# Patient Record
Sex: FEMALE | Race: WHITE | Employment: OTHER | URBAN - METROPOLITAN AREA
[De-identification: names, ages, dates, MRNs, and addresses within clinical notes are randomized per-mention and may not be internally consistent; named-entity substitution may affect disease eponyms.]

---

## 2018-08-07 ENCOUNTER — HOSPITAL ENCOUNTER (EMERGENCY)
Age: 53
Discharge: HOME | End: 2018-08-07
Payer: MEDICARE

## 2018-08-07 VITALS
TEMPERATURE: 98.4 F | DIASTOLIC BLOOD PRESSURE: 92 MMHG | SYSTOLIC BLOOD PRESSURE: 168 MMHG | HEART RATE: 83 BPM | WEIGHT: 120 LBS | RESPIRATION RATE: 15 BRPM | OXYGEN SATURATION: 95 % | HEIGHT: 62 IN | BODY MASS INDEX: 22.08 KG/M2

## 2018-08-07 DIAGNOSIS — R31.0 GROSS HEMATURIA: Primary | ICD-10-CM

## 2018-08-07 LAB
AMORPHOUS: ABNORMAL
BACTERIA: ABNORMAL /HPF
BILIRUBIN URINE: NEGATIVE
BLOOD, URINE: ABNORMAL
CLARITY: ABNORMAL
COLOR: ABNORMAL
EPITHELIAL CELLS, UA: ABNORMAL /HPF
GLUCOSE URINE: NEGATIVE MG/DL
KETONES, URINE: NEGATIVE MG/DL
LEUKOCYTE ESTERASE, URINE: NEGATIVE
NITRITE, URINE: NEGATIVE
PH UA: 7 (ref 5–9)
PROTEIN UA: NEGATIVE MG/DL
RBC UA: >20 /HPF (ref 0–2)
SPECIFIC GRAVITY UA: 1.01 (ref 1–1.03)
UROBILINOGEN, URINE: 0.2 E.U./DL
WBC UA: ABNORMAL /HPF (ref 0–5)

## 2018-08-07 PROCEDURE — 99283 EMERGENCY DEPT VISIT LOW MDM: CPT

## 2018-08-07 PROCEDURE — 87088 URINE BACTERIA CULTURE: CPT

## 2018-08-07 PROCEDURE — 81001 URINALYSIS AUTO W/SCOPE: CPT

## 2018-08-07 RX ORDER — CEFDINIR 300 MG/1
300 CAPSULE ORAL 2 TIMES DAILY
Qty: 20 CAPSULE | Refills: 0 | Status: SHIPPED | OUTPATIENT
Start: 2018-08-07 | End: 2018-08-17

## 2018-08-07 RX ORDER — GABAPENTIN 300 MG/1
300 CAPSULE ORAL 3 TIMES DAILY
Status: ON HOLD | COMMUNITY
End: 2020-01-01 | Stop reason: HOSPADM

## 2018-08-07 NOTE — ED PROVIDER NOTES
Re-examination:  8/7/18       Time: 1938    Patients symptoms show no change. All results discussed. Patient advised to follow up with urology for further evaluation and management of this condition. Consults:   None    Procedures:   none    Medical Decision Making:    Patient is well appearing, non toxic and appropriate for outpatient management. Plan is for symptom management and follow up with urology is necessary based on her UA. Patient and outpatient treatment and follow up discussed with Dr Jaime Cloud prior to the patient's discharge from the ER. No CVA ttp or flank pain on exam. No concerns for ureterolithiasis    Counseling: The emergency provider has spoken with the patient and discussed todays results, in addition to providing specific details for the plan of care and counseling regarding the diagnosis and prognosis. Questions are answered at this time and they are agreeable with the plan. Assessment      1. Gross hematuria      Plan   Disposition: Discharge to home  Patient condition is good    New Medications     New Prescriptions    CEFDINIR (OMNICEF) 300 MG CAPSULE    Take 1 capsule by mouth 2 times daily for 10 days     Electronically signed by RUDI Marti   DD: 8/7/18  **This report was transcribed using voice recognition software. Every effort was made to ensure accuracy; however, inadvertent computerized transcription errors may be present.   END OF ED PROVIDER NOTE           Dexter Early, [address]  08/07/18 1941  ATTENDING PROVIDER ATTESTATION:     Supervising Physician, on-site, available for consultation, non-participatory in the evaluation or care of this patient       Fouzia Yanez DO  08/07/18 5004

## 2018-08-07 NOTE — ED NOTES
Patient presents to er with c/o freq burning urination and blood in  Urine , patient has history of MSA, alert and oriented skin warm and dry respirations easy and non labored     Chino Hanks RN  08/07/18 7637

## 2018-08-10 LAB — URINE CULTURE, ROUTINE: NORMAL

## 2018-09-10 ENCOUNTER — HOSPITAL ENCOUNTER (OUTPATIENT)
Age: 53
Discharge: HOME | End: 2018-09-12
Payer: MEDICARE

## 2018-09-10 PROCEDURE — 87070 CULTURE OTHR SPECIMN AEROBIC: CPT

## 2018-09-10 PROCEDURE — 87075 CULTR BACTERIA EXCEPT BLOOD: CPT

## 2018-09-10 PROCEDURE — 87186 SC STD MICRODIL/AGAR DIL: CPT

## 2018-09-10 PROCEDURE — 87077 CULTURE AEROBIC IDENTIFY: CPT

## 2018-09-12 LAB — ANAEROBIC CULTURE: NORMAL

## 2018-09-13 LAB
ORGANISM: ABNORMAL
WOUND/ABSCESS: ABNORMAL
WOUND/ABSCESS: ABNORMAL

## 2018-10-22 ENCOUNTER — HOSPITAL ENCOUNTER (EMERGENCY)
Age: 53
Discharge: HOME | End: 2018-10-22
Attending: EMERGENCY MEDICINE
Payer: MEDICARE

## 2018-10-22 VITALS
HEIGHT: 62 IN | OXYGEN SATURATION: 99 % | WEIGHT: 125 LBS | HEART RATE: 76 BPM | SYSTOLIC BLOOD PRESSURE: 162 MMHG | BODY MASS INDEX: 23 KG/M2 | DIASTOLIC BLOOD PRESSURE: 78 MMHG | RESPIRATION RATE: 16 BRPM | TEMPERATURE: 98.2 F

## 2018-10-22 DIAGNOSIS — Z86.69 HX OF PARKINSON'S DISEASE: ICD-10-CM

## 2018-10-22 DIAGNOSIS — I10 UNCONTROLLED HYPERTENSION: Primary | ICD-10-CM

## 2018-10-22 LAB
ANION GAP SERPL CALCULATED.3IONS-SCNC: 10 MMOL/L (ref 7–16)
BUN BLDV-MCNC: 25 MG/DL (ref 6–20)
CALCIUM SERPL-MCNC: 10.1 MG/DL (ref 8.6–10.2)
CHLORIDE BLD-SCNC: 101 MMOL/L (ref 98–107)
CO2: 33 MMOL/L (ref 22–29)
CREAT SERPL-MCNC: 0.6 MG/DL (ref 0.5–1)
GFR AFRICAN AMERICAN: >60
GFR NON-AFRICAN AMERICAN: >60 ML/MIN/1.73
GLUCOSE BLD-MCNC: 100 MG/DL (ref 74–109)
POTASSIUM SERPL-SCNC: 3.7 MMOL/L (ref 3.5–5)
SODIUM BLD-SCNC: 144 MMOL/L (ref 132–146)

## 2018-10-22 PROCEDURE — 99283 EMERGENCY DEPT VISIT LOW MDM: CPT

## 2018-10-22 PROCEDURE — 36415 COLL VENOUS BLD VENIPUNCTURE: CPT

## 2018-10-22 PROCEDURE — 80048 BASIC METABOLIC PNL TOTAL CA: CPT

## 2018-10-22 PROCEDURE — 6360000002 HC RX W HCPCS: Performed by: EMERGENCY MEDICINE

## 2018-10-22 PROCEDURE — 6370000000 HC RX 637 (ALT 250 FOR IP): Performed by: EMERGENCY MEDICINE

## 2018-10-22 RX ORDER — FLUDROCORTISONE ACETATE 0.1 MG/1
0.1 TABLET ORAL 2 TIMES DAILY
Status: ON HOLD | COMMUNITY
End: 2020-01-01 | Stop reason: HOSPADM

## 2018-10-22 RX ORDER — ONDANSETRON 4 MG/1
8 TABLET, ORALLY DISINTEGRATING ORAL ONCE
Status: COMPLETED | OUTPATIENT
Start: 2018-10-22 | End: 2018-10-22

## 2018-10-22 RX ORDER — OXYCODONE HYDROCHLORIDE AND ACETAMINOPHEN 5; 325 MG/1; MG/1
1 TABLET ORAL ONCE
Status: DISCONTINUED | OUTPATIENT
Start: 2018-10-22 | End: 2018-10-22 | Stop reason: HOSPADM

## 2018-10-22 RX ORDER — CLONIDINE HYDROCHLORIDE 0.1 MG/1
0.1 TABLET ORAL ONCE
Status: DISCONTINUED | OUTPATIENT
Start: 2018-10-22 | End: 2018-10-22 | Stop reason: HOSPADM

## 2018-10-22 RX ORDER — CLONIDINE HYDROCHLORIDE 0.1 MG/1
0.2 TABLET ORAL ONCE
Status: COMPLETED | OUTPATIENT
Start: 2018-10-22 | End: 2018-10-22

## 2018-10-22 RX ORDER — MIDODRINE HYDROCHLORIDE 5 MG/1
5 TABLET ORAL 2 TIMES DAILY
Status: ON HOLD | COMMUNITY
End: 2020-01-01 | Stop reason: HOSPADM

## 2018-10-22 RX ADMIN — ONDANSETRON 4 MG: 4 TABLET, ORALLY DISINTEGRATING ORAL at 19:46

## 2018-10-22 RX ADMIN — CLONIDINE HYDROCHLORIDE 0.2 MG: 0.1 TABLET ORAL at 18:24

## 2018-10-22 NOTE — ED PROVIDER NOTES
HPI:  10/22/18, Time: 6:21 PM        Nacho Garrison is a 52 y.o. female presenting to the ED for elevated blood pressure readings at home, beginning 4 hours ago. The complaint has been persistent, moderate in severity, and worsened by nothing. Pt. States that her blood pressures have usually been \"low numbers\" and that for this reason, she has been prescribed medications to actually raise her blood pressure. Pt. States that she sees a specialist at the [facility] for this condition which she states is related to her Parkinson's disease. Pt. Has also chronic pain related to severe arthritis and states that this also raises her blood pressures. No other complaints, no aggravating nor relieving factors. Review of Systems:   Pertinent positives and negatives are stated within HPI, all other systems reviewed and are negative.      --------------------------------------------- PAST HISTORY ---------------------------------------------  Past Medical History:  has a past medical history of Arthritis; Depression; Falls frequently; Hypertension; Parkinson's disease ([facility] [address] and [facility] Past Surgical History:  has a past surgical history that includes Appendectomy and tumor removal.    Social History:  reports that she has never smoked. She has never used smokeless tobacco. She reports that she does not drink alcohol or use drugs. Family History: family history includes Arthritis in an other family member; Cancer in an other family member; Depression in an other family member; Heart Disease in an other family member; Hypertension in an other family member; Kidney Disease in an other family member; Stroke in an other family member. The patients home medications have been reviewed.     Allergies: Sulfa antibiotics    -------------------------------------------------- RESULTS -------------------------------------------------  All laboratory and radiology results have been personally reviewed by a few.    Counseling: The emergency provider has spoken with the patient and spouse/SO and discussed todays results, in addition to providing specific details for the plan of care and counseling regarding the diagnosis and prognosis. Questions are answered at this time and they are agreeable with the plan.    --------------------------------- IMPRESSION AND DISPOSITION ---------------------------------    IMPRESSION  1. Uncontrolled hypertension    2. Hx of Parkinson's disease        DISPOSITION  Disposition: Discharge to home  Patient condition is stable      NOTE: This report was transcribed using voice recognition software.  Every effort was made to ensure accuracy; however, inadvertent computerized transcription errors may be present       Yuriy Burk MD  10/23/18 0040

## 2018-11-09 ENCOUNTER — HOSPITAL ENCOUNTER (EMERGENCY)
Age: 53
Discharge: HOME | End: 2018-11-09
Attending: EMERGENCY MEDICINE
Payer: MEDICARE

## 2018-11-09 ENCOUNTER — APPOINTMENT (OUTPATIENT)
Dept: CT IMAGING | Age: 53
End: 2018-11-09
Payer: MEDICARE

## 2018-11-09 ENCOUNTER — APPOINTMENT (OUTPATIENT)
Dept: GENERAL RADIOLOGY | Age: 53
End: 2018-11-09
Payer: MEDICARE

## 2018-11-09 VITALS
WEIGHT: 120 LBS | TEMPERATURE: 97.6 F | SYSTOLIC BLOOD PRESSURE: 158 MMHG | BODY MASS INDEX: 19.29 KG/M2 | HEIGHT: 66 IN | HEART RATE: 96 BPM | RESPIRATION RATE: 16 BRPM | DIASTOLIC BLOOD PRESSURE: 90 MMHG | OXYGEN SATURATION: 99 %

## 2018-11-09 DIAGNOSIS — S29.019A THORACIC MYOFASCIAL STRAIN, INITIAL ENCOUNTER: ICD-10-CM

## 2018-11-09 DIAGNOSIS — S16.1XXA ACUTE STRAIN OF NECK MUSCLE, INITIAL ENCOUNTER: ICD-10-CM
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
## 2018-11-09 DIAGNOSIS — S09.90XA INJURY OF HEAD, INITIAL ENCOUNTER: Primary | ICD-10-CM

## 2018-11-09 PROCEDURE — 99284 EMERGENCY DEPT VISIT MOD MDM: CPT

## 2018-11-09 PROCEDURE — 70450 CT HEAD/BRAIN W/O DYE: CPT

## 2018-11-09 PROCEDURE — 72070 X-RAY EXAM THORAC SPINE 2VWS: CPT

## 2018-11-09 PROCEDURE — 72125 CT NECK SPINE W/O DYE: CPT

## 2018-11-09 NOTE — ED PROVIDER NOTES
family member; Cancer in an other family member; Depression in an other family member; Heart Disease in an other family member; Hypertension in an other family member; Kidney Disease in an other family member; Stroke in an other family member. The patients home medications have been reviewed. Allergies: Sulfa antibiotics        ---------------------------------------------------PHYSICAL EXAM--------------------------------------    Constitutional:  Well developed, well nourished, no acute distress, non-toxic appearance   Eyes: No scleral icterus, no conjunctival injection  HENT: Contusion, swelling to the left temporal region, no blood from ear canals, external ears normal, nose normal, oropharynx moist.  Neck: Limited range of motion and some paraspinous and spinous tenderness although patient states she has chronic neck pain  Respiratory:  No respiratory distress, no chest wall tenderness or bruising  Cardiac: Regular rate and rhythm without murmur, normal and equal bilateral radial pulses   GI:  No abdominal distention, no evidence of abdominal trauma  Musculoskeletal:  No edema, no deformities  Tender midline over the thoracic spine. Integument:  Well hydrated, no rash. Neurologic:  Alert, answers all questions appropriately ,no focal motor deficits noted. Has general weakness in her upper and lower extremities but this is chronic, has good range of motion of upper and lower extremities. Psychiatric:  Speech and behavior appropriate       -------------------------------------------------- RESULTS -------------------------------------------------  I have personally reviewed all laboratory and imaging results for this patient. Results are listed below. LABS:  No results found for this visit on 11/09/18. RADIOLOGY:  Interpreted by Radiologist.  XR THORACIC SPINE (2 VIEWS)   Final Result   No evidence of acute thoracic spine trauma. Osteopenia and mild degenerative changes are present.

## 2019-01-01 ENCOUNTER — ANESTHESIA EVENT (OUTPATIENT)
Dept: OPERATING ROOM | Age: 54
DRG: 853 | End: 2019-01-01
Payer: COMMERCIAL

## 2019-01-01 ENCOUNTER — APPOINTMENT (OUTPATIENT)
Dept: ULTRASOUND IMAGING | Age: 54
DRG: 853 | End: 2019-01-01
Payer: COMMERCIAL

## 2019-01-01 ENCOUNTER — APPOINTMENT (OUTPATIENT)
Dept: GENERAL RADIOLOGY | Age: 54
DRG: 853 | End: 2019-01-01
Payer: COMMERCIAL

## 2019-01-01 ENCOUNTER — HOSPITAL ENCOUNTER (OUTPATIENT)
Age: 54
Discharge: HOME | End: 2019-06-07
Payer: COMMERCIAL

## 2019-01-01 ENCOUNTER — ANESTHESIA (OUTPATIENT)
Dept: OPERATING ROOM | Age: 54
DRG: 853 | End: 2019-01-01
Payer: COMMERCIAL

## 2019-01-01 ENCOUNTER — APPOINTMENT (OUTPATIENT)
Dept: CT IMAGING | Age: 54
DRG: 853 | End: 2019-01-01
Payer: COMMERCIAL

## 2019-01-01 ENCOUNTER — HOSPITAL ENCOUNTER (INPATIENT)
Age: 54
LOS: 33 days | DRG: 853 | End: 2020-01-31
Attending: EMERGENCY MEDICINE | Admitting: INTERNAL MEDICINE
Payer: COMMERCIAL

## 2019-01-01 ENCOUNTER — HOSPITAL ENCOUNTER (OUTPATIENT)
Age: 54
Discharge: HOME | End: 2019-12-29
Payer: COMMERCIAL

## 2019-01-01 ENCOUNTER — HOSPITAL ENCOUNTER (OUTPATIENT)
Age: 54
Discharge: HOME | End: 2019-09-08

## 2019-01-01 VITALS — OXYGEN SATURATION: 100 % | RESPIRATION RATE: 16 BRPM

## 2019-01-01 LAB
AADO2: 164.2 MMHG
AADO2: 477.6 MMHG
AADO2: 484.2 MMHG
AADO2: 505.6 MMHG
ABO/RH: NORMAL
ALBUMIN SERPL-MCNC: 2.4 G/DL (ref 3.5–5.2)
ALBUMIN SERPL-MCNC: 2.6 G/DL (ref 3.5–5.2)
ALBUMIN SERPL-MCNC: 3.1 G/DL (ref 3.5–5.2)
ALBUMIN SERPL-MCNC: 3.2 G/DL (ref 3.5–5.2)
ALBUMIN SERPL-MCNC: 4.1 G/DL (ref 3.5–5.2)
ALP BLD-CCNC: 155 U/L (ref 35–104)
ALP BLD-CCNC: 209 U/L (ref 35–104)
ALP BLD-CCNC: 265 U/L (ref 35–104)
ALP BLD-CCNC: 338 U/L (ref 35–104)
ALP BLD-CCNC: 359 U/L (ref 35–104)
ALT SERPL-CCNC: 5 U/L (ref 0–32)
ALT SERPL-CCNC: 6 U/L (ref 0–32)
ALT SERPL-CCNC: 6 U/L (ref 0–32)
AMMONIA: 56 UMOL/L (ref 11–51)
AMMONIA: 99 UMOL/L (ref 11–51)
ANGLE (CLOT STRENGTH): 73.3 DEGREE (ref 59–74)
ANION GAP SERPL CALCULATED.3IONS-SCNC: 12 MMOL/L (ref 7–16)
ANION GAP SERPL CALCULATED.3IONS-SCNC: 14 MMOL/L (ref 7–16)
ANION GAP SERPL CALCULATED.3IONS-SCNC: 17 MMOL/L (ref 7–16)
ANISOCYTOSIS: ABNORMAL
ANTIBODY SCREEN: NORMAL
AST SERPL-CCNC: 31 U/L (ref 0–31)
AST SERPL-CCNC: 32 U/L (ref 0–31)
AST SERPL-CCNC: 39 U/L (ref 0–31)
AST SERPL-CCNC: 42 U/L (ref 0–31)
AST SERPL-CCNC: 63 U/L (ref 0–31)
B.E.: -10.7 MMOL/L (ref -3–3)
B.E.: -11.1 MMOL/L (ref -3–0)
B.E.: -4.1 MMOL/L (ref -3–3)
B.E.: -4.9 MMOL/L (ref -3–0)
B.E.: -9.3 MMOL/L (ref -3–3)
B.E.: -9.4 MMOL/L (ref -3–3)
BACTERIA: ABNORMAL /HPF
BASOPHILS ABSOLUTE: 0 E9/L (ref 0–0.2)
BASOPHILS ABSOLUTE: 0.03 E9/L (ref 0–0.2)
BASOPHILS RELATIVE PERCENT: 0 % (ref 0–2)
BASOPHILS RELATIVE PERCENT: 0 % (ref 0–2)
BASOPHILS RELATIVE PERCENT: 0.1 % (ref 0–2)
BASOPHILS RELATIVE PERCENT: 0.2 % (ref 0–2)
BASOPHILS RELATIVE PERCENT: 0.3 % (ref 0–2)
BILIRUB SERPL-MCNC: 0.2 MG/DL (ref 0–1.2)
BILIRUB SERPL-MCNC: 0.2 MG/DL (ref 0–1.2)
BILIRUB SERPL-MCNC: 0.3 MG/DL (ref 0–1.2)
BILIRUB SERPL-MCNC: 0.3 MG/DL (ref 0–1.2)
BILIRUB SERPL-MCNC: 0.4 MG/DL (ref 0–1.2)
BILIRUBIN URINE: NEGATIVE
BLOOD BANK DISPENSE STATUS: NORMAL
BLOOD BANK DISPENSE STATUS: NORMAL
BLOOD BANK PRODUCT CODE: NORMAL
BLOOD BANK PRODUCT CODE: NORMAL
BLOOD, URINE: ABNORMAL
BPU ID: NORMAL
BPU ID: NORMAL
BUN BLDV-MCNC: 49 MG/DL (ref 6–20)
BUN BLDV-MCNC: 56 MG/DL (ref 6–20)
BUN BLDV-MCNC: 59 MG/DL (ref 6–20)
BUN BLDV-MCNC: 59 MG/DL (ref 6–20)
BUN BLDV-MCNC: 60 MG/DL (ref 6–20)
BURR CELLS: ABNORMAL
CALCIUM IONIZED: 1.12 MMOL/L (ref 1.15–1.33)
CALCIUM SERPL-MCNC: 10.4 MG/DL (ref 8.6–10.2)
CALCIUM SERPL-MCNC: 7.4 MG/DL (ref 8.6–10.2)
CALCIUM SERPL-MCNC: 7.8 MG/DL (ref 8.6–10.2)
CALCIUM SERPL-MCNC: 8.5 MG/DL (ref 8.6–10.2)
CALCIUM SERPL-MCNC: 8.7 MG/DL (ref 8.6–10.2)
CARDIOPULMONARY BYPASS: NO
CARDIOPULMONARY BYPASS: NO
CHLORIDE BLD-SCNC: 105 MMOL/L (ref 98–107)
CHLORIDE BLD-SCNC: 108 MMOL/L (ref 98–107)
CHLORIDE BLD-SCNC: 108 MMOL/L (ref 98–107)
CHLORIDE BLD-SCNC: 111 MMOL/L (ref 98–107)
CHLORIDE BLD-SCNC: 99 MMOL/L (ref 98–107)
CLARITY: ABNORMAL
CO2: 16 MMOL/L (ref 22–29)
CO2: 18 MMOL/L (ref 22–29)
CO2: 20 MMOL/L (ref 22–29)
CO2: 21 MMOL/L (ref 22–29)
CO2: 27 MMOL/L (ref 22–29)
COHB: 3.1 % (ref 0–1.5)
COHB: 3.2 % (ref 0–1.5)
COHB: 3.9 % (ref 0–1.5)
COHB: 4 % (ref 0–1.5)
COLOR: YELLOW
CORTISOL TOTAL: 53.18 MCG/DL (ref 2.68–18.4)
CREAT SERPL-MCNC: 0.7 MG/DL (ref 0.5–1)
CREAT SERPL-MCNC: 0.8 MG/DL (ref 0.5–1)
CREAT SERPL-MCNC: 0.8 MG/DL (ref 0.5–1)
CREAT SERPL-MCNC: 1.1 MG/DL (ref 0.5–1)
CREAT SERPL-MCNC: 1.4 MG/DL (ref 0.5–1)
CRITICAL NOTIFICATION: YES
CRITICAL: ABNORMAL
DATE ANALYZED: ABNORMAL
DATE OF COLLECTION: ABNORMAL
DESCRIPTION BLOOD BANK: NORMAL
DESCRIPTION BLOOD BANK: NORMAL
DEVICE: ABNORMAL
DEVICE: ABNORMAL
EKG ATRIAL RATE: 78 BPM
EKG P AXIS: 9 DEGREES
EKG P-R INTERVAL: 134 MS
EKG Q-T INTERVAL: 442 MS
EKG QRS DURATION: 82 MS
EKG QTC CALCULATION (BAZETT): 503 MS
EKG R AXIS: 22 DEGREES
EKG T AXIS: 16 DEGREES
EKG VENTRICULAR RATE: 78 BPM
EOSINOPHILS ABSOLUTE: 0 E9/L (ref 0.05–0.5)
EOSINOPHILS ABSOLUTE: 0.08 E9/L (ref 0.05–0.5)
EOSINOPHILS ABSOLUTE: 0.13 E9/L (ref 0.05–0.5)
EOSINOPHILS RELATIVE PERCENT: 0 % (ref 0–6)
EOSINOPHILS RELATIVE PERCENT: 0.7 % (ref 0–6)
EOSINOPHILS RELATIVE PERCENT: 1.7 % (ref 0–6)
EPL-TEG: 0.9 % (ref 0–15)
FIO2: 45 %
FIO2: 90 %
G-TEG: 12.1 K D/SC (ref 4.5–11)
GAMMA GLUTAMYL TRANSFERASE: 24 U/L (ref 6–42)
GFR AFRICAN AMERICAN: 47
GFR AFRICAN AMERICAN: >60
GFR NON-AFRICAN AMERICAN: 39 ML/MIN/1.73
GFR NON-AFRICAN AMERICAN: 51 ML/MIN/1.73
GFR NON-AFRICAN AMERICAN: >60 ML/MIN/1.73
GLUCOSE BLD-MCNC: 119 MG/DL (ref 74–99)
GLUCOSE BLD-MCNC: 122 MG/DL (ref 74–99)
GLUCOSE BLD-MCNC: 122 MG/DL (ref 74–99)
GLUCOSE BLD-MCNC: 126 MG/DL (ref 74–99)
GLUCOSE BLD-MCNC: 136 MG/DL (ref 74–99)
GLUCOSE URINE: NEGATIVE MG/DL
GRAM STAIN ORDERABLE: NORMAL
HAV IGM SER IA-ACNC: NORMAL
HCO3 ARTERIAL: 17.6 MMOL/L (ref 22–26)
HCO3 ARTERIAL: 21.6 MMOL/L (ref 22–26)
HCO3: 15.6 MMOL/L (ref 22–26)
HCO3: 17.7 MMOL/L (ref 22–26)
HCO3: 17.9 MMOL/L (ref 22–26)
HCO3: 19.4 MMOL/L (ref 22–26)
HCT (EST): 20 % (ref 34–48)
HCT (EST): 24 % (ref 34–48)
HCT VFR BLD CALC: 22.9 % (ref 34–48)
HCT VFR BLD CALC: 24.5 % (ref 34–48)
HCT VFR BLD CALC: 24.8 % (ref 34–48)
HCT VFR BLD CALC: 26.2 % (ref 34–48)
HCT VFR BLD CALC: 27.7 % (ref 34–48)
HCT VFR BLD CALC: 28.1 % (ref 34–48)
HCT VFR BLD CALC: 36.7 % (ref 34–48)
HCT VFR BLD CALC: 36.7 % (ref 34–48)
HCT VFR BLD CALC: 37.1 % (ref 34–48)
HEMOGLOBIN: 10.6 G/DL (ref 11.5–15.5)
HEMOGLOBIN: 10.6 G/DL (ref 11.5–15.5)
HEMOGLOBIN: 11.1 G/DL (ref 11.5–15.5)
HEMOGLOBIN: 7.3 G/DL (ref 11.5–15.5)
HEMOGLOBIN: 7.4 G/DL (ref 11.5–15.5)
HEMOGLOBIN: 7.5 G/DL (ref 11.5–15.5)
HEMOGLOBIN: 8.1 G/DL (ref 11.5–15.5)
HEMOGLOBIN: 8.6 G/DL (ref 11.5–15.5)
HEMOGLOBIN: 8.8 G/DL (ref 11.5–15.5)
HEPATITIS B CORE IGM ANTIBODY: NORMAL
HEPATITIS B SURFACE ANTIGEN INTERPRETATION: NORMAL
HEPATITIS C ANTIBODY INTERPRETATION: NORMAL
HGB, (EST): 6.8 G/DL (ref 11.5–15.5)
HGB, (EST): 8.2 G/DL (ref 11.5–15.5)
HHB: 2.4 % (ref 0–5)
HHB: 2.8 % (ref 0–5)
HHB: 6.5 % (ref 0–5)
HHB: 7.8 % (ref 0–5)
HYPOCHROMIA: ABNORMAL
HYPOCHROMIA: ABNORMAL
IMMATURE GRANULOCYTES #: 0.02 E9/L
IMMATURE GRANULOCYTES #: 0.02 E9/L
IMMATURE GRANULOCYTES #: 0.06 E9/L
IMMATURE GRANULOCYTES %: 0.2 % (ref 0–5)
IMMATURE GRANULOCYTES %: 0.6 % (ref 0–5)
IMMATURE GRANULOCYTES %: 1.1 % (ref 0–5)
INFLUENZA A BY PCR: NOT DETECTED
INFLUENZA B BY PCR: NOT DETECTED
INR BLD: 1.1
K (CLOTTING TIME): 1.2 MIN (ref 1–3)
KETONES, URINE: NEGATIVE MG/DL
LAB: ABNORMAL
LACTIC ACID: 1.2 MMOL/L (ref 0.5–2.2)
LACTIC ACID: 2 MMOL/L (ref 0.5–2.2)
LACTIC ACID: 2 MMOL/L (ref 0.5–2.2)
LACTIC ACID: 3 MMOL/L (ref 0.5–2.2)
LACTIC ACID: 3.1 MMOL/L (ref 0.5–2.2)
LEUKOCYTE ESTERASE, URINE: ABNORMAL
LIPASE: 27 U/L (ref 13–60)
LY30 (FIBRINOLYSIS): 0.9 % (ref 0–8)
LYME, EIA: 0.18 LIV (ref 0–1.2)
LYMPHOCYTES ABSOLUTE: 0.22 E9/L (ref 1.5–4)
LYMPHOCYTES ABSOLUTE: 0.26 E9/L (ref 1.5–4)
LYMPHOCYTES ABSOLUTE: 0.29 E9/L (ref 1.5–4)
LYMPHOCYTES ABSOLUTE: 0.38 E9/L (ref 1.5–4)
LYMPHOCYTES ABSOLUTE: 0.6 E9/L (ref 1.5–4)
LYMPHOCYTES RELATIVE PERCENT: 4.1 % (ref 20–42)
LYMPHOCYTES RELATIVE PERCENT: 5.2 % (ref 20–42)
LYMPHOCYTES RELATIVE PERCENT: 5.4 % (ref 20–42)
LYMPHOCYTES RELATIVE PERCENT: 6.1 % (ref 20–42)
LYMPHOCYTES RELATIVE PERCENT: 7.8 % (ref 20–42)
Lab: ABNORMAL
MA (MAX AMPLITUDE): 70.8 MM (ref 50–70)
MAGNESIUM: 4.6 MG/DL (ref 1.6–2.6)
MAGNESIUM: 6.6 MG/DL (ref 1.6–2.6)
MAGNESIUM: 6.8 MG/DL (ref 1.6–2.6)
MAGNESIUM: 7 MG/DL (ref 1.6–2.6)
MCH RBC QN AUTO: 27.5 PG (ref 26–35)
MCH RBC QN AUTO: 28.5 PG (ref 26–35)
MCH RBC QN AUTO: 28.5 PG (ref 26–35)
MCH RBC QN AUTO: 28.6 PG (ref 26–35)
MCH RBC QN AUTO: 29 PG (ref 26–35)
MCH RBC QN AUTO: 29.4 PG (ref 26–35)
MCH RBC QN AUTO: 29.6 PG (ref 26–35)
MCHC RBC AUTO-ENTMCNC: 28.9 % (ref 32–34.5)
MCHC RBC AUTO-ENTMCNC: 28.9 % (ref 32–34.5)
MCHC RBC AUTO-ENTMCNC: 29.8 % (ref 32–34.5)
MCHC RBC AUTO-ENTMCNC: 29.9 % (ref 32–34.5)
MCHC RBC AUTO-ENTMCNC: 30.6 % (ref 32–34.5)
MCHC RBC AUTO-ENTMCNC: 31.3 % (ref 32–34.5)
MCHC RBC AUTO-ENTMCNC: 31.9 % (ref 32–34.5)
MCV RBC AUTO: 92.7 FL (ref 80–99.9)
MCV RBC AUTO: 93.2 FL (ref 80–99.9)
MCV RBC AUTO: 94 FL (ref 80–99.9)
MCV RBC AUTO: 95.3 FL (ref 80–99.9)
MCV RBC AUTO: 95.4 FL (ref 80–99.9)
MCV RBC AUTO: 96.9 FL (ref 80–99.9)
MCV RBC AUTO: 98.9 FL (ref 80–99.9)
METAMYELOCYTES RELATIVE PERCENT: 2.6 % (ref 0–1)
METAMYELOCYTES RELATIVE PERCENT: 3.5 % (ref 0–1)
METHB: 0 % (ref 0–1.5)
METHB: 0.1 % (ref 0–1.5)
MODE: AC
MONOCYTES ABSOLUTE: 0.32 E9/L (ref 0.1–0.95)
MONOCYTES ABSOLUTE: 0.39 E9/L (ref 0.1–0.95)
MONOCYTES ABSOLUTE: 0.44 E9/L (ref 0.1–0.95)
MONOCYTES ABSOLUTE: 0.56 E9/L (ref 0.1–0.95)
MONOCYTES ABSOLUTE: 0.82 E9/L (ref 0.1–0.95)
MONOCYTES RELATIVE PERCENT: 11.3 % (ref 2–12)
MONOCYTES RELATIVE PERCENT: 5.1 % (ref 2–12)
MONOCYTES RELATIVE PERCENT: 7.2 % (ref 2–12)
MONOCYTES RELATIVE PERCENT: 8.7 % (ref 2–12)
MONOCYTES RELATIVE PERCENT: 9.6 % (ref 2–12)
MYELOCYTE PERCENT: 0.9 % (ref 0–0)
NEUTROPHILS ABSOLUTE: 2.75 E9/L (ref 1.8–7.3)
NEUTROPHILS ABSOLUTE: 4.17 E9/L (ref 1.8–7.3)
NEUTROPHILS ABSOLUTE: 4.71 E9/L (ref 1.8–7.3)
NEUTROPHILS ABSOLUTE: 6.15 E9/L (ref 1.8–7.3)
NEUTROPHILS ABSOLUTE: 9.76 E9/L (ref 1.8–7.3)
NEUTROPHILS RELATIVE PERCENT: 79.1 % (ref 43–80)
NEUTROPHILS RELATIVE PERCENT: 80.9 % (ref 43–80)
NEUTROPHILS RELATIVE PERCENT: 82 % (ref 43–80)
NEUTROPHILS RELATIVE PERCENT: 87.6 % (ref 43–80)
NEUTROPHILS RELATIVE PERCENT: 88.3 % (ref 43–80)
NITRITE, URINE: POSITIVE
O2 CONTENT: 10.6 ML/DL
O2 CONTENT: 13.4 ML/DL
O2 CONTENT: 14.1 ML/DL
O2 CONTENT: 14.8 ML/DL
O2 SATURATION: 91.9 % (ref 92–98.5)
O2 SATURATION: 93.2 % (ref 92–98.5)
O2 SATURATION: 97.1 % (ref 92–98.5)
O2 SATURATION: 97.5 % (ref 92–98.5)
O2 SATURATION: 99 % (ref 92–98.5)
O2 SATURATION: 99.3 % (ref 92–98.5)
O2HB: 88.3 % (ref 94–97)
O2HB: 89.5 % (ref 94–97)
O2HB: 94 % (ref 94–97)
O2HB: 94.4 % (ref 94–97)
OPERATOR ID: 2067
OPERATOR ID: ABNORMAL
OVALOCYTES: ABNORMAL
PARATHYROID HORMONE INTACT: 16 PG/ML (ref 15–65)
PATIENT TEMP: 37 C
PCO2 ARTERIAL: 46.5 MMHG (ref 35–45)
PCO2 ARTERIAL: 53.7 MMHG (ref 35–45)
PCO2: 29.2 MMHG (ref 35–45)
PCO2: 30.5 MMHG (ref 35–45)
PCO2: 43.2 MMHG (ref 35–45)
PCO2: 51.8 MMHG (ref 35–45)
PDW BLD-RTO: 17.1 FL (ref 11.5–15)
PDW BLD-RTO: 17.3 FL (ref 11.5–15)
PDW BLD-RTO: 17.7 FL (ref 11.5–15)
PDW BLD-RTO: 17.8 FL (ref 11.5–15)
PDW BLD-RTO: 17.8 FL (ref 11.5–15)
PDW BLD-RTO: 18.1 FL (ref 11.5–15)
PDW BLD-RTO: 18.4 FL (ref 11.5–15)
PEEP/CPAP: 5 CMH2O
PEEP/CPAP: 7 CMH2O
PFO2: 0.77 MMHG/%
PFO2: 0.91 MMHG/%
PFO2: 1.23 MMHG/%
PFO2: 2.49 MMHG/%
PH BLOOD GAS: 7.12 (ref 7.35–7.45)
PH BLOOD GAS: 7.16 (ref 7.35–7.45)
PH BLOOD GAS: 7.23 (ref 7.35–7.45)
PH BLOOD GAS: 7.28 (ref 7.35–7.45)
PH BLOOD GAS: 7.33 (ref 7.35–7.45)
PH BLOOD GAS: 7.44 (ref 7.35–7.45)
PH UA: 6 (ref 5–9)
PHOSPHORUS: 4.6 MG/DL (ref 2.5–4.5)
PHOSPHORUS: 5.5 MG/DL (ref 2.5–4.5)
PHOSPHORUS: 6.8 MG/DL (ref 2.5–4.5)
PHOSPHORUS: 7.8 MG/DL (ref 2.5–4.5)
PLATELET # BLD: 120 E9/L (ref 130–450)
PLATELET # BLD: 121 E9/L (ref 130–450)
PLATELET # BLD: 126 E9/L (ref 130–450)
PLATELET # BLD: 143 E9/L (ref 130–450)
PLATELET # BLD: 224 E9/L (ref 130–450)
PLATELET # BLD: 227 E9/L (ref 130–450)
PLATELET # BLD: 247 E9/L (ref 130–450)
PMV BLD AUTO: 10.2 FL (ref 7–12)
PMV BLD AUTO: 10.3 FL (ref 7–12)
PMV BLD AUTO: 10.4 FL (ref 7–12)
PMV BLD AUTO: 11 FL (ref 7–12)
PMV BLD AUTO: 11.1 FL (ref 7–12)
PMV BLD AUTO: 11.4 FL (ref 7–12)
PMV BLD AUTO: 11.6 FL (ref 7–12)
PO2 ARTERIAL: 171.5 MMHG (ref 80–100)
PO2 ARTERIAL: 177 MMHG (ref 80–100)
PO2: 110.3 MMHG (ref 60–100)
PO2: 112.2 MMHG (ref 60–100)
PO2: 69.3 MMHG (ref 60–100)
PO2: 81.9 MMHG (ref 60–100)
POIKILOCYTES: ABNORMAL
POLYCHROMASIA: ABNORMAL
POTASSIUM REFLEX MAGNESIUM: 4.2 MMOL/L (ref 3.5–5)
POTASSIUM REFLEX MAGNESIUM: 4.5 MMOL/L (ref 3.5–5)
POTASSIUM REFLEX MAGNESIUM: 4.5 MMOL/L (ref 3.5–5)
POTASSIUM SERPL-SCNC: 3.6 MMOL/L (ref 3.5–5.5)
POTASSIUM SERPL-SCNC: 4.1 MMOL/L (ref 3.5–5.5)
POTASSIUM SERPL-SCNC: 4.3 MMOL/L (ref 3.5–5)
POTASSIUM SERPL-SCNC: 4.7 MMOL/L (ref 3.5–5)
PROCALCITONIN: 3.56 NG/ML (ref 0–0.08)
PROTEIN UA: NEGATIVE MG/DL
PROTHROMBIN TIME: 11.9 SEC (ref 9.3–12.4)
R (REACTION TIME): 6.8 MIN (ref 5–10)
RBC # BLD: 2.47 E12/L (ref 3.5–5.5)
RBC # BLD: 2.6 E12/L (ref 3.5–5.5)
RBC # BLD: 2.63 E12/L (ref 3.5–5.5)
RBC # BLD: 2.99 E12/L (ref 3.5–5.5)
RBC # BLD: 3.71 E12/L (ref 3.5–5.5)
RBC # BLD: 3.83 E12/L (ref 3.5–5.5)
RBC # BLD: 3.85 E12/L (ref 3.5–5.5)
RBC UA: ABNORMAL /HPF (ref 0–2)
RI(T): 1.46
RI(T): 4.33
RI(T): 5.91
RI(T): 7.3
RR MECHANICAL: 14 B/MIN
RR MECHANICAL: 16 B/MIN
RR MECHANICAL: 18 B/MIN
RR MECHANICAL: 18 B/MIN
SCHISTOCYTES: ABNORMAL
SCHISTOCYTES: ABNORMAL
SODIUM BLD-SCNC: 140 MMOL/L (ref 132–146)
SODIUM BLD-SCNC: 141 MMOL/L (ref 132–146)
SODIUM BLD-SCNC: 143 MMOL/L (ref 132–146)
SOURCE, BLOOD GAS: ABNORMAL
SPECIFIC GRAVITY UA: 1.01 (ref 1–1.03)
TARGET CELLS: ABNORMAL
THB: 10.7 G/DL (ref 11.5–16.5)
THB: 11.1 G/DL (ref 11.5–16.5)
THB: 11.1 G/DL (ref 11.5–16.5)
THB: 7.8 G/DL (ref 11.5–16.5)
TIME ANALYZED: 0
TIME ANALYZED: 2135
TIME ANALYZED: 519
TIME ANALYZED: 633
TOTAL CK: 137 U/L (ref 20–180)
TOTAL PROTEIN: 4.5 G/DL (ref 6.4–8.3)
TOTAL PROTEIN: 4.7 G/DL (ref 6.4–8.3)
TOTAL PROTEIN: 5.6 G/DL (ref 6.4–8.3)
TOTAL PROTEIN: 6 G/DL (ref 6.4–8.3)
TOTAL PROTEIN: 7 G/DL (ref 6.4–8.3)
TSH SERPL DL<=0.05 MIU/L-ACNC: 1.95 UIU/ML (ref 0.27–4.2)
UROBILINOGEN, URINE: 0.2 E.U./DL
VACUOLATED NEUTROPHILS: ABNORMAL
VT MECHANICAL: 450 ML
VT MECHANICAL: 450 ML
VT MECHANICAL: 480 ML
VT MECHANICAL: 480 ML
WBC # BLD: 11.1 E9/L (ref 4.5–11.5)
WBC # BLD: 3.4 E9/L (ref 4.5–11.5)
WBC # BLD: 4.9 E9/L (ref 4.5–11.5)
WBC # BLD: 5.4 E9/L (ref 4.5–11.5)
WBC # BLD: 5.8 E9/L (ref 4.5–11.5)
WBC # BLD: 7.5 E9/L (ref 4.5–11.5)
WBC # BLD: 8.5 E9/L (ref 4.5–11.5)
WBC UA: ABNORMAL /HPF (ref 0–5)

## 2019-01-01 PROCEDURE — 71045 X-RAY EXAM CHEST 1 VIEW: CPT

## 2019-01-01 PROCEDURE — 74018 RADEX ABDOMEN 1 VIEW: CPT

## 2019-01-01 PROCEDURE — 82140 ASSAY OF AMMONIA: CPT

## 2019-01-01 PROCEDURE — 93010 ELECTROCARDIOGRAM REPORT: CPT | Performed by: INTERNAL MEDICINE

## 2019-01-01 PROCEDURE — 6360000002 HC RX W HCPCS: Performed by: INTERNAL MEDICINE

## 2019-01-01 PROCEDURE — 2580000003 HC RX 258: Performed by: STUDENT IN AN ORGANIZED HEALTH CARE EDUCATION/TRAINING PROGRAM

## 2019-01-01 PROCEDURE — P9041 ALBUMIN (HUMAN),5%, 50ML: HCPCS | Performed by: NURSE ANESTHETIST, CERTIFIED REGISTERED

## 2019-01-01 PROCEDURE — 6370000000 HC RX 637 (ALT 250 FOR IP): Performed by: INTERNAL MEDICINE

## 2019-01-01 PROCEDURE — 6360000002 HC RX W HCPCS: Performed by: NURSE ANESTHETIST, CERTIFIED REGISTERED

## 2019-01-01 PROCEDURE — 86923 COMPATIBILITY TEST ELECTRIC: CPT

## 2019-01-01 PROCEDURE — 85610 PROTHROMBIN TIME: CPT

## 2019-01-01 PROCEDURE — 94002 VENT MGMT INPAT INIT DAY: CPT

## 2019-01-01 PROCEDURE — 85014 HEMATOCRIT: CPT

## 2019-01-01 PROCEDURE — 86901 BLOOD TYPING SEROLOGIC RH(D): CPT

## 2019-01-01 PROCEDURE — 2580000003 HC RX 258: Performed by: INTERNAL MEDICINE

## 2019-01-01 PROCEDURE — 2000000000 HC ICU R&B

## 2019-01-01 PROCEDURE — 2580000003 HC RX 258: Performed by: EMERGENCY MEDICINE

## 2019-01-01 PROCEDURE — 83735 ASSAY OF MAGNESIUM: CPT

## 2019-01-01 PROCEDURE — P9016 RBC LEUKOCYTES REDUCED: HCPCS

## 2019-01-01 PROCEDURE — 83605 ASSAY OF LACTIC ACID: CPT

## 2019-01-01 PROCEDURE — 87186 SC STD MICRODIL/AGAR DIL: CPT

## 2019-01-01 PROCEDURE — 6360000002 HC RX W HCPCS

## 2019-01-01 PROCEDURE — 96374 THER/PROPH/DIAG INJ IV PUSH: CPT

## 2019-01-01 PROCEDURE — 02HV33Z INSERTION OF INFUSION DEVICE INTO SUPERIOR VENA CAVA, PERCUTANEOUS APPROACH: ICD-10-PCS | Performed by: INTERNAL MEDICINE

## 2019-01-01 PROCEDURE — 80053 COMPREHEN METABOLIC PANEL: CPT

## 2019-01-01 PROCEDURE — 85025 COMPLETE CBC W/AUTO DIFF WBC: CPT

## 2019-01-01 PROCEDURE — 6360000002 HC RX W HCPCS: Performed by: STUDENT IN AN ORGANIZED HEALTH CARE EDUCATION/TRAINING PROGRAM

## 2019-01-01 PROCEDURE — 37799 UNLISTED PX VASCULAR SURGERY: CPT

## 2019-01-01 PROCEDURE — 85384 FIBRINOGEN ACTIVITY: CPT

## 2019-01-01 PROCEDURE — 85347 COAGULATION TIME ACTIVATED: CPT

## 2019-01-01 PROCEDURE — 99291 CRITICAL CARE FIRST HOUR: CPT | Performed by: INTERNAL MEDICINE

## 2019-01-01 PROCEDURE — 85018 HEMOGLOBIN: CPT

## 2019-01-01 PROCEDURE — 2500000003 HC RX 250 WO HCPCS: Performed by: NURSE ANESTHETIST, CERTIFIED REGISTERED

## 2019-01-01 PROCEDURE — 2500000003 HC RX 250 WO HCPCS: Performed by: INTERNAL MEDICINE

## 2019-01-01 PROCEDURE — 6370000000 HC RX 637 (ALT 250 FOR IP)

## 2019-01-01 PROCEDURE — 80074 ACUTE HEPATITIS PANEL: CPT

## 2019-01-01 PROCEDURE — 85027 COMPLETE CBC AUTOMATED: CPT

## 2019-01-01 PROCEDURE — 0DBG0ZZ EXCISION OF LEFT LARGE INTESTINE, OPEN APPROACH: ICD-10-PCS | Performed by: SURGERY

## 2019-01-01 PROCEDURE — 82533 TOTAL CORTISOL: CPT

## 2019-01-01 PROCEDURE — 87075 CULTR BACTERIA EXCEPT BLOOD: CPT

## 2019-01-01 PROCEDURE — 94003 VENT MGMT INPAT SUBQ DAY: CPT

## 2019-01-01 PROCEDURE — 96375 TX/PRO/DX INJ NEW DRUG ADDON: CPT

## 2019-01-01 PROCEDURE — 6370000000 HC RX 637 (ALT 250 FOR IP): Performed by: STUDENT IN AN ORGANIZED HEALTH CARE EDUCATION/TRAINING PROGRAM

## 2019-01-01 PROCEDURE — A0425 GROUND MILEAGE: HCPCS

## 2019-01-01 PROCEDURE — 36620 INSERTION CATHETER ARTERY: CPT

## 2019-01-01 PROCEDURE — 83970 ASSAY OF PARATHORMONE: CPT

## 2019-01-01 PROCEDURE — 84100 ASSAY OF PHOSPHORUS: CPT

## 2019-01-01 PROCEDURE — 6360000004 HC RX CONTRAST MEDICATION: Performed by: RADIOLOGY

## 2019-01-01 PROCEDURE — 3600000014 HC SURGERY LEVEL 4 ADDTL 15MIN: Performed by: SURGERY

## 2019-01-01 PROCEDURE — 87205 SMEAR GRAM STAIN: CPT

## 2019-01-01 PROCEDURE — 36415 COLL VENOUS BLD VENIPUNCTURE: CPT

## 2019-01-01 PROCEDURE — 86618 LYME DISEASE ANTIBODY: CPT

## 2019-01-01 PROCEDURE — 96361 HYDRATE IV INFUSION ADD-ON: CPT

## 2019-01-01 PROCEDURE — 99024 POSTOP FOLLOW-UP VISIT: CPT | Performed by: SURGERY

## 2019-01-01 PROCEDURE — 0D1B0Z4 BYPASS ILEUM TO CUTANEOUS, OPEN APPROACH: ICD-10-PCS | Performed by: SURGERY

## 2019-01-01 PROCEDURE — 36430 TRANSFUSION BLD/BLD COMPNT: CPT

## 2019-01-01 PROCEDURE — 99223 1ST HOSP IP/OBS HIGH 75: CPT | Performed by: SURGERY

## 2019-01-01 PROCEDURE — 0DBF0ZZ EXCISION OF RIGHT LARGE INTESTINE, OPEN APPROACH: ICD-10-PCS | Performed by: SURGERY

## 2019-01-01 PROCEDURE — 86850 RBC ANTIBODY SCREEN: CPT

## 2019-01-01 PROCEDURE — 3700000001 HC ADD 15 MINUTES (ANESTHESIA): Performed by: SURGERY

## 2019-01-01 PROCEDURE — 2500000003 HC RX 250 WO HCPCS: Performed by: EMERGENCY MEDICINE

## 2019-01-01 PROCEDURE — 2580000003 HC RX 258: Performed by: NURSE ANESTHETIST, CERTIFIED REGISTERED

## 2019-01-01 PROCEDURE — A0426 ALS 1: HCPCS

## 2019-01-01 PROCEDURE — 84145 PROCALCITONIN (PCT): CPT

## 2019-01-01 PROCEDURE — 71260 CT THORAX DX C+: CPT

## 2019-01-01 PROCEDURE — 6360000002 HC RX W HCPCS: Performed by: EMERGENCY MEDICINE

## 2019-01-01 PROCEDURE — 94799 UNLISTED PULMONARY SVC/PX: CPT

## 2019-01-01 PROCEDURE — 2500000003 HC RX 250 WO HCPCS

## 2019-01-01 PROCEDURE — 88307 TISSUE EXAM BY PATHOLOGIST: CPT

## 2019-01-01 PROCEDURE — 31500 INSERT EMERGENCY AIRWAY: CPT

## 2019-01-01 PROCEDURE — 3600000004 HC SURGERY LEVEL 4 BASE: Performed by: SURGERY

## 2019-01-01 PROCEDURE — 2709999900 HC NON-CHARGEABLE SUPPLY: Performed by: SURGERY

## 2019-01-01 PROCEDURE — 44150 REMOVAL OF COLON: CPT | Performed by: SURGERY

## 2019-01-01 PROCEDURE — 83690 ASSAY OF LIPASE: CPT

## 2019-01-01 PROCEDURE — C9113 INJ PANTOPRAZOLE SODIUM, VIA: HCPCS | Performed by: INTERNAL MEDICINE

## 2019-01-01 PROCEDURE — 93005 ELECTROCARDIOGRAM TRACING: CPT | Performed by: EMERGENCY MEDICINE

## 2019-01-01 PROCEDURE — 82805 BLOOD GASES W/O2 SATURATION: CPT

## 2019-01-01 PROCEDURE — 99285 EMERGENCY DEPT VISIT HI MDM: CPT

## 2019-01-01 PROCEDURE — 82550 ASSAY OF CK (CPK): CPT

## 2019-01-01 PROCEDURE — 3700000000 HC ANESTHESIA ATTENDED CARE: Performed by: SURGERY

## 2019-01-01 PROCEDURE — 88305 TISSUE EXAM BY PATHOLOGIST: CPT

## 2019-01-01 PROCEDURE — 2500000003 HC RX 250 WO HCPCS: Performed by: NURSE PRACTITIONER

## 2019-01-01 PROCEDURE — 87088 URINE BACTERIA CULTURE: CPT

## 2019-01-01 PROCEDURE — 86900 BLOOD TYPING SEROLOGIC ABO: CPT

## 2019-01-01 PROCEDURE — 2720000010 HC SURG SUPPLY STERILE: Performed by: SURGERY

## 2019-01-01 PROCEDURE — 81001 URINALYSIS AUTO W/SCOPE: CPT

## 2019-01-01 PROCEDURE — 87070 CULTURE OTHR SPECIMN AEROBIC: CPT

## 2019-01-01 PROCEDURE — 2700000000 HC OXYGEN THERAPY PER DAY

## 2019-01-01 PROCEDURE — 82330 ASSAY OF CALCIUM: CPT

## 2019-01-01 PROCEDURE — C9113 INJ PANTOPRAZOLE SODIUM, VIA: HCPCS | Performed by: STUDENT IN AN ORGANIZED HEALTH CARE EDUCATION/TRAINING PROGRAM

## 2019-01-01 PROCEDURE — 87040 BLOOD CULTURE FOR BACTERIA: CPT

## 2019-01-01 PROCEDURE — 82803 BLOOD GASES ANY COMBINATION: CPT

## 2019-01-01 PROCEDURE — 2500000003 HC RX 250 WO HCPCS: Performed by: STUDENT IN AN ORGANIZED HEALTH CARE EDUCATION/TRAINING PROGRAM

## 2019-01-01 PROCEDURE — 84443 ASSAY THYROID STIM HORMONE: CPT

## 2019-01-01 PROCEDURE — 85576 BLOOD PLATELET AGGREGATION: CPT

## 2019-01-01 PROCEDURE — 82977 ASSAY OF GGT: CPT

## 2019-01-01 PROCEDURE — 74177 CT ABD & PELVIS W/CONTRAST: CPT

## 2019-01-01 PROCEDURE — 87502 INFLUENZA DNA AMP PROBE: CPT

## 2019-01-01 RX ORDER — LIDOCAINE HYDROCHLORIDE 10 MG/ML
INJECTION, SOLUTION EPIDURAL; INFILTRATION; INTRACAUDAL; PERINEURAL
Status: COMPLETED
Start: 2019-01-01 | End: 2019-01-01

## 2019-01-01 RX ORDER — 0.9 % SODIUM CHLORIDE 0.9 %
250 INTRAVENOUS SOLUTION INTRAVENOUS ONCE
Status: COMPLETED | OUTPATIENT
Start: 2019-01-01 | End: 2019-01-01

## 2019-01-01 RX ORDER — SODIUM CHLORIDE 0.9 % (FLUSH) 0.9 %
10 SYRINGE (ML) INJECTION EVERY 12 HOURS SCHEDULED
Status: DISCONTINUED | OUTPATIENT
Start: 2019-01-01 | End: 2020-01-01 | Stop reason: HOSPADM

## 2019-01-01 RX ORDER — ONDANSETRON 2 MG/ML
4 INJECTION INTRAMUSCULAR; INTRAVENOUS EVERY 6 HOURS PRN
Status: DISCONTINUED | OUTPATIENT
Start: 2019-01-01 | End: 2019-01-01

## 2019-01-01 RX ORDER — 0.9 % SODIUM CHLORIDE 0.9 %
30 INTRAVENOUS SOLUTION INTRAVENOUS ONCE
Status: COMPLETED | OUTPATIENT
Start: 2019-01-01 | End: 2019-01-01

## 2019-01-01 RX ORDER — MAGNESIUM OXIDE 400 MG/1
400 TABLET ORAL DAILY
Status: ON HOLD | COMMUNITY
End: 2020-01-01 | Stop reason: HOSPADM

## 2019-01-01 RX ORDER — MIDAZOLAM HYDROCHLORIDE 1 MG/ML
INJECTION INTRAMUSCULAR; INTRAVENOUS PRN
Status: DISCONTINUED | OUTPATIENT
Start: 2019-01-01 | End: 2019-01-01 | Stop reason: SDUPTHER

## 2019-01-01 RX ORDER — MIDAZOLAM HYDROCHLORIDE 1 MG/ML
2 INJECTION INTRAMUSCULAR; INTRAVENOUS ONCE
Status: COMPLETED | OUTPATIENT
Start: 2019-01-01 | End: 2019-01-01

## 2019-01-01 RX ORDER — SODIUM CHLORIDE, SODIUM LACTATE, POTASSIUM CHLORIDE, CALCIUM CHLORIDE 600; 310; 30; 20 MG/100ML; MG/100ML; MG/100ML; MG/100ML
INJECTION, SOLUTION INTRAVENOUS CONTINUOUS
Status: DISCONTINUED | OUTPATIENT
Start: 2019-01-01 | End: 2019-01-01 | Stop reason: SDUPTHER

## 2019-01-01 RX ORDER — ROCURONIUM BROMIDE 10 MG/ML
INJECTION, SOLUTION INTRAVENOUS PRN
Status: DISCONTINUED | OUTPATIENT
Start: 2019-01-01 | End: 2019-01-01 | Stop reason: SDUPTHER

## 2019-01-01 RX ORDER — SODIUM CHLORIDE 9 MG/ML
10 INJECTION INTRAVENOUS DAILY
Status: DISCONTINUED | OUTPATIENT
Start: 2019-01-01 | End: 2019-01-01

## 2019-01-01 RX ORDER — SODIUM CHLORIDE, SODIUM LACTATE, POTASSIUM CHLORIDE, CALCIUM CHLORIDE 600; 310; 30; 20 MG/100ML; MG/100ML; MG/100ML; MG/100ML
INJECTION, SOLUTION INTRAVENOUS CONTINUOUS
Status: DISCONTINUED | OUTPATIENT
Start: 2019-01-01 | End: 2020-01-01

## 2019-01-01 RX ORDER — OXYMETAZOLINE HYDROCHLORIDE 0.05 G/100ML
2 SPRAY NASAL ONCE
Status: DISCONTINUED | OUTPATIENT
Start: 2019-01-01 | End: 2019-01-01

## 2019-01-01 RX ORDER — MIDAZOLAM HYDROCHLORIDE 1 MG/ML
INJECTION INTRAMUSCULAR; INTRAVENOUS
Status: COMPLETED
Start: 2019-01-01 | End: 2019-01-01

## 2019-01-01 RX ORDER — SODIUM PHOSPHATE, DIBASIC AND SODIUM PHOSPHATE, MONOBASIC 7; 19 G/133ML; G/133ML
1 ENEMA RECTAL
Status: DISCONTINUED | OUTPATIENT
Start: 2019-01-01 | End: 2019-01-01

## 2019-01-01 RX ORDER — SODIUM CHLORIDE 9 MG/ML
INJECTION, SOLUTION INTRAVENOUS CONTINUOUS PRN
Status: DISCONTINUED | OUTPATIENT
Start: 2019-01-01 | End: 2019-01-01 | Stop reason: SDUPTHER

## 2019-01-01 RX ORDER — SODIUM CHLORIDE 9 MG/ML
10 INJECTION INTRAVENOUS DAILY
Status: DISCONTINUED | OUTPATIENT
Start: 2019-01-01 | End: 2020-01-01 | Stop reason: HOSPADM

## 2019-01-01 RX ORDER — 0.9 % SODIUM CHLORIDE 0.9 %
1000 INTRAVENOUS SOLUTION INTRAVENOUS ONCE
Status: COMPLETED | OUTPATIENT
Start: 2019-01-01 | End: 2019-01-01

## 2019-01-01 RX ORDER — ACETAMINOPHEN 325 MG/1
650 TABLET ORAL EVERY 4 HOURS PRN
Status: DISCONTINUED | OUTPATIENT
Start: 2019-01-01 | End: 2019-01-01

## 2019-01-01 RX ORDER — 0.9 % SODIUM CHLORIDE 0.9 %
250 INTRAVENOUS SOLUTION INTRAVENOUS ONCE
Status: DISCONTINUED | OUTPATIENT
Start: 2019-01-01 | End: 2019-01-01 | Stop reason: SDUPTHER

## 2019-01-01 RX ORDER — FENTANYL CITRATE 50 UG/ML
INJECTION, SOLUTION INTRAMUSCULAR; INTRAVENOUS
Status: COMPLETED
Start: 2019-01-01 | End: 2019-01-01

## 2019-01-01 RX ORDER — LEVETIRACETAM 10 MG/ML
1000 INJECTION INTRAVASCULAR EVERY 12 HOURS
Status: DISCONTINUED | OUTPATIENT
Start: 2019-01-01 | End: 2020-01-01 | Stop reason: HOSPADM

## 2019-01-01 RX ORDER — SODIUM CHLORIDE, SODIUM LACTATE, POTASSIUM CHLORIDE, AND CALCIUM CHLORIDE .6; .31; .03; .02 G/100ML; G/100ML; G/100ML; G/100ML
1000 INJECTION, SOLUTION INTRAVENOUS ONCE
Status: COMPLETED | OUTPATIENT
Start: 2019-01-01 | End: 2019-01-01

## 2019-01-01 RX ORDER — FLUDROCORTISONE ACETATE 0.1 MG/1
0.1 TABLET ORAL 2 TIMES DAILY
Status: DISCONTINUED | OUTPATIENT
Start: 2019-01-01 | End: 2020-01-01

## 2019-01-01 RX ORDER — MIDODRINE HYDROCHLORIDE 5 MG/1
5 TABLET ORAL 2 TIMES DAILY
Status: DISCONTINUED | OUTPATIENT
Start: 2019-01-01 | End: 2020-01-01

## 2019-01-01 RX ORDER — PANTOPRAZOLE SODIUM 40 MG/1
40 TABLET, DELAYED RELEASE ORAL DAILY
Status: ON HOLD | COMMUNITY
End: 2020-01-01 | Stop reason: HOSPADM

## 2019-01-01 RX ORDER — SODIUM CHLORIDE 0.9 % (FLUSH) 0.9 %
10 SYRINGE (ML) INJECTION PRN
Status: DISCONTINUED | OUTPATIENT
Start: 2019-01-01 | End: 2020-01-01 | Stop reason: HOSPADM

## 2019-01-01 RX ORDER — AMOXICILLIN 250 MG
2 CAPSULE ORAL DAILY
Status: DISCONTINUED | OUTPATIENT
Start: 2019-01-01 | End: 2019-01-01

## 2019-01-01 RX ORDER — PANTOPRAZOLE SODIUM 40 MG/10ML
40 INJECTION, POWDER, LYOPHILIZED, FOR SOLUTION INTRAVENOUS DAILY
Status: DISCONTINUED | OUTPATIENT
Start: 2019-01-01 | End: 2020-01-01 | Stop reason: HOSPADM

## 2019-01-01 RX ORDER — DIMETHICONE, OXYBENZONE, AND PADIMATE O 2; 2.5; 6.6 G/100G; G/100G; G/100G
STICK TOPICAL
Status: COMPLETED
Start: 2019-01-01 | End: 2019-01-01

## 2019-01-01 RX ORDER — DEXAMETHASONE SODIUM PHOSPHATE 10 MG/ML
INJECTION INTRAMUSCULAR; INTRAVENOUS PRN
Status: DISCONTINUED | OUTPATIENT
Start: 2019-01-01 | End: 2019-01-01 | Stop reason: SDUPTHER

## 2019-01-01 RX ORDER — SODIUM CHLORIDE 9 MG/ML
INJECTION, SOLUTION INTRAVENOUS CONTINUOUS
Status: DISCONTINUED | OUTPATIENT
Start: 2019-01-01 | End: 2019-01-01

## 2019-01-01 RX ORDER — FENTANYL CITRATE 50 UG/ML
100 INJECTION, SOLUTION INTRAMUSCULAR; INTRAVENOUS ONCE
Status: COMPLETED | OUTPATIENT
Start: 2019-01-01 | End: 2019-01-01

## 2019-01-01 RX ORDER — LIDOCAINE HYDROCHLORIDE 20 MG/ML
JELLY TOPICAL ONCE
Status: DISCONTINUED | OUTPATIENT
Start: 2019-01-01 | End: 2020-01-01 | Stop reason: HOSPADM

## 2019-01-01 RX ORDER — 0.9 % SODIUM CHLORIDE 0.9 %
500 INTRAVENOUS SOLUTION INTRAVENOUS ONCE
Status: COMPLETED | OUTPATIENT
Start: 2019-01-01 | End: 2019-01-01

## 2019-01-01 RX ORDER — ACETAMINOPHEN 160 MG/5ML
650 SOLUTION ORAL EVERY 6 HOURS PRN
Status: DISCONTINUED | OUTPATIENT
Start: 2019-01-01 | End: 2020-01-01 | Stop reason: HOSPADM

## 2019-01-01 RX ORDER — PANTOPRAZOLE SODIUM 40 MG/10ML
40 INJECTION, POWDER, LYOPHILIZED, FOR SOLUTION INTRAVENOUS DAILY
Status: DISCONTINUED | OUTPATIENT
Start: 2019-01-01 | End: 2019-01-01

## 2019-01-01 RX ORDER — LACTULOSE 10 G/15ML
30 SOLUTION ORAL 3 TIMES DAILY
Status: DISCONTINUED | OUTPATIENT
Start: 2019-01-01 | End: 2019-01-01

## 2019-01-01 RX ORDER — SODIUM PHOSPHATE, DIBASIC AND SODIUM PHOSPHATE, MONOBASIC 7; 19 G/133ML; G/133ML
1 ENEMA RECTAL ONCE
Status: COMPLETED | OUTPATIENT
Start: 2019-01-01 | End: 2019-01-01

## 2019-01-01 RX ORDER — GLYCOPYRROLATE 1 MG/5 ML
SYRINGE (ML) INTRAVENOUS PRN
Status: DISCONTINUED | OUTPATIENT
Start: 2019-01-01 | End: 2019-01-01 | Stop reason: SDUPTHER

## 2019-01-01 RX ORDER — CHLORHEXIDINE GLUCONATE 0.12 MG/ML
15 RINSE ORAL 2 TIMES DAILY
Status: DISCONTINUED | OUTPATIENT
Start: 2019-01-01 | End: 2020-01-01 | Stop reason: ALTCHOICE

## 2019-01-01 RX ORDER — PROPOFOL 10 MG/ML
INJECTION, EMULSION INTRAVENOUS PRN
Status: DISCONTINUED | OUTPATIENT
Start: 2019-01-01 | End: 2019-01-01 | Stop reason: SDUPTHER

## 2019-01-01 RX ORDER — PROPOFOL 10 MG/ML
10 INJECTION, EMULSION INTRAVENOUS
Status: DISCONTINUED | OUTPATIENT
Start: 2019-01-01 | End: 2019-01-01 | Stop reason: ALTCHOICE

## 2019-01-01 RX ORDER — ONDANSETRON 2 MG/ML
INJECTION INTRAMUSCULAR; INTRAVENOUS PRN
Status: DISCONTINUED | OUTPATIENT
Start: 2019-01-01 | End: 2019-01-01 | Stop reason: SDUPTHER

## 2019-01-01 RX ORDER — POTASSIUM ACETATE 3.93 G/20ML
INJECTION, SOLUTION, CONCENTRATE INTRAVENOUS DAILY
Status: ON HOLD | COMMUNITY
End: 2020-01-01 | Stop reason: HOSPADM

## 2019-01-01 RX ORDER — FENTANYL CITRATE 50 UG/ML
INJECTION, SOLUTION INTRAMUSCULAR; INTRAVENOUS PRN
Status: DISCONTINUED | OUTPATIENT
Start: 2019-01-01 | End: 2019-01-01 | Stop reason: SDUPTHER

## 2019-01-01 RX ORDER — NEOSTIGMINE METHYLSULFATE 1 MG/ML
INJECTION, SOLUTION INTRAVENOUS PRN
Status: DISCONTINUED | OUTPATIENT
Start: 2019-01-01 | End: 2019-01-01 | Stop reason: SDUPTHER

## 2019-01-01 RX ORDER — ALBUMIN, HUMAN INJ 5% 5 %
SOLUTION INTRAVENOUS PRN
Status: DISCONTINUED | OUTPATIENT
Start: 2019-01-01 | End: 2019-01-01 | Stop reason: SDUPTHER

## 2019-01-01 RX ADMIN — SODIUM CHLORIDE, POTASSIUM CHLORIDE, SODIUM LACTATE AND CALCIUM CHLORIDE: 600; 310; 30; 20 INJECTION, SOLUTION INTRAVENOUS at 06:16

## 2019-01-01 RX ADMIN — SODIUM CHLORIDE, POTASSIUM CHLORIDE, SODIUM LACTATE AND CALCIUM CHLORIDE: 600; 310; 30; 20 INJECTION, SOLUTION INTRAVENOUS at 22:12

## 2019-01-01 RX ADMIN — SODIUM BICARBONATE 50 MEQ: 84 INJECTION, SOLUTION INTRAVENOUS at 11:13

## 2019-01-01 RX ADMIN — Medication 10 ML: at 08:54

## 2019-01-01 RX ADMIN — ALBUMIN (HUMAN) 25 G: 12.5 INJECTION, SOLUTION INTRAVENOUS at 10:04

## 2019-01-01 RX ADMIN — IOHEXOL 50 ML: 240 INJECTION, SOLUTION INTRATHECAL; INTRAVASCULAR; INTRAVENOUS; ORAL at 15:29

## 2019-01-01 RX ADMIN — Medication 10 ML: at 20:56

## 2019-01-01 RX ADMIN — CEFTRIAXONE 2 G: 2 INJECTION, POWDER, FOR SOLUTION INTRAMUSCULAR; INTRAVENOUS at 10:18

## 2019-01-01 RX ADMIN — HYDROCORTISONE SODIUM SUCCINATE 100 MG: 100 INJECTION, POWDER, FOR SOLUTION INTRAMUSCULAR; INTRAVENOUS at 04:31

## 2019-01-01 RX ADMIN — SODIUM CHLORIDE 1000 ML: 9 INJECTION, SOLUTION INTRAVENOUS at 20:36

## 2019-01-01 RX ADMIN — Medication 3 MG: at 12:05

## 2019-01-01 RX ADMIN — PROPOFOL 20 MCG/KG/MIN: 10 INJECTION, EMULSION INTRAVENOUS at 21:39

## 2019-01-01 RX ADMIN — NOREPINEPHRINE BITARTRATE 4 MCG/MIN: 1 INJECTION INTRAVENOUS at 10:42

## 2019-01-01 RX ADMIN — Medication: at 14:28

## 2019-01-01 RX ADMIN — SODIUM CHLORIDE: 9 INJECTION, SOLUTION INTRAVENOUS at 09:45

## 2019-01-01 RX ADMIN — FENTANYL CITRATE 50 MCG: 50 INJECTION, SOLUTION INTRAMUSCULAR; INTRAVENOUS at 09:47

## 2019-01-01 RX ADMIN — FENTANYL CITRATE 100 MCG: 50 INJECTION, SOLUTION INTRAMUSCULAR; INTRAVENOUS at 06:48

## 2019-01-01 RX ADMIN — Medication 10 ML: at 21:03

## 2019-01-01 RX ADMIN — PIPERACILLIN AND TAZOBACTAM 3.38 G: 3; .375 INJECTION, POWDER, LYOPHILIZED, FOR SOLUTION INTRAVENOUS at 20:26

## 2019-01-01 RX ADMIN — LACTULOSE 30 G: 20 SOLUTION ORAL at 01:03

## 2019-01-01 RX ADMIN — SODIUM CHLORIDE, PRESERVATIVE FREE 10 ML: 5 INJECTION INTRAVENOUS at 12:11

## 2019-01-01 RX ADMIN — HYDROCORTISONE SODIUM SUCCINATE 100 MG: 100 INJECTION, POWDER, FOR SOLUTION INTRAMUSCULAR; INTRAVENOUS at 05:29

## 2019-01-01 RX ADMIN — PIPERACILLIN AND TAZOBACTAM 3.38 G: 3; .375 INJECTION, POWDER, LYOPHILIZED, FOR SOLUTION INTRAVENOUS at 20:56

## 2019-01-01 RX ADMIN — LEVETIRACETAM 1000 MG: 10 INJECTION, SOLUTION INTRAVENOUS at 19:00

## 2019-01-01 RX ADMIN — SODIUM CHLORIDE, POTASSIUM CHLORIDE, SODIUM LACTATE AND CALCIUM CHLORIDE 1000 ML: 600; 310; 30; 20 INJECTION, SOLUTION INTRAVENOUS at 20:48

## 2019-01-01 RX ADMIN — PIPERACILLIN AND TAZOBACTAM 3.38 G: 3; .375 INJECTION, POWDER, LYOPHILIZED, FOR SOLUTION INTRAVENOUS at 12:08

## 2019-01-01 RX ADMIN — PROPOFOL 10 MCG/KG/MIN: 10 INJECTION, EMULSION INTRAVENOUS at 20:16

## 2019-01-01 RX ADMIN — FENTANYL CITRATE 50 MCG: 50 INJECTION, SOLUTION INTRAMUSCULAR; INTRAVENOUS at 10:25

## 2019-01-01 RX ADMIN — MIDAZOLAM 2 MG: 1 INJECTION INTRAMUSCULAR; INTRAVENOUS at 12:09

## 2019-01-01 RX ADMIN — HYDROCORTISONE SODIUM SUCCINATE 100 MG: 100 INJECTION, POWDER, FOR SOLUTION INTRAMUSCULAR; INTRAVENOUS at 20:26

## 2019-01-01 RX ADMIN — Medication 0.6 MG: at 12:05

## 2019-01-01 RX ADMIN — ROCURONIUM BROMIDE 10 MG: 10 INJECTION, SOLUTION INTRAVENOUS at 10:00

## 2019-01-01 RX ADMIN — SODIUM CHLORIDE: 9 INJECTION, SOLUTION INTRAVENOUS at 11:41

## 2019-01-01 RX ADMIN — DEXAMETHASONE SODIUM PHOSPHATE 10 MG: 10 INJECTION INTRAMUSCULAR; INTRAVENOUS at 09:50

## 2019-01-01 RX ADMIN — CARBIDOPA AND LEVODOPA 2 TABLET: 25; 100 TABLET ORAL at 11:23

## 2019-01-01 RX ADMIN — MIDAZOLAM 2 MG: 1 INJECTION INTRAMUSCULAR; INTRAVENOUS at 19:46

## 2019-01-01 RX ADMIN — FENTANYL CITRATE 50 MCG: 50 INJECTION, SOLUTION INTRAMUSCULAR; INTRAVENOUS at 10:50

## 2019-01-01 RX ADMIN — CARBIDOPA AND LEVODOPA 2 TABLET: 25; 100 TABLET ORAL at 19:00

## 2019-01-01 RX ADMIN — HYDROCORTISONE SODIUM SUCCINATE 100 MG: 100 INJECTION, POWDER, FOR SOLUTION INTRAMUSCULAR; INTRAVENOUS at 17:59

## 2019-01-01 RX ADMIN — CHLORHEXIDINE GLUCONATE 0.12% ORAL RINSE 15 ML: 1.2 LIQUID ORAL at 20:29

## 2019-01-01 RX ADMIN — LEVETIRACETAM 1000 MG: 10 INJECTION, SOLUTION INTRAVENOUS at 18:32

## 2019-01-01 RX ADMIN — Medication 50 MCG/HR: at 20:42

## 2019-01-01 RX ADMIN — SODIUM BICARBONATE 50 MEQ: 84 INJECTION, SOLUTION INTRAVENOUS at 11:17

## 2019-01-01 RX ADMIN — CHLORHEXIDINE GLUCONATE 0.12% ORAL RINSE 15 ML: 1.2 LIQUID ORAL at 20:55

## 2019-01-01 RX ADMIN — SODIUM CHLORIDE 500 ML: 9 INJECTION, SOLUTION INTRAVENOUS at 06:32

## 2019-01-01 RX ADMIN — ENOXAPARIN SODIUM 40 MG: 40 INJECTION SUBCUTANEOUS at 16:48

## 2019-01-01 RX ADMIN — CHLORHEXIDINE GLUCONATE 0.12% ORAL RINSE 15 ML: 1.2 LIQUID ORAL at 08:53

## 2019-01-01 RX ADMIN — PANTOPRAZOLE SODIUM 40 MG: 40 INJECTION, POWDER, FOR SOLUTION INTRAVENOUS at 14:10

## 2019-01-01 RX ADMIN — ACETAMINOPHEN ORAL SOLUTION 650 MG: 650 SOLUTION ORAL at 22:26

## 2019-01-01 RX ADMIN — SODIUM CHLORIDE, POTASSIUM CHLORIDE, SODIUM LACTATE AND CALCIUM CHLORIDE: 600; 310; 30; 20 INJECTION, SOLUTION INTRAVENOUS at 20:12

## 2019-01-01 RX ADMIN — CALCIUM GLUCONATE 1 G: 98 INJECTION, SOLUTION INTRAVENOUS at 09:43

## 2019-01-01 RX ADMIN — LIDOCAINE HYDROCHLORIDE 5 ML: 10 INJECTION, SOLUTION EPIDURAL; INFILTRATION; INTRACAUDAL; PERINEURAL at 20:49

## 2019-01-01 RX ADMIN — ONDANSETRON HYDROCHLORIDE 4 MG: 2 INJECTION, SOLUTION INTRAMUSCULAR; INTRAVENOUS at 12:04

## 2019-01-01 RX ADMIN — ACETAMINOPHEN ORAL SOLUTION 650 MG: 650 SOLUTION ORAL at 00:45

## 2019-01-01 RX ADMIN — FLUDROCORTISONE ACETATE 0.1 MG: 0.1 TABLET ORAL at 08:53

## 2019-01-01 RX ADMIN — FLUDROCORTISONE ACETATE 0.1 MG: 0.1 TABLET ORAL at 22:26

## 2019-01-01 RX ADMIN — CARBIDOPA AND LEVODOPA 2 TABLET: 25; 100 TABLET ORAL at 22:26

## 2019-01-01 RX ADMIN — FENTANYL CITRATE 50 MCG: 50 INJECTION, SOLUTION INTRAMUSCULAR; INTRAVENOUS at 10:31

## 2019-01-01 RX ADMIN — HYDROCORTISONE SODIUM SUCCINATE 100 MG: 100 INJECTION, POWDER, FOR SOLUTION INTRAMUSCULAR; INTRAVENOUS at 14:02

## 2019-01-01 RX ADMIN — SODIUM CHLORIDE, POTASSIUM CHLORIDE, SODIUM LACTATE AND CALCIUM CHLORIDE: 600; 310; 30; 20 INJECTION, SOLUTION INTRAVENOUS at 14:01

## 2019-01-01 RX ADMIN — SODIUM CHLORIDE 250 ML: 9 INJECTION, SOLUTION INTRAVENOUS at 06:53

## 2019-01-01 RX ADMIN — LEVETIRACETAM 1000 MG: 10 INJECTION, SOLUTION INTRAVENOUS at 06:09

## 2019-01-01 RX ADMIN — Medication 50 MCG/HR: at 06:48

## 2019-01-01 RX ADMIN — SODIUM CHLORIDE 1000 ML: 9 INJECTION, SOLUTION INTRAVENOUS at 09:45

## 2019-01-01 RX ADMIN — PIPERACILLIN AND TAZOBACTAM 3.38 G: 3; .375 INJECTION, POWDER, LYOPHILIZED, FOR SOLUTION INTRAVENOUS at 05:29

## 2019-01-01 RX ADMIN — SODIUM PHOSPHATE, DIBASIC AND SODIUM PHOSPHATE, MONOBASIC 1 ENEMA: 7; 19 ENEMA RECTAL at 01:04

## 2019-01-01 RX ADMIN — LEVETIRACETAM 1000 MG: 10 INJECTION, SOLUTION INTRAVENOUS at 20:49

## 2019-01-01 RX ADMIN — SODIUM CHLORIDE, POTASSIUM CHLORIDE, SODIUM LACTATE AND CALCIUM CHLORIDE: 600; 310; 30; 20 INJECTION, SOLUTION INTRAVENOUS at 16:36

## 2019-01-01 RX ADMIN — Medication 11 MCG/MIN: at 06:24

## 2019-01-01 RX ADMIN — PROPOFOL 30 MCG/KG/MIN: 10 INJECTION, EMULSION INTRAVENOUS at 06:16

## 2019-01-01 RX ADMIN — MIDAZOLAM 2 MG: 1 INJECTION INTRAMUSCULAR; INTRAVENOUS at 19:49

## 2019-01-01 RX ADMIN — SODIUM CHLORIDE, PRESERVATIVE FREE 10 ML: 5 INJECTION INTRAVENOUS at 17:59

## 2019-01-01 RX ADMIN — FENTANYL CITRATE 50 MCG: 50 INJECTION, SOLUTION INTRAMUSCULAR; INTRAVENOUS at 11:19

## 2019-01-01 RX ADMIN — CARBIDOPA AND LEVODOPA 2 TABLET: 25; 100 TABLET ORAL at 16:22

## 2019-01-01 RX ADMIN — PANTOPRAZOLE SODIUM 40 MG: 40 INJECTION, POWDER, FOR SOLUTION INTRAVENOUS at 16:49

## 2019-01-01 RX ADMIN — PIPERACILLIN AND TAZOBACTAM 3.38 G: 3; .375 INJECTION, POWDER, LYOPHILIZED, FOR SOLUTION INTRAVENOUS at 21:06

## 2019-01-01 RX ADMIN — MIDAZOLAM HYDROCHLORIDE 2 MG: 1 INJECTION INTRAMUSCULAR; INTRAVENOUS at 19:46

## 2019-01-01 RX ADMIN — HYDROCORTISONE SODIUM SUCCINATE 100 MG: 100 INJECTION, POWDER, FOR SOLUTION INTRAMUSCULAR; INTRAVENOUS at 21:00

## 2019-01-01 RX ADMIN — PIPERACILLIN AND TAZOBACTAM 3.38 G: 3; .375 INJECTION, POWDER, LYOPHILIZED, FOR SOLUTION INTRAVENOUS at 14:10

## 2019-01-01 RX ADMIN — SODIUM CHLORIDE 10 ML: 9 INJECTION INTRAMUSCULAR; INTRAVENOUS; SUBCUTANEOUS at 16:49

## 2019-01-01 RX ADMIN — ROCURONIUM BROMIDE 40 MG: 10 INJECTION, SOLUTION INTRAVENOUS at 09:47

## 2019-01-01 RX ADMIN — LEVETIRACETAM 1000 MG: 10 INJECTION, SOLUTION INTRAVENOUS at 06:55

## 2019-01-01 RX ADMIN — PROPOFOL 50 MG: 10 INJECTION, EMULSION INTRAVENOUS at 11:55

## 2019-01-01 RX ADMIN — IOPAMIDOL 110 ML: 755 INJECTION, SOLUTION INTRAVENOUS at 23:15

## 2019-01-01 RX ADMIN — PIPERACILLIN AND TAZOBACTAM 3.38 G: 3; .375 INJECTION, POWDER, LYOPHILIZED, FOR SOLUTION INTRAVENOUS at 04:31

## 2019-01-01 RX ADMIN — ENOXAPARIN SODIUM 40 MG: 40 INJECTION SUBCUTANEOUS at 08:53

## 2019-01-01 RX ADMIN — Medication 10 ML: at 20:29

## 2019-01-01 RX ADMIN — PANTOPRAZOLE SODIUM 40 MG: 40 INJECTION, POWDER, FOR SOLUTION INTRAVENOUS at 08:53

## 2019-01-01 ASSESSMENT — PULMONARY FUNCTION TESTS
PIF_VALUE: 23
PIF_VALUE: 23
PIF_VALUE: 18
PIF_VALUE: 30
PIF_VALUE: 19
PIF_VALUE: 18
PIF_VALUE: 20
PIF_VALUE: 19
PIF_VALUE: 25
PIF_VALUE: 18
PIF_VALUE: 25
PIF_VALUE: 18
PIF_VALUE: 18
PIF_VALUE: 20
PIF_VALUE: 24
PIF_VALUE: 23
PIF_VALUE: 19
PIF_VALUE: 19
PIF_VALUE: 18
PIF_VALUE: 28
PIF_VALUE: 18
PIF_VALUE: 18
PIF_VALUE: 20
PIF_VALUE: 19
PIF_VALUE: 27
PIF_VALUE: 21
PIF_VALUE: 18
PIF_VALUE: 26
PIF_VALUE: 27
PIF_VALUE: 25
PIF_VALUE: 18
PIF_VALUE: 32
PIF_VALUE: 20
PIF_VALUE: 18
PIF_VALUE: 19
PIF_VALUE: 18
PIF_VALUE: 25
PIF_VALUE: 22
PIF_VALUE: 18
PIF_VALUE: 19
PIF_VALUE: 18
PIF_VALUE: 23
PIF_VALUE: 18
PIF_VALUE: 18
PIF_VALUE: 22
PIF_VALUE: 19
PIF_VALUE: 18
PIF_VALUE: 18
PIF_VALUE: 20
PIF_VALUE: 24
PIF_VALUE: 18
PIF_VALUE: 20
PIF_VALUE: 18
PIF_VALUE: 23
PIF_VALUE: 19
PIF_VALUE: 25
PIF_VALUE: 18
PIF_VALUE: 16
PIF_VALUE: 18
PIF_VALUE: 19
PIF_VALUE: 22
PIF_VALUE: 21
PIF_VALUE: 19
PIF_VALUE: 35
PIF_VALUE: 18
PIF_VALUE: 26
PIF_VALUE: 18
PIF_VALUE: 27
PIF_VALUE: 18
PIF_VALUE: 21
PIF_VALUE: 19
PIF_VALUE: 18
PIF_VALUE: 20
PIF_VALUE: 19
PIF_VALUE: 18
PIF_VALUE: 19
PIF_VALUE: 25
PIF_VALUE: 18
PIF_VALUE: 18
PIF_VALUE: 19
PIF_VALUE: 18
PIF_VALUE: 21
PIF_VALUE: 18
PIF_VALUE: 18
PIF_VALUE: 19
PIF_VALUE: 22
PIF_VALUE: 27
PIF_VALUE: 20
PIF_VALUE: 19
PIF_VALUE: 18
PIF_VALUE: 26
PIF_VALUE: 18
PIF_VALUE: 23
PIF_VALUE: 21
PIF_VALUE: 18
PIF_VALUE: 32
PIF_VALUE: 19
PIF_VALUE: 32
PIF_VALUE: 26
PIF_VALUE: 28
PIF_VALUE: 18
PIF_VALUE: 3
PIF_VALUE: 42
PIF_VALUE: 25
PIF_VALUE: 19
PIF_VALUE: 18
PIF_VALUE: 19
PIF_VALUE: 18
PIF_VALUE: 22
PIF_VALUE: 20
PIF_VALUE: 4
PIF_VALUE: 20
PIF_VALUE: 18
PIF_VALUE: 28
PIF_VALUE: 19
PIF_VALUE: 23
PIF_VALUE: 18
PIF_VALUE: 18
PIF_VALUE: 29
PIF_VALUE: 18
PIF_VALUE: 18
PIF_VALUE: 19
PIF_VALUE: 18
PIF_VALUE: 21
PIF_VALUE: 26
PIF_VALUE: 26
PIF_VALUE: 19
PIF_VALUE: 18
PIF_VALUE: 12
PIF_VALUE: 18
PIF_VALUE: 22
PIF_VALUE: 40
PIF_VALUE: 19
PIF_VALUE: 18
PIF_VALUE: 29
PIF_VALUE: 35
PIF_VALUE: 18
PIF_VALUE: 26
PIF_VALUE: 18
PIF_VALUE: 19
PIF_VALUE: 18
PIF_VALUE: 4
PIF_VALUE: 21
PIF_VALUE: 20
PIF_VALUE: 22
PIF_VALUE: 18
PIF_VALUE: 26
PIF_VALUE: 19
PIF_VALUE: 19
PIF_VALUE: 21

## 2019-01-01 ASSESSMENT — PAIN SCALES - GENERAL
PAINLEVEL_OUTOF10: 0
PAINLEVEL_OUTOF10: 4
PAINLEVEL_OUTOF10: 1
PAINLEVEL_OUTOF10: 0

## 2019-12-29 PROBLEM — I95.89 HYPOTENSION DUE TO HYPOVOLEMIA: Status: ACTIVE | Noted: 2019-01-01

## 2019-12-29 PROBLEM — E86.1 HYPOTENSION DUE TO HYPOVOLEMIA: Status: ACTIVE | Noted: 2019-01-01

## 2019-12-29 PROBLEM — I95.9 HYPOTENSION: Status: ACTIVE | Noted: 2019-01-01

## 2019-12-29 PROBLEM — G35 MS (MULTIPLE SCLEROSIS) (HCC): Chronic | Status: ACTIVE | Noted: 2019-01-01

## 2019-12-29 PROBLEM — G90.3 MULTIPLE SYSTEM ATROPHY (HCC): Chronic | Status: ACTIVE | Noted: 2019-01-01

## 2019-12-29 PROBLEM — G20 PARKINSON DISEASE (HCC): Chronic | Status: ACTIVE | Noted: 2019-01-01

## 2019-12-29 NOTE — ED NOTES
Pt resting in bed. Warm blankets provided. Patient's  and dad at bedside with patient.  Patient on cardiac monitor, nurse will continue to monitor pt     Jose Robins RN  12/29/19 0929

## 2019-12-29 NOTE — PROGRESS NOTES
During central venous cath insertion, while patient was flat, began vomiting contrast. Sat upright immediately. Zofran given. CAT scan cancelled as patient does not tolerate oral contrast or being flat.

## 2019-12-29 NOTE — ED NOTES
Verified with Dr Veto morgan to hang levophed in peripheral 20 IV in [address] [place] [address], RN  12/29/19 4856

## 2019-12-29 NOTE — ED PROVIDER NOTES
HPI:  12/29/19,   Time: 10:15 AM         Patricia Serrano is a 62 y.o. female presenting to the ED for generalized weakness, beginning several hours ago. The complaint has been constant, severe in severity, and worsened by standing. 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 She has been treated for UTI recently and just finished a course of antibiotics. She has also had significant constipation lately. There has been no fever or chills but she still complains of dysuria. There has been no vomiting cough or shortness of breath    ROS:   Pertinent positives and negatives are stated within HPI, all other systems reviewed and are negative.  --------------------------------------------- PAST HISTORY ---------------------------------------------  Past Medical History:  has a past medical history of Arthritis, Depression, Falls frequently, Hypertension, Parkinson's disease ([facility] [address] and [facility] Past Surgical History:  has a past surgical history that includes Appendectomy and tumor removal.    Social History:  reports that she has never smoked. She has never used smokeless tobacco. She reports that she does not drink alcohol or use drugs. Family History: family history includes Arthritis in an other family member; Cancer in an other family member; Depression in an other family member; Heart Disease in an other family member; Hypertension in an other family member; Kidney Disease in an other family member; Stroke in an other family member. The patients home medications have been reviewed.     Allergies: Sulfa antibiotics    -------------------------------------------------- RESULTS -------------------------------------------------  All laboratory and radiology results have been personally reviewed by myself   LABS:  Results for orders placed or American >60 >=60 mL/min/1.73    GFR African American >60     Calcium 10.4 (H) 8.6 - 10.2 mg/dL    Total Protein 7.0 6.4 - 8.3 g/dL    Alb 4.1 3.5 - 5.2 g/dL    Total Bilirubin 0.4 0.0 - 1.2 mg/dL    Alkaline Phosphatase 265 (H) 35 - 104 U/L    ALT 6 0 - 32 U/L    AST 63 (H) 0 - 31 U/L   Microscopic Urinalysis   Result Value Ref Range    WBC, UA 5-10 0 - 5 /HPF    RBC, UA 1-3 0 - 2 /HPF    Bacteria, UA MANY (A) /HPF   EKG 12 Lead   Result Value Ref Range    Ventricular Rate 78 BPM    Atrial Rate 78 BPM    P-R Interval 134 ms    QRS Duration 82 ms    Q-T Interval 442 ms    QTc Calculation (Bazett) 503 ms    P Axis 9 degrees    R Axis 22 degrees    T Axis 16 degrees       RADIOLOGY:  Interpreted by Radiologist.  XR CHEST PORTABLE   Final Result   Left basilar opacity. Differential includes atelectasis, infection,   and/or layering pleural effusion.                   ------------------------- NURSING NOTES AND VITALS REVIEWED ---------------------------   The nursing notes within the ED encounter and vital signs as below have been reviewed. BP 91/60   Pulse 82   Temp 98 °F (36.7 °C)   Resp 16   Ht 5' 6\" (1.676 m)   Wt 120 lb (54.4 kg)   SpO2 95%   BMI 19.37 kg/m²   Oxygen Saturation Interpretation: Normal      ---------------------------------------------------PHYSICAL EXAM--------------------------------------      Constitutional/General: Alert and oriented x3, the patient is noticed to be significantly hypotensive on arrival with a systolic blood pressure of 55  Head: NC/AT  Eyes: PERRL, EOMI  Mouth: Oral mucosa appears very dry  Neck: Supple, full ROM, no meningeal signs  Pulmonary: Lungs clear to auscultation bilaterally, no wheezes, rales, or rhonchi. Not in respiratory distress  Cardiovascular:  Regular rate and rhythm, no murmurs, gallops, or rubs. 2+ distal pulses  Abdomen: The abdomen is diffusely tender somewhat indurated and slightly distended  Extremities: Moves all extremities x 4.  Warm and well perfused  Skin: warm and dry without rash  Neurologic: GCS 15,  Psych: Normal Affect      ------------------------------ ED COURSE/MEDICAL DECISION MAKING----------------------  Medications   0.9 % sodium chloride bolus (1,000 mLs Intravenous New Bag 12/29/19 0945)   norepinephrine (LEVOPHED) 16 mg in dextrose 5 % 250 mL infusion (4 mcg/min Intravenous New Bag 12/29/19 1042)   cefTRIAXone (ROCEPHIN) 2 g in sterile water 20 mL IV syringe (2 g Intravenous Given 12/29/19 1018)         Medical Decision Making:   The patient's urinalysis was compatible with a UTI; IV fluid resuscitation and empiric antibiotics were started in the ED. Time: 10:35a  Re-evaluation. Patients symptoms show no change  Repeat physical examination is not changed; after 2000 cc of crystalloid infusion the patient continues to be hypotension with a systolic blood pressure of 64. IV Levophed was initiated and in order to consult the hospitalist for admission was placed. The patient remains alert and oriented and has decent urinary output     Time: 11:20a  Re-evaluation. Patients symptoms are improving  Repeat physical examination is significantly improved; the patient systolic blood pressure is now 90 and she is eupneic and a normal sinus rhythm and continues to have good urinary output  Spoke with Dr. Sloan Arnold (Critical care). Discussed case. They will admit this patient to ICU  Please note that the withdrawal or failure to initiate urgent interventions for this patient would likely result in a life threatening deterioration or permanent disability. After examination and evaluation of the patient the critical nature the patient's clinical condition became evident required my constant attention see emergency department record for details with total time was over 30 minutes  Accordingly this patient received 30 minutes of critical care time, excluding separately billable procedures. .      EKG:   This EKG is signed and interpreted by me.    Rate: 78  Rhythm: Sinus  Interpretation: no acute changes  Comparison: stable as compared to patient's most recent EKG      Counseling: The emergency provider has spoken with the patient and spouse/SO and discussed todays results, in addition to providing specific details for the plan of care and counseling regarding the diagnosis and prognosis. Questions are answered at this time and they are agreeable with the plan.      --------------------------------- IMPRESSION AND DISPOSITION ---------------------------------    IMPRESSION  1. Fatigue, unspecified type    2. Generalized weakness    3. Dehydration    4. Hypotension, unspecified hypotension type    5.  Urinary tract infection without hematuria, site unspecified        DISPOSITION  Disposition: Admit to telemetry  Patient condition is serious                  Charlie Zhang MD  12/29/19 1120

## 2019-12-29 NOTE — ED NOTES
incontinence care provided for patient at this time with RN x2.  Patient turned and repositioned      Kyler Mcbride RN  12/29/19 8149

## 2019-12-29 NOTE — ED NOTES
Per , patient presents for weakness since 0700 this morning. Patient is usually able to get up and walk to bathroom with assistance from , patient was unable to do so this morning. Patient has a chronic condition known as MSA- patient was diagnosed 3 years ago per .      Keon Ya, RN  12/29/19 2591

## 2019-12-29 NOTE — CONSULTS
Medical Intensive Care Unit     [facility]  Resident History and Physical    Date and time: 12/29/2019 1:00 PM  Patient's name:  Elijah Qiu Record Number: [id number]  Patient's account/billing number: [de-identified]  Patient's YOB: 1965  Age: 62 y.o. Date of Admission: 12/29/2019  9:30 AM  Length of stay during current admission: 0    Primary Care Physician: Ahsan Junior MD  ICU Attending Physician: Dr. Mandi Talamantes    Code Status: Prior    Reason for ICU admission: Septic shock    History of Present Illness:   53-year-old female with a past medical history of seizure disorder, multiple systemic sclerosis, arthritis, hypotension presented to the ED after her  noted her blood pressure to be lower than usual and generalized weakness worse in the lower extremities. Patient is a poor historian. Patient was diagnosed with urinary tract infection about 10 days ago and she was treated with a course of Rocephin. CURRENT VENTILATION STATUS:   [] Ventilator  [] BIPAP  [] Nasal Cannula [x] Room Air        SEDATION:  RAAS Score:  [] Propofol gtt  [] Versed gtt  [] Ativan gtt   [x] No Sedation    PARALYZED:  [x] No    [] Yes    VASOPRESSORS:  [] No    [x] Yes    If yes -   [x] Levophed       [] Dopamine     [] Vasopressin       [] Dobutamine  [] Phenylephrine         [] Epinephrine    CENTRAL LINES:     [] No   [x] Yes   (Date of Insertion:   )           If yes -     [] Right IJ     [] Left IJ [x] Right Femoral [] Left Femoral                   [] Right Subclavian [] Left Subclavian     BHARDWAJ'S CATHETER:   [] No   [x] Yes  (Date of Insertion:   )     URINE OUTPUT:            [x] Good   [] Low              [] Anuric    REVIEW OF SYSTEMS:    · Constitutional: No fever, no chills, no change in weight; good appetite  · HEENT: No blurred vision, no ear problems, no sore throat, no rhinorrhea.   · Respiratory: No cough, no sputum production, no pleuritic chest pain, no shortness of breath  · Cardiology: No angina, no dyspnea on exertion, no paroxysmal nocturnal dyspnea, no orthopnea, no palpitation, no leg swelling. · Gastroenterology: No dysphagia, no reflux; (+) abdominal pain, no nausea or vomiting; no constipation or diarrhea.  No hematochezia    · Genitourinary: No dysuria, no frequency, hesitancy; no hematuria  · Musculoskeletal: no joint pain, no myalgia, no change in range of movement  · Neurology: no focal weakness in extremities, no slurred speech, no double vision, no tingling or numbness sensation  · Endocrinology: no temperature intolerance, no polyphagia, polydipsia or polyuria  · Hematology: no increased bleeding, no bruising, no lymphadenopathy  · Skin: no skin changes noticed by patient  · Psychology: no depressed mood, no suicidal ideation    OBJECTIVE:     VITAL SIGNS:  BP 95/66   Pulse 86   Temp 98.9 °F (37.2 °C) (Oral)   Resp 16   Ht 5' 6\" (1.676 m)   Wt 120 lb (54.4 kg)   SpO2 96%   BMI 19.37 kg/m²   Tmax over 24 hours:  Temp (24hrs), Av.3 °F (36.8 °C), Min:98 °F (36.7 °C), Max:98.9 °F (37.2 °C)      Patient Vitals for the past 6 hrs:   BP Temp Temp src Pulse Resp SpO2 Height Weight   19 1242 -- -- -- 86 -- -- -- --   19 1139 95/66 98.9 °F (37.2 °C) Oral 74 16 96 % -- --   19 1126 96/63 -- -- 82 16 96 % -- --   19 1122 91/60 -- -- 82 16 95 % -- --   19 1113 (!) 90/57 -- -- -- -- -- -- --   19 1105 (!) 85/54 -- -- -- -- -- -- --   19 1102 (!) 81/55 -- -- 77 16 98 % -- --   19 1051 (!) 87/58 -- -- -- -- -- -- --   19 1044 (!) 73/48 -- -- 71 16 98 % -- --   19 1022 (!) 68/43 -- -- 69 18 95 % -- --   19 1010 (!) 63/43 -- -- 69 16 100 % -- --   19 0936 (!) 53/41 98 °F (36.7 °C) -- 79 16 92 % -- --   19 0934 -- -- -- -- -- -- 5' 6\" (1.676 m) 120 lb (54.4 kg)   19 -- 98 °F (36.7 °C) Oral -- 14 -- -- --       No intake or output data in the 24 hours admission:     Blood cultures:                 [x] Drawn      [] Negative             []  Positive (Details:  )  Urine Culture:                   [x] Drawn      [] Negative             []  Positive (Details:  )  Sputum Culture:               [x] None drawn       [] Negative             []  Positive (Details:  )   Endotracheal aspirate:     [x] None drawn       [] Negative             []  Positive (Details:  )     Radiology/Imaging:   Chest Xray (12/29/2019): Left basilar opacity    ASSESSMENT  And PLAN:    78-year-old female with a past medical history of seizure disorder, multiple systemic sclerosis, arthritis, hypo tension presented to the ED after her  noted her blood pressure to be lower than usual and generalized weakness worse in the lower extremities. Neurologic    Awake, alert, interactive     Seizure disorder  - On Keppra 1000 twice daily at home  - Resume home med     Parkinson's Disease  - On Sinemet - currently holding for hypotension     Pulmonary    Acute hypoxic respiratory failure  - Likely 2/2 Aspiration   - Slowly on nasal cannula, became hypoxic later on.  - Chest Xray (12/29/2019): Left basilar opacity  - Follow CT chest    Cardiovascular    Hypotension   - Adrenal insufficiency vs septic shock vs hypovolemia   - History of hypotension, patient takes Midodrin at home as needed per - resumed  - Follow urine culture blood culture, procalcitonin, cortisol, TSH  - currently on Levophed for blood pressure support     Gastrointestinal    Abdominal distention   - Patient stated that she has been constipated for a while. On physical exam abdominal distention was noted. - KUB 12/29- Right hemicolonic fecal distention with functional outflow obstruction of the distal small bowel.  The transverse colon is distended with gas, raising questions regarding possible \"institutional bowel\" or chronic constipation.  - Follow CT abdomen   - On Zosyn     Elevated Liver enzymes   - ALP - 265, ALT/AST- 6/63  - Follow Ammonia, daily LFTs    Infectious Disease    Septic shock  - likely 2/2 to Urinary tract infection vs intraabdominal source   - Hypotensive requiring Levophed, history of UTI  - Remains Afebrile, WBC - 11.1 on admission  - UA positive for leukocyte esterase nitrites, bacteriuria, WBC 5-10  - Received Rocephin x1 in ED. Started on Zosyn to cover intraabdominal source   - Follow NICOM  - Follow urine and blood cultures, procalcitonin    Genitourinary/Renal    Elevated BUN  - Bun of 49, creatinine of 0.7 on admission, possibly from dehydration  - Monitor kidney function     Hematology/Oncology    Anemia  - Hb of 11.1 on admission   - Follow CBC daily       Dermatologic/Musculoskeletal    Multiple system atrophy  - Chronic condition, parkinson variant     WEAN PER PROTOCOL:  [] No   [] Yes  [x] N/A    ICU PROPHYLAXIS:  Stress ulcer:  [x] PPI Agent  [] [id number] [] Sucralfate  [] Other:  VTE:   [x] Enoxaparin  [] Unfract. Heparin Subcut  [] EPC Cuffs    NUTRITION:  [x] NPO [] Tube Feeding (Specify: ) [] TPN  [] PO (Diet: No diet orders on file)    INSULIN DRIP:   [x] No   [] Yes    CONSULTATION NEEDED:   [] No   [x] Yes    FAMILY UPDATED:    [] No   [x] Yes    TRANSFER OUT OF ICU:   [x] No   [] Yes    Stefanie Sen MD PGY-1  Attending Physician: Dr. Yolis Fabian  12/29/2019, 1:00 PM   I personally saw, examined and provided care for the patient. Radiographs, labs and medication list were reviewed by me independently. I spoke with bedside nursing, therapists and consultants. Critical care services and times documented are independent of procedures and multidisciplinary rounds with Residents. Additionally comprehensive, multidisciplinary rounds were conducted with the MICU team. The case was discussed in detail and plans for care were established. Review of Residents documentation was conducted and revisions were made as appropriate.  I agree with the above documented exam, problem list and plan of

## 2019-12-29 NOTE — ED NOTES
Patient complaining of abdominal pain and back pain.  Dr Lorelei Garcia notified      Caitlin Killian RN  12/29/19 3952

## 2019-12-30 NOTE — CONSULTS
capsule Take 300 mg by mouth 3 times daily. .   Yes Historical Provider, MD   Cyanocobalamin (VITAMIN B-12 PO) Take by mouth   Yes Historical Provider, MD   TURMERIC PO Take by mouth daily   Yes Historical Provider, MD   Pyridoxine HCl (VITAMIN B-6) 100 MG tablet Take 100 mg by mouth daily   Yes Historical Provider, MD   oxaprozin (DAYPRO) 600 MG tablet Take 1,200 mg by mouth daily   Yes Historical Provider, MD   Vitamin D (CHOLECALCIFEROL) 1000 UNITS CAPS capsule Take 1,000 Units by mouth daily   Yes Historical Provider, MD   levETIRAcetam (KEPPRA) 1000 MG tablet Take 1,000 mg by mouth 2 times daily. Yes Historical Provider, MD   traZODone (DESYREL) 50 MG tablet Take 50 mg by mouth nightly    Yes Historical Provider, MD   fludrocortisone (FLORINEF) 0.1 MG tablet Take 0.1 mg by mouth 2 times daily    Historical Provider, MD   Alpha-Lipoic Acid 600 MG CAPS Take 600 mg by mouth 2 times daily    Historical Provider, MD       Allergies   Allergen Reactions    Sulfa Antibiotics Rash       Family History   Problem Relation Age of Onset    Arthritis Other     Cancer Other     Depression Other     Heart Disease Other     Hypertension Other     Kidney Disease Other     Stroke Other        Social History     Tobacco Use    Smoking status: Never Smoker    Smokeless tobacco: Never Used   Substance Use Topics    Alcohol use: No    Drug use: No         Review of Systems   Unable to be obtained due to septic chock      PHYSICAL EXAM:    Vitals:    12/30/19 0600   BP: 101/66   Pulse: 96   Resp: 18   Temp:    SpO2: 99%       PHYSICAL EXAM   PSYCH: intubated sedaed opens eyes to voice  CONSTITUTIONAL: No apparent distress, comfortable  EYES: Sclera white, pupils equal round and reactive to light  ENMT:  Hearing normal, trachea midline, ears externally intact  RESP: Breath sounds were clear and equal with no rales, wheezes, or rhonchi.               Respiratory effort was normal with no retractions or use of accessory muscles. CV: sinus tachycardicNo pedal edema  GI/ Abdomen: The abdomen was soft and  distended. There was diffusetenderness, guarding, rebound, or rigidity. Pt grimaces to palpation There was no                     masses, hepatosplenomegaly, or hernias. LABS:    CBC  Recent Labs     19  0450   WBC 4.9   HGB 10.6*   HCT 36.7        BMP  Recent Labs     19  0450      K 4.5   *   CO2 16*   BUN 59*   CREATININE 0.8   CALCIUM 8.5*     Liver Function  Recent Labs     19  1735  19  0450   LIPASE 27  --   --    BILITOT  --    < > 0.3   AST  --    < > 32*   ALT  --    < > 5   ALKPHOS  --    < > 338*   PROT  --    < > 5.6*   LABALBU  --    < > 3.1*    < > = values in this interval not displayed. No results for input(s): LACTATE in the last 72 hours. No results for input(s): INR, PTT in the last 72 hours. Invalid input(s): PT    RADIOLOGY    Xr Abdomen (kub) (single Ap View)    Result Date: 2019  Patient MRN:  [id number] : 1965 Age: 62 years Gender: Female Order Date:  2019 12:45 PM EXAM: XR ABDOMEN (KUB) (SINGLE AP VIEW) NUMBER OF IMAGES:  1 view-2 images  INDICATION: Abdominal distention COMPARISON: None FINDINGS: Right hemicolon is distended with fecal content, and there is fluid filled pelvic small bowel which may be functional outflow obstruction. The transverse colon is distended with gas. There is no evidence of obstructive dilation, perforation, or pathologic fluid levels. Flank and psoas margins are well-defined. No pathologic calcifications are noted, and there is no evidence of organomegaly. Included portions of the lower chest show no evidence of acute pathology. Skeletal structures are unremarkable for acute findings. Tubal sterilization clips are noted in the pelvis. Overlying EKG leads are present. Right hemicolonic fecal distention with functional outflow obstruction of the distal small bowel.  The transverse colon is distended with gas, raising questions regarding possible \"institutional bowel\" or chronic constipation. Limited radiographic appearance does not suggest small bowel obstruction. Ct Chest W Contrast    Result Date: 2019  Patient MRN:  [id number] : 1965 Age: 62 years Gender: Female Order Date:  2019 2:00 PM TECHNIQUE/NUMBER OF IMAGES/COMPARISON/CLINICAL HISTORY: CT chest After IV contrast administration axial images were obtained with sagittal and coronal MIP reconstructions of pulmonary arterial circulation thoracic aorta. Clinical history abdominal distention, abdominal pain, for evolution the pulmonary arterial circulation. 75-year-old female patient had previous appendectomy. The history of Parkinson's disease is seizures. FINDINGS: There are motion artifacts which cause misregistration of images makes difficult evaluation of the pulmonary arterial circulation towards the lower lung bases. There is no conspicuous acute central pulmonary embolus in the main PA, right and left main PAs in their lobar and segmental and more proximal subsegmental branches. There is no aneurysm formation dissection thoracic aorta. Heart has normal size. Inner diameter for the left ventricular cavity is 4.5 cm and for the right ventricular cavity is 4.4 cm. There is discrete to mild pericardial effusion. There is no mediastinal masses or adenopathy. Diameter for the ascending aorta is 3.5 cm and for the main pulmonary arteries up to 2.5 cm . There is no pericardial effusion. Presence of a consolidation with atelectasis of the right and left lower lobe with patent central airways. Air bronchograms are present. Findings can be relate with hypoventilation lower lung base. Please correlate clinically. There is no conspicuous or pleural effusions. Endotracheal tube is in good position. The patient has a NG tube with the tip in the stomach.  There are also compression atelectasis in the posterior aspect of the shows a dilated cecum of 10 cm and dilated colon withfree fluid  Her clinical picture and ct imaging is concerning for ischemic colon with perforation  Pt needs emergency surgery for source control--subtotal colectomy with end ileostomy.   Risk include death  If family is agreeable to surgery, we will proceed emergently      Ruma Woody MD, FACS  12/30/2019  7:39 AM

## 2019-12-30 NOTE — H&P
Value Date    WBC 3.4 12/30/2019    RBC 2.60 12/30/2019    HGB 7.4 12/30/2019    HCT 24.8 12/30/2019     12/30/2019    MCV 95.4 12/30/2019    MCH 28.5 12/30/2019    MCHC 29.8 12/30/2019    RDW 18.4 12/30/2019    METASPCT 3.5 12/30/2019    LYMPHOPCT 6.1 12/30/2019    MONOPCT 8.7 12/30/2019    MYELOPCT 0.9 12/30/2019    BASOPCT 0.2 12/30/2019    MONOSABS 0.44 12/30/2019    LYMPHSABS 0.29 12/30/2019    EOSABS 0.00 12/30/2019    BASOSABS 0.00 12/30/2019     CMP:    Lab Results   Component Value Date     12/30/2019    K 4.5 12/30/2019     12/30/2019    CO2 18 12/30/2019    BUN 59 12/30/2019    CREATININE 1.1 12/30/2019    GFRAA >60 12/30/2019    LABGLOM 51 12/30/2019    GLUCOSE 136 12/30/2019    PROT 4.7 12/30/2019    LABALBU 2.6 12/30/2019    CALCIUM 7.4 12/30/2019    BILITOT 0.2 12/30/2019    ALKPHOS 209 12/30/2019    AST 39 12/30/2019    ALT 6 12/30/2019     Magnesium:    Lab Results   Component Value Date    MG 6.8 12/30/2019     Phosphorus:    Lab Results   Component Value Date    PHOS 5.5 12/30/2019     PT/INR:    Lab Results   Component Value Date    PROTIME 11.9 12/30/2019    INR 1.1 12/30/2019     Last 3 Troponin:    Lab Results   Component Value Date    TROPONINI <0.01 12/08/2017    TROPONINI <0.01 09/27/2017    TROPONINI <0.01 01/07/2016     U/A:    Lab Results   Component Value Date    COLORU Yellow 12/29/2019    PROTEINU Negative 12/29/2019    PHUR 6.0 12/29/2019    WBCUA 5-10 12/29/2019    RBCUA 1-3 12/29/2019    BACTERIA MANY 12/29/2019    CLARITYU SL CLOUDY 12/29/2019    SPECGRAV 1.015 12/29/2019    LEUKOCYTESUR TRACE 12/29/2019    UROBILINOGEN 0.2 12/29/2019    BILIRUBINUR Negative 12/29/2019    BLOODU TRACE-LYSED 12/29/2019    GLUCOSEU Negative 12/29/2019    AMORPHOUS MODERATE 08/07/2018     ABG:    Lab Results   Component Value Date    PH 7.275 12/30/2019    PCO2 30.5 12/30/2019    PO2 110.3 12/30/2019    HCO3 15.6 12/30/2019    BE -4.9 12/30/2019    O2SAT 99.3 12/30/2019     HgBA1c: No results found for: LABA1C  FLP:  No results found for: TRIG, HDL, LDLCALC, LDLDIRECT, LABVLDL  TSH:    Lab Results   Component Value Date    TSH 1.950 12/29/2019       XR CHEST PORTABLE   Final Result   Partial clearing of bibasilar infiltrates and left effusion               XR CHEST PORTABLE   Final Result   Stable abnormal chest with  improving CHF. Superimposed pneumonia is   not excluded. CT CHEST W CONTRAST   Final Result   1. Atelectasis consolidation of both lower lobes with patent central   airways. Findings to be correlated clinically. Can be relate with the   hypoventilation of the lower lung bases. Cannot exclude a pneumonia. 2. Discrete patchy bronchocentric infiltrate in the left upper lobe   lingular segment. 3. No acute pulmonary emboli seen although there are some limitations   for evaluation of the lower lobes and motion artifacts. 4. No aneurysm formation dissection thoracic aorta. 5. Discrete to mild pericardial effusion. CT ABDOMEN PELVIS W IV CONTRAST Additional Contrast? None   Final Result   1. Findings for severe constipation with a large amount of fecal   contents/rotation/impaction from the sigmoid colon to the rectum,   causing a pattern of megacolon with marked fluid/air distentions or   more proximal segments of the colon. 2. Consider decompression of the rectum/sigmoid to permit relieve of   the more proximal segments of the colon. 3. Marked: Distention can explain hypoventilation lower lung bases   causing atelectasis or consolidations with overall patent central   airways of both lower lobes. Please correlate clinically. 4. There is no dilatation of the biliary tree, to be reevaluated   relieve of the megacolon pattern which cause significant mass effect   throughout the entire abdomen peritoneal cavity. .      5. Mild ascites in the abdomen. 6. No free intraperitoneal air observed.        XR CHEST PORTABLE   Final Result

## 2019-12-30 NOTE — PROGRESS NOTES
Patient just had a colectomy, and due to the positioning and pressures of the transducer, we (RN & Myself) decided it's best to wait and try tomorrow~thank you.

## 2019-12-30 NOTE — PROGRESS NOTES
I discussed the patient's course with her spouse, Mic Paiz, at [phone]. He reports he would want anything done to help the patient overcome her acute illness. OR for exploratory laparotomy, colectomy and end ileostomy. ONUR discussed.     John Zaldivar DO  12/30/19  8:38 AM

## 2019-12-30 NOTE — PROGRESS NOTES
[address] [facility]   Department of Internal Medicine   Internal Medicine Residency  MICU Progress Note    Patient:  Megan Almeida 62 y.o. female   MRN: [id number]       Date of Service: 2019    Allergy: Sulfa antibiotics    Subjective     Patient was seen and examined this morning at bedside in no acute distress. 24 hour change: Overnight, became increasingly hypoxic to 73% and unresponsive. Was subsequently intubated with improvement of SpO2 > 90%. MAP 50-60, arterial line placed. Patient taken for CT chest and abdomen revealing bilateral lower lobe infiltrates on CT chest. CT abdomen demonstrating distended large bowel with fecal impaction from rectum to proximal sigmoid colon with air/fluid levels proximal to sigmoid colon. Objective     TEMPERATURE:  Current - Temp: 97 °F (36.1 °C); Max - Temp  Av.5 °F (36.4 °C)  Min: 96.8 °F (36 °C)  Max: 98.9 °F (37.2 °C)  RESPIRATIONS RANGE: Resp  Av.5  Min: 14  Max: 23  PULSE RANGE: Pulse  Av.7  Min: 69  Max: 106  BLOOD PRESSURE RANGE:  Systolic (48UEF), VWW:99 , Min:53 , ARK:220   ; Diastolic (08EJX), IVK:77, Min:41, Max:78    PULSE OXIMETRY RANGE: SpO2  Av.3 %  Min: 83 %  Max: 100 %    I & O - 24hr:    Intake/Output Summary (Last 24 hours) at 2019 0027  Last data filed at 2019 0000  Gross per 24 hour   Intake 2486 ml   Output 570 ml   Net 1916 ml     I/O last 3 completed shifts: In: 2486 [I.V.:2486]  Out: 26 [Urine:520] I/O this shift:  In: -   Out: 50 [Urine:50]   Weight change:     Physical Exam:  General Appearance:    Sedated, intubated   HEENT:    NC/AT, mucous membranes are moist   Neck:   Supple, no jugular venous distention. Resp:     CTAB, No wheezes, No rhonchi, no use of accessory muscles   Heart:    RRR, S1 and S2 normal, no murmur, rub or gallop.     Abdomen:     Soft, non-tender, but distended with hypoactive bowel sounds   Extremities:   Atraumatic, no cyanosis or edema   Pulses:  Radial and pedal pulses are intact bilaterally   Neurologic:   DTRs intact, no focal deficits grossly       Medications     Continuous Infusions:   norepinephrine 4 mcg/min (12/29/19 1042)    propofol 20 mcg/kg/min (12/29/19 2139)     Scheduled Meds:   midodrine  5 mg Oral BID    sodium chloride flush  10 mL Intravenous 2 times per day    enoxaparin  40 mg Subcutaneous Daily    levetiracetam  1,000 mg Intravenous Q12H    hydrocortisone sodium succinate PF  100 mg Intravenous Q8H    piperacillin-tazobactam  3.375 g Intravenous Q8H    oxymetazoline  2 spray Each Nostril Once    lidocaine   Topical Once    fludrocortisone  0.1 mg Oral BID    lactulose  30 g Oral TID    fleet  1 enema Rectal Once    carbidopa-levodopa  2 tablet Oral 5x Daily     PRN Meds: sodium chloride flush, ondansetron, acetaminophen  Nutrition:   Hold TF d/t ileus     Labs and Imaging Studies     CBC:   Recent Labs     12/29/19  0954 12/29/19  2130   WBC 11.1 7.5   HGB 11.1* 10.6*   HCT 37.1 36.7   MCV 96.9 98.9    227       BMP:    Recent Labs     12/29/19  0954 12/29/19  2130    140   K 4.3 4.7   CL 99 105   CO2 27 21*   BUN 49* 56*   CREATININE 0.7 0.8   GLUCOSE 122* 122*       LIVER PROFILE:   Recent Labs     12/29/19  0954 12/29/19  1735 12/29/19  2130   AST 63*  --  42*   ALT 6  --  5   LIPASE  --  27  --    BILITOT 0.4  --  0.3   ALKPHOS 265*  --  359*       PT/INR:   No results for input(s): PROTIME, INR in the last 72 hours. APTT:   No results for input(s): APTT in the last 72 hours.     Fasting Lipid Panel:    No results found for: CHOL, TRIG, HDL    Cardiac Enzymes:    Lab Results   Component Value Date    CKTOTAL 137 12/29/2019    CKTOTAL 238 (H) 01/07/2016    CKMB 5.7 (H) 01/07/2016    TROPONINI <0.01 12/08/2017    TROPONINI <0.01 09/27/2017    TROPONINI <0.01 01/07/2016       Notable Cultures:      Blood cultures No results found for: BC  Respiratory cultures No results found for: RESPCULTURE No results found for: LABGRAM  Urine   Urine Culture, Routine   Date Value Ref Range Status   08/07/2018 <10,000 CFU/mL  Mixed gram positive organisms    Final     Legionella No results found for: LABLEGI  C Diff PCR No results found for: CDIFPCR  Wound culture/abscess: No results for input(s): WNDABS in the last 72 hours. Tip culture:No results for input(s): CXCATHTIP in the last 72 hours. Antibiotic  Days  Day started   Ceftriaxone x 1     Zosyn     1   12/29                Oxygen:     Vent Information  $Ventilation: $Initial Day  Equipment ID: [id number]  Vent Type: 980  Vent Mode: AC/VC  Vt Ordered: 480 mL  Rate Set: 18 bmp  Peak Flow: 60 L/min  Pressure Support: 0 cmH20  FiO2 : 90 %  Sensitivity: 3  PEEP/CPAP: 7  I Time/ I Time %: 0 s  Additional Respiratory  Assessments  Pulse: 99  Resp: 18  SpO2: 98 %  pCO2 (TCOM, mmHg): 35 mmHg  Position: Semi-Vyas's  Subglottic Suction Done?: Yes  Airway Type: ET  Airway Size: 8  Measured From: Lips(21 top lipline)     ABG   Vent Settings     PH  7.23 Mode   AC/VC   PCO2  43.2 TV   480   PO2  69.3 RR   18   HCO3  17.7 PS      Sat%   PEEP   7   FIO2  90 FIO2   90     P/F   77       Lines:  Site  Day  Date inserted     TLC   R fem   1   12/29     PICC              Arterial line   R radial    1         Peripheral line         12/29     HD cath            Urethral Catheter 16 fr-Output (mL): 50 mL    Imaging Studies:  CT CHEST W CONTRAST   Final Result   1. Atelectasis consolidation of both lower lobes with patent central   airways. Findings to be correlated clinically. Can be relate with the   hypoventilation of the lower lung bases. Cannot exclude a pneumonia. 2. Discrete patchy bronchocentric infiltrate in the left upper lobe   lingular segment. 3. No acute pulmonary emboli seen although there are some limitations   for evaluation of the lower lobes and motion artifacts. 4. No aneurysm formation dissection thoracic aorta. 5. Discrete to mild pericardial effusion.       CT ABDOMEN PELVIS W IV CONTRAST Additional Contrast? None   Final Result   1. Findings for severe constipation with a large amount of fecal   contents/rotation/impaction from the sigmoid colon to the rectum,   causing a pattern of megacolon with marked fluid/air distentions or   more proximal segments of the colon. 2. Consider decompression of the rectum/sigmoid to permit relieve of   the more proximal segments of the colon. 3. Marked: Distention can explain hypoventilation lower lung bases   causing atelectasis or consolidations with overall patent central   airways of both lower lobes. Please correlate clinically. 4. There is no dilatation of the biliary tree, to be reevaluated   relieve of the megacolon pattern which cause significant mass effect   throughout the entire abdomen peritoneal cavity. .      5. Mild ascites in the abdomen. 6. No free intraperitoneal air observed. XR CHEST PORTABLE   Final Result      Gaseous distention of a loop of bowel within the left upper abdomen   may be stomach or colon. Consider further workup with computed   tomography for better characterization. Bilateral pleural effusions with contiguous atelectasis. Endotracheal tube is present with the distal tip 5 cm above the   adriano. Nasogastric tube follows the expected contours of the esophagus into   stomach with distal sideholes overlying the left upper quadrant of the   abdomen. Esophageal manometry device appears present terminating at the level   of the esophagogastric junction. XR ABDOMEN FOR NG/OG/NE TUBE PLACEMENT   Final Result      Gaseous distention of a loop of bowel within the left upper abdomen   may be stomach or colon. Consider further workup with computed   tomography for better characterization. Bilateral pleural effusions with contiguous atelectasis. Endotracheal tube is present with the distal tip 5 cm above the   adriano.        Nasogastric tube follows WBC 5-10  · Continue Zosyn  · Bedside ultrasound with collapsible IVC, given additional 30cc/kg NS  · On Levophed, wean as able, MAP goal > 65mmHg  · Resume home dose midodrine 5mg BID  · Continue Solucortef 100mg q8h, wean as able   · F/u cultures     Gastrointestinal  Fecal Impaction  · CT abdomen with distended large intestine, large amounts of feces from rectum to proximal sigmoid colon  · No radiographic evidence of bowel perforation  · Gastric decompression, NGT to intermittent suction  · Fleet enema x 1, monitor for bowel movements  · Lactulose for hyperammonemia, titrate to 3 BMs qd  · If no progression, consider GS consult     Transaminitis   · CT abdomen demonstrating distended gallbladder, dilatation of intrahepatic biliary tree and CBD  · F/u Hepatitis viral panel  · Alk Phos 359, f/u GGT    Hyperammonemia  · Likely 2/2 fecal impaction/constipation  · Start lactulose 30g TID titrated to 3 BMs qd    Infectious Disease  Aspiration PNA  · procalcitonin 3.15  · CT chest and CXR w/ bilateral lower lobe infiltrates  · Continue Zosyn   · F/u respiratory culture    UTI  · UA w/ pyuria, bacteriuria and +LE  · Continue Zosyn  · F/u urine culture     Genitourinary/Renal  Hypermagnesemia  · Mg 7.0, on mag oxide at home  · DTRs remain intact  · Hold all magnesium containing medications/supplementation  · F/u repeat mag    Lactic Acidosis  · Trend LA q6h until normal  · Given an additional 30cc/kg IV NS    Hematology/Oncology  Normocytic Hypochromic Anemia  · Baseline Hb 11-12  · Monitor CBC qd for now  · Transfuse if Hb < 7      # Peptic ulcer prophylaxis: Protonix  # DVT Prophylaxis: Lovenox  # Disposition: Cont current care    Margaret Joy M.D., PGY-1    Attending Physician: Dr. Shankar Thomas  Addendum ICU Attending Statement     Ángela Radford was seen, examined and discussed with the multi-disciplinary ICU team during rounds. A addendum note may be separate to the residents note.  If not then, I have personally seen and examined the patient and the key elements of the encounter were performed by me (> 85 % time). The medications & laboratory data was discussed and adjusted where necessary. The radiographic images were reviewed either as a group or with radiologist.  Any changes are document or changed if felt dis-concordant with the exam or history. The above findings were corroborated, plans confirmed and changes made if needed. Family was updated at the bedside as available. Key issues of the case were discussed among consultants. Critical Care time is documented if appropriate.       Aleksandr Veliz DO, MPH  Professor of Medicine

## 2019-12-31 PROBLEM — R65.21 SEPTIC SHOCK (HCC): Status: ACTIVE | Noted: 2019-01-01

## 2019-12-31 PROBLEM — A41.9 SEPTIC SHOCK (HCC): Status: ACTIVE | Noted: 2019-01-01

## 2019-12-31 NOTE — PROGRESS NOTES
Patient seen and examined. Hypotensive now resumed on low dose levophed 2mcg. 1U PRBC administered for multifactorial anemia and the same. UOP adequate. Intubated and sedated    BP (!) 94/49   Pulse 96   Temp 99.5 °F (37.5 °C) (Esophageal)   Resp 13   Ht 5' 6\" (1.676 m)   Wt 126 lb 5.2 oz (57.3 kg)   SpO2 100%   BMI 20.39 kg/m²     Sedated  Vented  RR  Soft, non distended, sedated-no grimace w palpation, midline dressing with minimal betadine strikethrough, stoma pink with thin liquid OP  Ext warm without edema    63 yo female POD0 subtotal colectomy/end ileostomy for ischemic colitis resultant septic shock now with post-op/dilutional anemia    Discussed with ICU team - ongoing resuscitation for septic shock - concern also for hypovolemia s/p colectomy - bolus 1L crystalloid  Hgb 7.4 ->8.1 s/p 1U.  Check TEG, trend CBC q6 x4, hold lovenox until stable, transfuse prn  NPO/NGT LIS  Continue abx  Replete luiza Lopez Appl, DO  12/30/19  8:59 PM

## 2019-12-31 NOTE — OP NOTE
small omental bleeding vessel, which  was identified and controlled with Vicryl, tied the same. The left  colon, once freed was then removed from the field as specimen. Copious  irrigation was then carried out in all four quadrants. Packs and towels  were removed from the abdomen and irrigation completed until effluent  was clear. We then selected a site for ileostomy creation in the mid  rectus muscle on the patient's right just cephalad to the umbilicus. This area had been previously marked, grasped with Allis and a skin and  subcutaneous tissue disc was dissected down to fascia. A vertical  fascial incision was then carried out with electrocautery and the rectus  muscle was splayed. The posterior abdominal wall tissues were then  tented manually and our incision was completed. It was then dilated to  two fingerbreadths. Small bowel was then run and there was no evidence  for any additional pathology. Candida Curling was placed through the defect and  ileum was pulled up through the abdominal wall without clear torsion. Small bowel was then positioned within the peritoneal space without  torsion. The pelvis was again inspected and hemostatic and the field  was clean. We were now prepared for abdominal wall closure over  malleable, apposing 0 loop PDS sutures were then used to approximate the  fascia. Once tied, the midline was toweled off and we matured our  end-ileostomy. Staple line was grasped, removed with electrocautery  revealing brisk bleeding edge. There was no clear torsion of the ileal  segment and mesentery. It was brooked with 3-0 Vicryl in the standard  fashion. Gloves were changed. Irrigation carried out of the midline  wound, hemostasis confirmed, and a gapped staple closure was then  carried out, finished with intermittent Betadine-soaked Telfa dwight. A  stomal appliance was then placed over the ileostomy.   Abdomen was  cleaned and sterile Medipore dressing was placed over the

## 2019-12-31 NOTE — PROGRESS NOTES
P Quality Flow/Interdisciplinary Rounds Progress Note        Quality Flow Rounds held on December 31, 2019    Disciplines Attending:      Mariana Johnson was admitted on 12/29/2019  9:30 AM    Anticipated Discharge Date:  Expected Discharge Date: 12/04/21    Disposition:    Zay Score:  Zay Scale Score: 18    Readmission Risk              Risk of Unplanned Readmission:        17           Discussed patient goal for the day, patient clinical progression, and barriers to discharge. The following Goal(s) of the Day/Commitment(s) have been identified:  Wean vent for extubation.        Michael Yuen  December 31, 2019

## 2019-12-31 NOTE — PROGRESS NOTES
[address] [facility]   Department of Internal Medicine   Internal Medicine Residency  MICU Progress Note    Patient:  Pascale Jordan 62 y.o. female   MRN: [id number]       Date of Service: 2019    Allergy: Sulfa antibiotics    Subjective     Patient was seen and examined this morning at bedside in no acute distress. 24 hour change: Yesterday, patient evaluated by GS and taken for ex lap. No perforation identified, but noted ischemic sigmoid colon. S/p subtotal colectomy. Received 1 unit RBCs post operatively with Hb of 7.4. H/H overnight 8.1. Given 1L IV LR. Today, patient not complaining of any pain. Remains intubated, sedated. Off pressors overnight, HD stable. Objective     TEMPERATURE:  Current - Temp: 100.2 °F (37.9 °C); Max - Temp  Av.9 °F (36.6 °C)  Min: 95.5 °F (35.3 °C)  Max: 100.2 °F (37.9 °C)  RESPIRATIONS RANGE: Resp  Av.7  Min: 0  Max: 18  PULSE RANGE: Pulse  Av.5  Min: 85  Max: 100  BLOOD PRESSURE RANGE:  Systolic (80NVX), EYE:975 , Min:90 , RWA:001   ; Diastolic (28NAI), JUO:24, Min:49, Max:67    PULSE OXIMETRY RANGE: SpO2  Av %  Min: 88 %  Max: 100 %    I & O - 24hr:    Intake/Output Summary (Last 24 hours) at 2019 0426  Last data filed at 2019 0100  Gross per 24 hour   Intake 5214.5 ml   Output 2695 ml   Net 2519.5 ml     I/O last 3 completed shifts: In: 5214.5 [I.V.:5114.5; IV Piggyback:100]  Out: 9282 [Urine:2020; Stool:600; Blood:200] I/O this shift:  In: -   Out: 125 [Urine:125]   Weight change:     Physical Exam:  General Appearance:    Sedated, intubated   HEENT:    NC/AT, mucous membranes are moist   Neck:   Supple, no jugular venous distention. Resp:     CTAB, No wheezes, No rhonchi, no use of accessory muscles   Heart:    RRR, S1 and S2 normal, no murmur, rub or gallop.     Abdomen:     Soft, non-tender, surgical incision sites clean and dry with no drainage, colostomy bag with black fluid   Extremities:   Atraumatic, no cyanosis or intraperitoneal air observed. XR CHEST PORTABLE   Final Result      Gaseous distention of a loop of bowel within the left upper abdomen   may be stomach or colon. Consider further workup with computed   tomography for better characterization. Bilateral pleural effusions with contiguous atelectasis. Endotracheal tube is present with the distal tip 5 cm above the   adriano. Nasogastric tube follows the expected contours of the esophagus into   stomach with distal sideholes overlying the left upper quadrant of the   abdomen. Esophageal manometry device appears present terminating at the level   of the esophagogastric junction. XR ABDOMEN FOR NG/OG/NE TUBE PLACEMENT   Final Result      Gaseous distention of a loop of bowel within the left upper abdomen   may be stomach or colon. Consider further workup with computed   tomography for better characterization. Bilateral pleural effusions with contiguous atelectasis. Endotracheal tube is present with the distal tip 5 cm above the   adriano. Nasogastric tube follows the expected contours of the esophagus into   stomach with distal sideholes overlying the left upper quadrant of the   abdomen. Esophageal manometry device appears present terminating at the level   of the esophagogastric junction. XR ABDOMEN (KUB) (SINGLE AP VIEW)   Final Result   Right hemicolonic fecal distention with functional outflow obstruction   of the distal small bowel. The transverse colon is distended with gas,   raising questions regarding possible \"institutional bowel\" or chronic   constipation. Limited radiographic appearance does not suggest small bowel   obstruction. XR CHEST PORTABLE   Final Result   Left basilar opacity. Differential includes atelectasis, infection,   and/or layering pleural effusion.                US ORGAN ELASTOGRAPHY    (Results Pending)   XR CHEST PORTABLE    (Results Pending)   XR CHEST PORTABLE

## 2019-12-31 NOTE — PROGRESS NOTES
XR CHEST PORTABLE   Final Result   Stable abnormal chest with  improving CHF. Superimposed pneumonia is   not excluded. CT CHEST W CONTRAST   Final Result   1. Atelectasis consolidation of both lower lobes with patent central   airways. Findings to be correlated clinically. Can be relate with the   hypoventilation of the lower lung bases. Cannot exclude a pneumonia. 2. Discrete patchy bronchocentric infiltrate in the left upper lobe   lingular segment. 3. No acute pulmonary emboli seen although there are some limitations   for evaluation of the lower lobes and motion artifacts. 4. No aneurysm formation dissection thoracic aorta. 5. Discrete to mild pericardial effusion. CT ABDOMEN PELVIS W IV CONTRAST Additional Contrast? None   Final Result   1. Findings for severe constipation with a large amount of fecal   contents/rotation/impaction from the sigmoid colon to the rectum,   causing a pattern of megacolon with marked fluid/air distentions or   more proximal segments of the colon. 2. Consider decompression of the rectum/sigmoid to permit relieve of   the more proximal segments of the colon. 3. Marked: Distention can explain hypoventilation lower lung bases   causing atelectasis or consolidations with overall patent central   airways of both lower lobes. Please correlate clinically. 4. There is no dilatation of the biliary tree, to be reevaluated   relieve of the megacolon pattern which cause significant mass effect   throughout the entire abdomen peritoneal cavity. .      5. Mild ascites in the abdomen. 6. No free intraperitoneal air observed. XR CHEST PORTABLE   Final Result      Gaseous distention of a loop of bowel within the left upper abdomen   may be stomach or colon. Consider further workup with computed   tomography for better characterization. Bilateral pleural effusions with contiguous atelectasis.       Endotracheal tube is present

## 2020-01-01 ENCOUNTER — APPOINTMENT (OUTPATIENT)
Dept: GENERAL RADIOLOGY | Age: 55
DRG: 853 | End: 2020-01-01
Payer: COMMERCIAL

## 2020-01-01 ENCOUNTER — APPOINTMENT (OUTPATIENT)
Dept: CT IMAGING | Age: 55
DRG: 853 | End: 2020-01-01
Payer: COMMERCIAL

## 2020-01-01 ENCOUNTER — ANESTHESIA (OUTPATIENT)
Dept: ENDOSCOPY | Age: 55
DRG: 853 | End: 2020-01-01
Payer: COMMERCIAL

## 2020-01-01 ENCOUNTER — ANESTHESIA EVENT (OUTPATIENT)
Dept: ENDOSCOPY | Age: 55
DRG: 853 | End: 2020-01-01
Payer: COMMERCIAL

## 2020-01-01 ENCOUNTER — APPOINTMENT (OUTPATIENT)
Dept: ULTRASOUND IMAGING | Age: 55
DRG: 853 | End: 2020-01-01
Payer: COMMERCIAL

## 2020-01-01 VITALS — DIASTOLIC BLOOD PRESSURE: 77 MMHG | SYSTOLIC BLOOD PRESSURE: 136 MMHG | OXYGEN SATURATION: 93 %

## 2020-01-01 VITALS
TEMPERATURE: 97.3 F | HEIGHT: 66 IN | BODY MASS INDEX: 13.73 KG/M2 | OXYGEN SATURATION: 93 % | DIASTOLIC BLOOD PRESSURE: 62 MMHG | HEART RATE: 108 BPM | SYSTOLIC BLOOD PRESSURE: 136 MMHG | WEIGHT: 85.4 LBS | RESPIRATION RATE: 20 BRPM

## 2020-01-01 VITALS — DIASTOLIC BLOOD PRESSURE: 83 MMHG | OXYGEN SATURATION: 96 % | SYSTOLIC BLOOD PRESSURE: 151 MMHG

## 2020-01-01 LAB
(1,3)-BETA-D-GLUCAN (FUNGITELL) INTERPRETATION: NEGATIVE
(1,3)-BETA-D-GLUCAN (FUNGITELL): 47 PG/ML
AADO2: 114 MMHG
AADO2: 129.3 MMHG
AADO2: 157.2 MMHG
AADO2: 161.3 MMHG
AADO2: 203.5 MMHG
AADO2: 203.5 MMHG
AADO2: 203.9 MMHG
AADO2: 210 MMHG
AADO2: 224.6 MMHG
AADO2: 244 MMHG
AADO2: 249.4 MMHG
AADO2: 323.1 MMHG
AADO2: 349.3 MMHG
AADO2: 376.6 MMHG
AADO2: 391.3 MMHG
AADO2: 415.7 MMHG
AADO2: 422.5 MMHG
AADO2: 430.9 MMHG
AADO2: 518.3 MMHG
ACETAMINOPHEN LEVEL: <5 MCG/ML (ref 10–30)
ADENOVIRUS BY PCR: NOT DETECTED
ALBUMIN SERPL-MCNC: 2.6 G/DL (ref 3.5–5.2)
ALBUMIN SERPL-MCNC: 2.7 G/DL (ref 3.5–5.2)
ALBUMIN SERPL-MCNC: 2.8 G/DL (ref 3.5–5.2)
ALBUMIN SERPL-MCNC: 2.9 G/DL (ref 3.5–5.2)
ALBUMIN SERPL-MCNC: 3 G/DL (ref 3.5–5.2)
ALBUMIN SERPL-MCNC: 3.3 G/DL (ref 3.5–5.2)
ALBUMIN SERPL-MCNC: 3.4 G/DL (ref 3.5–5.2)
ALBUMIN SERPL-MCNC: 3.4 G/DL (ref 3.5–5.2)
ALBUMIN SERPL-MCNC: 3.5 G/DL (ref 3.5–5.2)
ALBUMIN SERPL-MCNC: 3.6 G/DL (ref 3.5–5.2)
ALBUMIN SERPL-MCNC: 3.7 G/DL (ref 3.5–5.2)
ALBUMIN SERPL-MCNC: 3.7 G/DL (ref 3.5–5.2)
ALBUMIN SERPL-MCNC: 3.9 G/DL (ref 3.5–5.2)
ALBUMIN SERPL-MCNC: 4 G/DL (ref 3.5–5.2)
ALBUMIN SERPL-MCNC: 4.1 G/DL (ref 3.5–5.2)
ALBUMIN SERPL-MCNC: 4.3 G/DL (ref 3.5–5.2)
ALP BLD-CCNC: 123 U/L (ref 35–104)
ALP BLD-CCNC: 129 U/L (ref 35–104)
ALP BLD-CCNC: 139 U/L (ref 35–104)
ALP BLD-CCNC: 168 U/L (ref 35–104)
ALP BLD-CCNC: 168 U/L (ref 35–104)
ALP BLD-CCNC: 169 U/L (ref 35–104)
ALP BLD-CCNC: 171 U/L (ref 35–104)
ALP BLD-CCNC: 174 U/L (ref 35–104)
ALP BLD-CCNC: 208 U/L (ref 35–104)
ALP BLD-CCNC: 212 U/L (ref 35–104)
ALP BLD-CCNC: 215 U/L (ref 35–104)
ALP BLD-CCNC: 220 U/L (ref 35–104)
ALP BLD-CCNC: 231 U/L (ref 35–104)
ALP BLD-CCNC: 249 U/L (ref 35–104)
ALP BLD-CCNC: 256 U/L (ref 35–104)
ALP BLD-CCNC: 256 U/L (ref 35–104)
ALP BLD-CCNC: 261 U/L (ref 35–104)
ALP BLD-CCNC: 290 U/L (ref 35–104)
ALP BLD-CCNC: 308 U/L (ref 35–104)
ALP BLD-CCNC: 315 U/L (ref 35–104)
ALP BLD-CCNC: 316 U/L (ref 35–104)
ALP BLD-CCNC: 339 U/L (ref 35–104)
ALP BLD-CCNC: 365 U/L (ref 35–104)
ALP BLD-CCNC: 369 U/L (ref 35–104)
ALT SERPL-CCNC: 10 U/L (ref 0–32)
ALT SERPL-CCNC: 10 U/L (ref 0–32)
ALT SERPL-CCNC: 12 U/L (ref 0–32)
ALT SERPL-CCNC: 12 U/L (ref 0–32)
ALT SERPL-CCNC: 14 U/L (ref 0–32)
ALT SERPL-CCNC: 16 U/L (ref 0–32)
ALT SERPL-CCNC: 17 U/L (ref 0–32)
ALT SERPL-CCNC: 5 U/L (ref 0–32)
ALT SERPL-CCNC: 6 U/L (ref 0–32)
ALT SERPL-CCNC: 6 U/L (ref 0–32)
ALT SERPL-CCNC: 7 U/L (ref 0–32)
ALT SERPL-CCNC: 7 U/L (ref 0–32)
ALT SERPL-CCNC: 8 U/L (ref 0–32)
ALT SERPL-CCNC: 9 U/L (ref 0–32)
ALT SERPL-CCNC: <5 U/L (ref 0–32)
AMMONIA: 37 UMOL/L (ref 11–51)
AMMONIA: 52 UMOL/L (ref 11–51)
AMMONIA: 53 UMOL/L (ref 11–51)
ANAEROBIC CULTURE: NORMAL
ANION GAP SERPL CALCULATED.3IONS-SCNC: 10 MMOL/L (ref 7–16)
ANION GAP SERPL CALCULATED.3IONS-SCNC: 11 MMOL/L (ref 7–16)
ANION GAP SERPL CALCULATED.3IONS-SCNC: 12 MMOL/L (ref 7–16)
ANION GAP SERPL CALCULATED.3IONS-SCNC: 12 MMOL/L (ref 7–16)
ANION GAP SERPL CALCULATED.3IONS-SCNC: 13 MMOL/L (ref 7–16)
ANION GAP SERPL CALCULATED.3IONS-SCNC: 14 MMOL/L (ref 7–16)
ANION GAP SERPL CALCULATED.3IONS-SCNC: 15 MMOL/L (ref 7–16)
ANION GAP SERPL CALCULATED.3IONS-SCNC: 16 MMOL/L (ref 7–16)
ANION GAP SERPL CALCULATED.3IONS-SCNC: 17 MMOL/L (ref 7–16)
ANION GAP SERPL CALCULATED.3IONS-SCNC: 18 MMOL/L (ref 7–16)
ANION GAP SERPL CALCULATED.3IONS-SCNC: 18 MMOL/L (ref 7–16)
ANION GAP SERPL CALCULATED.3IONS-SCNC: 19 MMOL/L (ref 7–16)
ANION GAP SERPL CALCULATED.3IONS-SCNC: 20 MMOL/L (ref 7–16)
ANION GAP SERPL CALCULATED.3IONS-SCNC: 21 MMOL/L (ref 7–16)
ANION GAP SERPL CALCULATED.3IONS-SCNC: 9 MMOL/L (ref 7–16)
ANISOCYTOSIS: ABNORMAL
APPEARANCE FLUID: NORMAL
AST SERPL-CCNC: 14 U/L (ref 0–31)
AST SERPL-CCNC: 15 U/L (ref 0–31)
AST SERPL-CCNC: 16 U/L (ref 0–31)
AST SERPL-CCNC: 19 U/L (ref 0–31)
AST SERPL-CCNC: 19 U/L (ref 0–31)
AST SERPL-CCNC: 21 U/L (ref 0–31)
AST SERPL-CCNC: 22 U/L (ref 0–31)
AST SERPL-CCNC: 22 U/L (ref 0–31)
AST SERPL-CCNC: 23 U/L (ref 0–31)
AST SERPL-CCNC: 23 U/L (ref 0–31)
AST SERPL-CCNC: 25 U/L (ref 0–31)
AST SERPL-CCNC: 26 U/L (ref 0–31)
AST SERPL-CCNC: 29 U/L (ref 0–31)
AST SERPL-CCNC: 35 U/L (ref 0–31)
AST SERPL-CCNC: 36 U/L (ref 0–31)
AST SERPL-CCNC: 38 U/L (ref 0–31)
AST SERPL-CCNC: 40 U/L (ref 0–31)
AST SERPL-CCNC: 40 U/L (ref 0–31)
AST SERPL-CCNC: 48 U/L (ref 0–31)
AST SERPL-CCNC: 67 U/L (ref 0–31)
AST SERPL-CCNC: 81 U/L (ref 0–31)
AST SERPL-CCNC: 98 U/L (ref 0–31)
B.E.: -3.4 MMOL/L (ref -3–3)
B.E.: 0.6 MMOL/L (ref -3–3)
B.E.: 0.7 MMOL/L (ref -3–3)
B.E.: 1.5 MMOL/L (ref -3–3)
B.E.: 10.1 MMOL/L (ref -3–3)
B.E.: 11.6 MMOL/L (ref -3–3)
B.E.: 2.2 MMOL/L (ref -3–3)
B.E.: 2.6 MMOL/L (ref -3–3)
B.E.: 3 MMOL/L (ref -3–3)
B.E.: 3.1 MMOL/L (ref -3–3)
B.E.: 3.3 MMOL/L (ref -3–3)
B.E.: 3.7 MMOL/L (ref -3–3)
B.E.: 4 MMOL/L (ref -3–3)
B.E.: 4.1 MMOL/L (ref -3–3)
B.E.: 4.4 MMOL/L (ref -3–3)
B.E.: 4.4 MMOL/L (ref -3–3)
B.E.: 5.7 MMOL/L (ref -3–3)
B.E.: 8 MMOL/L (ref -3–3)
B.E.: 9.4 MMOL/L (ref -3–3)
B.E.: 9.8 MMOL/L (ref -3–3)
BACTERIA: ABNORMAL /HPF
BACTERIA: ABNORMAL /HPF
BASO FLUID: 0 %
BASO FLUID: 0 %
BASOPHILIC STIPPLING: ABNORMAL
BASOPHILS ABSOLUTE: 0 E9/L (ref 0–0.2)
BASOPHILS ABSOLUTE: 0.01 E9/L (ref 0–0.2)
BASOPHILS ABSOLUTE: 0.01 E9/L (ref 0–0.2)
BASOPHILS ABSOLUTE: 0.02 E9/L (ref 0–0.2)
BASOPHILS ABSOLUTE: 0.02 E9/L (ref 0–0.2)
BASOPHILS ABSOLUTE: 0.03 E9/L (ref 0–0.2)
BASOPHILS ABSOLUTE: 0.04 E9/L (ref 0–0.2)
BASOPHILS ABSOLUTE: 0.05 E9/L (ref 0–0.2)
BASOPHILS ABSOLUTE: 0.06 E9/L (ref 0–0.2)
BASOPHILS ABSOLUTE: 0.06 E9/L (ref 0–0.2)
BASOPHILS ABSOLUTE: 0.08 E9/L (ref 0–0.2)
BASOPHILS RELATIVE PERCENT: 0.1 % (ref 0–2)
BASOPHILS RELATIVE PERCENT: 0.2 % (ref 0–2)
BASOPHILS RELATIVE PERCENT: 0.3 % (ref 0–2)
BASOPHILS RELATIVE PERCENT: 0.4 % (ref 0–2)
BASOPHILS RELATIVE PERCENT: 0.5 % (ref 0–2)
BASOPHILS RELATIVE PERCENT: 0.9 % (ref 0–2)
BETA-HYDROXYBUTYRATE: 0.52 MMOL/L (ref 0.02–0.27)
BETA-HYDROXYBUTYRATE: 1.05 MMOL/L (ref 0.02–0.27)
BILIRUB SERPL-MCNC: 0.2 MG/DL (ref 0–1.2)
BILIRUB SERPL-MCNC: 0.3 MG/DL (ref 0–1.2)
BILIRUB SERPL-MCNC: 0.4 MG/DL (ref 0–1.2)
BILIRUB SERPL-MCNC: 0.5 MG/DL (ref 0–1.2)
BILIRUB SERPL-MCNC: 0.6 MG/DL (ref 0–1.2)
BILIRUBIN DIRECT: <0.2 MG/DL (ref 0–0.3)
BILIRUBIN URINE: ABNORMAL
BILIRUBIN URINE: NEGATIVE
BILIRUBIN, INDIRECT: ABNORMAL MG/DL (ref 0–1)
BLOOD CULTURE, ROUTINE: NORMAL
BLOOD, URINE: ABNORMAL
BLOOD, URINE: ABNORMAL
BODY FLUID CULTURE, STERILE: NORMAL
BORDETELLA PARAPERTUSSIS BY PCR: NOT DETECTED
BORDETELLA PERTUSSIS BY PCR: NOT DETECTED
BUN BLDV-MCNC: 15 MG/DL (ref 6–20)
BUN BLDV-MCNC: 16 MG/DL (ref 6–20)
BUN BLDV-MCNC: 16 MG/DL (ref 6–20)
BUN BLDV-MCNC: 17 MG/DL (ref 6–20)
BUN BLDV-MCNC: 18 MG/DL (ref 6–20)
BUN BLDV-MCNC: 18 MG/DL (ref 6–20)
BUN BLDV-MCNC: 19 MG/DL (ref 6–20)
BUN BLDV-MCNC: 21 MG/DL (ref 6–20)
BUN BLDV-MCNC: 23 MG/DL (ref 6–20)
BUN BLDV-MCNC: 23 MG/DL (ref 6–20)
BUN BLDV-MCNC: 26 MG/DL (ref 6–20)
BUN BLDV-MCNC: 27 MG/DL (ref 6–20)
BUN BLDV-MCNC: 27 MG/DL (ref 6–20)
BUN BLDV-MCNC: 29 MG/DL (ref 6–20)
BUN BLDV-MCNC: 30 MG/DL (ref 6–20)
BUN BLDV-MCNC: 30 MG/DL (ref 6–20)
BUN BLDV-MCNC: 31 MG/DL (ref 6–20)
BUN BLDV-MCNC: 31 MG/DL (ref 6–20)
BUN BLDV-MCNC: 34 MG/DL (ref 6–20)
BUN BLDV-MCNC: 34 MG/DL (ref 6–20)
BUN BLDV-MCNC: 35 MG/DL (ref 6–20)
BUN BLDV-MCNC: 39 MG/DL (ref 6–20)
BUN BLDV-MCNC: 41 MG/DL (ref 6–20)
BUN BLDV-MCNC: 46 MG/DL (ref 6–20)
BUN BLDV-MCNC: 47 MG/DL (ref 6–20)
BUN BLDV-MCNC: 51 MG/DL (ref 6–20)
CALCIUM IONIZED: 1.39 MMOL/L (ref 1.15–1.33)
CALCIUM SERPL-MCNC: 10 MG/DL (ref 8.6–10.2)
CALCIUM SERPL-MCNC: 10.2 MG/DL (ref 8.6–10.2)
CALCIUM SERPL-MCNC: 10.2 MG/DL (ref 8.6–10.2)
CALCIUM SERPL-MCNC: 10.7 MG/DL (ref 8.6–10.2)
CALCIUM SERPL-MCNC: 10.7 MG/DL (ref 8.6–10.2)
CALCIUM SERPL-MCNC: 10.9 MG/DL (ref 8.6–10.2)
CALCIUM SERPL-MCNC: 11 MG/DL (ref 8.6–10.2)
CALCIUM SERPL-MCNC: 11 MG/DL (ref 8.6–10.2)
CALCIUM SERPL-MCNC: 11.1 MG/DL (ref 8.6–10.2)
CALCIUM SERPL-MCNC: 8.5 MG/DL (ref 8.6–10.2)
CALCIUM SERPL-MCNC: 8.5 MG/DL (ref 8.6–10.2)
CALCIUM SERPL-MCNC: 8.7 MG/DL (ref 8.6–10.2)
CALCIUM SERPL-MCNC: 8.8 MG/DL (ref 8.6–10.2)
CALCIUM SERPL-MCNC: 8.8 MG/DL (ref 8.6–10.2)
CALCIUM SERPL-MCNC: 8.9 MG/DL (ref 8.6–10.2)
CALCIUM SERPL-MCNC: 8.9 MG/DL (ref 8.6–10.2)
CALCIUM SERPL-MCNC: 9.1 MG/DL (ref 8.6–10.2)
CALCIUM SERPL-MCNC: 9.2 MG/DL (ref 8.6–10.2)
CALCIUM SERPL-MCNC: 9.3 MG/DL (ref 8.6–10.2)
CALCIUM SERPL-MCNC: 9.4 MG/DL (ref 8.6–10.2)
CALCIUM SERPL-MCNC: 9.5 MG/DL (ref 8.6–10.2)
CALCIUM SERPL-MCNC: 9.5 MG/DL (ref 8.6–10.2)
CALCIUM SERPL-MCNC: 9.6 MG/DL (ref 8.6–10.2)
CALCIUM SERPL-MCNC: 9.8 MG/DL (ref 8.6–10.2)
CALCIUM SERPL-MCNC: 9.9 MG/DL (ref 8.6–10.2)
CALCIUM SERPL-MCNC: 9.9 MG/DL (ref 8.6–10.2)
CASTS 2: ABNORMAL /LPF
CASTS: ABNORMAL /LPF
CASTS: ABNORMAL /LPF
CELL COUNT FLUID TYPE: NORMAL
CHLAMYDOPHILIA PNEUMONIAE BY PCR: NOT DETECTED
CHLORIDE BLD-SCNC: 101 MMOL/L (ref 98–107)
CHLORIDE BLD-SCNC: 104 MMOL/L (ref 98–107)
CHLORIDE BLD-SCNC: 104 MMOL/L (ref 98–107)
CHLORIDE BLD-SCNC: 108 MMOL/L (ref 98–107)
CHLORIDE BLD-SCNC: 108 MMOL/L (ref 98–107)
CHLORIDE BLD-SCNC: 109 MMOL/L (ref 98–107)
CHLORIDE BLD-SCNC: 109 MMOL/L (ref 98–107)
CHLORIDE BLD-SCNC: 110 MMOL/L (ref 98–107)
CHLORIDE BLD-SCNC: 112 MMOL/L (ref 98–107)
CHLORIDE BLD-SCNC: 93 MMOL/L (ref 98–107)
CHLORIDE BLD-SCNC: 94 MMOL/L (ref 98–107)
CHLORIDE BLD-SCNC: 95 MMOL/L (ref 98–107)
CHLORIDE BLD-SCNC: 96 MMOL/L (ref 98–107)
CHLORIDE BLD-SCNC: 97 MMOL/L (ref 98–107)
CHLORIDE BLD-SCNC: 98 MMOL/L (ref 98–107)
CHLORIDE BLD-SCNC: 99 MMOL/L (ref 98–107)
CHLORIDE URINE RANDOM: 36 MMOL/L
CHLORIDE URINE RANDOM: 70 MMOL/L
CHLORIDE URINE RANDOM: <20 MMOL/L
CK MB: 1.5 NG/ML (ref 0–4.3)
CLARITY: CLEAR
CLARITY: CLEAR
CO2: 21 MMOL/L (ref 22–29)
CO2: 21 MMOL/L (ref 22–29)
CO2: 22 MMOL/L (ref 22–29)
CO2: 23 MMOL/L (ref 22–29)
CO2: 24 MMOL/L (ref 22–29)
CO2: 24 MMOL/L (ref 22–29)
CO2: 25 MMOL/L (ref 22–29)
CO2: 26 MMOL/L (ref 22–29)
CO2: 26 MMOL/L (ref 22–29)
CO2: 27 MMOL/L (ref 22–29)
CO2: 28 MMOL/L (ref 22–29)
CO2: 29 MMOL/L (ref 22–29)
CO2: 30 MMOL/L (ref 22–29)
CO2: 32 MMOL/L (ref 22–29)
CO2: 32 MMOL/L (ref 22–29)
CO2: 37 MMOL/L (ref 22–29)
COHB: 0.8 % (ref 0–1.5)
COHB: 1 % (ref 0–1.5)
COHB: 1.2 % (ref 0–1.5)
COHB: 1.3 % (ref 0–1.5)
COHB: 1.4 % (ref 0–1.5)
COHB: 1.4 % (ref 0–1.5)
COHB: 1.5 % (ref 0–1.5)
COHB: 1.8 % (ref 0–1.5)
COHB: 1.8 % (ref 0–1.5)
COHB: 1.9 % (ref 0–1.5)
COHB: 2 % (ref 0–1.5)
COHB: 2 % (ref 0–1.5)
COHB: 2.1 % (ref 0–1.5)
COHB: 2.2 % (ref 0–1.5)
COHB: 2.3 % (ref 0–1.5)
COHB: 2.5 % (ref 0–1.5)
COHB: 2.5 % (ref 0–1.5)
COLOR FLUID: NORMAL
COLOR: YELLOW
COLOR: YELLOW
COMMENT: ABNORMAL
COMMENT: ABNORMAL
CORONAVIRUS 229E BY PCR: NOT DETECTED
CORONAVIRUS HKU1 BY PCR: NOT DETECTED
CORONAVIRUS NL63 BY PCR: NOT DETECTED
CORONAVIRUS OC43 BY PCR: NOT DETECTED
CREAT SERPL-MCNC: 0.3 MG/DL (ref 0.5–1)
CREAT SERPL-MCNC: 0.4 MG/DL (ref 0.5–1)
CREAT SERPL-MCNC: 0.5 MG/DL (ref 0.5–1)
CREAT SERPL-MCNC: 0.6 MG/DL (ref 0.5–1)
CREAT SERPL-MCNC: 0.7 MG/DL (ref 0.5–1)
CREAT SERPL-MCNC: 0.8 MG/DL (ref 0.5–1)
CREAT SERPL-MCNC: 0.9 MG/DL (ref 0.5–1)
CREATININE URINE: 32 MG/DL (ref 29–226)
CREATININE URINE: 88 MG/DL (ref 29–226)
CRITICAL: ABNORMAL
CULTURE, BLOOD 2: NORMAL
CULTURE, BLOOD 2: NORMAL
CULTURE, RESPIRATORY: NORMAL
CYTOMEGALOVIRUS IGM ANTIBODY: NORMAL
DATE ANALYZED: ABNORMAL
DATE OF COLLECTION: ABNORMAL
EKG ATRIAL RATE: 103 BPM
EKG ATRIAL RATE: 326 BPM
EKG P AXIS: 32 DEGREES
EKG P AXIS: 65 DEGREES
EKG P-R INTERVAL: 136 MS
EKG Q-T INTERVAL: 332 MS
EKG Q-T INTERVAL: 346 MS
EKG QRS DURATION: 80 MS
EKG QRS DURATION: 90 MS
EKG QTC CALCULATION (BAZETT): 436 MS
EKG QTC CALCULATION (BAZETT): 453 MS
EKG R AXIS: 28 DEGREES
EKG R AXIS: 41 DEGREES
EKG T AXIS: 42 DEGREES
EKG T AXIS: 80 DEGREES
EKG VENTRICULAR RATE: 103 BPM
EKG VENTRICULAR RATE: 104 BPM
EOSINOPHIL FLUID: 0 %
EOSINOPHIL FLUID: 0 %
EOSINOPHILS ABSOLUTE: 0 E9/L (ref 0.05–0.5)
EOSINOPHILS ABSOLUTE: 0.02 E9/L (ref 0.05–0.5)
EOSINOPHILS ABSOLUTE: 0.02 E9/L (ref 0.05–0.5)
EOSINOPHILS ABSOLUTE: 0.03 E9/L (ref 0.05–0.5)
EOSINOPHILS ABSOLUTE: 0.04 E9/L (ref 0.05–0.5)
EOSINOPHILS ABSOLUTE: 0.04 E9/L (ref 0.05–0.5)
EOSINOPHILS ABSOLUTE: 0.05 E9/L (ref 0.05–0.5)
EOSINOPHILS ABSOLUTE: 0.06 E9/L (ref 0.05–0.5)
EOSINOPHILS ABSOLUTE: 0.07 E9/L (ref 0.05–0.5)
EOSINOPHILS ABSOLUTE: 0.08 E9/L (ref 0.05–0.5)
EOSINOPHILS ABSOLUTE: 0.08 E9/L (ref 0.05–0.5)
EOSINOPHILS ABSOLUTE: 0.09 E9/L (ref 0.05–0.5)
EOSINOPHILS ABSOLUTE: 0.1 E9/L (ref 0.05–0.5)
EOSINOPHILS ABSOLUTE: 0.11 E9/L (ref 0.05–0.5)
EOSINOPHILS ABSOLUTE: 0.11 E9/L (ref 0.05–0.5)
EOSINOPHILS ABSOLUTE: 0.13 E9/L (ref 0.05–0.5)
EOSINOPHILS ABSOLUTE: 0.14 E9/L (ref 0.05–0.5)
EOSINOPHILS ABSOLUTE: 0.15 E9/L (ref 0.05–0.5)
EOSINOPHILS ABSOLUTE: 0.25 E9/L (ref 0.05–0.5)
EOSINOPHILS RELATIVE PERCENT: 0 % (ref 0–6)
EOSINOPHILS RELATIVE PERCENT: 0.1 % (ref 0–6)
EOSINOPHILS RELATIVE PERCENT: 0.1 % (ref 0–6)
EOSINOPHILS RELATIVE PERCENT: 0.2 % (ref 0–6)
EOSINOPHILS RELATIVE PERCENT: 0.3 % (ref 0–6)
EOSINOPHILS RELATIVE PERCENT: 0.4 % (ref 0–6)
EOSINOPHILS RELATIVE PERCENT: 0.5 % (ref 0–6)
EOSINOPHILS RELATIVE PERCENT: 0.6 % (ref 0–6)
EOSINOPHILS RELATIVE PERCENT: 0.7 % (ref 0–6)
EOSINOPHILS RELATIVE PERCENT: 0.7 % (ref 0–6)
EOSINOPHILS RELATIVE PERCENT: 0.8 % (ref 0–6)
EOSINOPHILS RELATIVE PERCENT: 0.9 % (ref 0–6)
EOSINOPHILS RELATIVE PERCENT: 1 % (ref 0–6)
EOSINOPHILS RELATIVE PERCENT: 1.5 % (ref 0–6)
EOSINOPHILS RELATIVE PERCENT: 1.5 % (ref 0–6)
EOSINOPHILS RELATIVE PERCENT: 2 % (ref 0–6)
FIO2: 100 %
FIO2: 40 %
FIO2: 50 %
FIO2: 60 %
FIO2: 70 %
FIO2: 70 %
FIO2: 80 %
FIO2: 90 %
FIO2: 90 %
FOLATE: 11 NG/ML (ref 4.8–24.2)
GAMMA GLUTAMYL TRANSFERASE: 405 U/L (ref 6–42)
GFR AFRICAN AMERICAN: >60
GFR NON-AFRICAN AMERICAN: >60 ML/MIN/1.73
GLUCOSE BLD-MCNC: 100 MG/DL (ref 74–99)
GLUCOSE BLD-MCNC: 104 MG/DL (ref 74–99)
GLUCOSE BLD-MCNC: 105 MG/DL (ref 74–99)
GLUCOSE BLD-MCNC: 108 MG/DL (ref 74–99)
GLUCOSE BLD-MCNC: 109 MG/DL (ref 74–99)
GLUCOSE BLD-MCNC: 112 MG/DL (ref 74–99)
GLUCOSE BLD-MCNC: 112 MG/DL (ref 74–99)
GLUCOSE BLD-MCNC: 114 MG/DL (ref 74–99)
GLUCOSE BLD-MCNC: 115 MG/DL (ref 74–99)
GLUCOSE BLD-MCNC: 115 MG/DL (ref 74–99)
GLUCOSE BLD-MCNC: 116 MG/DL (ref 74–99)
GLUCOSE BLD-MCNC: 119 MG/DL (ref 74–99)
GLUCOSE BLD-MCNC: 120 MG/DL (ref 74–99)
GLUCOSE BLD-MCNC: 121 MG/DL (ref 74–99)
GLUCOSE BLD-MCNC: 121 MG/DL (ref 74–99)
GLUCOSE BLD-MCNC: 123 MG/DL (ref 74–99)
GLUCOSE BLD-MCNC: 128 MG/DL (ref 74–99)
GLUCOSE BLD-MCNC: 129 MG/DL (ref 74–99)
GLUCOSE BLD-MCNC: 130 MG/DL (ref 74–99)
GLUCOSE BLD-MCNC: 131 MG/DL (ref 74–99)
GLUCOSE BLD-MCNC: 132 MG/DL (ref 74–99)
GLUCOSE BLD-MCNC: 133 MG/DL (ref 74–99)
GLUCOSE BLD-MCNC: 133 MG/DL (ref 74–99)
GLUCOSE BLD-MCNC: 134 MG/DL (ref 74–99)
GLUCOSE BLD-MCNC: 135 MG/DL (ref 74–99)
GLUCOSE BLD-MCNC: 142 MG/DL (ref 74–99)
GLUCOSE BLD-MCNC: 145 MG/DL (ref 74–99)
GLUCOSE BLD-MCNC: 147 MG/DL (ref 74–99)
GLUCOSE BLD-MCNC: 92 MG/DL (ref 74–99)
GLUCOSE BLD-MCNC: 93 MG/DL (ref 74–99)
GLUCOSE BLD-MCNC: 97 MG/DL (ref 74–99)
GLUCOSE URINE: NEGATIVE MG/DL
GLUCOSE URINE: NEGATIVE MG/DL
GRAM STAIN ORDERABLE: NORMAL
GRAM STAIN RESULT: NORMAL
HCO3: 23.1 MMOL/L (ref 22–26)
HCO3: 25 MMOL/L (ref 22–26)
HCO3: 25.3 MMOL/L (ref 22–26)
HCO3: 26 MMOL/L (ref 22–26)
HCO3: 26.7 MMOL/L (ref 22–26)
HCO3: 26.8 MMOL/L (ref 22–26)
HCO3: 27.7 MMOL/L (ref 22–26)
HCO3: 27.8 MMOL/L (ref 22–26)
HCO3: 28.4 MMOL/L (ref 22–26)
HCO3: 28.9 MMOL/L (ref 22–26)
HCO3: 29 MMOL/L (ref 22–26)
HCO3: 29.1 MMOL/L (ref 22–26)
HCO3: 30 MMOL/L (ref 22–26)
HCO3: 30.1 MMOL/L (ref 22–26)
HCO3: 31.9 MMOL/L (ref 22–26)
HCO3: 32.1 MMOL/L (ref 22–26)
HCO3: 33.5 MMOL/L (ref 22–26)
HCO3: 34.2 MMOL/L (ref 22–26)
HCO3: 35 MMOL/L (ref 22–26)
HCO3: 36.6 MMOL/L (ref 22–26)
HCT VFR BLD CALC: 24.8 % (ref 34–48)
HCT VFR BLD CALC: 24.8 % (ref 34–48)
HCT VFR BLD CALC: 25.1 % (ref 34–48)
HCT VFR BLD CALC: 26.4 % (ref 34–48)
HCT VFR BLD CALC: 26.6 % (ref 34–48)
HCT VFR BLD CALC: 27.2 % (ref 34–48)
HCT VFR BLD CALC: 27.3 % (ref 34–48)
HCT VFR BLD CALC: 27.3 % (ref 34–48)
HCT VFR BLD CALC: 27.5 % (ref 34–48)
HCT VFR BLD CALC: 27.5 % (ref 34–48)
HCT VFR BLD CALC: 28 % (ref 34–48)
HCT VFR BLD CALC: 28.1 % (ref 34–48)
HCT VFR BLD CALC: 28.4 % (ref 34–48)
HCT VFR BLD CALC: 28.6 % (ref 34–48)
HCT VFR BLD CALC: 28.8 % (ref 34–48)
HCT VFR BLD CALC: 29.1 % (ref 34–48)
HCT VFR BLD CALC: 29.1 % (ref 34–48)
HCT VFR BLD CALC: 29.2 % (ref 34–48)
HCT VFR BLD CALC: 29.3 % (ref 34–48)
HCT VFR BLD CALC: 29.5 % (ref 34–48)
HCT VFR BLD CALC: 30.2 % (ref 34–48)
HCT VFR BLD CALC: 30.9 % (ref 34–48)
HCT VFR BLD CALC: 31.1 % (ref 34–48)
HCT VFR BLD CALC: 31.4 % (ref 34–48)
HCT VFR BLD CALC: 31.5 % (ref 34–48)
HCT VFR BLD CALC: 31.7 % (ref 34–48)
HCT VFR BLD CALC: 32 % (ref 34–48)
HCT VFR BLD CALC: 32.8 % (ref 34–48)
HCT VFR BLD CALC: 34.1 % (ref 34–48)
HCT VFR BLD CALC: 34.4 % (ref 34–48)
HEMOGLOBIN: 10 G/DL (ref 11.5–15.5)
HEMOGLOBIN: 10.3 G/DL (ref 11.5–15.5)
HEMOGLOBIN: 7.2 G/DL (ref 11.5–15.5)
HEMOGLOBIN: 7.5 G/DL (ref 11.5–15.5)
HEMOGLOBIN: 7.6 G/DL (ref 11.5–15.5)
HEMOGLOBIN: 8 G/DL (ref 11.5–15.5)
HEMOGLOBIN: 8 G/DL (ref 11.5–15.5)
HEMOGLOBIN: 8.1 G/DL (ref 11.5–15.5)
HEMOGLOBIN: 8.2 G/DL (ref 11.5–15.5)
HEMOGLOBIN: 8.2 G/DL (ref 11.5–15.5)
HEMOGLOBIN: 8.3 G/DL (ref 11.5–15.5)
HEMOGLOBIN: 8.4 G/DL (ref 11.5–15.5)
HEMOGLOBIN: 8.4 G/DL (ref 11.5–15.5)
HEMOGLOBIN: 8.5 G/DL (ref 11.5–15.5)
HEMOGLOBIN: 8.6 G/DL (ref 11.5–15.5)
HEMOGLOBIN: 8.7 G/DL (ref 11.5–15.5)
HEMOGLOBIN: 8.7 G/DL (ref 11.5–15.5)
HEMOGLOBIN: 9 G/DL (ref 11.5–15.5)
HEMOGLOBIN: 9.1 G/DL (ref 11.5–15.5)
HEMOGLOBIN: 9.2 G/DL (ref 11.5–15.5)
HEMOGLOBIN: 9.3 G/DL (ref 11.5–15.5)
HEMOGLOBIN: 9.4 G/DL (ref 11.5–15.5)
HEMOGLOBIN: 9.5 G/DL (ref 11.5–15.5)
HEMOGLOBIN: 9.9 G/DL (ref 11.5–15.5)
HEPARIN PF4 ANTIBODY: 0.08 OD
HHB: 1 % (ref 0–5)
HHB: 1.3 % (ref 0–5)
HHB: 1.3 % (ref 0–5)
HHB: 1.4 % (ref 0–5)
HHB: 1.5 % (ref 0–5)
HHB: 1.8 % (ref 0–5)
HHB: 14.5 % (ref 0–5)
HHB: 2.2 % (ref 0–5)
HHB: 2.3 % (ref 0–5)
HHB: 2.4 % (ref 0–5)
HHB: 2.5 % (ref 0–5)
HHB: 3 % (ref 0–5)
HHB: 3.1 % (ref 0–5)
HHB: 3.4 % (ref 0–5)
HHB: 3.4 % (ref 0–5)
HHB: 3.6 % (ref 0–5)
HHB: 3.6 % (ref 0–5)
HHB: 4.5 % (ref 0–5)
HHB: 4.7 % (ref 0–5)
HHB: 5.6 % (ref 0–5)
HHB: 6.3 % (ref 0–5)
HHB: 6.8 % (ref 0–5)
HUMAN METAPNEUMOVIRUS BY PCR: NOT DETECTED
HUMAN RHINOVIRUS/ENTEROVIRUS BY PCR: NOT DETECTED
HYPOCHROMIA: ABNORMAL
IMMATURE GRANULOCYTES #: 0.06 E9/L
IMMATURE GRANULOCYTES #: 0.06 E9/L
IMMATURE GRANULOCYTES #: 0.07 E9/L
IMMATURE GRANULOCYTES #: 0.08 E9/L
IMMATURE GRANULOCYTES #: 0.09 E9/L
IMMATURE GRANULOCYTES #: 0.1 E9/L
IMMATURE GRANULOCYTES #: 0.1 E9/L
IMMATURE GRANULOCYTES #: 0.11 E9/L
IMMATURE GRANULOCYTES #: 0.11 E9/L
IMMATURE GRANULOCYTES #: 0.12 E9/L
IMMATURE GRANULOCYTES #: 0.13 E9/L
IMMATURE GRANULOCYTES #: 0.14 E9/L
IMMATURE GRANULOCYTES #: 0.16 E9/L
IMMATURE GRANULOCYTES #: 0.16 E9/L
IMMATURE GRANULOCYTES #: 0.17 E9/L
IMMATURE GRANULOCYTES #: 0.19 E9/L
IMMATURE GRANULOCYTES #: 0.2 E9/L
IMMATURE GRANULOCYTES #: 0.2 E9/L
IMMATURE GRANULOCYTES #: 0.21 E9/L
IMMATURE GRANULOCYTES #: 0.21 E9/L
IMMATURE GRANULOCYTES #: 0.24 E9/L
IMMATURE GRANULOCYTES #: 0.26 E9/L
IMMATURE GRANULOCYTES #: 0.32 E9/L
IMMATURE GRANULOCYTES #: 0.33 E9/L
IMMATURE GRANULOCYTES #: 0.37 E9/L
IMMATURE GRANULOCYTES %: 0.6 % (ref 0–5)
IMMATURE GRANULOCYTES %: 0.7 % (ref 0–5)
IMMATURE GRANULOCYTES %: 0.7 % (ref 0–5)
IMMATURE GRANULOCYTES %: 0.8 % (ref 0–5)
IMMATURE GRANULOCYTES %: 0.8 % (ref 0–5)
IMMATURE GRANULOCYTES %: 0.9 % (ref 0–5)
IMMATURE GRANULOCYTES %: 0.9 % (ref 0–5)
IMMATURE GRANULOCYTES %: 1 % (ref 0–5)
IMMATURE GRANULOCYTES %: 1.1 % (ref 0–5)
IMMATURE GRANULOCYTES %: 1.2 % (ref 0–5)
IMMATURE GRANULOCYTES %: 1.2 % (ref 0–5)
IMMATURE GRANULOCYTES %: 1.3 % (ref 0–5)
IMMATURE GRANULOCYTES %: 1.3 % (ref 0–5)
IMMATURE GRANULOCYTES %: 1.4 % (ref 0–5)
IMMATURE GRANULOCYTES %: 1.5 % (ref 0–5)
IMMATURE GRANULOCYTES %: 1.5 % (ref 0–5)
IMMATURE GRANULOCYTES %: 1.7 % (ref 0–5)
IMMATURE GRANULOCYTES %: 1.7 % (ref 0–5)
IMMATURE GRANULOCYTES %: 1.9 % (ref 0–5)
IMMATURE GRANULOCYTES %: 2 % (ref 0–5)
IMMATURE GRANULOCYTES %: 2.1 % (ref 0–5)
IMMATURE GRANULOCYTES %: 2.2 % (ref 0–5)
IMMATURE GRANULOCYTES %: 2.5 % (ref 0–5)
INFLUENZA A BY PCR: NOT DETECTED
INFLUENZA B BY PCR: NOT DETECTED
KETONES, URINE: 15 MG/DL
KETONES, URINE: ABNORMAL MG/DL
LAB: ABNORMAL
LACTIC ACID: 1 MMOL/L (ref 0.5–2.2)
LACTIC ACID: 1 MMOL/L (ref 0.5–2.2)
LEUKOCYTE ESTERASE, URINE: ABNORMAL
LEUKOCYTE ESTERASE, URINE: NEGATIVE
LV EF: 50 %
LVEF MODALITY: NORMAL
LYMPHOCYTES ABSOLUTE: 0.2 E9/L (ref 1.5–4)
LYMPHOCYTES ABSOLUTE: 0.5 E9/L (ref 1.5–4)
LYMPHOCYTES ABSOLUTE: 0.59 E9/L (ref 1.5–4)
LYMPHOCYTES ABSOLUTE: 0.67 E9/L (ref 1.5–4)
LYMPHOCYTES ABSOLUTE: 0.7 E9/L (ref 1.5–4)
LYMPHOCYTES ABSOLUTE: 0.76 E9/L (ref 1.5–4)
LYMPHOCYTES ABSOLUTE: 0.89 E9/L (ref 1.5–4)
LYMPHOCYTES ABSOLUTE: 0.91 E9/L (ref 1.5–4)
LYMPHOCYTES ABSOLUTE: 0.93 E9/L (ref 1.5–4)
LYMPHOCYTES ABSOLUTE: 1.03 E9/L (ref 1.5–4)
LYMPHOCYTES ABSOLUTE: 1.07 E9/L (ref 1.5–4)
LYMPHOCYTES ABSOLUTE: 1.08 E9/L (ref 1.5–4)
LYMPHOCYTES ABSOLUTE: 1.09 E9/L (ref 1.5–4)
LYMPHOCYTES ABSOLUTE: 1.12 E9/L (ref 1.5–4)
LYMPHOCYTES ABSOLUTE: 1.12 E9/L (ref 1.5–4)
LYMPHOCYTES ABSOLUTE: 1.15 E9/L (ref 1.5–4)
LYMPHOCYTES ABSOLUTE: 1.16 E9/L (ref 1.5–4)
LYMPHOCYTES ABSOLUTE: 1.17 E9/L (ref 1.5–4)
LYMPHOCYTES ABSOLUTE: 1.18 E9/L (ref 1.5–4)
LYMPHOCYTES ABSOLUTE: 1.19 E9/L (ref 1.5–4)
LYMPHOCYTES ABSOLUTE: 1.25 E9/L (ref 1.5–4)
LYMPHOCYTES ABSOLUTE: 1.32 E9/L (ref 1.5–4)
LYMPHOCYTES ABSOLUTE: 1.41 E9/L (ref 1.5–4)
LYMPHOCYTES ABSOLUTE: 1.44 E9/L (ref 1.5–4)
LYMPHOCYTES ABSOLUTE: 1.5 E9/L (ref 1.5–4)
LYMPHOCYTES ABSOLUTE: 1.57 E9/L (ref 1.5–4)
LYMPHOCYTES ABSOLUTE: 1.57 E9/L (ref 1.5–4)
LYMPHOCYTES ABSOLUTE: 1.67 E9/L (ref 1.5–4)
LYMPHOCYTES ABSOLUTE: 1.95 E9/L (ref 1.5–4)
LYMPHOCYTES ABSOLUTE: 2 E9/L (ref 1.5–4)
LYMPHOCYTES RELATIVE PERCENT: 1.8 % (ref 20–42)
LYMPHOCYTES RELATIVE PERCENT: 10.4 % (ref 20–42)
LYMPHOCYTES RELATIVE PERCENT: 10.5 % (ref 20–42)
LYMPHOCYTES RELATIVE PERCENT: 10.8 % (ref 20–42)
LYMPHOCYTES RELATIVE PERCENT: 11.4 % (ref 20–42)
LYMPHOCYTES RELATIVE PERCENT: 11.6 % (ref 20–42)
LYMPHOCYTES RELATIVE PERCENT: 12.3 % (ref 20–42)
LYMPHOCYTES RELATIVE PERCENT: 12.5 % (ref 20–42)
LYMPHOCYTES RELATIVE PERCENT: 15 % (ref 20–42)
LYMPHOCYTES RELATIVE PERCENT: 15 % (ref 20–42)
LYMPHOCYTES RELATIVE PERCENT: 15.8 % (ref 20–42)
LYMPHOCYTES RELATIVE PERCENT: 18.8 % (ref 20–42)
LYMPHOCYTES RELATIVE PERCENT: 20.6 % (ref 20–42)
LYMPHOCYTES RELATIVE PERCENT: 23.8 % (ref 20–42)
LYMPHOCYTES RELATIVE PERCENT: 5.2 % (ref 20–42)
LYMPHOCYTES RELATIVE PERCENT: 5.3 % (ref 20–42)
LYMPHOCYTES RELATIVE PERCENT: 5.5 % (ref 20–42)
LYMPHOCYTES RELATIVE PERCENT: 6 % (ref 20–42)
LYMPHOCYTES RELATIVE PERCENT: 6.1 % (ref 20–42)
LYMPHOCYTES RELATIVE PERCENT: 6.1 % (ref 20–42)
LYMPHOCYTES RELATIVE PERCENT: 6.6 % (ref 20–42)
LYMPHOCYTES RELATIVE PERCENT: 7.2 % (ref 20–42)
LYMPHOCYTES RELATIVE PERCENT: 7.4 % (ref 20–42)
LYMPHOCYTES RELATIVE PERCENT: 7.9 % (ref 20–42)
LYMPHOCYTES RELATIVE PERCENT: 7.9 % (ref 20–42)
LYMPHOCYTES RELATIVE PERCENT: 8.2 % (ref 20–42)
LYMPHOCYTES RELATIVE PERCENT: 8.4 % (ref 20–42)
LYMPHOCYTES RELATIVE PERCENT: 8.8 % (ref 20–42)
LYMPHOCYTES RELATIVE PERCENT: 9.6 % (ref 20–42)
LYMPHOCYTES RELATIVE PERCENT: 9.8 % (ref 20–42)
LYMPHOCYTES, BODY FLUID: 1 %
LYMPHOCYTES, BODY FLUID: 1 %
LYMPHOCYTES, BODY FLUID: 11 %
Lab: ABNORMAL
Lab: NORMAL
MAGNESIUM: 1.8 MG/DL (ref 1.6–2.6)
MAGNESIUM: 1.9 MG/DL (ref 1.6–2.6)
MAGNESIUM: 2 MG/DL (ref 1.6–2.6)
MAGNESIUM: 2.1 MG/DL (ref 1.6–2.6)
MAGNESIUM: 2.5 MG/DL (ref 1.6–2.6)
MAGNESIUM: 3.6 MG/DL (ref 1.6–2.6)
MCH RBC QN AUTO: 28.7 PG (ref 26–35)
MCH RBC QN AUTO: 28.8 PG (ref 26–35)
MCH RBC QN AUTO: 28.9 PG (ref 26–35)
MCH RBC QN AUTO: 29.1 PG (ref 26–35)
MCH RBC QN AUTO: 29.2 PG (ref 26–35)
MCH RBC QN AUTO: 29.2 PG (ref 26–35)
MCH RBC QN AUTO: 29.3 PG (ref 26–35)
MCH RBC QN AUTO: 29.4 PG (ref 26–35)
MCH RBC QN AUTO: 29.7 PG (ref 26–35)
MCH RBC QN AUTO: 29.8 PG (ref 26–35)
MCHC RBC AUTO-ENTMCNC: 28.6 % (ref 32–34.5)
MCHC RBC AUTO-ENTMCNC: 29 % (ref 32–34.5)
MCHC RBC AUTO-ENTMCNC: 29.1 % (ref 32–34.5)
MCHC RBC AUTO-ENTMCNC: 29.3 % (ref 32–34.5)
MCHC RBC AUTO-ENTMCNC: 29.3 % (ref 32–34.5)
MCHC RBC AUTO-ENTMCNC: 29.4 % (ref 32–34.5)
MCHC RBC AUTO-ENTMCNC: 29.5 % (ref 32–34.5)
MCHC RBC AUTO-ENTMCNC: 29.6 % (ref 32–34.5)
MCHC RBC AUTO-ENTMCNC: 29.7 % (ref 32–34.5)
MCHC RBC AUTO-ENTMCNC: 29.8 % (ref 32–34.5)
MCHC RBC AUTO-ENTMCNC: 29.9 % (ref 32–34.5)
MCHC RBC AUTO-ENTMCNC: 30 % (ref 32–34.5)
MCHC RBC AUTO-ENTMCNC: 30 % (ref 32–34.5)
MCHC RBC AUTO-ENTMCNC: 30.1 % (ref 32–34.5)
MCHC RBC AUTO-ENTMCNC: 30.2 % (ref 32–34.5)
MCHC RBC AUTO-ENTMCNC: 30.3 % (ref 32–34.5)
MCHC RBC AUTO-ENTMCNC: 30.3 % (ref 32–34.5)
MCHC RBC AUTO-ENTMCNC: 30.4 % (ref 32–34.5)
MCHC RBC AUTO-ENTMCNC: 30.5 % (ref 32–34.5)
MCV RBC AUTO: 100.7 FL (ref 80–99.9)
MCV RBC AUTO: 102.6 FL (ref 80–99.9)
MCV RBC AUTO: 102.9 FL (ref 80–99.9)
MCV RBC AUTO: 94.6 FL (ref 80–99.9)
MCV RBC AUTO: 94.7 FL (ref 80–99.9)
MCV RBC AUTO: 95 FL (ref 80–99.9)
MCV RBC AUTO: 95.2 FL (ref 80–99.9)
MCV RBC AUTO: 95.4 FL (ref 80–99.9)
MCV RBC AUTO: 95.4 FL (ref 80–99.9)
MCV RBC AUTO: 95.5 FL (ref 80–99.9)
MCV RBC AUTO: 96.5 FL (ref 80–99.9)
MCV RBC AUTO: 97 FL (ref 80–99.9)
MCV RBC AUTO: 97.2 FL (ref 80–99.9)
MCV RBC AUTO: 97.3 FL (ref 80–99.9)
MCV RBC AUTO: 97.5 FL (ref 80–99.9)
MCV RBC AUTO: 98.1 FL (ref 80–99.9)
MCV RBC AUTO: 98.3 FL (ref 80–99.9)
MCV RBC AUTO: 98.3 FL (ref 80–99.9)
MCV RBC AUTO: 98.4 FL (ref 80–99.9)
MCV RBC AUTO: 98.6 FL (ref 80–99.9)
MCV RBC AUTO: 98.6 FL (ref 80–99.9)
MCV RBC AUTO: 99 FL (ref 80–99.9)
MCV RBC AUTO: 99 FL (ref 80–99.9)
MCV RBC AUTO: 99.3 FL (ref 80–99.9)
MCV RBC AUTO: 99.3 FL (ref 80–99.9)
MCV RBC AUTO: 99.4 FL (ref 80–99.9)
MCV RBC AUTO: 99.7 FL (ref 80–99.9)
MCV RBC AUTO: 99.7 FL (ref 80–99.9)
METAMYELOCYTES RELATIVE PERCENT: 0.9 % (ref 0–1)
METAMYELOCYTES RELATIVE PERCENT: 0.9 % (ref 0–1)
METER GLUCOSE: 104 MG/DL (ref 74–99)
METER GLUCOSE: 106 MG/DL (ref 74–99)
METER GLUCOSE: 107 MG/DL (ref 74–99)
METER GLUCOSE: 108 MG/DL (ref 74–99)
METER GLUCOSE: 109 MG/DL (ref 74–99)
METER GLUCOSE: 109 MG/DL (ref 74–99)
METER GLUCOSE: 110 MG/DL (ref 74–99)
METER GLUCOSE: 110 MG/DL (ref 74–99)
METER GLUCOSE: 111 MG/DL (ref 74–99)
METER GLUCOSE: 112 MG/DL (ref 74–99)
METER GLUCOSE: 113 MG/DL (ref 74–99)
METER GLUCOSE: 114 MG/DL (ref 74–99)
METER GLUCOSE: 116 MG/DL (ref 74–99)
METER GLUCOSE: 116 MG/DL (ref 74–99)
METER GLUCOSE: 118 MG/DL (ref 74–99)
METER GLUCOSE: 119 MG/DL (ref 74–99)
METER GLUCOSE: 121 MG/DL (ref 74–99)
METER GLUCOSE: 124 MG/DL (ref 74–99)
METER GLUCOSE: 124 MG/DL (ref 74–99)
METER GLUCOSE: 125 MG/DL (ref 74–99)
METER GLUCOSE: 126 MG/DL (ref 74–99)
METER GLUCOSE: 128 MG/DL (ref 74–99)
METER GLUCOSE: 131 MG/DL (ref 74–99)
METER GLUCOSE: 133 MG/DL (ref 74–99)
METER GLUCOSE: 154 MG/DL (ref 74–99)
METER GLUCOSE: 156 MG/DL (ref 74–99)
METER GLUCOSE: 82 MG/DL (ref 74–99)
METER GLUCOSE: 93 MG/DL (ref 74–99)
METHB: 0.2 % (ref 0–1.5)
METHB: 0.3 % (ref 0–1.5)
MODE: ABNORMAL
MODE: AC
MONOCYTE, FLUID: 0 %
MONOCYTE, FLUID: 1 %
MONOCYTE, FLUID: 6 %
MONOCYTES ABSOLUTE: 0.1 E9/L (ref 0.1–0.95)
MONOCYTES ABSOLUTE: 0.18 E9/L (ref 0.1–0.95)
MONOCYTES ABSOLUTE: 0.3 E9/L (ref 0.1–0.95)
MONOCYTES ABSOLUTE: 0.44 E9/L (ref 0.1–0.95)
MONOCYTES ABSOLUTE: 0.5 E9/L (ref 0.1–0.95)
MONOCYTES ABSOLUTE: 0.66 E9/L (ref 0.1–0.95)
MONOCYTES ABSOLUTE: 0.69 E9/L (ref 0.1–0.95)
MONOCYTES ABSOLUTE: 0.78 E9/L (ref 0.1–0.95)
MONOCYTES ABSOLUTE: 0.79 E9/L (ref 0.1–0.95)
MONOCYTES ABSOLUTE: 0.79 E9/L (ref 0.1–0.95)
MONOCYTES ABSOLUTE: 0.82 E9/L (ref 0.1–0.95)
MONOCYTES ABSOLUTE: 0.84 E9/L (ref 0.1–0.95)
MONOCYTES ABSOLUTE: 0.87 E9/L (ref 0.1–0.95)
MONOCYTES ABSOLUTE: 0.9 E9/L (ref 0.1–0.95)
MONOCYTES ABSOLUTE: 0.9 E9/L (ref 0.1–0.95)
MONOCYTES ABSOLUTE: 0.91 E9/L (ref 0.1–0.95)
MONOCYTES ABSOLUTE: 0.93 E9/L (ref 0.1–0.95)
MONOCYTES ABSOLUTE: 0.95 E9/L (ref 0.1–0.95)
MONOCYTES ABSOLUTE: 0.97 E9/L (ref 0.1–0.95)
MONOCYTES ABSOLUTE: 0.97 E9/L (ref 0.1–0.95)
MONOCYTES ABSOLUTE: 0.98 E9/L (ref 0.1–0.95)
MONOCYTES ABSOLUTE: 1.05 E9/L (ref 0.1–0.95)
MONOCYTES ABSOLUTE: 1.08 E9/L (ref 0.1–0.95)
MONOCYTES ABSOLUTE: 1.1 E9/L (ref 0.1–0.95)
MONOCYTES ABSOLUTE: 1.16 E9/L (ref 0.1–0.95)
MONOCYTES ABSOLUTE: 1.18 E9/L (ref 0.1–0.95)
MONOCYTES ABSOLUTE: 1.23 E9/L (ref 0.1–0.95)
MONOCYTES ABSOLUTE: 1.28 E9/L (ref 0.1–0.95)
MONOCYTES ABSOLUTE: 1.31 E9/L (ref 0.1–0.95)
MONOCYTES ABSOLUTE: 1.48 E9/L (ref 0.1–0.95)
MONOCYTES RELATIVE PERCENT: 0.9 % (ref 2–12)
MONOCYTES RELATIVE PERCENT: 1.7 % (ref 2–12)
MONOCYTES RELATIVE PERCENT: 10.3 % (ref 2–12)
MONOCYTES RELATIVE PERCENT: 10.4 % (ref 2–12)
MONOCYTES RELATIVE PERCENT: 10.5 % (ref 2–12)
MONOCYTES RELATIVE PERCENT: 2.6 % (ref 2–12)
MONOCYTES RELATIVE PERCENT: 4.3 % (ref 2–12)
MONOCYTES RELATIVE PERCENT: 5.2 % (ref 2–12)
MONOCYTES RELATIVE PERCENT: 5.3 % (ref 2–12)
MONOCYTES RELATIVE PERCENT: 5.9 % (ref 2–12)
MONOCYTES RELATIVE PERCENT: 6.1 % (ref 2–12)
MONOCYTES RELATIVE PERCENT: 6.9 % (ref 2–12)
MONOCYTES RELATIVE PERCENT: 7 % (ref 2–12)
MONOCYTES RELATIVE PERCENT: 7.2 % (ref 2–12)
MONOCYTES RELATIVE PERCENT: 7.3 % (ref 2–12)
MONOCYTES RELATIVE PERCENT: 7.5 % (ref 2–12)
MONOCYTES RELATIVE PERCENT: 7.7 % (ref 2–12)
MONOCYTES RELATIVE PERCENT: 7.9 % (ref 2–12)
MONOCYTES RELATIVE PERCENT: 8 % (ref 2–12)
MONOCYTES RELATIVE PERCENT: 8.5 % (ref 2–12)
MONOCYTES RELATIVE PERCENT: 8.6 % (ref 2–12)
MONOCYTES RELATIVE PERCENT: 8.6 % (ref 2–12)
MONOCYTES RELATIVE PERCENT: 8.7 % (ref 2–12)
MONOCYTES RELATIVE PERCENT: 8.8 % (ref 2–12)
MONOCYTES RELATIVE PERCENT: 8.9 % (ref 2–12)
MONOCYTES RELATIVE PERCENT: 8.9 % (ref 2–12)
MONOCYTES RELATIVE PERCENT: 9 % (ref 2–12)
MONOCYTES RELATIVE PERCENT: 9.5 % (ref 2–12)
MRSA CULTURE ONLY: NORMAL
MYCOPLASMA PNEUMONIAE BY PCR: NOT DETECTED
MYELOCYTE PERCENT: 0.9 % (ref 0–0)
MYELOCYTE PERCENT: 0.9 % (ref 0–0)
MYELOCYTE PERCENT: 1.8 % (ref 0–0)
NEUTROPHIL, FLUID: 83 %
NEUTROPHIL, FLUID: 98 %
NEUTROPHIL, FLUID: 99 %
NEUTROPHILS ABSOLUTE: 10.55 E9/L (ref 1.8–7.3)
NEUTROPHILS ABSOLUTE: 10.55 E9/L (ref 1.8–7.3)
NEUTROPHILS ABSOLUTE: 10.62 E9/L (ref 1.8–7.3)
NEUTROPHILS ABSOLUTE: 11.32 E9/L (ref 1.8–7.3)
NEUTROPHILS ABSOLUTE: 11.47 E9/L (ref 1.8–7.3)
NEUTROPHILS ABSOLUTE: 11.65 E9/L (ref 1.8–7.3)
NEUTROPHILS ABSOLUTE: 12.2 E9/L (ref 1.8–7.3)
NEUTROPHILS ABSOLUTE: 12.2 E9/L (ref 1.8–7.3)
NEUTROPHILS ABSOLUTE: 13.02 E9/L (ref 1.8–7.3)
NEUTROPHILS ABSOLUTE: 13.47 E9/L (ref 1.8–7.3)
NEUTROPHILS ABSOLUTE: 14.28 E9/L (ref 1.8–7.3)
NEUTROPHILS ABSOLUTE: 14.3 E9/L (ref 1.8–7.3)
NEUTROPHILS ABSOLUTE: 5.38 E9/L (ref 1.8–7.3)
NEUTROPHILS ABSOLUTE: 6.21 E9/L (ref 1.8–7.3)
NEUTROPHILS ABSOLUTE: 6.56 E9/L (ref 1.8–7.3)
NEUTROPHILS ABSOLUTE: 6.63 E9/L (ref 1.8–7.3)
NEUTROPHILS ABSOLUTE: 7.03 E9/L (ref 1.8–7.3)
NEUTROPHILS ABSOLUTE: 7.23 E9/L (ref 1.8–7.3)
NEUTROPHILS ABSOLUTE: 7.35 E9/L (ref 1.8–7.3)
NEUTROPHILS ABSOLUTE: 7.84 E9/L (ref 1.8–7.3)
NEUTROPHILS ABSOLUTE: 7.92 E9/L (ref 1.8–7.3)
NEUTROPHILS ABSOLUTE: 7.97 E9/L (ref 1.8–7.3)
NEUTROPHILS ABSOLUTE: 8.07 E9/L (ref 1.8–7.3)
NEUTROPHILS ABSOLUTE: 8.21 E9/L (ref 1.8–7.3)
NEUTROPHILS ABSOLUTE: 8.66 E9/L (ref 1.8–7.3)
NEUTROPHILS ABSOLUTE: 8.7 E9/L (ref 1.8–7.3)
NEUTROPHILS ABSOLUTE: 8.81 E9/L (ref 1.8–7.3)
NEUTROPHILS ABSOLUTE: 9.11 E9/L (ref 1.8–7.3)
NEUTROPHILS ABSOLUTE: 9.57 E9/L (ref 1.8–7.3)
NEUTROPHILS ABSOLUTE: 9.89 E9/L (ref 1.8–7.3)
NEUTROPHILS RELATIVE PERCENT: 64 % (ref 43–80)
NEUTROPHILS RELATIVE PERCENT: 69.2 % (ref 43–80)
NEUTROPHILS RELATIVE PERCENT: 70.1 % (ref 43–80)
NEUTROPHILS RELATIVE PERCENT: 72.7 % (ref 43–80)
NEUTROPHILS RELATIVE PERCENT: 73.8 % (ref 43–80)
NEUTROPHILS RELATIVE PERCENT: 75.9 % (ref 43–80)
NEUTROPHILS RELATIVE PERCENT: 76.3 % (ref 43–80)
NEUTROPHILS RELATIVE PERCENT: 76.5 % (ref 43–80)
NEUTROPHILS RELATIVE PERCENT: 76.8 % (ref 43–80)
NEUTROPHILS RELATIVE PERCENT: 76.9 % (ref 43–80)
NEUTROPHILS RELATIVE PERCENT: 77.2 % (ref 43–80)
NEUTROPHILS RELATIVE PERCENT: 79.1 % (ref 43–80)
NEUTROPHILS RELATIVE PERCENT: 79.4 % (ref 43–80)
NEUTROPHILS RELATIVE PERCENT: 79.5 % (ref 43–80)
NEUTROPHILS RELATIVE PERCENT: 80.1 % (ref 43–80)
NEUTROPHILS RELATIVE PERCENT: 80.8 % (ref 43–80)
NEUTROPHILS RELATIVE PERCENT: 81.3 % (ref 43–80)
NEUTROPHILS RELATIVE PERCENT: 82.1 % (ref 43–80)
NEUTROPHILS RELATIVE PERCENT: 82.3 % (ref 43–80)
NEUTROPHILS RELATIVE PERCENT: 82.6 % (ref 43–80)
NEUTROPHILS RELATIVE PERCENT: 83.4 % (ref 43–80)
NEUTROPHILS RELATIVE PERCENT: 83.8 % (ref 43–80)
NEUTROPHILS RELATIVE PERCENT: 84.8 % (ref 43–80)
NEUTROPHILS RELATIVE PERCENT: 84.9 % (ref 43–80)
NEUTROPHILS RELATIVE PERCENT: 85.2 % (ref 43–80)
NEUTROPHILS RELATIVE PERCENT: 85.4 % (ref 43–80)
NEUTROPHILS RELATIVE PERCENT: 85.8 % (ref 43–80)
NEUTROPHILS RELATIVE PERCENT: 88.7 % (ref 43–80)
NEUTROPHILS RELATIVE PERCENT: 92.2 % (ref 43–80)
NEUTROPHILS RELATIVE PERCENT: 96.5 % (ref 43–80)
NITRITE, URINE: NEGATIVE
NITRITE, URINE: NEGATIVE
NUCLEATED CELLS FLUID: 2140 /UL
NUCLEATED CELLS FLUID: 3050 /UL
NUCLEATED CELLS FLUID: 3750 /UL
NUCLEATED RED BLOOD CELLS: 0.9 /100 WBC
NUCLEATED RED BLOOD CELLS: 0.9 /100 WBC
O2 CONTENT: 11 ML/DL
O2 CONTENT: 11.3 ML/DL
O2 CONTENT: 11.4 ML/DL
O2 CONTENT: 11.5 ML/DL
O2 CONTENT: 11.6 ML/DL
O2 CONTENT: 12 ML/DL
O2 CONTENT: 12.1 ML/DL
O2 CONTENT: 12.1 ML/DL
O2 CONTENT: 12.2 ML/DL
O2 CONTENT: 12.4 ML/DL
O2 CONTENT: 12.4 ML/DL
O2 CONTENT: 12.7 ML/DL
O2 CONTENT: 12.7 ML/DL
O2 CONTENT: 12.8 ML/DL
O2 CONTENT: 12.9 ML/DL
O2 CONTENT: 13.6 ML/DL
O2 CONTENT: 13.8 ML/DL
O2 CONTENT: 14.2 ML/DL
O2 CONTENT: 14.2 ML/DL
O2 CONTENT: 14.3 ML/DL
O2 CONTENT: 14.6 ML/DL
O2 CONTENT: 14.7 ML/DL
O2 SATURATION: 85.1 % (ref 92–98.5)
O2 SATURATION: 93.1 % (ref 92–98.5)
O2 SATURATION: 93.5 % (ref 92–98.5)
O2 SATURATION: 94.3 % (ref 92–98.5)
O2 SATURATION: 95.2 % (ref 92–98.5)
O2 SATURATION: 95.4 % (ref 92–98.5)
O2 SATURATION: 96.3 % (ref 92–98.5)
O2 SATURATION: 96.4 % (ref 92–98.5)
O2 SATURATION: 96.5 % (ref 92–98.5)
O2 SATURATION: 96.5 % (ref 92–98.5)
O2 SATURATION: 96.8 % (ref 92–98.5)
O2 SATURATION: 96.9 % (ref 92–98.5)
O2 SATURATION: 97.4 % (ref 92–98.5)
O2 SATURATION: 97.6 % (ref 92–98.5)
O2 SATURATION: 97.7 % (ref 92–98.5)
O2 SATURATION: 97.8 % (ref 92–98.5)
O2 SATURATION: 98.2 % (ref 92–98.5)
O2 SATURATION: 98.5 % (ref 92–98.5)
O2 SATURATION: 98.6 % (ref 92–98.5)
O2 SATURATION: 98.7 % (ref 92–98.5)
O2 SATURATION: 98.7 % (ref 92–98.5)
O2 SATURATION: 99 % (ref 92–98.5)
O2HB: 82.8 % (ref 94–97)
O2HB: 91 % (ref 94–97)
O2HB: 91.4 % (ref 94–97)
O2HB: 91.9 % (ref 94–97)
O2HB: 92.9 % (ref 94–97)
O2HB: 93.7 % (ref 94–97)
O2HB: 94.2 % (ref 94–97)
O2HB: 94.7 % (ref 94–97)
O2HB: 94.8 % (ref 94–97)
O2HB: 94.8 % (ref 94–97)
O2HB: 94.9 % (ref 94–97)
O2HB: 95.2 % (ref 94–97)
O2HB: 95.3 % (ref 94–97)
O2HB: 95.6 % (ref 94–97)
O2HB: 95.8 % (ref 94–97)
O2HB: 95.8 % (ref 94–97)
O2HB: 96 % (ref 94–97)
O2HB: 96.5 % (ref 94–97)
O2HB: 96.9 % (ref 94–97)
O2HB: 97.1 % (ref 94–97)
O2HB: 97.2 % (ref 94–97)
O2HB: 97.7 % (ref 94–97)
OPERATOR ID: 1394
OPERATOR ID: 187
OPERATOR ID: 1874
OPERATOR ID: 2464
OPERATOR ID: 2593
OPERATOR ID: 2593
OPERATOR ID: 2860
OPERATOR ID: 2863
OPERATOR ID: 359
OPERATOR ID: ABNORMAL
ORGANISM: ABNORMAL
OVALOCYTES: ABNORMAL
OVALOCYTES: ABNORMAL
PARAINFLUENZA VIRUS 1 BY PCR: NOT DETECTED
PARAINFLUENZA VIRUS 2 BY PCR: NOT DETECTED
PARAINFLUENZA VIRUS 3 BY PCR: NOT DETECTED
PARAINFLUENZA VIRUS 4 BY PCR: NOT DETECTED
PATHOLOGIST REVIEW: NORMAL
PATIENT TEMP: 37 C
PCO2: 32.4 MMHG (ref 35–45)
PCO2: 38.2 MMHG (ref 35–45)
PCO2: 39.5 MMHG (ref 35–45)
PCO2: 40.2 MMHG (ref 35–45)
PCO2: 42.1 MMHG (ref 35–45)
PCO2: 42.4 MMHG (ref 35–45)
PCO2: 43 MMHG (ref 35–45)
PCO2: 43.2 MMHG (ref 35–45)
PCO2: 43.4 MMHG (ref 35–45)
PCO2: 43.7 MMHG (ref 35–45)
PCO2: 44.4 MMHG (ref 35–45)
PCO2: 45 MMHG (ref 35–45)
PCO2: 45.1 MMHG (ref 35–45)
PCO2: 45.1 MMHG (ref 35–45)
PCO2: 45.3 MMHG (ref 35–45)
PCO2: 48.8 MMHG (ref 35–45)
PCO2: 51 MMHG (ref 35–45)
PCO2: 51.2 MMHG (ref 35–45)
PCO2: 51.7 MMHG (ref 35–45)
PCO2: 51.9 MMHG (ref 35–45)
PCO2: 54.3 MMHG (ref 35–45)
PCO2: 68.4 MMHG (ref 35–45)
PDW BLD-RTO: 15.7 FL (ref 11.5–15)
PDW BLD-RTO: 16.1 FL (ref 11.5–15)
PDW BLD-RTO: 16.2 FL (ref 11.5–15)
PDW BLD-RTO: 16.4 FL (ref 11.5–15)
PDW BLD-RTO: 16.5 FL (ref 11.5–15)
PDW BLD-RTO: 16.7 FL (ref 11.5–15)
PDW BLD-RTO: 17.4 FL (ref 11.5–15)
PDW BLD-RTO: 17.5 FL (ref 11.5–15)
PDW BLD-RTO: 17.7 FL (ref 11.5–15)
PDW BLD-RTO: 17.8 FL (ref 11.5–15)
PDW BLD-RTO: 17.9 FL (ref 11.5–15)
PDW BLD-RTO: 17.9 FL (ref 11.5–15)
PDW BLD-RTO: 18 FL (ref 11.5–15)
PDW BLD-RTO: 18 FL (ref 11.5–15)
PDW BLD-RTO: 18.1 FL (ref 11.5–15)
PDW BLD-RTO: 18.3 FL (ref 11.5–15)
PDW BLD-RTO: 18.4 FL (ref 11.5–15)
PDW BLD-RTO: 18.4 FL (ref 11.5–15)
PDW BLD-RTO: 18.6 FL (ref 11.5–15)
PDW BLD-RTO: 18.9 FL (ref 11.5–15)
PDW BLD-RTO: 19.1 FL (ref 11.5–15)
PDW BLD-RTO: 19.2 FL (ref 11.5–15)
PDW BLD-RTO: 19.3 FL (ref 11.5–15)
PDW BLD-RTO: 19.3 FL (ref 11.5–15)
PDW BLD-RTO: 19.7 FL (ref 11.5–15)
PEEP/CPAP: 10 CMH2O
PEEP/CPAP: 5 CMH2O
PEEP/CPAP: 6 CMH2O
PEEP/CPAP: 7 CMH2O
PEEP/CPAP: 7 CMH2O
PEEP/CPAP: 8 CMH2O
PEEP/CPAP: 8 CMH2O
PFO2: 0.72 MMHG/%
PFO2: 1.09 MMHG/%
PFO2: 1.15 MMHG/%
PFO2: 1.16 MMHG/%
PFO2: 1.6 MMHG/%
PFO2: 1.61 MMHG/%
PFO2: 1.68 MMHG/%
PFO2: 1.7 MMHG/%
PFO2: 1.74 MMHG/%
PFO2: 1.77 MMHG/%
PFO2: 1.8 MMHG/%
PFO2: 1.8 MMHG/%
PFO2: 1.84 MMHG/%
PFO2: 1.86 MMHG/%
PFO2: 1.91 MMHG/%
PFO2: 2.31 MMHG/%
PFO2: 2.44 MMHG/%
PFO2: 2.66 MMHG/%
PFO2: 2.79 MMHG/%
PH BLOOD GAS: 7.29 (ref 7.35–7.45)
PH BLOOD GAS: 7.29 (ref 7.35–7.45)
PH BLOOD GAS: 7.34 (ref 7.35–7.45)
PH BLOOD GAS: 7.37 (ref 7.35–7.45)
PH BLOOD GAS: 7.38 (ref 7.35–7.45)
PH BLOOD GAS: 7.39 (ref 7.35–7.45)
PH BLOOD GAS: 7.39 (ref 7.35–7.45)
PH BLOOD GAS: 7.42 (ref 7.35–7.45)
PH BLOOD GAS: 7.43 (ref 7.35–7.45)
PH BLOOD GAS: 7.44 (ref 7.35–7.45)
PH BLOOD GAS: 7.45 (ref 7.35–7.45)
PH BLOOD GAS: 7.45 (ref 7.35–7.45)
PH BLOOD GAS: 7.46 (ref 7.35–7.45)
PH BLOOD GAS: 7.46 (ref 7.35–7.45)
PH BLOOD GAS: 7.47 (ref 7.35–7.45)
PH BLOOD GAS: 7.47 (ref 7.35–7.45)
PH BLOOD GAS: 7.49 (ref 7.35–7.45)
PH BLOOD GAS: 7.5 (ref 7.35–7.45)
PH UA: 6 (ref 5–9)
PH UA: 6 (ref 5–9)
PHOSPHORUS: 1.2 MG/DL (ref 2.5–4.5)
PHOSPHORUS: 1.8 MG/DL (ref 2.5–4.5)
PHOSPHORUS: 2.1 MG/DL (ref 2.5–4.5)
PHOSPHORUS: 2.3 MG/DL (ref 2.5–4.5)
PHOSPHORUS: 2.4 MG/DL (ref 2.5–4.5)
PHOSPHORUS: 2.6 MG/DL (ref 2.5–4.5)
PHOSPHORUS: 2.6 MG/DL (ref 2.5–4.5)
PHOSPHORUS: 2.7 MG/DL (ref 2.5–4.5)
PHOSPHORUS: 2.8 MG/DL (ref 2.5–4.5)
PHOSPHORUS: 3 MG/DL (ref 2.5–4.5)
PHOSPHORUS: 3.1 MG/DL (ref 2.5–4.5)
PHOSPHORUS: 3.2 MG/DL (ref 2.5–4.5)
PHOSPHORUS: 3.3 MG/DL (ref 2.5–4.5)
PHOSPHORUS: 3.4 MG/DL (ref 2.5–4.5)
PHOSPHORUS: 3.5 MG/DL (ref 2.5–4.5)
PHOSPHORUS: 3.6 MG/DL (ref 2.5–4.5)
PHOSPHORUS: 3.8 MG/DL (ref 2.5–4.5)
PHOSPHORUS: 4 MG/DL (ref 2.5–4.5)
PHOSPHORUS: 4.1 MG/DL (ref 2.5–4.5)
PIP: 12 CMH2O
PIP: 18 CMH2O
PLATELET # BLD: 103 E9/L (ref 130–450)
PLATELET # BLD: 112 E9/L (ref 130–450)
PLATELET # BLD: 113 E9/L (ref 130–450)
PLATELET # BLD: 127 E9/L (ref 130–450)
PLATELET # BLD: 163 E9/L (ref 130–450)
PLATELET # BLD: 194 E9/L (ref 130–450)
PLATELET # BLD: 215 E9/L (ref 130–450)
PLATELET # BLD: 215 E9/L (ref 130–450)
PLATELET # BLD: 227 E9/L (ref 130–450)
PLATELET # BLD: 231 E9/L (ref 130–450)
PLATELET # BLD: 237 E9/L (ref 130–450)
PLATELET # BLD: 243 E9/L (ref 130–450)
PLATELET # BLD: 244 E9/L (ref 130–450)
PLATELET # BLD: 249 E9/L (ref 130–450)
PLATELET # BLD: 272 E9/L (ref 130–450)
PLATELET # BLD: 287 E9/L (ref 130–450)
PLATELET # BLD: 315 E9/L (ref 130–450)
PLATELET # BLD: 323 E9/L (ref 130–450)
PLATELET # BLD: 362 E9/L (ref 130–450)
PLATELET # BLD: 457 E9/L (ref 130–450)
PLATELET # BLD: 477 E9/L (ref 130–450)
PLATELET # BLD: 482 E9/L (ref 130–450)
PLATELET # BLD: 512 E9/L (ref 130–450)
PLATELET # BLD: 553 E9/L (ref 130–450)
PLATELET # BLD: 572 E9/L (ref 130–450)
PLATELET # BLD: 610 E9/L (ref 130–450)
PLATELET # BLD: 625 E9/L (ref 130–450)
PLATELET # BLD: 642 E9/L (ref 130–450)
PLATELET # BLD: 658 E9/L (ref 130–450)
PLATELET # BLD: 663 E9/L (ref 130–450)
PMV BLD AUTO: 10.1 FL (ref 7–12)
PMV BLD AUTO: 10.1 FL (ref 7–12)
PMV BLD AUTO: 10.2 FL (ref 7–12)
PMV BLD AUTO: 10.2 FL (ref 7–12)
PMV BLD AUTO: 10.4 FL (ref 7–12)
PMV BLD AUTO: 10.4 FL (ref 7–12)
PMV BLD AUTO: 10.5 FL (ref 7–12)
PMV BLD AUTO: 10.6 FL (ref 7–12)
PMV BLD AUTO: 10.7 FL (ref 7–12)
PMV BLD AUTO: 10.8 FL (ref 7–12)
PMV BLD AUTO: 10.8 FL (ref 7–12)
PMV BLD AUTO: 10.9 FL (ref 7–12)
PMV BLD AUTO: 10.9 FL (ref 7–12)
PMV BLD AUTO: 11.1 FL (ref 7–12)
PMV BLD AUTO: 11.3 FL (ref 7–12)
PMV BLD AUTO: 11.4 FL (ref 7–12)
PMV BLD AUTO: 11.7 FL (ref 7–12)
PMV BLD AUTO: 11.7 FL (ref 7–12)
PMV BLD AUTO: 11.9 FL (ref 7–12)
PMV BLD AUTO: 11.9 FL (ref 7–12)
PMV BLD AUTO: 12 FL (ref 7–12)
PMV BLD AUTO: 12 FL (ref 7–12)
PMV BLD AUTO: 12.1 FL (ref 7–12)
PMV BLD AUTO: 12.2 FL (ref 7–12)
PMV BLD AUTO: 12.5 FL (ref 7–12)
PNEUMOCYSTIS DFA: NORMAL
PNEUMOCYSTIS DFA: NORMAL
PO2: 106.5 MMHG (ref 60–100)
PO2: 111.5 MMHG (ref 60–100)
PO2: 111.7 MMHG (ref 60–100)
PO2: 112.9 MMHG (ref 60–100)
PO2: 115.4 MMHG (ref 60–100)
PO2: 138.4 MMHG (ref 60–100)
PO2: 144.2 MMHG (ref 60–100)
PO2: 147.3 MMHG (ref 60–100)
PO2: 156.2 MMHG (ref 60–100)
PO2: 159.4 MMHG (ref 60–100)
PO2: 50.2 MMHG (ref 60–100)
PO2: 67.3 MMHG (ref 60–100)
PO2: 68.1 MMHG (ref 60–100)
PO2: 76.6 MMHG (ref 60–100)
PO2: 81.1 MMHG (ref 60–100)
PO2: 84.1 MMHG (ref 60–100)
PO2: 85.8 MMHG (ref 75–100)
PO2: 87.3 MMHG (ref 60–100)
PO2: 89.8 MMHG (ref 60–100)
PO2: 89.8 MMHG (ref 60–100)
PO2: 92.5 MMHG (ref 60–100)
PO2: 97.5 MMHG (ref 60–100)
POIKILOCYTES: ABNORMAL
POLYCHROMASIA: ABNORMAL
POTASSIUM REFLEX MAGNESIUM: 2.8 MMOL/L (ref 3.5–5)
POTASSIUM REFLEX MAGNESIUM: 3.2 MMOL/L (ref 3.5–5)
POTASSIUM REFLEX MAGNESIUM: 3.3 MMOL/L (ref 3.5–5)
POTASSIUM REFLEX MAGNESIUM: 3.4 MMOL/L (ref 3.5–5)
POTASSIUM REFLEX MAGNESIUM: 3.5 MMOL/L (ref 3.5–5)
POTASSIUM REFLEX MAGNESIUM: 3.6 MMOL/L (ref 3.5–5)
POTASSIUM REFLEX MAGNESIUM: 3.7 MMOL/L (ref 3.5–5)
POTASSIUM REFLEX MAGNESIUM: 3.9 MMOL/L (ref 3.5–5)
POTASSIUM REFLEX MAGNESIUM: 4 MMOL/L (ref 3.5–5)
POTASSIUM REFLEX MAGNESIUM: 4 MMOL/L (ref 3.5–5)
POTASSIUM REFLEX MAGNESIUM: 4.1 MMOL/L (ref 3.5–5)
POTASSIUM REFLEX MAGNESIUM: 4.4 MMOL/L (ref 3.5–5)
POTASSIUM REFLEX MAGNESIUM: 4.6 MMOL/L (ref 3.5–5)
POTASSIUM SERPL-SCNC: 3.1 MMOL/L (ref 3.5–5)
POTASSIUM SERPL-SCNC: 3.5 MMOL/L (ref 3.5–5)
POTASSIUM SERPL-SCNC: 3.7 MMOL/L (ref 3.5–5)
POTASSIUM SERPL-SCNC: 3.8 MMOL/L (ref 3.5–5)
POTASSIUM SERPL-SCNC: 3.9 MMOL/L (ref 3.5–5)
POTASSIUM SERPL-SCNC: 4 MMOL/L (ref 3.5–5)
POTASSIUM SERPL-SCNC: 4 MMOL/L (ref 3.5–5)
POTASSIUM SERPL-SCNC: 4.1 MMOL/L (ref 3.5–5)
POTASSIUM SERPL-SCNC: 4.55 MMOL/L (ref 3.3–5.1)
POTASSIUM SERPL-SCNC: 4.58 MMOL/L (ref 3.3–5.1)
POTASSIUM SERPL-SCNC: 4.7 MMOL/L (ref 3.5–5)
POTASSIUM, UR: 26 MMOL/L
POTASSIUM, UR: >100 MMOL/L
POTASSIUM, UR: >100 MMOL/L
PRO-BNP: ABNORMAL PG/ML (ref 0–125)
PROCALCITONIN: 0.27 NG/ML (ref 0–0.08)
PROCALCITONIN: 0.31 NG/ML (ref 0–0.08)
PROCALCITONIN: 0.45 NG/ML (ref 0–0.08)
PROCALCITONIN: 1.89 NG/ML (ref 0–0.08)
PROMYELOCYTES PERCENT: 0.9 % (ref 0–0)
PROTEIN UA: 100 MG/DL
PROTEIN UA: 30 MG/DL
RBC # BLD: 2.6 E12/L (ref 3.5–5.5)
RBC # BLD: 2.63 E12/L (ref 3.5–5.5)
RBC # BLD: 2.77 E12/L (ref 3.5–5.5)
RBC # BLD: 2.78 E12/L (ref 3.5–5.5)
RBC # BLD: 2.8 E12/L (ref 3.5–5.5)
RBC # BLD: 2.81 E12/L (ref 3.5–5.5)
RBC # BLD: 2.82 E12/L (ref 3.5–5.5)
RBC # BLD: 2.83 E12/L (ref 3.5–5.5)
RBC # BLD: 2.87 E12/L (ref 3.5–5.5)
RBC # BLD: 2.88 E12/L (ref 3.5–5.5)
RBC # BLD: 2.89 E12/L (ref 3.5–5.5)
RBC # BLD: 2.89 E12/L (ref 3.5–5.5)
RBC # BLD: 2.9 E12/L (ref 3.5–5.5)
RBC # BLD: 2.92 E12/L (ref 3.5–5.5)
RBC # BLD: 2.94 E12/L (ref 3.5–5.5)
RBC # BLD: 2.94 E12/L (ref 3.5–5.5)
RBC # BLD: 2.96 E12/L (ref 3.5–5.5)
RBC # BLD: 2.96 E12/L (ref 3.5–5.5)
RBC # BLD: 2.97 E12/L (ref 3.5–5.5)
RBC # BLD: 2.98 E12/L (ref 3.5–5.5)
RBC # BLD: 3.06 E12/L (ref 3.5–5.5)
RBC # BLD: 3.08 E12/L (ref 3.5–5.5)
RBC # BLD: 3.11 E12/L (ref 3.5–5.5)
RBC # BLD: 3.12 E12/L (ref 3.5–5.5)
RBC # BLD: 3.16 E12/L (ref 3.5–5.5)
RBC # BLD: 3.2 E12/L (ref 3.5–5.5)
RBC # BLD: 3.23 E12/L (ref 3.5–5.5)
RBC # BLD: 3.38 E12/L (ref 3.5–5.5)
RBC # BLD: 3.46 E12/L (ref 3.5–5.5)
RBC # BLD: 3.54 E12/L (ref 3.5–5.5)
RBC FLUID: <2000 /UL
RBC UA: ABNORMAL /HPF (ref 0–2)
RBC UA: ABNORMAL /HPF (ref 0–2)
REPORT: NORMAL
RESPIRATORY SYNCYTIAL VIRUS BY PCR: NOT DETECTED
RI(T): 1.02
RI(T): 1.28
RI(T): 1.33
RI(T): 1.62
RI(T): 2.11
RI(T): 2.18
RI(T): 2.27
RI(T): 2.27
RI(T): 2.31
RI(T): 2.34
RI(T): 2.37
RI(T): 2.5
RI(T): 2.7
RI(T): 2.86
RI(T): 2.99
RI(T): 4.48
RI(T): 4.49
RI(T): 4.49
RI(T): 7.5
RR MECHANICAL: 12 B/MIN
RR MECHANICAL: 14 B/MIN
RR MECHANICAL: 16 B/MIN
RR MECHANICAL: 16 B/MIN
RR MECHANICAL: 18 B/MIN
SCHISTOCYTES: ABNORMAL
SMEAR, RESPIRATORY: NORMAL
SODIUM BLD-SCNC: 134 MMOL/L (ref 132–146)
SODIUM BLD-SCNC: 135 MMOL/L (ref 132–146)
SODIUM BLD-SCNC: 135 MMOL/L (ref 132–146)
SODIUM BLD-SCNC: 136 MMOL/L (ref 132–146)
SODIUM BLD-SCNC: 136 MMOL/L (ref 132–146)
SODIUM BLD-SCNC: 137 MMOL/L (ref 132–146)
SODIUM BLD-SCNC: 138 MMOL/L (ref 132–146)
SODIUM BLD-SCNC: 139 MMOL/L (ref 132–146)
SODIUM BLD-SCNC: 139 MMOL/L (ref 132–146)
SODIUM BLD-SCNC: 140 MMOL/L (ref 132–146)
SODIUM BLD-SCNC: 141 MMOL/L (ref 132–146)
SODIUM BLD-SCNC: 142 MMOL/L (ref 132–146)
SODIUM BLD-SCNC: 143 MMOL/L (ref 132–146)
SODIUM BLD-SCNC: 145 MMOL/L (ref 132–146)
SODIUM BLD-SCNC: 147 MMOL/L (ref 132–146)
SODIUM BLD-SCNC: 148 MMOL/L (ref 132–146)
SODIUM BLD-SCNC: 148 MMOL/L (ref 132–146)
SODIUM BLD-SCNC: 149 MMOL/L (ref 132–146)
SODIUM BLD-SCNC: 150 MMOL/L (ref 132–146)
SODIUM URINE: 42 MMOL/L
SODIUM URINE: 43 MMOL/L
SODIUM URINE: <20 MMOL/L
SOURCE, BLOOD GAS: ABNORMAL
SPECIFIC GRAVITY UA: 1.02 (ref 1–1.03)
SPECIFIC GRAVITY UA: 1.02 (ref 1–1.03)
STOMATOCYTES: ABNORMAL
THB: 10.6 G/DL (ref 11.5–16.5)
THB: 11.1 G/DL (ref 11.5–16.5)
THB: 11.3 G/DL (ref 11.5–16.5)
THB: 8.2 G/DL (ref 11.5–16.5)
THB: 8.3 G/DL (ref 11.5–16.5)
THB: 8.5 G/DL (ref 11.5–16.5)
THB: 8.7 G/DL (ref 11.5–16.5)
THB: 8.9 G/DL (ref 11.5–16.5)
THB: 9 G/DL (ref 11.5–16.5)
THB: 9.1 G/DL (ref 11.5–16.5)
THB: 9.2 G/DL (ref 11.5–16.5)
THB: 9.2 G/DL (ref 11.5–16.5)
THB: 9.3 G/DL (ref 11.5–16.5)
THB: 9.3 G/DL (ref 11.5–16.5)
THB: 9.4 G/DL (ref 11.5–16.5)
THB: 9.4 G/DL (ref 11.5–16.5)
THB: 9.7 G/DL (ref 11.5–16.5)
THB: 9.9 G/DL (ref 11.5–16.5)
THIS TEST SENT TO: NORMAL
TIME ANALYZED: 118
TIME ANALYZED: 1253
TIME ANALYZED: 1434
TIME ANALYZED: 1813
TIME ANALYZED: 1848
TIME ANALYZED: 1945
TIME ANALYZED: 2226
TIME ANALYZED: 342
TIME ANALYZED: 416
TIME ANALYZED: 446
TIME ANALYZED: 500
TIME ANALYZED: 508
TIME ANALYZED: 520
TIME ANALYZED: 526
TIME ANALYZED: 526
TIME ANALYZED: 535
TIME ANALYZED: 617
TIME ANALYZED: 621
TIME ANALYZED: 632
TIME ANALYZED: 632
TIME ANALYZED: 633
TIME ANALYZED: 956
TOTAL CK: 46 U/L (ref 20–180)
TOTAL PROTEIN: 5.3 G/DL (ref 6.4–8.3)
TOTAL PROTEIN: 5.6 G/DL (ref 6.4–8.3)
TOTAL PROTEIN: 5.6 G/DL (ref 6.4–8.3)
TOTAL PROTEIN: 5.7 G/DL (ref 6.4–8.3)
TOTAL PROTEIN: 5.9 G/DL (ref 6.4–8.3)
TOTAL PROTEIN: 6 G/DL (ref 6.4–8.3)
TOTAL PROTEIN: 6 G/DL (ref 6.4–8.3)
TOTAL PROTEIN: 6.3 G/DL (ref 6.4–8.3)
TOTAL PROTEIN: 6.3 G/DL (ref 6.4–8.3)
TOTAL PROTEIN: 6.7 G/DL (ref 6.4–8.3)
TOTAL PROTEIN: 6.7 G/DL (ref 6.4–8.3)
TOTAL PROTEIN: 6.8 G/DL (ref 6.4–8.3)
TOTAL PROTEIN: 6.9 G/DL (ref 6.4–8.3)
TOTAL PROTEIN: 6.9 G/DL (ref 6.4–8.3)
TOTAL PROTEIN: 7 G/DL (ref 6.4–8.3)
TOTAL PROTEIN: 7.1 G/DL (ref 6.4–8.3)
TOTAL PROTEIN: 7.3 G/DL (ref 6.4–8.3)
TOTAL PROTEIN: 7.5 G/DL (ref 6.4–8.3)
TOTAL PROTEIN: 7.6 G/DL (ref 6.4–8.3)
TOTAL PROTEIN: 7.9 G/DL (ref 6.4–8.3)
TOTAL PROTEIN: 8.1 G/DL (ref 6.4–8.3)
TOTAL PROTEIN: 8.4 G/DL (ref 6.4–8.3)
TRIGL SERPL-MCNC: 142 MG/DL (ref 0–149)
TROPONIN: <0.01 NG/ML (ref 0–0.03)
URINE CULTURE, ROUTINE: ABNORMAL
URINE CULTURE, ROUTINE: NORMAL
UROBILINOGEN, URINE: 0.2 E.U./DL
UROBILINOGEN, URINE: 0.2 E.U./DL
VITAMIN B-12: >2000 PG/ML (ref 211–946)
VITAMIN D 25-HYDROXY: 40 NG/ML (ref 30–100)
VT MECHANICAL: 350 ML
VT MECHANICAL: 400 ML
VT MECHANICAL: 500 ML
VT MECHANICAL: 500 ML
WBC # BLD: 10 E9/L (ref 4.5–11.5)
WBC # BLD: 10.1 E9/L (ref 4.5–11.5)
WBC # BLD: 10.2 E9/L (ref 4.5–11.5)
WBC # BLD: 10.2 E9/L (ref 4.5–11.5)
WBC # BLD: 10.5 E9/L (ref 4.5–11.5)
WBC # BLD: 10.5 E9/L (ref 4.5–11.5)
WBC # BLD: 10.7 E9/L (ref 4.5–11.5)
WBC # BLD: 10.9 E9/L (ref 4.5–11.5)
WBC # BLD: 12.1 E9/L (ref 4.5–11.5)
WBC # BLD: 12.5 E9/L (ref 4.5–11.5)
WBC # BLD: 12.9 E9/L (ref 4.5–11.5)
WBC # BLD: 13.2 E9/L (ref 4.5–11.5)
WBC # BLD: 13.3 E9/L (ref 4.5–11.5)
WBC # BLD: 14.2 E9/L (ref 4.5–11.5)
WBC # BLD: 14.6 E9/L (ref 4.5–11.5)
WBC # BLD: 14.8 E9/L (ref 4.5–11.5)
WBC # BLD: 15 E9/L (ref 4.5–11.5)
WBC # BLD: 15.6 E9/L (ref 4.5–11.5)
WBC # BLD: 15.9 E9/L (ref 4.5–11.5)
WBC # BLD: 17.1 E9/L (ref 4.5–11.5)
WBC # BLD: 17.3 E9/L (ref 4.5–11.5)
WBC # BLD: 8.4 E9/L (ref 4.5–11.5)
WBC # BLD: 8.9 E9/L (ref 4.5–11.5)
WBC # BLD: 8.9 E9/L (ref 4.5–11.5)
WBC # BLD: 9.1 E9/L (ref 4.5–11.5)
WBC # BLD: 9.1 E9/L (ref 4.5–11.5)
WBC # BLD: 9.5 E9/L (ref 4.5–11.5)
WBC # BLD: 9.5 E9/L (ref 4.5–11.5)
WBC # BLD: 9.9 E9/L (ref 4.5–11.5)
WBC # BLD: 9.9 E9/L (ref 4.5–11.5)
WBC UA: ABNORMAL /HPF (ref 0–5)
WBC UA: ABNORMAL /HPF (ref 0–5)

## 2020-01-01 PROCEDURE — 87081 CULTURE SCREEN ONLY: CPT

## 2020-01-01 PROCEDURE — 83735 ASSAY OF MAGNESIUM: CPT

## 2020-01-01 PROCEDURE — 6370000000 HC RX 637 (ALT 250 FOR IP): Performed by: INTERNAL MEDICINE

## 2020-01-01 PROCEDURE — 87088 URINE BACTERIA CULTURE: CPT

## 2020-01-01 PROCEDURE — 71045 X-RAY EXAM CHEST 1 VIEW: CPT

## 2020-01-01 PROCEDURE — 84100 ASSAY OF PHOSPHORUS: CPT

## 2020-01-01 PROCEDURE — 94640 AIRWAY INHALATION TREATMENT: CPT

## 2020-01-01 PROCEDURE — 80053 COMPREHEN METABOLIC PANEL: CPT

## 2020-01-01 PROCEDURE — 2500000003 HC RX 250 WO HCPCS: Performed by: INTERNAL MEDICINE

## 2020-01-01 PROCEDURE — 85025 COMPLETE CBC W/AUTO DIFF WBC: CPT

## 2020-01-01 PROCEDURE — 2700000000 HC OXYGEN THERAPY PER DAY

## 2020-01-01 PROCEDURE — 2580000003 HC RX 258: Performed by: STUDENT IN AN ORGANIZED HEALTH CARE EDUCATION/TRAINING PROGRAM

## 2020-01-01 PROCEDURE — 2580000003 HC RX 258: Performed by: INTERNAL MEDICINE

## 2020-01-01 PROCEDURE — 84133 ASSAY OF URINE POTASSIUM: CPT

## 2020-01-01 PROCEDURE — 94660 CPAP INITIATION&MGMT: CPT

## 2020-01-01 PROCEDURE — 82436 ASSAY OF URINE CHLORIDE: CPT

## 2020-01-01 PROCEDURE — 85018 HEMOGLOBIN: CPT

## 2020-01-01 PROCEDURE — 6360000002 HC RX W HCPCS: Performed by: INTERNAL MEDICINE

## 2020-01-01 PROCEDURE — 87281 PNEUMOCYSTIS CARINII AG IF: CPT

## 2020-01-01 PROCEDURE — 99232 SBSQ HOSP IP/OBS MODERATE 35: CPT | Performed by: NURSE PRACTITIONER

## 2020-01-01 PROCEDURE — 94669 MECHANICAL CHEST WALL OSCILL: CPT

## 2020-01-01 PROCEDURE — 2060000000 HC ICU INTERMEDIATE R&B

## 2020-01-01 PROCEDURE — 6360000002 HC RX W HCPCS: Performed by: STUDENT IN AN ORGANIZED HEALTH CARE EDUCATION/TRAINING PROGRAM

## 2020-01-01 PROCEDURE — 3609010800 HC BRONCHOSCOPY ALVEOLAR LAVAGE: Performed by: INTERNAL MEDICINE

## 2020-01-01 PROCEDURE — 36415 COLL VENOUS BLD VENIPUNCTURE: CPT

## 2020-01-01 PROCEDURE — 84484 ASSAY OF TROPONIN QUANT: CPT

## 2020-01-01 PROCEDURE — 80048 BASIC METABOLIC PNL TOTAL CA: CPT

## 2020-01-01 PROCEDURE — 94002 VENT MGMT INPAT INIT DAY: CPT

## 2020-01-01 PROCEDURE — C9113 INJ PANTOPRAZOLE SODIUM, VIA: HCPCS | Performed by: STUDENT IN AN ORGANIZED HEALTH CARE EDUCATION/TRAINING PROGRAM

## 2020-01-01 PROCEDURE — 94668 MNPJ CHEST WALL SBSQ: CPT

## 2020-01-01 PROCEDURE — 97530 THERAPEUTIC ACTIVITIES: CPT

## 2020-01-01 PROCEDURE — 87040 BLOOD CULTURE FOR BACTERIA: CPT

## 2020-01-01 PROCEDURE — 82306 VITAMIN D 25 HYDROXY: CPT

## 2020-01-01 PROCEDURE — 93005 ELECTROCARDIOGRAM TRACING: CPT | Performed by: INTERNAL MEDICINE

## 2020-01-01 PROCEDURE — 97110 THERAPEUTIC EXERCISES: CPT

## 2020-01-01 PROCEDURE — 87116 MYCOBACTERIA CULTURE: CPT

## 2020-01-01 PROCEDURE — 82962 GLUCOSE BLOOD TEST: CPT

## 2020-01-01 PROCEDURE — C9113 INJ PANTOPRAZOLE SODIUM, VIA: HCPCS | Performed by: INTERNAL MEDICINE

## 2020-01-01 PROCEDURE — 6370000000 HC RX 637 (ALT 250 FOR IP)

## 2020-01-01 PROCEDURE — 36592 COLLECT BLOOD FROM PICC: CPT

## 2020-01-01 PROCEDURE — 86645 CMV ANTIBODY IGM: CPT

## 2020-01-01 PROCEDURE — 94003 VENT MGMT INPAT SUBQ DAY: CPT

## 2020-01-01 PROCEDURE — 1200000000 HC SEMI PRIVATE

## 2020-01-01 PROCEDURE — 89051 BODY FLUID CELL COUNT: CPT

## 2020-01-01 PROCEDURE — 82805 BLOOD GASES W/O2 SATURATION: CPT

## 2020-01-01 PROCEDURE — 99024 POSTOP FOLLOW-UP VISIT: CPT | Performed by: SURGERY

## 2020-01-01 PROCEDURE — 0B9J8ZZ DRAINAGE OF LEFT LOWER LUNG LOBE, VIA NATURAL OR ARTIFICIAL OPENING ENDOSCOPIC: ICD-10-PCS | Performed by: INTERNAL MEDICINE

## 2020-01-01 PROCEDURE — 97163 PT EVAL HIGH COMPLEX 45 MIN: CPT

## 2020-01-01 PROCEDURE — 87106 FUNGI IDENTIFICATION YEAST: CPT

## 2020-01-01 PROCEDURE — 87205 SMEAR GRAM STAIN: CPT

## 2020-01-01 PROCEDURE — 84145 PROCALCITONIN (PCT): CPT

## 2020-01-01 PROCEDURE — 99291 CRITICAL CARE FIRST HOUR: CPT | Performed by: INTERNAL MEDICINE

## 2020-01-01 PROCEDURE — 0BJ08ZZ INSPECTION OF TRACHEOBRONCHIAL TREE, VIA NATURAL OR ARTIFICIAL OPENING ENDOSCOPIC: ICD-10-PCS | Performed by: INTERNAL MEDICINE

## 2020-01-01 PROCEDURE — 81001 URINALYSIS AUTO W/SCOPE: CPT

## 2020-01-01 PROCEDURE — 2000000000 HC ICU R&B

## 2020-01-01 PROCEDURE — 87102 FUNGUS ISOLATION CULTURE: CPT

## 2020-01-01 PROCEDURE — 2709999900 HC NON-CHARGEABLE SUPPLY: Performed by: INTERNAL MEDICINE

## 2020-01-01 PROCEDURE — 3700000001 HC ADD 15 MINUTES (ANESTHESIA): Performed by: INTERNAL MEDICINE

## 2020-01-01 PROCEDURE — 97140 MANUAL THERAPY 1/> REGIONS: CPT

## 2020-01-01 PROCEDURE — 97167 OT EVAL HIGH COMPLEX 60 MIN: CPT

## 2020-01-01 PROCEDURE — 70450 CT HEAD/BRAIN W/O DYE: CPT

## 2020-01-01 PROCEDURE — 93010 ELECTROCARDIOGRAM REPORT: CPT | Performed by: INTERNAL MEDICINE

## 2020-01-01 PROCEDURE — 84132 ASSAY OF SERUM POTASSIUM: CPT

## 2020-01-01 PROCEDURE — 87070 CULTURE OTHR SPECIMN AEROBIC: CPT

## 2020-01-01 PROCEDURE — 0B9D8ZX DRAINAGE OF RIGHT MIDDLE LUNG LOBE, VIA NATURAL OR ARTIFICIAL OPENING ENDOSCOPIC, DIAGNOSTIC: ICD-10-PCS | Performed by: INTERNAL MEDICINE

## 2020-01-01 PROCEDURE — 36569 INSJ PICC 5 YR+ W/O IMAGING: CPT

## 2020-01-01 PROCEDURE — 94664 DEMO&/EVAL PT USE INHALER: CPT

## 2020-01-01 PROCEDURE — 6360000002 HC RX W HCPCS

## 2020-01-01 PROCEDURE — 97535 SELF CARE MNGMENT TRAINING: CPT

## 2020-01-01 PROCEDURE — 85014 HEMATOCRIT: CPT

## 2020-01-01 PROCEDURE — 6360000002 HC RX W HCPCS: Performed by: ANESTHESIOLOGIST ASSISTANT

## 2020-01-01 PROCEDURE — 93306 TTE W/DOPPLER COMPLETE: CPT

## 2020-01-01 PROCEDURE — 74018 RADEX ABDOMEN 1 VIEW: CPT

## 2020-01-01 PROCEDURE — 9900000073 HC MANUAL THERAPY PER 15 MIN (SELF-PAY)

## 2020-01-01 PROCEDURE — 99233 SBSQ HOSP IP/OBS HIGH 50: CPT | Performed by: INTERNAL MEDICINE

## 2020-01-01 PROCEDURE — 76705 ECHO EXAM OF ABDOMEN: CPT

## 2020-01-01 PROCEDURE — 94667 MNPJ CHEST WALL 1ST: CPT

## 2020-01-01 PROCEDURE — 2580000003 HC RX 258: Performed by: RADIOLOGY

## 2020-01-01 PROCEDURE — 82553 CREATINE MB FRACTION: CPT

## 2020-01-01 PROCEDURE — 97164 PT RE-EVAL EST PLAN CARE: CPT

## 2020-01-01 PROCEDURE — 87015 SPECIMEN INFECT AGNT CONCNTJ: CPT

## 2020-01-01 PROCEDURE — 88312 SPECIAL STAINS GROUP 1: CPT

## 2020-01-01 PROCEDURE — 5A1955Z RESPIRATORY VENTILATION, GREATER THAN 96 CONSECUTIVE HOURS: ICD-10-PCS | Performed by: INTERNAL MEDICINE

## 2020-01-01 PROCEDURE — 82550 ASSAY OF CK (CPK): CPT

## 2020-01-01 PROCEDURE — 0B9F8ZZ DRAINAGE OF RIGHT LOWER LUNG LOBE, VIA NATURAL OR ARTIFICIAL OPENING ENDOSCOPIC: ICD-10-PCS | Performed by: INTERNAL MEDICINE

## 2020-01-01 PROCEDURE — 82570 ASSAY OF URINE CREATININE: CPT

## 2020-01-01 PROCEDURE — 82010 KETONE BODYS QUAN: CPT

## 2020-01-01 PROCEDURE — 0B918ZZ DRAINAGE OF TRACHEA, VIA NATURAL OR ARTIFICIAL OPENING ENDOSCOPIC: ICD-10-PCS | Performed by: INTERNAL MEDICINE

## 2020-01-01 PROCEDURE — 92610 EVALUATE SWALLOWING FUNCTION: CPT | Performed by: SPEECH-LANGUAGE PATHOLOGIST

## 2020-01-01 PROCEDURE — 3700000000 HC ANESTHESIA ATTENDED CARE: Performed by: INTERNAL MEDICINE

## 2020-01-01 PROCEDURE — 83605 ASSAY OF LACTIC ACID: CPT

## 2020-01-01 PROCEDURE — 82140 ASSAY OF AMMONIA: CPT

## 2020-01-01 PROCEDURE — 87449 NOS EACH ORGANISM AG IA: CPT

## 2020-01-01 PROCEDURE — C1751 CATH, INF, PER/CENT/MIDLINE: HCPCS

## 2020-01-01 PROCEDURE — 0B9D8ZZ DRAINAGE OF RIGHT MIDDLE LUNG LOBE, VIA NATURAL OR ARTIFICIAL OPENING ENDOSCOPIC: ICD-10-PCS | Performed by: INTERNAL MEDICINE

## 2020-01-01 PROCEDURE — 80307 DRUG TEST PRSMV CHEM ANLYZR: CPT

## 2020-01-01 PROCEDURE — 71260 CT THORAX DX C+: CPT

## 2020-01-01 PROCEDURE — 99222 1ST HOSP IP/OBS MODERATE 55: CPT | Performed by: NURSE PRACTITIONER

## 2020-01-01 PROCEDURE — 83880 ASSAY OF NATRIURETIC PEPTIDE: CPT

## 2020-01-01 PROCEDURE — 31720 CLEARANCE OF AIRWAYS: CPT

## 2020-01-01 PROCEDURE — 99221 1ST HOSP IP/OBS SF/LOW 40: CPT | Performed by: PSYCHIATRY & NEUROLOGY

## 2020-01-01 PROCEDURE — 2580000003 HC RX 258

## 2020-01-01 PROCEDURE — 92526 ORAL FUNCTION THERAPY: CPT | Performed by: SPEECH-LANGUAGE PATHOLOGIST

## 2020-01-01 PROCEDURE — 83918 ORGANIC ACIDS TOTAL QUANT: CPT

## 2020-01-01 PROCEDURE — 2720000010 HC SURG SUPPLY STERILE

## 2020-01-01 PROCEDURE — 31624 DX BRONCHOSCOPE/LAVAGE: CPT | Performed by: INTERNAL MEDICINE

## 2020-01-01 PROCEDURE — 0100U HC RESPIRPTHGN MULT REV TRANS & AMP PRB TECH 21 TRGT: CPT

## 2020-01-01 PROCEDURE — 87206 SMEAR FLUORESCENT/ACID STAI: CPT

## 2020-01-01 PROCEDURE — 7100000000 HC PACU RECOVERY - FIRST 15 MIN: Performed by: INTERNAL MEDICINE

## 2020-01-01 PROCEDURE — 84478 ASSAY OF TRIGLYCERIDES: CPT

## 2020-01-01 PROCEDURE — 82977 ASSAY OF GGT: CPT

## 2020-01-01 PROCEDURE — 82746 ASSAY OF FOLIC ACID SERUM: CPT

## 2020-01-01 PROCEDURE — 0B9F8ZX DRAINAGE OF RIGHT LOWER LUNG LOBE, VIA NATURAL OR ARTIFICIAL OPENING ENDOSCOPIC, DIAGNOSTIC: ICD-10-PCS | Performed by: INTERNAL MEDICINE

## 2020-01-01 PROCEDURE — 82330 ASSAY OF CALCIUM: CPT

## 2020-01-01 PROCEDURE — 99231 SBSQ HOSP IP/OBS SF/LOW 25: CPT | Performed by: NURSE PRACTITIONER

## 2020-01-01 PROCEDURE — 2500000003 HC RX 250 WO HCPCS

## 2020-01-01 PROCEDURE — 0B9M8ZX DRAINAGE OF BILATERAL LUNGS, VIA NATURAL OR ARTIFICIAL OPENING ENDOSCOPIC, DIAGNOSTIC: ICD-10-PCS | Performed by: INTERNAL MEDICINE

## 2020-01-01 PROCEDURE — P9047 ALBUMIN (HUMAN), 25%, 50ML: HCPCS | Performed by: INTERNAL MEDICINE

## 2020-01-01 PROCEDURE — 3609027000 HC BRONCHOSCOPY: Performed by: INTERNAL MEDICINE

## 2020-01-01 PROCEDURE — 2580000003 HC RX 258: Performed by: ANESTHESIOLOGIST ASSISTANT

## 2020-01-01 PROCEDURE — 76937 US GUIDE VASCULAR ACCESS: CPT

## 2020-01-01 PROCEDURE — 74177 CT ABD & PELVIS W/CONTRAST: CPT

## 2020-01-01 PROCEDURE — 88305 TISSUE EXAM BY PATHOLOGIST: CPT

## 2020-01-01 PROCEDURE — 97112 NEUROMUSCULAR REEDUCATION: CPT

## 2020-01-01 PROCEDURE — 02HV33Z INSERTION OF INFUSION DEVICE INTO SUPERIOR VENA CAVA, PERCUTANEOUS APPROACH: ICD-10-PCS | Performed by: INTERNAL MEDICINE

## 2020-01-01 PROCEDURE — 0BB18ZX EXCISION OF TRACHEA, VIA NATURAL OR ARTIFICIAL OPENING ENDOSCOPIC, DIAGNOSTIC: ICD-10-PCS | Performed by: INTERNAL MEDICINE

## 2020-01-01 PROCEDURE — 82607 VITAMIN B-12: CPT

## 2020-01-01 PROCEDURE — 84300 ASSAY OF URINE SODIUM: CPT

## 2020-01-01 PROCEDURE — 80076 HEPATIC FUNCTION PANEL: CPT

## 2020-01-01 PROCEDURE — 86022 PLATELET ANTIBODIES: CPT

## 2020-01-01 PROCEDURE — 92523 SPEECH SOUND LANG COMPREHEN: CPT

## 2020-01-01 PROCEDURE — 74022 RADEX COMPL AQT ABD SERIES: CPT

## 2020-01-01 PROCEDURE — 97168 OT RE-EVAL EST PLAN CARE: CPT

## 2020-01-01 PROCEDURE — 99153 MOD SED SAME PHYS/QHP EA: CPT | Performed by: INTERNAL MEDICINE

## 2020-01-01 PROCEDURE — 31500 INSERT EMERGENCY AIRWAY: CPT | Performed by: INTERNAL MEDICINE

## 2020-01-01 PROCEDURE — 6360000004 HC RX CONTRAST MEDICATION: Performed by: RADIOLOGY

## 2020-01-01 PROCEDURE — 0B9J8ZX DRAINAGE OF LEFT LOWER LUNG LOBE, VIA NATURAL OR ARTIFICIAL OPENING ENDOSCOPIC, DIAGNOSTIC: ICD-10-PCS | Performed by: INTERNAL MEDICINE

## 2020-01-01 PROCEDURE — 0BH18EZ INSERTION OF ENDOTRACHEAL AIRWAY INTO TRACHEA, VIA NATURAL OR ARTIFICIAL OPENING ENDOSCOPIC: ICD-10-PCS | Performed by: INTERNAL MEDICINE

## 2020-01-01 PROCEDURE — 99152 MOD SED SAME PHYS/QHP 5/>YRS: CPT | Performed by: INTERNAL MEDICINE

## 2020-01-01 PROCEDURE — 6370000000 HC RX 637 (ALT 250 FOR IP): Performed by: STUDENT IN AN ORGANIZED HEALTH CARE EDUCATION/TRAINING PROGRAM

## 2020-01-01 PROCEDURE — 7100000001 HC PACU RECOVERY - ADDTL 15 MIN: Performed by: INTERNAL MEDICINE

## 2020-01-01 RX ORDER — TRAZODONE HYDROCHLORIDE 50 MG/1
50 TABLET ORAL NIGHTLY
Status: DISCONTINUED | OUTPATIENT
Start: 2020-01-01 | End: 2020-01-01

## 2020-01-01 RX ORDER — ONDANSETRON 4 MG/1
4 TABLET, ORALLY DISINTEGRATING ORAL 3 TIMES DAILY PRN
Qty: 21 TABLET | Refills: 0 | DISCHARGE
Start: 2020-01-01

## 2020-01-01 RX ORDER — MIDAZOLAM HYDROCHLORIDE 1 MG/ML
1 INJECTION INTRAMUSCULAR; INTRAVENOUS ONCE
Status: COMPLETED | OUTPATIENT
Start: 2020-01-01 | End: 2020-01-01

## 2020-01-01 RX ORDER — POTASSIUM CHLORIDE 29.8 MG/ML
20 INJECTION INTRAVENOUS
Status: COMPLETED | OUTPATIENT
Start: 2020-01-01 | End: 2020-01-01

## 2020-01-01 RX ORDER — POTASSIUM CHLORIDE 29.8 MG/ML
20 INJECTION INTRAVENOUS ONCE
Status: COMPLETED | OUTPATIENT
Start: 2020-01-01 | End: 2020-01-01

## 2020-01-01 RX ORDER — FUROSEMIDE 10 MG/ML
40 INJECTION INTRAMUSCULAR; INTRAVENOUS ONCE
Status: COMPLETED | OUTPATIENT
Start: 2020-01-01 | End: 2020-01-01

## 2020-01-01 RX ORDER — BUMETANIDE 0.25 MG/ML
1 INJECTION, SOLUTION INTRAMUSCULAR; INTRAVENOUS ONCE
Status: COMPLETED | OUTPATIENT
Start: 2020-01-01 | End: 2020-01-01

## 2020-01-01 RX ORDER — CHLORHEXIDINE GLUCONATE 0.12 MG/ML
15 RINSE ORAL 2 TIMES DAILY
Status: DISCONTINUED | OUTPATIENT
Start: 2020-01-01 | End: 2020-01-01 | Stop reason: ALTCHOICE

## 2020-01-01 RX ORDER — METHYLPREDNISOLONE SODIUM SUCCINATE 125 MG/2ML
125 INJECTION, POWDER, LYOPHILIZED, FOR SOLUTION INTRAMUSCULAR; INTRAVENOUS ONCE
Status: DISCONTINUED | OUTPATIENT
Start: 2020-01-01 | End: 2020-01-01

## 2020-01-01 RX ORDER — LABETALOL HYDROCHLORIDE 5 MG/ML
10 INJECTION, SOLUTION INTRAVENOUS EVERY 6 HOURS PRN
Status: DISCONTINUED | OUTPATIENT
Start: 2020-01-01 | End: 2020-01-01 | Stop reason: HOSPADM

## 2020-01-01 RX ORDER — MAGNESIUM SULFATE 1 G/100ML
1 INJECTION INTRAVENOUS ONCE
Status: COMPLETED | OUTPATIENT
Start: 2020-01-01 | End: 2020-01-01

## 2020-01-01 RX ORDER — LIDOCAINE HYDROCHLORIDE 10 MG/ML
INJECTION, SOLUTION INFILTRATION; PERINEURAL
Status: COMPLETED
Start: 2020-01-01 | End: 2020-01-01

## 2020-01-01 RX ORDER — SODIUM CHLORIDE 9 MG/ML
INJECTION, SOLUTION INTRAVENOUS CONTINUOUS
Status: ACTIVE | OUTPATIENT
Start: 2020-01-01 | End: 2020-01-01

## 2020-01-01 RX ORDER — PETROLATUM 42 G/100G
OINTMENT TOPICAL 4 TIMES DAILY PRN
Status: DISCONTINUED | OUTPATIENT
Start: 2020-01-01 | End: 2020-01-01 | Stop reason: HOSPADM

## 2020-01-01 RX ORDER — DEXTROSE AND SODIUM CHLORIDE 5; .9 G/100ML; G/100ML
INJECTION, SOLUTION INTRAVENOUS CONTINUOUS
Status: DISCONTINUED | OUTPATIENT
Start: 2020-01-01 | End: 2020-01-01

## 2020-01-01 RX ORDER — POTASSIUM CHLORIDE 29.8 MG/ML
40 INJECTION INTRAVENOUS ONCE
Status: COMPLETED | OUTPATIENT
Start: 2020-01-01 | End: 2020-01-01

## 2020-01-01 RX ORDER — LANOLIN ALCOHOL/MO/W.PET/CERES
6 CREAM (GRAM) TOPICAL NIGHTLY PRN
Status: DISCONTINUED | OUTPATIENT
Start: 2020-01-01 | End: 2020-01-01 | Stop reason: HOSPADM

## 2020-01-01 RX ORDER — DEXTROSE AND SODIUM CHLORIDE 5; .9 G/100ML; G/100ML
INJECTION, SOLUTION INTRAVENOUS CONTINUOUS
Status: ACTIVE | OUTPATIENT
Start: 2020-01-01 | End: 2020-01-01

## 2020-01-01 RX ORDER — FENTANYL CITRATE 50 UG/ML
INJECTION, SOLUTION INTRAMUSCULAR; INTRAVENOUS
Status: DISCONTINUED
Start: 2020-01-01 | End: 2020-01-01 | Stop reason: WASHOUT

## 2020-01-01 RX ORDER — MAGNESIUM SULFATE IN WATER 40 MG/ML
2 INJECTION, SOLUTION INTRAVENOUS ONCE
Status: COMPLETED | OUTPATIENT
Start: 2020-01-01 | End: 2020-01-01

## 2020-01-01 RX ORDER — ALBUTEROL SULFATE 2.5 MG/3ML
2.5 SOLUTION RESPIRATORY (INHALATION) 4 TIMES DAILY
Status: DISCONTINUED | OUTPATIENT
Start: 2020-01-01 | End: 2020-01-01

## 2020-01-01 RX ORDER — FLUCONAZOLE 2 MG/ML
400 INJECTION, SOLUTION INTRAVENOUS EVERY 24 HOURS
Status: DISCONTINUED | OUTPATIENT
Start: 2020-01-01 | End: 2020-01-01 | Stop reason: SDUPTHER

## 2020-01-01 RX ORDER — CETIRIZINE HYDROCHLORIDE 5 MG/1
5 TABLET ORAL DAILY
DISCHARGE
Start: 2020-01-01

## 2020-01-01 RX ORDER — SODIUM CHLORIDE FOR INHALATION 3 %
2 VIAL, NEBULIZER (ML) INHALATION EVERY 4 HOURS
Status: DISCONTINUED | OUTPATIENT
Start: 2020-01-01 | End: 2020-01-01 | Stop reason: HOSPADM

## 2020-01-01 RX ORDER — SODIUM CHLORIDE 9 MG/ML
INJECTION, SOLUTION INTRAVENOUS CONTINUOUS PRN
Status: DISCONTINUED | OUTPATIENT
Start: 2020-01-01 | End: 2020-01-01 | Stop reason: SDUPTHER

## 2020-01-01 RX ORDER — GUAIFENESIN 400 MG/1
400 TABLET ORAL 3 TIMES DAILY
Status: DISCONTINUED | OUTPATIENT
Start: 2020-01-01 | End: 2020-01-01 | Stop reason: HOSPADM

## 2020-01-01 RX ORDER — GUAIFENESIN 400 MG/1
400 TABLET ORAL 3 TIMES DAILY
Qty: 56 TABLET | Refills: 0 | DISCHARGE
Start: 2020-01-01

## 2020-01-01 RX ORDER — CETIRIZINE HYDROCHLORIDE 10 MG/1
5 TABLET ORAL DAILY
Status: DISCONTINUED | OUTPATIENT
Start: 2020-01-01 | End: 2020-01-01 | Stop reason: HOSPADM

## 2020-01-01 RX ORDER — IPRATROPIUM BROMIDE AND ALBUTEROL SULFATE 2.5; .5 MG/3ML; MG/3ML
1 SOLUTION RESPIRATORY (INHALATION)
Status: DISCONTINUED | OUTPATIENT
Start: 2020-01-01 | End: 2020-01-01 | Stop reason: HOSPADM

## 2020-01-01 RX ORDER — SODIUM CHLORIDE 0.9 % (FLUSH) 0.9 %
10 SYRINGE (ML) INJECTION
Status: COMPLETED | OUTPATIENT
Start: 2020-01-01 | End: 2020-01-01

## 2020-01-01 RX ORDER — DIMETHICONE, OXYBENZONE, AND PADIMATE O 2; 2.5; 6.6 G/100G; G/100G; G/100G
STICK TOPICAL
Status: COMPLETED
Start: 2020-01-01 | End: 2020-01-01

## 2020-01-01 RX ORDER — LIDOCAINE HYDROCHLORIDE 10 MG/ML
INJECTION, SOLUTION EPIDURAL; INFILTRATION; INTRACAUDAL; PERINEURAL PRN
Status: DISCONTINUED | OUTPATIENT
Start: 2020-01-01 | End: 2020-01-01 | Stop reason: ALTCHOICE

## 2020-01-01 RX ORDER — PROPOFOL 10 MG/ML
INJECTION, EMULSION INTRAVENOUS PRN
Status: DISCONTINUED | OUTPATIENT
Start: 2020-01-01 | End: 2020-01-01 | Stop reason: SDUPTHER

## 2020-01-01 RX ORDER — METOCLOPRAMIDE 10 MG/1
10 TABLET ORAL
Status: DISCONTINUED | OUTPATIENT
Start: 2020-01-01 | End: 2020-01-01 | Stop reason: ALTCHOICE

## 2020-01-01 RX ORDER — LABETALOL HYDROCHLORIDE 5 MG/ML
10 INJECTION, SOLUTION INTRAVENOUS ONCE
Status: COMPLETED | OUTPATIENT
Start: 2020-01-01 | End: 2020-01-01

## 2020-01-01 RX ORDER — IPRATROPIUM BROMIDE AND ALBUTEROL SULFATE 2.5; .5 MG/3ML; MG/3ML
1 SOLUTION RESPIRATORY (INHALATION)
Status: DISCONTINUED | OUTPATIENT
Start: 2020-01-01 | End: 2020-01-01

## 2020-01-01 RX ORDER — METOCLOPRAMIDE HYDROCHLORIDE 5 MG/5ML
5 SOLUTION ORAL
Status: COMPLETED | OUTPATIENT
Start: 2020-01-01 | End: 2020-01-01

## 2020-01-01 RX ORDER — MIDAZOLAM HYDROCHLORIDE 1 MG/ML
INJECTION INTRAMUSCULAR; INTRAVENOUS
Status: COMPLETED
Start: 2020-01-01 | End: 2020-01-01

## 2020-01-01 RX ORDER — DEXTROSE, SODIUM CHLORIDE, AND POTASSIUM CHLORIDE 5; .45; .15 G/100ML; G/100ML; G/100ML
INJECTION INTRAVENOUS CONTINUOUS
Status: DISCONTINUED | OUTPATIENT
Start: 2020-01-01 | End: 2020-01-01 | Stop reason: HOSPADM

## 2020-01-01 RX ORDER — NICOTINE POLACRILEX 4 MG
15 LOZENGE BUCCAL PRN
Status: DISCONTINUED | OUTPATIENT
Start: 2020-01-01 | End: 2020-01-01 | Stop reason: HOSPADM

## 2020-01-01 RX ORDER — LIDOCAINE HCL 4% 4 G/100G
1 CREAM TOPICAL EVERY 6 HOURS PRN
DISCHARGE
Start: 2020-01-01

## 2020-01-01 RX ORDER — GUAIFENESIN 600 MG/1
600 TABLET, EXTENDED RELEASE ORAL 2 TIMES DAILY
Status: DISCONTINUED | OUTPATIENT
Start: 2020-01-01 | End: 2020-01-01 | Stop reason: CLARIF

## 2020-01-01 RX ORDER — SODIUM CHLORIDE FOR INHALATION 3 %
2 VIAL, NEBULIZER (ML) INHALATION EVERY 4 HOURS
DISCHARGE
Start: 2020-01-01

## 2020-01-01 RX ORDER — SODIUM CHLORIDE 0.9 % (FLUSH) 0.9 %
10 SYRINGE (ML) INJECTION PRN
Status: DISCONTINUED | OUTPATIENT
Start: 2020-01-01 | End: 2020-01-01 | Stop reason: HOSPADM

## 2020-01-01 RX ORDER — HEPARIN SODIUM (PORCINE) LOCK FLUSH IV SOLN 100 UNIT/ML 100 UNIT/ML
SOLUTION INTRAVENOUS
Status: COMPLETED
Start: 2020-01-01 | End: 2020-01-01

## 2020-01-01 RX ORDER — GABAPENTIN 300 MG/1
300 CAPSULE ORAL 3 TIMES DAILY
Status: DISCONTINUED | OUTPATIENT
Start: 2020-01-01 | End: 2020-01-01 | Stop reason: HOSPADM

## 2020-01-01 RX ORDER — TRAMADOL HYDROCHLORIDE 50 MG/1
50 TABLET ORAL EVERY 6 HOURS PRN
Status: DISCONTINUED | OUTPATIENT
Start: 2020-01-01 | End: 2020-01-01 | Stop reason: HOSPADM

## 2020-01-01 RX ORDER — MIDAZOLAM HYDROCHLORIDE 1 MG/ML
INJECTION INTRAMUSCULAR; INTRAVENOUS PRN
Status: DISCONTINUED | OUTPATIENT
Start: 2020-01-01 | End: 2020-01-01 | Stop reason: SDUPTHER

## 2020-01-01 RX ORDER — LANOLIN ALCOHOL/MO/W.PET/CERES
6 CREAM (GRAM) TOPICAL NIGHTLY PRN
Refills: 3 | DISCHARGE
Start: 2020-01-01

## 2020-01-01 RX ORDER — PROPOFOL 10 MG/ML
10 INJECTION, EMULSION INTRAVENOUS
Status: DISCONTINUED | OUTPATIENT
Start: 2020-01-01 | End: 2020-01-01 | Stop reason: ALTCHOICE

## 2020-01-01 RX ORDER — PETROLATUM 42 G/100G
OINTMENT TOPICAL
Refills: 0 | DISCHARGE
Start: 2020-01-01

## 2020-01-01 RX ORDER — LIDOCAINE HCL 4% 4 G/100G
1 CREAM TOPICAL EVERY 6 HOURS PRN
Status: DISCONTINUED | OUTPATIENT
Start: 2020-01-01 | End: 2020-01-01 | Stop reason: HOSPADM

## 2020-01-01 RX ORDER — LIDOCAINE HYDROCHLORIDE 10 MG/ML
5 INJECTION, SOLUTION EPIDURAL; INFILTRATION; INTRACAUDAL; PERINEURAL ONCE
Status: COMPLETED | OUTPATIENT
Start: 2020-01-01 | End: 2020-01-01

## 2020-01-01 RX ORDER — GLYCOPYRROLATE 1 MG/1
1 TABLET ORAL 3 TIMES DAILY
Status: DISCONTINUED | OUTPATIENT
Start: 2020-01-01 | End: 2020-01-01

## 2020-01-01 RX ORDER — GABAPENTIN 100 MG/1
100 CAPSULE ORAL 3 TIMES DAILY
Status: DISCONTINUED | OUTPATIENT
Start: 2020-01-01 | End: 2020-01-01

## 2020-01-01 RX ORDER — DIMETHICONE, OXYBENZONE, AND PADIMATE O 2; 2.5; 6.6 G/100G; G/100G; G/100G
STICK TOPICAL PRN
Refills: 0 | DISCHARGE
Start: 2020-01-01

## 2020-01-01 RX ORDER — METHYLPREDNISOLONE SODIUM SUCCINATE 125 MG/2ML
60 INJECTION, POWDER, LYOPHILIZED, FOR SOLUTION INTRAMUSCULAR; INTRAVENOUS ONCE
Status: COMPLETED | OUTPATIENT
Start: 2020-01-01 | End: 2020-01-01

## 2020-01-01 RX ORDER — DEXTROSE MONOHYDRATE 50 MG/ML
INJECTION, SOLUTION INTRAVENOUS CONTINUOUS
Status: ACTIVE | OUTPATIENT
Start: 2020-01-01 | End: 2020-01-01

## 2020-01-01 RX ORDER — ANALGESIC BALM 1.74; 4.06 G/29G; G/29G
OINTMENT TOPICAL PRN
Status: DISCONTINUED | OUTPATIENT
Start: 2020-01-01 | End: 2020-01-01 | Stop reason: HOSPADM

## 2020-01-01 RX ORDER — ACETAMINOPHEN 325 MG/1
650 TABLET ORAL EVERY 6 HOURS PRN
Status: DISCONTINUED | OUTPATIENT
Start: 2020-01-01 | End: 2020-01-01 | Stop reason: HOSPADM

## 2020-01-01 RX ORDER — LIDOCAINE 4 G/G
1 PATCH TOPICAL DAILY
Status: DISCONTINUED | OUTPATIENT
Start: 2020-01-01 | End: 2020-01-01 | Stop reason: HOSPADM

## 2020-01-01 RX ORDER — DIMETHICONE, OXYBENZONE, AND PADIMATE O 2; 2.5; 6.6 G/100G; G/100G; G/100G
STICK TOPICAL PRN
Status: DISCONTINUED | OUTPATIENT
Start: 2020-01-01 | End: 2020-01-01 | Stop reason: HOSPADM

## 2020-01-01 RX ORDER — DEXTROSE MONOHYDRATE 25 G/50ML
12.5 INJECTION, SOLUTION INTRAVENOUS PRN
Status: DISCONTINUED | OUTPATIENT
Start: 2020-01-01 | End: 2020-01-01 | Stop reason: HOSPADM

## 2020-01-01 RX ORDER — ALBUMIN (HUMAN) 12.5 G/50ML
25 SOLUTION INTRAVENOUS ONCE
Status: COMPLETED | OUTPATIENT
Start: 2020-01-01 | End: 2020-01-01

## 2020-01-01 RX ORDER — SODIUM CHLORIDE, SODIUM LACTATE, POTASSIUM CHLORIDE, CALCIUM CHLORIDE 600; 310; 30; 20 MG/100ML; MG/100ML; MG/100ML; MG/100ML
INJECTION, SOLUTION INTRAVENOUS CONTINUOUS
Status: DISCONTINUED | OUTPATIENT
Start: 2020-01-01 | End: 2020-01-01

## 2020-01-01 RX ORDER — LIDOCAINE HYDROCHLORIDE 10 MG/ML
5 INJECTION, SOLUTION EPIDURAL; INFILTRATION; INTRACAUDAL; PERINEURAL EVERY 4 HOURS PRN
Status: DISCONTINUED | OUTPATIENT
Start: 2020-01-01 | End: 2020-01-01 | Stop reason: HOSPADM

## 2020-01-01 RX ORDER — LIDOCAINE HYDROCHLORIDE 20 MG/ML
INJECTION, SOLUTION INTRAVENOUS PRN
Status: DISCONTINUED | OUTPATIENT
Start: 2020-01-01 | End: 2020-01-01 | Stop reason: SDUPTHER

## 2020-01-01 RX ORDER — PETROLATUM 42 G/100G
OINTMENT TOPICAL 4 TIMES DAILY
Status: DISCONTINUED | OUTPATIENT
Start: 2020-01-01 | End: 2020-01-01 | Stop reason: HOSPADM

## 2020-01-01 RX ORDER — FLUCONAZOLE 2 MG/ML
400 INJECTION, SOLUTION INTRAVENOUS EVERY 24 HOURS
Status: DISCONTINUED | OUTPATIENT
Start: 2020-01-01 | End: 2020-01-01

## 2020-01-01 RX ORDER — FAMOTIDINE 20 MG/1
20 TABLET, FILM COATED ORAL 2 TIMES DAILY
Qty: 60 TABLET | Refills: 3 | DISCHARGE
Start: 2020-01-01

## 2020-01-01 RX ORDER — MIDAZOLAM HYDROCHLORIDE 1 MG/ML
2 INJECTION INTRAMUSCULAR; INTRAVENOUS ONCE
Status: COMPLETED | OUTPATIENT
Start: 2020-01-01 | End: 2020-01-01

## 2020-01-01 RX ORDER — IPRATROPIUM BROMIDE AND ALBUTEROL SULFATE 2.5; .5 MG/3ML; MG/3ML
3 SOLUTION RESPIRATORY (INHALATION)
Qty: 360 ML | DISCHARGE
Start: 2020-01-01

## 2020-01-01 RX ORDER — SODIUM CHLORIDE FOR INHALATION 3 %
4 VIAL, NEBULIZER (ML) INHALATION PRN
DISCHARGE
Start: 2020-01-01

## 2020-01-01 RX ORDER — LIDOCAINE 4 G/G
1 PATCH TOPICAL DAILY
DISCHARGE
Start: 2020-01-01

## 2020-01-01 RX ORDER — ACETAMINOPHEN 650 MG/1
650 SUPPOSITORY RECTAL EVERY 6 HOURS PRN
Status: DISCONTINUED | OUTPATIENT
Start: 2020-01-01 | End: 2020-01-01

## 2020-01-01 RX ORDER — DEXTROSE MONOHYDRATE 50 MG/ML
100 INJECTION, SOLUTION INTRAVENOUS PRN
Status: DISCONTINUED | OUTPATIENT
Start: 2020-01-01 | End: 2020-01-01 | Stop reason: HOSPADM

## 2020-01-01 RX ORDER — MORPHINE SULFATE 2 MG/ML
1 INJECTION, SOLUTION INTRAMUSCULAR; INTRAVENOUS EVERY 4 HOURS PRN
Status: DISCONTINUED | OUTPATIENT
Start: 2020-01-01 | End: 2020-01-01

## 2020-01-01 RX ORDER — SODIUM CHLORIDE FOR INHALATION 3 %
4 VIAL, NEBULIZER (ML) INHALATION PRN
Status: DISCONTINUED | OUTPATIENT
Start: 2020-01-01 | End: 2020-01-01 | Stop reason: HOSPADM

## 2020-01-01 RX ORDER — ONDANSETRON 2 MG/ML
4 INJECTION INTRAMUSCULAR; INTRAVENOUS EVERY 6 HOURS PRN
Status: DISCONTINUED | OUTPATIENT
Start: 2020-01-01 | End: 2020-01-01 | Stop reason: HOSPADM

## 2020-01-01 RX ORDER — FENTANYL CITRATE 50 UG/ML
INJECTION, SOLUTION INTRAMUSCULAR; INTRAVENOUS PRN
Status: DISCONTINUED | OUTPATIENT
Start: 2020-01-01 | End: 2020-01-01 | Stop reason: SDUPTHER

## 2020-01-01 RX ADMIN — GUAIFENESIN 400 MG: 400 TABLET, FILM COATED ORAL at 08:45

## 2020-01-01 RX ADMIN — LEVETIRACETAM 1000 MG: 10 INJECTION, SOLUTION INTRAVENOUS at 08:14

## 2020-01-01 RX ADMIN — CARBIDOPA AND LEVODOPA 2.5 TABLET: 25; 100 TABLET ORAL at 09:07

## 2020-01-01 RX ADMIN — IPRATROPIUM BROMIDE AND ALBUTEROL SULFATE 1 AMPULE: 2.5; .5 SOLUTION RESPIRATORY (INHALATION) at 21:07

## 2020-01-01 RX ADMIN — CARBIDOPA AND LEVODOPA 2.5 TABLET: 25; 100 TABLET ORAL at 18:01

## 2020-01-01 RX ADMIN — SODIUM CHLORIDE SOLN NEBU 3% 4 ML: 3 NEBU SOLN at 03:09

## 2020-01-01 RX ADMIN — CARBIDOPA AND LEVODOPA 2.5 TABLET: 25; 100 TABLET ORAL at 17:02

## 2020-01-01 RX ADMIN — ACETAMINOPHEN ORAL SOLUTION 650 MG: 650 SOLUTION ORAL at 01:15

## 2020-01-01 RX ADMIN — ALBUTEROL SULFATE 2.5 MG: 2.5 SOLUTION RESPIRATORY (INHALATION) at 19:53

## 2020-01-01 RX ADMIN — CARBIDOPA AND LEVODOPA 2 TABLET: 25; 100 TABLET ORAL at 01:04

## 2020-01-01 RX ADMIN — MIDAZOLAM 1 MG: 1 INJECTION INTRAMUSCULAR; INTRAVENOUS at 04:48

## 2020-01-01 RX ADMIN — PANTOPRAZOLE SODIUM 40 MG: 40 INJECTION, POWDER, FOR SOLUTION INTRAVENOUS at 07:47

## 2020-01-01 RX ADMIN — CARBIDOPA AND LEVODOPA 2.5 TABLET: 25; 100 TABLET ORAL at 16:37

## 2020-01-01 RX ADMIN — SODIUM CHLORIDE SOLN NEBU 3% 2 ML: 3 NEBU SOLN at 20:33

## 2020-01-01 RX ADMIN — SODIUM CHLORIDE SOLN NEBU 3% 2 ML: 3 NEBU SOLN at 03:38

## 2020-01-01 RX ADMIN — PROPOFOL 10 MCG/KG/MIN: 10 INJECTION, EMULSION INTRAVENOUS at 13:07

## 2020-01-01 RX ADMIN — SODIUM CHLORIDE SOLN NEBU 3% 2 ML: 3 NEBU SOLN at 08:36

## 2020-01-01 RX ADMIN — LABETALOL HYDROCHLORIDE 10 MG: 5 INJECTION INTRAVENOUS at 20:30

## 2020-01-01 RX ADMIN — CARBIDOPA AND LEVODOPA 2.5 TABLET: 25; 100 TABLET ORAL at 17:31

## 2020-01-01 RX ADMIN — PANTOPRAZOLE SODIUM 40 MG: 40 INJECTION, POWDER, FOR SOLUTION INTRAVENOUS at 08:47

## 2020-01-01 RX ADMIN — DORNASE ALFA 2.5 MG: 1 SOLUTION RESPIRATORY (INHALATION) at 08:27

## 2020-01-01 RX ADMIN — LEVETIRACETAM 1000 MG: 10 INJECTION, SOLUTION INTRAVENOUS at 19:13

## 2020-01-01 RX ADMIN — PROPOFOL 20.06 MCG/KG/MIN: 10 INJECTION, EMULSION INTRAVENOUS at 08:19

## 2020-01-01 RX ADMIN — CHLORHEXIDINE GLUCONATE 0.12% ORAL RINSE 15 ML: 1.2 LIQUID ORAL at 21:06

## 2020-01-01 RX ADMIN — GUAIFENESIN 400 MG: 400 TABLET, FILM COATED ORAL at 19:47

## 2020-01-01 RX ADMIN — ACETAMINOPHEN ORAL SOLUTION 650 MG: 650 SOLUTION ORAL at 20:45

## 2020-01-01 RX ADMIN — CETIRIZINE HYDROCHLORIDE 5 MG: 10 TABLET, FILM COATED ORAL at 08:44

## 2020-01-01 RX ADMIN — CARBIDOPA AND LEVODOPA 2.5 TABLET: 25; 100 TABLET ORAL at 19:47

## 2020-01-01 RX ADMIN — PETROLATUM: 42 OINTMENT TOPICAL at 16:42

## 2020-01-01 RX ADMIN — SODIUM CHLORIDE, PRESERVATIVE FREE 10 ML: 5 INJECTION INTRAVENOUS at 05:46

## 2020-01-01 RX ADMIN — ENOXAPARIN SODIUM 40 MG: 40 INJECTION SUBCUTANEOUS at 08:44

## 2020-01-01 RX ADMIN — CARBIDOPA AND LEVODOPA 2.5 TABLET: 25; 100 TABLET ORAL at 14:36

## 2020-01-01 RX ADMIN — SODIUM CHLORIDE SOLN NEBU 3% 2 ML: 3 NEBU SOLN at 21:41

## 2020-01-01 RX ADMIN — CARBIDOPA AND LEVODOPA 2.5 TABLET: 25; 100 TABLET ORAL at 20:35

## 2020-01-01 RX ADMIN — GUAIFENESIN 400 MG: 400 TABLET, FILM COATED ORAL at 20:35

## 2020-01-01 RX ADMIN — CARBIDOPA AND LEVODOPA 2.5 TABLET: 25; 100 TABLET ORAL at 14:28

## 2020-01-01 RX ADMIN — ALBUTEROL SULFATE 2.5 MG: 2.5 SOLUTION RESPIRATORY (INHALATION) at 12:01

## 2020-01-01 RX ADMIN — GUAIFENESIN 400 MG: 400 TABLET, FILM COATED ORAL at 08:08

## 2020-01-01 RX ADMIN — LEVETIRACETAM 1000 MG: 10 INJECTION, SOLUTION INTRAVENOUS at 19:46

## 2020-01-01 RX ADMIN — PETROLATUM: 42 OINTMENT TOPICAL at 20:31

## 2020-01-01 RX ADMIN — SODIUM CHLORIDE SOLN NEBU 3% 2 ML: 3 NEBU SOLN at 08:30

## 2020-01-01 RX ADMIN — PIPERACILLIN AND TAZOBACTAM 3.38 G: 3; .375 INJECTION, POWDER, LYOPHILIZED, FOR SOLUTION INTRAVENOUS at 17:41

## 2020-01-01 RX ADMIN — IPRATROPIUM BROMIDE AND ALBUTEROL SULFATE 1 AMPULE: 2.5; .5 SOLUTION RESPIRATORY (INHALATION) at 20:30

## 2020-01-01 RX ADMIN — PANTOPRAZOLE SODIUM 40 MG: 40 INJECTION, POWDER, FOR SOLUTION INTRAVENOUS at 08:00

## 2020-01-01 RX ADMIN — LEVETIRACETAM 1000 MG: 10 INJECTION, SOLUTION INTRAVENOUS at 20:14

## 2020-01-01 RX ADMIN — DEXTROSE AND SODIUM CHLORIDE: 5; 900 INJECTION, SOLUTION INTRAVENOUS at 12:04

## 2020-01-01 RX ADMIN — PETROLATUM: 42 OINTMENT TOPICAL at 21:16

## 2020-01-01 RX ADMIN — SODIUM CHLORIDE SOLN NEBU 3% 2 ML: 3 NEBU SOLN at 11:59

## 2020-01-01 RX ADMIN — IPRATROPIUM BROMIDE AND ALBUTEROL SULFATE 1 AMPULE: 2.5; .5 SOLUTION RESPIRATORY (INHALATION) at 12:46

## 2020-01-01 RX ADMIN — ENOXAPARIN SODIUM 40 MG: 40 INJECTION SUBCUTANEOUS at 08:50

## 2020-01-01 RX ADMIN — DEXTROSE AND SODIUM CHLORIDE: 5; 900 INJECTION, SOLUTION INTRAVENOUS at 08:56

## 2020-01-01 RX ADMIN — POTASSIUM BICARBONATE 40 MEQ: 782 TABLET, EFFERVESCENT ORAL at 08:50

## 2020-01-01 RX ADMIN — MAGNESIUM SULFATE 1 G: 1 INJECTION INTRAVENOUS at 08:35

## 2020-01-01 RX ADMIN — CARBIDOPA AND LEVODOPA 2 TABLET: 25; 100 TABLET ORAL at 23:32

## 2020-01-01 RX ADMIN — PIPERACILLIN AND TAZOBACTAM 3.38 G: 3; .375 INJECTION, POWDER, LYOPHILIZED, FOR SOLUTION INTRAVENOUS at 08:16

## 2020-01-01 RX ADMIN — FLUDROCORTISONE ACETATE 0.1 MG: 0.1 TABLET ORAL at 22:09

## 2020-01-01 RX ADMIN — CARBIDOPA AND LEVODOPA 2.5 TABLET: 25; 100 TABLET ORAL at 08:07

## 2020-01-01 RX ADMIN — CETIRIZINE HYDROCHLORIDE 5 MG: 10 TABLET, FILM COATED ORAL at 08:45

## 2020-01-01 RX ADMIN — CARBIDOPA AND LEVODOPA 2 TABLET: 25; 100 TABLET ORAL at 22:49

## 2020-01-01 RX ADMIN — SODIUM CHLORIDE SOLN NEBU 3% 2 ML: 3 NEBU SOLN at 05:00

## 2020-01-01 RX ADMIN — CARBIDOPA AND LEVODOPA 2.5 TABLET: 25; 100 TABLET ORAL at 18:18

## 2020-01-01 RX ADMIN — GUAIFENESIN 400 MG: 400 TABLET, FILM COATED ORAL at 20:52

## 2020-01-01 RX ADMIN — NYSTATIN 500000 UNITS: 100000 SUSPENSION ORAL at 21:07

## 2020-01-01 RX ADMIN — PANTOPRAZOLE SODIUM 40 MG: 40 INJECTION, POWDER, FOR SOLUTION INTRAVENOUS at 08:21

## 2020-01-01 RX ADMIN — SODIUM CHLORIDE, POTASSIUM CHLORIDE, SODIUM LACTATE AND CALCIUM CHLORIDE: 600; 310; 30; 20 INJECTION, SOLUTION INTRAVENOUS at 04:32

## 2020-01-01 RX ADMIN — MIDAZOLAM HYDROCHLORIDE 2 MG: 1 INJECTION INTRAMUSCULAR; INTRAVENOUS at 15:42

## 2020-01-01 RX ADMIN — GUAIFENESIN 400 MG: 400 TABLET, FILM COATED ORAL at 15:45

## 2020-01-01 RX ADMIN — NYSTATIN 500000 UNITS: 100000 SUSPENSION ORAL at 09:00

## 2020-01-01 RX ADMIN — FUROSEMIDE 40 MG: 10 INJECTION, SOLUTION INTRAMUSCULAR; INTRAVENOUS at 11:13

## 2020-01-01 RX ADMIN — PETROLATUM: 42 OINTMENT TOPICAL at 21:50

## 2020-01-01 RX ADMIN — DORNASE ALFA 2.5 MG: 1 SOLUTION RESPIRATORY (INHALATION) at 09:04

## 2020-01-01 RX ADMIN — CETIRIZINE HYDROCHLORIDE 5 MG: 10 TABLET, FILM COATED ORAL at 08:33

## 2020-01-01 RX ADMIN — DEXTROSE, SODIUM CHLORIDE, AND POTASSIUM CHLORIDE: 5; .45; .15 INJECTION INTRAVENOUS at 21:06

## 2020-01-01 RX ADMIN — IPRATROPIUM BROMIDE AND ALBUTEROL SULFATE 1 AMPULE: 2.5; .5 SOLUTION RESPIRATORY (INHALATION) at 16:59

## 2020-01-01 RX ADMIN — LEVETIRACETAM 1000 MG: 10 INJECTION, SOLUTION INTRAVENOUS at 08:07

## 2020-01-01 RX ADMIN — Medication 10 ML: at 08:32

## 2020-01-01 RX ADMIN — CARBIDOPA AND LEVODOPA 2.5 TABLET: 25; 100 TABLET ORAL at 18:35

## 2020-01-01 RX ADMIN — GUAIFENESIN 400 MG: 400 TABLET, FILM COATED ORAL at 14:10

## 2020-01-01 RX ADMIN — Medication 10 ML: at 20:03

## 2020-01-01 RX ADMIN — LABETALOL HYDROCHLORIDE 10 MG: 5 INJECTION INTRAVENOUS at 01:20

## 2020-01-01 RX ADMIN — CARBIDOPA AND LEVODOPA 2.5 TABLET: 25; 100 TABLET ORAL at 16:20

## 2020-01-01 RX ADMIN — LEVETIRACETAM 1000 MG: 10 INJECTION, SOLUTION INTRAVENOUS at 09:06

## 2020-01-01 RX ADMIN — SODIUM CHLORIDE SOLN NEBU 3% 2 ML: 3 NEBU SOLN at 21:37

## 2020-01-01 RX ADMIN — LEVETIRACETAM 1000 MG: 10 INJECTION, SOLUTION INTRAVENOUS at 18:36

## 2020-01-01 RX ADMIN — SODIUM CHLORIDE SOLN NEBU 3% 2 ML: 3 NEBU SOLN at 23:36

## 2020-01-01 RX ADMIN — PIPERACILLIN AND TAZOBACTAM 3.38 G: 3; .375 INJECTION, POWDER, LYOPHILIZED, FOR SOLUTION INTRAVENOUS at 11:11

## 2020-01-01 RX ADMIN — GUAIFENESIN 400 MG: 400 TABLET, FILM COATED ORAL at 21:06

## 2020-01-01 RX ADMIN — CARBIDOPA AND LEVODOPA 2.5 TABLET: 25; 100 TABLET ORAL at 08:00

## 2020-01-01 RX ADMIN — IPRATROPIUM BROMIDE AND ALBUTEROL SULFATE 1 AMPULE: 2.5; .5 SOLUTION RESPIRATORY (INHALATION) at 17:36

## 2020-01-01 RX ADMIN — IPRATROPIUM BROMIDE AND ALBUTEROL SULFATE 1 AMPULE: 2.5; .5 SOLUTION RESPIRATORY (INHALATION) at 10:01

## 2020-01-01 RX ADMIN — SODIUM CHLORIDE: 9 INJECTION, SOLUTION INTRAVENOUS at 12:16

## 2020-01-01 RX ADMIN — SODIUM CHLORIDE SOLN NEBU 3% 2 ML: 3 NEBU SOLN at 18:20

## 2020-01-01 RX ADMIN — NYSTATIN 500000 UNITS: 100000 SUSPENSION ORAL at 16:12

## 2020-01-01 RX ADMIN — ALBUTEROL SULFATE 2.5 MG: 2.5 SOLUTION RESPIRATORY (INHALATION) at 08:01

## 2020-01-01 RX ADMIN — LEVETIRACETAM 1000 MG: 10 INJECTION, SOLUTION INTRAVENOUS at 21:46

## 2020-01-01 RX ADMIN — PANTOPRAZOLE SODIUM 40 MG: 40 INJECTION, POWDER, FOR SOLUTION INTRAVENOUS at 07:58

## 2020-01-01 RX ADMIN — IPRATROPIUM BROMIDE AND ALBUTEROL SULFATE 1 AMPULE: 2.5; .5 SOLUTION RESPIRATORY (INHALATION) at 12:50

## 2020-01-01 RX ADMIN — GABAPENTIN 100 MG: 100 CAPSULE ORAL at 08:42

## 2020-01-01 RX ADMIN — NYSTATIN 500000 UNITS: 100000 SUSPENSION ORAL at 21:05

## 2020-01-01 RX ADMIN — SODIUM CHLORIDE SOLN NEBU 3% 2 ML: 3 NEBU SOLN at 04:29

## 2020-01-01 RX ADMIN — PIPERACILLIN AND TAZOBACTAM 3.38 G: 3; .375 INJECTION, POWDER, LYOPHILIZED, FOR SOLUTION INTRAVENOUS at 11:00

## 2020-01-01 RX ADMIN — NYSTATIN 500000 UNITS: 100000 SUSPENSION ORAL at 07:48

## 2020-01-01 RX ADMIN — ACETAMINOPHEN ORAL SOLUTION 650 MG: 650 SOLUTION ORAL at 13:44

## 2020-01-01 RX ADMIN — CARBIDOPA AND LEVODOPA 2 TABLET: 25; 100 TABLET ORAL at 06:24

## 2020-01-01 RX ADMIN — CARBIDOPA AND LEVODOPA 2.5 TABLET: 25; 100 TABLET ORAL at 18:15

## 2020-01-01 RX ADMIN — Medication 10 ML: at 20:20

## 2020-01-01 RX ADMIN — CARBIDOPA AND LEVODOPA 2 TABLET: 25; 100 TABLET ORAL at 00:58

## 2020-01-01 RX ADMIN — PETROLATUM: 42 OINTMENT TOPICAL at 08:39

## 2020-01-01 RX ADMIN — CARBIDOPA AND LEVODOPA 2.5 TABLET: 25; 100 TABLET ORAL at 21:47

## 2020-01-01 RX ADMIN — SODIUM CHLORIDE SOLN NEBU 3% 2 ML: 3 NEBU SOLN at 04:46

## 2020-01-01 RX ADMIN — SODIUM CHLORIDE SOLN NEBU 3% 2 ML: 3 NEBU SOLN at 20:01

## 2020-01-01 RX ADMIN — LEVETIRACETAM 1000 MG: 10 INJECTION, SOLUTION INTRAVENOUS at 08:40

## 2020-01-01 RX ADMIN — GUAIFENESIN 400 MG: 400 TABLET, FILM COATED ORAL at 13:55

## 2020-01-01 RX ADMIN — NYSTATIN 500000 UNITS: 100000 SUSPENSION ORAL at 13:55

## 2020-01-01 RX ADMIN — CARBIDOPA AND LEVODOPA 2 TABLET: 25; 100 TABLET ORAL at 23:09

## 2020-01-01 RX ADMIN — CARBIDOPA AND LEVODOPA 2.5 TABLET: 25; 100 TABLET ORAL at 21:17

## 2020-01-01 RX ADMIN — PETROLATUM: 42 OINTMENT TOPICAL at 19:53

## 2020-01-01 RX ADMIN — SODIUM CHLORIDE SOLN NEBU 3% 2 ML: 3 NEBU SOLN at 21:18

## 2020-01-01 RX ADMIN — PETROLATUM: 42 OINTMENT TOPICAL at 18:12

## 2020-01-01 RX ADMIN — Medication 10 ML: at 09:35

## 2020-01-01 RX ADMIN — IPRATROPIUM BROMIDE AND ALBUTEROL SULFATE 1 AMPULE: 2.5; .5 SOLUTION RESPIRATORY (INHALATION) at 09:04

## 2020-01-01 RX ADMIN — GABAPENTIN 300 MG: 300 CAPSULE ORAL at 19:47

## 2020-01-01 RX ADMIN — CARBIDOPA AND LEVODOPA 2.5 TABLET: 25; 100 TABLET ORAL at 07:57

## 2020-01-01 RX ADMIN — LEVETIRACETAM 1000 MG: 10 INJECTION, SOLUTION INTRAVENOUS at 08:46

## 2020-01-01 RX ADMIN — Medication 10 ML: at 08:43

## 2020-01-01 RX ADMIN — GUAIFENESIN 400 MG: 400 TABLET, FILM COATED ORAL at 14:06

## 2020-01-01 RX ADMIN — IPRATROPIUM BROMIDE AND ALBUTEROL SULFATE 1 AMPULE: 2.5; .5 SOLUTION RESPIRATORY (INHALATION) at 17:03

## 2020-01-01 RX ADMIN — SODIUM CHLORIDE, PRESERVATIVE FREE 10 ML: 5 INJECTION INTRAVENOUS at 18:37

## 2020-01-01 RX ADMIN — ALBUTEROL SULFATE 2.5 MG: 2.5 SOLUTION RESPIRATORY (INHALATION) at 16:01

## 2020-01-01 RX ADMIN — PETROLATUM: 42 OINTMENT TOPICAL at 12:34

## 2020-01-01 RX ADMIN — CARBIDOPA AND LEVODOPA 2.5 TABLET: 25; 100 TABLET ORAL at 16:11

## 2020-01-01 RX ADMIN — CETIRIZINE HYDROCHLORIDE 5 MG: 10 TABLET, FILM COATED ORAL at 14:55

## 2020-01-01 RX ADMIN — SODIUM CHLORIDE SOLN NEBU 3% 2 ML: 3 NEBU SOLN at 08:46

## 2020-01-01 RX ADMIN — CARBIDOPA AND LEVODOPA 2.5 TABLET: 25; 100 TABLET ORAL at 11:18

## 2020-01-01 RX ADMIN — Medication 10 ML: at 07:59

## 2020-01-01 RX ADMIN — Medication 10 ML: at 20:26

## 2020-01-01 RX ADMIN — IPRATROPIUM BROMIDE AND ALBUTEROL SULFATE 1 AMPULE: 2.5; .5 SOLUTION RESPIRATORY (INHALATION) at 08:22

## 2020-01-01 RX ADMIN — DIMETHICONE, OXYBENZONE, AND PADIMATE O: 2; 2.5; 6.6 STICK TOPICAL at 10:27

## 2020-01-01 RX ADMIN — LEVETIRACETAM 1000 MG: 10 INJECTION, SOLUTION INTRAVENOUS at 20:55

## 2020-01-01 RX ADMIN — CARBIDOPA AND LEVODOPA 2.5 TABLET: 25; 100 TABLET ORAL at 16:10

## 2020-01-01 RX ADMIN — POTASSIUM PHOSPHATE, MONOBASIC AND POTASSIUM PHOSPHATE, DIBASIC 10 MMOL: 224; 236 INJECTION, SOLUTION, CONCENTRATE INTRAVENOUS at 15:14

## 2020-01-01 RX ADMIN — SODIUM CHLORIDE SOLN NEBU 3% 2 ML: 3 NEBU SOLN at 16:00

## 2020-01-01 RX ADMIN — IPRATROPIUM BROMIDE AND ALBUTEROL SULFATE 1 AMPULE: 2.5; .5 SOLUTION RESPIRATORY (INHALATION) at 13:55

## 2020-01-01 RX ADMIN — NYSTATIN 500000 UNITS: 100000 SUSPENSION ORAL at 17:31

## 2020-01-01 RX ADMIN — GUAIFENESIN 400 MG: 400 TABLET, FILM COATED ORAL at 13:56

## 2020-01-01 RX ADMIN — Medication: at 10:27

## 2020-01-01 RX ADMIN — CARBIDOPA AND LEVODOPA 2.5 TABLET: 25; 100 TABLET ORAL at 12:20

## 2020-01-01 RX ADMIN — ACETAMINOPHEN ORAL SOLUTION 650 MG: 650 SOLUTION ORAL at 05:07

## 2020-01-01 RX ADMIN — ALBUTEROL SULFATE 2.5 MG: 2.5 SOLUTION RESPIRATORY (INHALATION) at 16:02

## 2020-01-01 RX ADMIN — SODIUM CHLORIDE SOLN NEBU 3% 2 ML: 3 NEBU SOLN at 20:07

## 2020-01-01 RX ADMIN — CETIRIZINE HYDROCHLORIDE 5 MG: 10 TABLET, FILM COATED ORAL at 08:55

## 2020-01-01 RX ADMIN — LABETALOL HYDROCHLORIDE 10 MG: 5 INJECTION INTRAVENOUS at 08:58

## 2020-01-01 RX ADMIN — IPRATROPIUM BROMIDE AND ALBUTEROL SULFATE 1 AMPULE: 2.5; .5 SOLUTION RESPIRATORY (INHALATION) at 20:39

## 2020-01-01 RX ADMIN — SODIUM CHLORIDE SOLN NEBU 3% 2 ML: 3 NEBU SOLN at 05:12

## 2020-01-01 RX ADMIN — PIPERACILLIN AND TAZOBACTAM 3.38 G: 3; .375 INJECTION, POWDER, LYOPHILIZED, FOR SOLUTION INTRAVENOUS at 08:03

## 2020-01-01 RX ADMIN — CARBIDOPA AND LEVODOPA 2.5 TABLET: 25; 100 TABLET ORAL at 08:28

## 2020-01-01 RX ADMIN — IPRATROPIUM BROMIDE AND ALBUTEROL SULFATE 1 AMPULE: 2.5; .5 SOLUTION RESPIRATORY (INHALATION) at 09:21

## 2020-01-01 RX ADMIN — LEVETIRACETAM 1000 MG: 10 INJECTION, SOLUTION INTRAVENOUS at 08:35

## 2020-01-01 RX ADMIN — LEVETIRACETAM 1000 MG: 10 INJECTION, SOLUTION INTRAVENOUS at 07:04

## 2020-01-01 RX ADMIN — Medication 10 ML: at 08:42

## 2020-01-01 RX ADMIN — IPRATROPIUM BROMIDE 0.5 MG: 0.5 SOLUTION RESPIRATORY (INHALATION) at 13:35

## 2020-01-01 RX ADMIN — METOCLOPRAMIDE HYDROCHLORIDE 5 MG: 5 SOLUTION ORAL at 15:00

## 2020-01-01 RX ADMIN — IPRATROPIUM BROMIDE AND ALBUTEROL SULFATE 1 AMPULE: 2.5; .5 SOLUTION RESPIRATORY (INHALATION) at 08:42

## 2020-01-01 RX ADMIN — Medication 10 ML: at 09:31

## 2020-01-01 RX ADMIN — SODIUM CHLORIDE SOLN NEBU 3% 2 ML: 3 NEBU SOLN at 16:53

## 2020-01-01 RX ADMIN — IPRATROPIUM BROMIDE AND ALBUTEROL SULFATE 1 AMPULE: 2.5; .5 SOLUTION RESPIRATORY (INHALATION) at 21:43

## 2020-01-01 RX ADMIN — SODIUM CHLORIDE SOLN NEBU 3% 2 ML: 3 NEBU SOLN at 04:30

## 2020-01-01 RX ADMIN — PIPERACILLIN AND TAZOBACTAM 3.38 G: 3; .375 INJECTION, POWDER, LYOPHILIZED, FOR SOLUTION INTRAVENOUS at 07:51

## 2020-01-01 RX ADMIN — POTASSIUM BICARBONATE 40 MEQ: 782 TABLET, EFFERVESCENT ORAL at 08:08

## 2020-01-01 RX ADMIN — IPRATROPIUM BROMIDE AND ALBUTEROL SULFATE 1 AMPULE: 2.5; .5 SOLUTION RESPIRATORY (INHALATION) at 18:07

## 2020-01-01 RX ADMIN — SODIUM CHLORIDE SOLN NEBU 3% 2 ML: 3 NEBU SOLN at 16:16

## 2020-01-01 RX ADMIN — Medication 10 ML: at 08:02

## 2020-01-01 RX ADMIN — CARBIDOPA AND LEVODOPA 2.5 TABLET: 25; 100 TABLET ORAL at 15:13

## 2020-01-01 RX ADMIN — LEVETIRACETAM 1000 MG: 10 INJECTION, SOLUTION INTRAVENOUS at 08:43

## 2020-01-01 RX ADMIN — LEVETIRACETAM 1000 MG: 10 INJECTION, SOLUTION INTRAVENOUS at 19:52

## 2020-01-01 RX ADMIN — POTASSIUM BICARBONATE 40 MEQ: 782 TABLET, EFFERVESCENT ORAL at 08:31

## 2020-01-01 RX ADMIN — CARBIDOPA AND LEVODOPA 2.5 TABLET: 25; 100 TABLET ORAL at 14:55

## 2020-01-01 RX ADMIN — Medication 10 ML: at 20:18

## 2020-01-01 RX ADMIN — LIDOCAINE HYDROCHLORIDE: 10 INJECTION, SOLUTION INFILTRATION; PERINEURAL at 15:46

## 2020-01-01 RX ADMIN — PIPERACILLIN AND TAZOBACTAM 3.38 G: 3; .375 INJECTION, POWDER, LYOPHILIZED, FOR SOLUTION INTRAVENOUS at 21:47

## 2020-01-01 RX ADMIN — SODIUM CHLORIDE SOLN NEBU 3% 2 ML: 3 NEBU SOLN at 00:14

## 2020-01-01 RX ADMIN — ONDANSETRON 4 MG: 2 INJECTION INTRAMUSCULAR; INTRAVENOUS at 01:32

## 2020-01-01 RX ADMIN — PETROLATUM: 42 OINTMENT TOPICAL at 17:18

## 2020-01-01 RX ADMIN — DORNASE ALFA 2.5 MG: 1 SOLUTION RESPIRATORY (INHALATION) at 08:37

## 2020-01-01 RX ADMIN — SODIUM CHLORIDE SOLN NEBU 3% 2 ML: 3 NEBU SOLN at 16:57

## 2020-01-01 RX ADMIN — CETIRIZINE HYDROCHLORIDE 5 MG: 10 TABLET, FILM COATED ORAL at 08:46

## 2020-01-01 RX ADMIN — SODIUM CHLORIDE: 9 INJECTION, SOLUTION INTRAVENOUS at 14:08

## 2020-01-01 RX ADMIN — CARBIDOPA AND LEVODOPA 2.5 TABLET: 25; 100 TABLET ORAL at 13:54

## 2020-01-01 RX ADMIN — GUAIFENESIN 400 MG: 400 TABLET, FILM COATED ORAL at 21:30

## 2020-01-01 RX ADMIN — LEVETIRACETAM 1000 MG: 10 INJECTION, SOLUTION INTRAVENOUS at 20:20

## 2020-01-01 RX ADMIN — GUAIFENESIN 400 MG: 400 TABLET, FILM COATED ORAL at 15:00

## 2020-01-01 RX ADMIN — ALBUTEROL SULFATE 2.5 MG: 2.5 SOLUTION RESPIRATORY (INHALATION) at 13:11

## 2020-01-01 RX ADMIN — DORNASE ALFA 2.5 MG: 1 SOLUTION RESPIRATORY (INHALATION) at 08:50

## 2020-01-01 RX ADMIN — CARBIDOPA AND LEVODOPA 2.5 TABLET: 25; 100 TABLET ORAL at 17:14

## 2020-01-01 RX ADMIN — ENOXAPARIN SODIUM 40 MG: 40 INJECTION SUBCUTANEOUS at 09:30

## 2020-01-01 RX ADMIN — CARBIDOPA AND LEVODOPA 2 TABLET: 25; 100 TABLET ORAL at 23:30

## 2020-01-01 RX ADMIN — ENOXAPARIN SODIUM 40 MG: 40 INJECTION SUBCUTANEOUS at 08:34

## 2020-01-01 RX ADMIN — Medication 10 ML: at 10:26

## 2020-01-01 RX ADMIN — SODIUM CHLORIDE SOLN NEBU 3% 2 ML: 3 NEBU SOLN at 20:43

## 2020-01-01 RX ADMIN — IPRATROPIUM BROMIDE 0.5 MG: 0.5 SOLUTION RESPIRATORY (INHALATION) at 12:28

## 2020-01-01 RX ADMIN — GUAIFENESIN 400 MG: 400 TABLET, FILM COATED ORAL at 08:46

## 2020-01-01 RX ADMIN — Medication 10 ML: at 09:29

## 2020-01-01 RX ADMIN — IPRATROPIUM BROMIDE AND ALBUTEROL SULFATE 1 AMPULE: 2.5; .5 SOLUTION RESPIRATORY (INHALATION) at 17:26

## 2020-01-01 RX ADMIN — GUAIFENESIN 400 MG: 400 TABLET, FILM COATED ORAL at 21:07

## 2020-01-01 RX ADMIN — Medication 10 ML: at 08:45

## 2020-01-01 RX ADMIN — IPRATROPIUM BROMIDE AND ALBUTEROL SULFATE 1 AMPULE: 2.5; .5 SOLUTION RESPIRATORY (INHALATION) at 20:45

## 2020-01-01 RX ADMIN — GABAPENTIN 100 MG: 100 CAPSULE ORAL at 14:06

## 2020-01-01 RX ADMIN — LEVETIRACETAM 1000 MG: 10 INJECTION, SOLUTION INTRAVENOUS at 08:49

## 2020-01-01 RX ADMIN — PETROLATUM: 42 OINTMENT TOPICAL at 20:53

## 2020-01-01 RX ADMIN — POTASSIUM BICARBONATE 40 MEQ: 782 TABLET, EFFERVESCENT ORAL at 09:30

## 2020-01-01 RX ADMIN — GUAIFENESIN 400 MG: 400 TABLET, FILM COATED ORAL at 08:07

## 2020-01-01 RX ADMIN — Medication 10 ML: at 08:31

## 2020-01-01 RX ADMIN — SODIUM CHLORIDE SOLN NEBU 3% 2 ML: 3 NEBU SOLN at 13:55

## 2020-01-01 RX ADMIN — GUAIFENESIN 400 MG: 400 TABLET, FILM COATED ORAL at 20:23

## 2020-01-01 RX ADMIN — IPRATROPIUM BROMIDE AND ALBUTEROL SULFATE 1 AMPULE: 2.5; .5 SOLUTION RESPIRATORY (INHALATION) at 16:08

## 2020-01-01 RX ADMIN — NYSTATIN 500000 UNITS: 100000 SUSPENSION ORAL at 20:55

## 2020-01-01 RX ADMIN — SODIUM CHLORIDE SOLN NEBU 3% 2 ML: 3 NEBU SOLN at 08:50

## 2020-01-01 RX ADMIN — CHLORHEXIDINE GLUCONATE 0.12% ORAL RINSE 15 ML: 1.2 LIQUID ORAL at 08:09

## 2020-01-01 RX ADMIN — SODIUM CHLORIDE SOLN NEBU 3% 2 ML: 3 NEBU SOLN at 17:30

## 2020-01-01 RX ADMIN — CARBIDOPA AND LEVODOPA 2 TABLET: 25; 100 TABLET ORAL at 00:52

## 2020-01-01 RX ADMIN — Medication 10 ML: at 21:47

## 2020-01-01 RX ADMIN — CARBIDOPA AND LEVODOPA 2 TABLET: 25; 100 TABLET ORAL at 23:48

## 2020-01-01 RX ADMIN — SODIUM CHLORIDE SOLN NEBU 3% 2 ML: 3 NEBU SOLN at 08:25

## 2020-01-01 RX ADMIN — SODIUM CHLORIDE SOLN NEBU 3% 2 ML: 3 NEBU SOLN at 20:45

## 2020-01-01 RX ADMIN — CARBIDOPA AND LEVODOPA 2.5 TABLET: 25; 100 TABLET ORAL at 20:58

## 2020-01-01 RX ADMIN — GUAIFENESIN 400 MG: 400 TABLET, FILM COATED ORAL at 16:11

## 2020-01-01 RX ADMIN — GUAIFENESIN 400 MG: 400 TABLET, FILM COATED ORAL at 15:23

## 2020-01-01 RX ADMIN — LIDOCAINE 1 EACH: 40 CREAM TOPICAL at 17:43

## 2020-01-01 RX ADMIN — ALBUTEROL SULFATE 2.5 MG: 2.5 SOLUTION RESPIRATORY (INHALATION) at 08:25

## 2020-01-01 RX ADMIN — SODIUM CHLORIDE SOLN NEBU 3% 2 ML: 3 NEBU SOLN at 00:06

## 2020-01-01 RX ADMIN — ACETAMINOPHEN 650 MG: 650 SUPPOSITORY RECTAL at 02:25

## 2020-01-01 RX ADMIN — GUAIFENESIN 400 MG: 400 TABLET, FILM COATED ORAL at 21:03

## 2020-01-01 RX ADMIN — Medication 10 ML: at 20:07

## 2020-01-01 RX ADMIN — Medication 10 ML: at 08:59

## 2020-01-01 RX ADMIN — CARBIDOPA AND LEVODOPA 2.5 TABLET: 25; 100 TABLET ORAL at 17:29

## 2020-01-01 RX ADMIN — IPRATROPIUM BROMIDE AND ALBUTEROL SULFATE 1 AMPULE: 2.5; .5 SOLUTION RESPIRATORY (INHALATION) at 12:29

## 2020-01-01 RX ADMIN — ACETAMINOPHEN 650 MG: 650 SUPPOSITORY RECTAL at 13:42

## 2020-01-01 RX ADMIN — IPRATROPIUM BROMIDE AND ALBUTEROL SULFATE 1 AMPULE: 2.5; .5 SOLUTION RESPIRATORY (INHALATION) at 15:43

## 2020-01-01 RX ADMIN — CARBIDOPA AND LEVODOPA 2.5 TABLET: 25; 100 TABLET ORAL at 20:22

## 2020-01-01 RX ADMIN — SODIUM CHLORIDE: 9 INJECTION, SOLUTION INTRAVENOUS at 13:20

## 2020-01-01 RX ADMIN — SODIUM CHLORIDE SOLN NEBU 3% 2 ML: 3 NEBU SOLN at 00:51

## 2020-01-01 RX ADMIN — PIPERACILLIN AND TAZOBACTAM 3.38 G: 3; .375 INJECTION, POWDER, LYOPHILIZED, FOR SOLUTION INTRAVENOUS at 21:22

## 2020-01-01 RX ADMIN — POTASSIUM CHLORIDE 20 MEQ: 29.8 INJECTION, SOLUTION INTRAVENOUS at 07:00

## 2020-01-01 RX ADMIN — Medication 10 ML: at 07:58

## 2020-01-01 RX ADMIN — CARBIDOPA AND LEVODOPA 2.5 TABLET: 25; 100 TABLET ORAL at 14:03

## 2020-01-01 RX ADMIN — DORNASE ALFA 2.5 MG: 1 SOLUTION RESPIRATORY (INHALATION) at 08:31

## 2020-01-01 RX ADMIN — SODIUM CHLORIDE SOLN NEBU 3% 2 ML: 3 NEBU SOLN at 10:01

## 2020-01-01 RX ADMIN — PANTOPRAZOLE SODIUM 40 MG: 40 INJECTION, POWDER, FOR SOLUTION INTRAVENOUS at 08:07

## 2020-01-01 RX ADMIN — IPRATROPIUM BROMIDE 0.5 MG: 0.5 SOLUTION RESPIRATORY (INHALATION) at 16:58

## 2020-01-01 RX ADMIN — PETROLATUM: 42 OINTMENT TOPICAL at 08:43

## 2020-01-01 RX ADMIN — Medication 10 ML: at 20:34

## 2020-01-01 RX ADMIN — CARBIDOPA AND LEVODOPA 2.5 TABLET: 25; 100 TABLET ORAL at 08:47

## 2020-01-01 RX ADMIN — SODIUM CHLORIDE SOLN NEBU 3% 2 ML: 3 NEBU SOLN at 20:29

## 2020-01-01 RX ADMIN — POTASSIUM BICARBONATE 40 MEQ: 782 TABLET, EFFERVESCENT ORAL at 08:07

## 2020-01-01 RX ADMIN — CARBIDOPA AND LEVODOPA 2.5 TABLET: 25; 100 TABLET ORAL at 11:47

## 2020-01-01 RX ADMIN — POTASSIUM CHLORIDE 40 MEQ: 29.8 INJECTION, SOLUTION INTRAVENOUS at 20:59

## 2020-01-01 RX ADMIN — SODIUM CHLORIDE SOLN NEBU 3% 2 ML: 3 NEBU SOLN at 12:47

## 2020-01-01 RX ADMIN — PROPOFOL 20 MCG/KG/MIN: 10 INJECTION, EMULSION INTRAVENOUS at 00:49

## 2020-01-01 RX ADMIN — CARBIDOPA AND LEVODOPA 2.5 TABLET: 25; 100 TABLET ORAL at 07:47

## 2020-01-01 RX ADMIN — CARBIDOPA AND LEVODOPA 2.5 TABLET: 25; 100 TABLET ORAL at 08:27

## 2020-01-01 RX ADMIN — CARBIDOPA AND LEVODOPA 2 TABLET: 25; 100 TABLET ORAL at 23:38

## 2020-01-01 RX ADMIN — CARBIDOPA AND LEVODOPA 2.5 TABLET: 25; 100 TABLET ORAL at 20:27

## 2020-01-01 RX ADMIN — SODIUM CHLORIDE, PRESERVATIVE FREE 10 ML: 5 INJECTION INTRAVENOUS at 14:39

## 2020-01-01 RX ADMIN — SODIUM CHLORIDE SOLN NEBU 3% 2 ML: 3 NEBU SOLN at 04:50

## 2020-01-01 RX ADMIN — LEVETIRACETAM 1000 MG: 10 INJECTION, SOLUTION INTRAVENOUS at 20:08

## 2020-01-01 RX ADMIN — Medication 10 ML: at 08:09

## 2020-01-01 RX ADMIN — LEVETIRACETAM 1000 MG: 10 INJECTION, SOLUTION INTRAVENOUS at 08:29

## 2020-01-01 RX ADMIN — PIPERACILLIN AND TAZOBACTAM 3.38 G: 3; .375 INJECTION, POWDER, LYOPHILIZED, FOR SOLUTION INTRAVENOUS at 16:10

## 2020-01-01 RX ADMIN — Medication 10 ML: at 20:31

## 2020-01-01 RX ADMIN — PIPERACILLIN AND TAZOBACTAM 3.38 G: 3; .375 INJECTION, POWDER, LYOPHILIZED, FOR SOLUTION INTRAVENOUS at 17:18

## 2020-01-01 RX ADMIN — CARBIDOPA AND LEVODOPA 2.5 TABLET: 25; 100 TABLET ORAL at 20:23

## 2020-01-01 RX ADMIN — SODIUM CHLORIDE SOLN NEBU 3% 2 ML: 3 NEBU SOLN at 16:33

## 2020-01-01 RX ADMIN — Medication 10 ML: at 07:48

## 2020-01-01 RX ADMIN — LEVETIRACETAM 1000 MG: 10 INJECTION, SOLUTION INTRAVENOUS at 06:43

## 2020-01-01 RX ADMIN — CARBIDOPA AND LEVODOPA 2.5 TABLET: 25; 100 TABLET ORAL at 11:50

## 2020-01-01 RX ADMIN — GUAIFENESIN 400 MG: 400 TABLET, FILM COATED ORAL at 08:38

## 2020-01-01 RX ADMIN — LIDOCAINE HYDROCHLORIDE 60 MG: 20 INJECTION, SOLUTION INTRAVENOUS at 13:23

## 2020-01-01 RX ADMIN — CARBIDOPA AND LEVODOPA 2 TABLET: 25; 100 TABLET ORAL at 22:35

## 2020-01-01 RX ADMIN — SODIUM CHLORIDE SOLN NEBU 3% 2 ML: 3 NEBU SOLN at 01:22

## 2020-01-01 RX ADMIN — NYSTATIN 500000 UNITS: 100000 SUSPENSION ORAL at 20:23

## 2020-01-01 RX ADMIN — LEVETIRACETAM 1000 MG: 10 INJECTION, SOLUTION INTRAVENOUS at 06:23

## 2020-01-01 RX ADMIN — SODIUM CHLORIDE SOLN NEBU 3% 2 ML: 3 NEBU SOLN at 17:01

## 2020-01-01 RX ADMIN — SODIUM CHLORIDE SOLN NEBU 3% 2 ML: 3 NEBU SOLN at 12:36

## 2020-01-01 RX ADMIN — Medication 10 ML: at 19:53

## 2020-01-01 RX ADMIN — ACETAMINOPHEN ORAL SOLUTION 650 MG: 650 SOLUTION ORAL at 20:18

## 2020-01-01 RX ADMIN — LIDOCAINE HYDROCHLORIDE 5 ML: 10 INJECTION, SOLUTION EPIDURAL; INFILTRATION; INTRACAUDAL; PERINEURAL at 10:51

## 2020-01-01 RX ADMIN — ALBUTEROL SULFATE 2.5 MG: 2.5 SOLUTION RESPIRATORY (INHALATION) at 20:43

## 2020-01-01 RX ADMIN — CARBIDOPA AND LEVODOPA 2.5 TABLET: 25; 100 TABLET ORAL at 11:17

## 2020-01-01 RX ADMIN — NYSTATIN 500000 UNITS: 100000 SUSPENSION ORAL at 08:07

## 2020-01-01 RX ADMIN — GUAIFENESIN 400 MG: 400 TABLET, FILM COATED ORAL at 08:34

## 2020-01-01 RX ADMIN — PANTOPRAZOLE SODIUM 40 MG: 40 INJECTION, POWDER, FOR SOLUTION INTRAVENOUS at 09:34

## 2020-01-01 RX ADMIN — IPRATROPIUM BROMIDE AND ALBUTEROL SULFATE 1 AMPULE: 2.5; .5 SOLUTION RESPIRATORY (INHALATION) at 16:33

## 2020-01-01 RX ADMIN — SODIUM CHLORIDE SOLN NEBU 3% 2 ML: 3 NEBU SOLN at 00:26

## 2020-01-01 RX ADMIN — PETROLATUM: 42 OINTMENT TOPICAL at 16:20

## 2020-01-01 RX ADMIN — GUAIFENESIN 400 MG: 400 TABLET, FILM COATED ORAL at 08:47

## 2020-01-01 RX ADMIN — SODIUM CHLORIDE SOLN NEBU 3% 2 ML: 3 NEBU SOLN at 01:12

## 2020-01-01 RX ADMIN — CARBIDOPA AND LEVODOPA 2 TABLET: 25; 100 TABLET ORAL at 22:56

## 2020-01-01 RX ADMIN — PANTOPRAZOLE SODIUM 40 MG: 40 INJECTION, POWDER, FOR SOLUTION INTRAVENOUS at 08:46

## 2020-01-01 RX ADMIN — SODIUM CHLORIDE SOLN NEBU 3% 2 ML: 3 NEBU SOLN at 17:56

## 2020-01-01 RX ADMIN — SODIUM CHLORIDE SOLN NEBU 3% 2 ML: 3 NEBU SOLN at 16:18

## 2020-01-01 RX ADMIN — LABETALOL HYDROCHLORIDE 10 MG: 5 INJECTION INTRAVENOUS at 06:21

## 2020-01-01 RX ADMIN — Medication 10 ML: at 08:23

## 2020-01-01 RX ADMIN — NYSTATIN 500000 UNITS: 100000 SUSPENSION ORAL at 08:46

## 2020-01-01 RX ADMIN — CARBIDOPA AND LEVODOPA 2.5 TABLET: 25; 100 TABLET ORAL at 16:34

## 2020-01-01 RX ADMIN — PETROLATUM: 42 OINTMENT TOPICAL at 09:54

## 2020-01-01 RX ADMIN — LEVETIRACETAM 1000 MG: 10 INJECTION, SOLUTION INTRAVENOUS at 08:27

## 2020-01-01 RX ADMIN — DORNASE ALFA 2.5 MG: 1 SOLUTION RESPIRATORY (INHALATION) at 08:40

## 2020-01-01 RX ADMIN — PETROLATUM: 42 OINTMENT TOPICAL at 20:35

## 2020-01-01 RX ADMIN — Medication 10 ML: at 20:59

## 2020-01-01 RX ADMIN — CARBIDOPA AND LEVODOPA 2.5 TABLET: 25; 100 TABLET ORAL at 13:45

## 2020-01-01 RX ADMIN — NYSTATIN 500000 UNITS: 100000 SUSPENSION ORAL at 09:30

## 2020-01-01 RX ADMIN — CARBIDOPA AND LEVODOPA 2.5 TABLET: 25; 100 TABLET ORAL at 11:12

## 2020-01-01 RX ADMIN — PROPOFOL 20 MCG/KG/MIN: 10 INJECTION, EMULSION INTRAVENOUS at 22:51

## 2020-01-01 RX ADMIN — MENTHOL AND METHYL SALICYLATE: 7.6; 29 OINTMENT TOPICAL at 04:51

## 2020-01-01 RX ADMIN — CARBIDOPA AND LEVODOPA 2.5 TABLET: 25; 100 TABLET ORAL at 10:52

## 2020-01-01 RX ADMIN — CARBIDOPA AND LEVODOPA 2.5 TABLET: 25; 100 TABLET ORAL at 15:43

## 2020-01-01 RX ADMIN — NYSTATIN 500000 UNITS: 100000 SUSPENSION ORAL at 16:55

## 2020-01-01 RX ADMIN — NYSTATIN 500000 UNITS: 100000 SUSPENSION ORAL at 17:02

## 2020-01-01 RX ADMIN — Medication 10 ML: at 08:25

## 2020-01-01 RX ADMIN — IPRATROPIUM BROMIDE AND ALBUTEROL SULFATE 1 AMPULE: 2.5; .5 SOLUTION RESPIRATORY (INHALATION) at 20:20

## 2020-01-01 RX ADMIN — CETIRIZINE HYDROCHLORIDE 5 MG: 10 TABLET, FILM COATED ORAL at 08:29

## 2020-01-01 RX ADMIN — LEVETIRACETAM 1000 MG: 10 INJECTION, SOLUTION INTRAVENOUS at 15:36

## 2020-01-01 RX ADMIN — CETIRIZINE HYDROCHLORIDE 5 MG: 10 TABLET, FILM COATED ORAL at 08:42

## 2020-01-01 RX ADMIN — SODIUM CHLORIDE SOLN NEBU 3% 2 ML: 3 NEBU SOLN at 16:59

## 2020-01-01 RX ADMIN — PANTOPRAZOLE SODIUM 40 MG: 40 INJECTION, POWDER, FOR SOLUTION INTRAVENOUS at 09:08

## 2020-01-01 RX ADMIN — CARBIDOPA AND LEVODOPA 2.5 TABLET: 25; 100 TABLET ORAL at 20:24

## 2020-01-01 RX ADMIN — CARBIDOPA AND LEVODOPA 2.5 TABLET: 25; 100 TABLET ORAL at 08:08

## 2020-01-01 RX ADMIN — CARBIDOPA AND LEVODOPA 2.5 TABLET: 25; 100 TABLET ORAL at 21:00

## 2020-01-01 RX ADMIN — CARBIDOPA AND LEVODOPA 2.5 TABLET: 25; 100 TABLET ORAL at 11:13

## 2020-01-01 RX ADMIN — CARBIDOPA AND LEVODOPA 2 TABLET: 25; 100 TABLET ORAL at 23:51

## 2020-01-01 RX ADMIN — SODIUM CHLORIDE SOLN NEBU 3% 2 ML: 3 NEBU SOLN at 03:29

## 2020-01-01 RX ADMIN — SODIUM CHLORIDE SOLN NEBU 3% 2 ML: 3 NEBU SOLN at 19:52

## 2020-01-01 RX ADMIN — GUAIFENESIN 400 MG: 400 TABLET, FILM COATED ORAL at 08:48

## 2020-01-01 RX ADMIN — SODIUM CHLORIDE SOLN NEBU 3% 2 ML: 3 NEBU SOLN at 09:20

## 2020-01-01 RX ADMIN — IPRATROPIUM BROMIDE 0.5 MG: 0.5 SOLUTION RESPIRATORY (INHALATION) at 20:07

## 2020-01-01 RX ADMIN — PIPERACILLIN AND TAZOBACTAM 3.38 G: 3; .375 INJECTION, POWDER, LYOPHILIZED, FOR SOLUTION INTRAVENOUS at 00:59

## 2020-01-01 RX ADMIN — LEVETIRACETAM 1000 MG: 10 INJECTION, SOLUTION INTRAVENOUS at 06:24

## 2020-01-01 RX ADMIN — SODIUM CHLORIDE SOLN NEBU 3% 2 ML: 3 NEBU SOLN at 03:43

## 2020-01-01 RX ADMIN — SODIUM CHLORIDE SOLN NEBU 3% 2 ML: 3 NEBU SOLN at 03:16

## 2020-01-01 RX ADMIN — IPRATROPIUM BROMIDE AND ALBUTEROL SULFATE 1 AMPULE: 2.5; .5 SOLUTION RESPIRATORY (INHALATION) at 21:27

## 2020-01-01 RX ADMIN — ALBUTEROL SULFATE 2.5 MG: 2.5 SOLUTION RESPIRATORY (INHALATION) at 16:33

## 2020-01-01 RX ADMIN — IPRATROPIUM BROMIDE AND ALBUTEROL SULFATE 1 AMPULE: 2.5; .5 SOLUTION RESPIRATORY (INHALATION) at 16:14

## 2020-01-01 RX ADMIN — PANTOPRAZOLE SODIUM 40 MG: 40 INJECTION, POWDER, FOR SOLUTION INTRAVENOUS at 09:48

## 2020-01-01 RX ADMIN — POTASSIUM BICARBONATE 40 MEQ: 782 TABLET, EFFERVESCENT ORAL at 08:38

## 2020-01-01 RX ADMIN — SODIUM CHLORIDE SOLN NEBU 3% 2 ML: 3 NEBU SOLN at 12:30

## 2020-01-01 RX ADMIN — FLUCONAZOLE 800 MG: 400 INJECTION, SOLUTION INTRAVENOUS at 10:50

## 2020-01-01 RX ADMIN — GUAIFENESIN 400 MG: 400 TABLET, FILM COATED ORAL at 20:02

## 2020-01-01 RX ADMIN — LEVETIRACETAM 1000 MG: 10 INJECTION, SOLUTION INTRAVENOUS at 19:12

## 2020-01-01 RX ADMIN — Medication 10 ML: at 21:17

## 2020-01-01 RX ADMIN — DORNASE ALFA 2.5 MG: 1 SOLUTION RESPIRATORY (INHALATION) at 08:52

## 2020-01-01 RX ADMIN — Medication 10 ML: at 08:50

## 2020-01-01 RX ADMIN — LABETALOL HYDROCHLORIDE 10 MG: 5 INJECTION INTRAVENOUS at 04:29

## 2020-01-01 RX ADMIN — IPRATROPIUM BROMIDE AND ALBUTEROL SULFATE 1 AMPULE: 2.5; .5 SOLUTION RESPIRATORY (INHALATION) at 16:20

## 2020-01-01 RX ADMIN — IPRATROPIUM BROMIDE AND ALBUTEROL SULFATE 1 AMPULE: 2.5; .5 SOLUTION RESPIRATORY (INHALATION) at 21:10

## 2020-01-01 RX ADMIN — SODIUM CHLORIDE SOLN NEBU 3% 2 ML: 3 NEBU SOLN at 12:29

## 2020-01-01 RX ADMIN — CARBIDOPA AND LEVODOPA 2.5 TABLET: 25; 100 TABLET ORAL at 15:00

## 2020-01-01 RX ADMIN — TRAZODONE HYDROCHLORIDE 50 MG: 50 TABLET ORAL at 20:23

## 2020-01-01 RX ADMIN — ACETAMINOPHEN ORAL SOLUTION 650 MG: 650 SOLUTION ORAL at 21:53

## 2020-01-01 RX ADMIN — Medication 10 ML: at 09:36

## 2020-01-01 RX ADMIN — SODIUM CHLORIDE SOLN NEBU 3% 2 ML: 3 NEBU SOLN at 16:49

## 2020-01-01 RX ADMIN — ENOXAPARIN SODIUM 40 MG: 40 INJECTION SUBCUTANEOUS at 08:55

## 2020-01-01 RX ADMIN — SODIUM CHLORIDE SOLN NEBU 3% 2 ML: 3 NEBU SOLN at 16:21

## 2020-01-01 RX ADMIN — CARBIDOPA AND LEVODOPA 2.5 TABLET: 25; 100 TABLET ORAL at 14:09

## 2020-01-01 RX ADMIN — LABETALOL HYDROCHLORIDE 10 MG: 5 INJECTION INTRAVENOUS at 08:33

## 2020-01-01 RX ADMIN — CARBIDOPA AND LEVODOPA 2.5 TABLET: 25; 100 TABLET ORAL at 13:56

## 2020-01-01 RX ADMIN — CARBIDOPA AND LEVODOPA 2.5 TABLET: 25; 100 TABLET ORAL at 16:30

## 2020-01-01 RX ADMIN — CARBIDOPA AND LEVODOPA 2.5 TABLET: 25; 100 TABLET ORAL at 21:22

## 2020-01-01 RX ADMIN — NYSTATIN 500000 UNITS: 100000 SUSPENSION ORAL at 12:21

## 2020-01-01 RX ADMIN — IPRATROPIUM BROMIDE AND ALBUTEROL SULFATE 1 AMPULE: 2.5; .5 SOLUTION RESPIRATORY (INHALATION) at 11:59

## 2020-01-01 RX ADMIN — GABAPENTIN 100 MG: 100 CAPSULE ORAL at 18:10

## 2020-01-01 RX ADMIN — SODIUM CHLORIDE SOLN NEBU 3% 2 ML: 3 NEBU SOLN at 00:38

## 2020-01-01 RX ADMIN — PANTOPRAZOLE SODIUM 40 MG: 40 INJECTION, POWDER, FOR SOLUTION INTRAVENOUS at 08:42

## 2020-01-01 RX ADMIN — Medication 10 ML: at 21:07

## 2020-01-01 RX ADMIN — SODIUM CHLORIDE SOLN NEBU 3% 2 ML: 3 NEBU SOLN at 20:20

## 2020-01-01 RX ADMIN — SODIUM CHLORIDE SOLN NEBU 3% 2 ML: 3 NEBU SOLN at 03:53

## 2020-01-01 RX ADMIN — SODIUM CHLORIDE, POTASSIUM CHLORIDE, SODIUM LACTATE AND CALCIUM CHLORIDE: 600; 310; 30; 20 INJECTION, SOLUTION INTRAVENOUS at 13:01

## 2020-01-01 RX ADMIN — NYSTATIN 500000 UNITS: 100000 SUSPENSION ORAL at 18:15

## 2020-01-01 RX ADMIN — CARBIDOPA AND LEVODOPA 2.5 TABLET: 25; 100 TABLET ORAL at 08:55

## 2020-01-01 RX ADMIN — SODIUM CHLORIDE SOLN NEBU 3% 2 ML: 3 NEBU SOLN at 21:11

## 2020-01-01 RX ADMIN — IPRATROPIUM BROMIDE AND ALBUTEROL SULFATE 1 AMPULE: 2.5; .5 SOLUTION RESPIRATORY (INHALATION) at 08:30

## 2020-01-01 RX ADMIN — CHLORHEXIDINE GLUCONATE 0.12% ORAL RINSE 15 ML: 1.2 LIQUID ORAL at 09:29

## 2020-01-01 RX ADMIN — IPRATROPIUM BROMIDE AND ALBUTEROL SULFATE 1 AMPULE: 2.5; .5 SOLUTION RESPIRATORY (INHALATION) at 14:36

## 2020-01-01 RX ADMIN — CARBIDOPA AND LEVODOPA 2.5 TABLET: 25; 100 TABLET ORAL at 21:51

## 2020-01-01 RX ADMIN — IPRATROPIUM BROMIDE AND ALBUTEROL SULFATE 1 AMPULE: 2.5; .5 SOLUTION RESPIRATORY (INHALATION) at 13:25

## 2020-01-01 RX ADMIN — GUAIFENESIN 400 MG: 400 TABLET, FILM COATED ORAL at 08:42

## 2020-01-01 RX ADMIN — GUAIFENESIN 400 MG: 400 TABLET, FILM COATED ORAL at 09:08

## 2020-01-01 RX ADMIN — ALBUTEROL SULFATE 2.5 MG: 2.5 SOLUTION RESPIRATORY (INHALATION) at 12:30

## 2020-01-01 RX ADMIN — CARBIDOPA AND LEVODOPA 2.5 TABLET: 25; 100 TABLET ORAL at 11:06

## 2020-01-01 RX ADMIN — IPRATROPIUM BROMIDE 0.5 MG: 0.5 SOLUTION RESPIRATORY (INHALATION) at 12:36

## 2020-01-01 RX ADMIN — NYSTATIN 500000 UNITS: 100000 SUSPENSION ORAL at 09:35

## 2020-01-01 RX ADMIN — IPRATROPIUM BROMIDE AND ALBUTEROL SULFATE 1 AMPULE: 2.5; .5 SOLUTION RESPIRATORY (INHALATION) at 08:36

## 2020-01-01 RX ADMIN — NYSTATIN 500000 UNITS: 100000 SUSPENSION ORAL at 20:32

## 2020-01-01 RX ADMIN — Medication 10 ML: at 08:10

## 2020-01-01 RX ADMIN — SODIUM CHLORIDE SOLN NEBU 3% 2 ML: 3 NEBU SOLN at 16:58

## 2020-01-01 RX ADMIN — NYSTATIN 500000 UNITS: 100000 SUSPENSION ORAL at 08:47

## 2020-01-01 RX ADMIN — POTASSIUM PHOSPHATE, MONOBASIC AND POTASSIUM PHOSPHATE, DIBASIC 20 MMOL: 224; 236 INJECTION, SOLUTION, CONCENTRATE INTRAVENOUS at 06:20

## 2020-01-01 RX ADMIN — CETIRIZINE HYDROCHLORIDE 5 MG: 10 TABLET, FILM COATED ORAL at 08:38

## 2020-01-01 RX ADMIN — NYSTATIN 500000 UNITS: 100000 SUSPENSION ORAL at 12:03

## 2020-01-01 RX ADMIN — Medication 10 ML: at 20:06

## 2020-01-01 RX ADMIN — NYSTATIN 500000 UNITS: 100000 SUSPENSION ORAL at 07:58

## 2020-01-01 RX ADMIN — SODIUM CHLORIDE SOLN NEBU 3%: 3 NEBU SOLN at 08:43

## 2020-01-01 RX ADMIN — SODIUM CHLORIDE SOLN NEBU 3% 2 ML: 3 NEBU SOLN at 14:05

## 2020-01-01 RX ADMIN — IPRATROPIUM BROMIDE AND ALBUTEROL SULFATE 1 AMPULE: 2.5; .5 SOLUTION RESPIRATORY (INHALATION) at 14:32

## 2020-01-01 RX ADMIN — MAGNESIUM SULFATE HEPTAHYDRATE 2 G: 40 INJECTION, SOLUTION INTRAVENOUS at 08:19

## 2020-01-01 RX ADMIN — NYSTATIN 500000 UNITS: 100000 SUSPENSION ORAL at 17:13

## 2020-01-01 RX ADMIN — SODIUM CHLORIDE SOLN NEBU 3% 2 ML: 3 NEBU SOLN at 20:39

## 2020-01-01 RX ADMIN — LEVETIRACETAM 1000 MG: 10 INJECTION, SOLUTION INTRAVENOUS at 20:23

## 2020-01-01 RX ADMIN — TRAZODONE HYDROCHLORIDE 50 MG: 50 TABLET ORAL at 22:09

## 2020-01-01 RX ADMIN — SODIUM CHLORIDE SOLN NEBU 3% 2 ML: 3 NEBU SOLN at 23:52

## 2020-01-01 RX ADMIN — Medication 10 ML: at 08:46

## 2020-01-01 RX ADMIN — IPRATROPIUM BROMIDE AND ALBUTEROL SULFATE 1 AMPULE: 2.5; .5 SOLUTION RESPIRATORY (INHALATION) at 16:53

## 2020-01-01 RX ADMIN — DEXTROSE AND SODIUM CHLORIDE: 5; 900 INJECTION, SOLUTION INTRAVENOUS at 22:36

## 2020-01-01 RX ADMIN — CARBIDOPA AND LEVODOPA 2.5 TABLET: 25; 100 TABLET ORAL at 20:01

## 2020-01-01 RX ADMIN — PIPERACILLIN AND TAZOBACTAM 3.38 G: 3; .375 INJECTION, POWDER, LYOPHILIZED, FOR SOLUTION INTRAVENOUS at 18:45

## 2020-01-01 RX ADMIN — GUAIFENESIN 400 MG: 400 TABLET, FILM COATED ORAL at 21:17

## 2020-01-01 RX ADMIN — SODIUM CHLORIDE SOLN NEBU 3% 2 ML: 3 NEBU SOLN at 23:46

## 2020-01-01 RX ADMIN — SODIUM CHLORIDE SOLN NEBU 3% 2 ML: 3 NEBU SOLN at 21:27

## 2020-01-01 RX ADMIN — SODIUM CHLORIDE SOLN NEBU 3% 2 ML: 3 NEBU SOLN at 08:45

## 2020-01-01 RX ADMIN — Medication 10 ML: at 08:48

## 2020-01-01 RX ADMIN — IPRATROPIUM BROMIDE AND ALBUTEROL SULFATE 1 AMPULE: 2.5; .5 SOLUTION RESPIRATORY (INHALATION) at 09:44

## 2020-01-01 RX ADMIN — POTASSIUM BICARBONATE 40 MEQ: 782 TABLET, EFFERVESCENT ORAL at 08:36

## 2020-01-01 RX ADMIN — PIPERACILLIN AND TAZOBACTAM 3.38 G: 3; .375 INJECTION, POWDER, LYOPHILIZED, FOR SOLUTION INTRAVENOUS at 00:58

## 2020-01-01 RX ADMIN — PROPOFOL 20 MCG/KG/MIN: 10 INJECTION, EMULSION INTRAVENOUS at 15:17

## 2020-01-01 RX ADMIN — ENOXAPARIN SODIUM 40 MG: 40 INJECTION SUBCUTANEOUS at 10:26

## 2020-01-01 RX ADMIN — GUAIFENESIN 400 MG: 400 TABLET, FILM COATED ORAL at 23:30

## 2020-01-01 RX ADMIN — CARBIDOPA AND LEVODOPA 2.5 TABLET: 25; 100 TABLET ORAL at 19:46

## 2020-01-01 RX ADMIN — PIPERACILLIN AND TAZOBACTAM 3.38 G: 3; .375 INJECTION, POWDER, LYOPHILIZED, FOR SOLUTION INTRAVENOUS at 00:49

## 2020-01-01 RX ADMIN — SODIUM CHLORIDE SOLN NEBU 3% 2 ML: 3 NEBU SOLN at 20:34

## 2020-01-01 RX ADMIN — SODIUM CHLORIDE SOLN NEBU 3% 2 ML: 3 NEBU SOLN at 14:08

## 2020-01-01 RX ADMIN — SODIUM CHLORIDE SOLN NEBU 3% 2 ML: 3 NEBU SOLN at 15:43

## 2020-01-01 RX ADMIN — GUAIFENESIN 400 MG: 400 TABLET, FILM COATED ORAL at 08:31

## 2020-01-01 RX ADMIN — GUAIFENESIN 400 MG: 400 TABLET, FILM COATED ORAL at 08:55

## 2020-01-01 RX ADMIN — IPRATROPIUM BROMIDE AND ALBUTEROL SULFATE 1 AMPULE: 2.5; .5 SOLUTION RESPIRATORY (INHALATION) at 16:13

## 2020-01-01 RX ADMIN — ENOXAPARIN SODIUM 40 MG: 40 INJECTION SUBCUTANEOUS at 08:30

## 2020-01-01 RX ADMIN — PIPERACILLIN AND TAZOBACTAM 3.38 G: 3; .375 INJECTION, POWDER, LYOPHILIZED, FOR SOLUTION INTRAVENOUS at 04:30

## 2020-01-01 RX ADMIN — CARBIDOPA AND LEVODOPA 2.5 TABLET: 25; 100 TABLET ORAL at 20:16

## 2020-01-01 RX ADMIN — CARBIDOPA AND LEVODOPA 2.5 TABLET: 25; 100 TABLET ORAL at 07:51

## 2020-01-01 RX ADMIN — GUAIFENESIN 400 MG: 400 TABLET, FILM COATED ORAL at 14:02

## 2020-01-01 RX ADMIN — SODIUM CHLORIDE SOLN NEBU 3% 2 ML: 3 NEBU SOLN at 17:11

## 2020-01-01 RX ADMIN — CARBIDOPA AND LEVODOPA 2.5 TABLET: 25; 100 TABLET ORAL at 19:52

## 2020-01-01 RX ADMIN — DORNASE ALFA 2.5 MG: 1 SOLUTION RESPIRATORY (INHALATION) at 08:01

## 2020-01-01 RX ADMIN — PIPERACILLIN AND TAZOBACTAM 3.38 G: 3; .375 INJECTION, POWDER, LYOPHILIZED, FOR SOLUTION INTRAVENOUS at 12:03

## 2020-01-01 RX ADMIN — NYSTATIN 500000 UNITS: 100000 SUSPENSION ORAL at 16:37

## 2020-01-01 RX ADMIN — ENOXAPARIN SODIUM 40 MG: 40 INJECTION SUBCUTANEOUS at 08:20

## 2020-01-01 RX ADMIN — ACETAMINOPHEN ORAL SOLUTION 650 MG: 650 SOLUTION ORAL at 13:54

## 2020-01-01 RX ADMIN — DORNASE ALFA 2.5 MG: 1 SOLUTION RESPIRATORY (INHALATION) at 08:30

## 2020-01-01 RX ADMIN — ENOXAPARIN SODIUM 40 MG: 40 INJECTION SUBCUTANEOUS at 08:47

## 2020-01-01 RX ADMIN — ENOXAPARIN SODIUM 40 MG: 40 INJECTION SUBCUTANEOUS at 07:58

## 2020-01-01 RX ADMIN — NYSTATIN 500000 UNITS: 100000 SUSPENSION ORAL at 17:42

## 2020-01-01 RX ADMIN — LEVETIRACETAM 1000 MG: 10 INJECTION, SOLUTION INTRAVENOUS at 20:16

## 2020-01-01 RX ADMIN — SODIUM CHLORIDE SOLN NEBU 3% 2 ML: 3 NEBU SOLN at 21:20

## 2020-01-01 RX ADMIN — CARBIDOPA AND LEVODOPA 2.5 TABLET: 25; 100 TABLET ORAL at 11:41

## 2020-01-01 RX ADMIN — CHLORHEXIDINE GLUCONATE 0.12% ORAL RINSE 15 ML: 1.2 LIQUID ORAL at 08:21

## 2020-01-01 RX ADMIN — LEVETIRACETAM 1000 MG: 10 INJECTION, SOLUTION INTRAVENOUS at 09:45

## 2020-01-01 RX ADMIN — ENOXAPARIN SODIUM 40 MG: 40 INJECTION SUBCUTANEOUS at 08:35

## 2020-01-01 RX ADMIN — Medication 10 ML: at 21:54

## 2020-01-01 RX ADMIN — SODIUM CHLORIDE SOLN NEBU 3% 2 ML: 3 NEBU SOLN at 14:37

## 2020-01-01 RX ADMIN — SODIUM CHLORIDE SOLN NEBU 3% 2 ML: 3 NEBU SOLN at 10:04

## 2020-01-01 RX ADMIN — ONDANSETRON 4 MG: 2 INJECTION INTRAMUSCULAR; INTRAVENOUS at 18:50

## 2020-01-01 RX ADMIN — CARBIDOPA AND LEVODOPA 2 TABLET: 25; 100 TABLET ORAL at 22:32

## 2020-01-01 RX ADMIN — DEXTROSE MONOHYDRATE 1250 MG: 50 INJECTION, SOLUTION INTRAVENOUS at 17:11

## 2020-01-01 RX ADMIN — LIDOCAINE 1 EACH: 40 CREAM TOPICAL at 02:22

## 2020-01-01 RX ADMIN — MAGNESIUM SULFATE HEPTAHYDRATE 2 G: 40 INJECTION, SOLUTION INTRAVENOUS at 12:48

## 2020-01-01 RX ADMIN — CARBIDOPA AND LEVODOPA 2.5 TABLET: 25; 100 TABLET ORAL at 14:16

## 2020-01-01 RX ADMIN — DORNASE ALFA 2.5 MG: 1 SOLUTION RESPIRATORY (INHALATION) at 09:23

## 2020-01-01 RX ADMIN — CARBIDOPA AND LEVODOPA 2.5 TABLET: 25; 100 TABLET ORAL at 21:06

## 2020-01-01 RX ADMIN — ENOXAPARIN SODIUM 40 MG: 40 INJECTION SUBCUTANEOUS at 08:46

## 2020-01-01 RX ADMIN — IPRATROPIUM BROMIDE AND ALBUTEROL SULFATE 1 AMPULE: 2.5; .5 SOLUTION RESPIRATORY (INHALATION) at 10:09

## 2020-01-01 RX ADMIN — ENOXAPARIN SODIUM 40 MG: 40 INJECTION SUBCUTANEOUS at 08:31

## 2020-01-01 RX ADMIN — LEVETIRACETAM 1000 MG: 10 INJECTION, SOLUTION INTRAVENOUS at 09:34

## 2020-01-01 RX ADMIN — PIPERACILLIN AND TAZOBACTAM 3.38 G: 3; .375 INJECTION, POWDER, LYOPHILIZED, FOR SOLUTION INTRAVENOUS at 23:48

## 2020-01-01 RX ADMIN — ENOXAPARIN SODIUM 40 MG: 40 INJECTION SUBCUTANEOUS at 08:42

## 2020-01-01 RX ADMIN — IPRATROPIUM BROMIDE AND ALBUTEROL SULFATE 1 AMPULE: 2.5; .5 SOLUTION RESPIRATORY (INHALATION) at 14:08

## 2020-01-01 RX ADMIN — IPRATROPIUM BROMIDE AND ALBUTEROL SULFATE 1 AMPULE: 2.5; .5 SOLUTION RESPIRATORY (INHALATION) at 18:20

## 2020-01-01 RX ADMIN — NYSTATIN 500000 UNITS: 100000 SUSPENSION ORAL at 08:49

## 2020-01-01 RX ADMIN — SODIUM CHLORIDE SOLN NEBU 3% 2 ML: 3 NEBU SOLN at 04:08

## 2020-01-01 RX ADMIN — ACETAMINOPHEN ORAL SOLUTION 650 MG: 650 SOLUTION ORAL at 17:01

## 2020-01-01 RX ADMIN — POTASSIUM BICARBONATE 40 MEQ: 782 TABLET, EFFERVESCENT ORAL at 08:47

## 2020-01-01 RX ADMIN — CARBIDOPA AND LEVODOPA 2.5 TABLET: 25; 100 TABLET ORAL at 09:35

## 2020-01-01 RX ADMIN — IPRATROPIUM BROMIDE AND ALBUTEROL SULFATE 1 AMPULE: 2.5; .5 SOLUTION RESPIRATORY (INHALATION) at 21:41

## 2020-01-01 RX ADMIN — SODIUM CHLORIDE SOLN NEBU 3% 4 ML: 3 NEBU SOLN at 14:33

## 2020-01-01 RX ADMIN — SODIUM CHLORIDE SOLN NEBU 3% 2 ML: 3 NEBU SOLN at 01:17

## 2020-01-01 RX ADMIN — Medication 300 UNITS: at 08:32

## 2020-01-01 RX ADMIN — CARBIDOPA AND LEVODOPA 2.5 TABLET: 25; 100 TABLET ORAL at 17:03

## 2020-01-01 RX ADMIN — ACETAMINOPHEN ORAL SOLUTION 650 MG: 650 SOLUTION ORAL at 10:39

## 2020-01-01 RX ADMIN — FUROSEMIDE 40 MG: 10 INJECTION, SOLUTION INTRAMUSCULAR; INTRAVENOUS at 11:47

## 2020-01-01 RX ADMIN — PETROLATUM: 42 OINTMENT TOPICAL at 13:03

## 2020-01-01 RX ADMIN — NYSTATIN 500000 UNITS: 100000 SUSPENSION ORAL at 20:45

## 2020-01-01 RX ADMIN — CARBIDOPA AND LEVODOPA 2.5 TABLET: 25; 100 TABLET ORAL at 08:25

## 2020-01-01 RX ADMIN — ACETAMINOPHEN ORAL SOLUTION 650 MG: 650 SOLUTION ORAL at 06:48

## 2020-01-01 RX ADMIN — PANTOPRAZOLE SODIUM 40 MG: 40 INJECTION, POWDER, FOR SOLUTION INTRAVENOUS at 08:28

## 2020-01-01 RX ADMIN — PETROLATUM: 42 OINTMENT TOPICAL at 14:16

## 2020-01-01 RX ADMIN — GUAIFENESIN 400 MG: 400 TABLET, FILM COATED ORAL at 12:29

## 2020-01-01 RX ADMIN — POTASSIUM BICARBONATE 40 MEQ: 782 TABLET, EFFERVESCENT ORAL at 12:23

## 2020-01-01 RX ADMIN — Medication 10 ML: at 08:28

## 2020-01-01 RX ADMIN — POTASSIUM CHLORIDE 20 MEQ: 29.8 INJECTION, SOLUTION INTRAVENOUS at 06:58

## 2020-01-01 RX ADMIN — CARBIDOPA AND LEVODOPA 2.5 TABLET: 25; 100 TABLET ORAL at 08:57

## 2020-01-01 RX ADMIN — CARBIDOPA AND LEVODOPA 2 TABLET: 25; 100 TABLET ORAL at 23:16

## 2020-01-01 RX ADMIN — SODIUM CHLORIDE SOLN NEBU 3% 2 ML: 3 NEBU SOLN at 08:23

## 2020-01-01 RX ADMIN — SODIUM CHLORIDE SOLN NEBU 3% 2 ML: 3 NEBU SOLN at 00:20

## 2020-01-01 RX ADMIN — PETROLATUM: 42 OINTMENT TOPICAL at 20:20

## 2020-01-01 RX ADMIN — CARBIDOPA AND LEVODOPA 2.5 TABLET: 25; 100 TABLET ORAL at 12:21

## 2020-01-01 RX ADMIN — PETROLATUM: 42 OINTMENT TOPICAL at 14:29

## 2020-01-01 RX ADMIN — PIPERACILLIN AND TAZOBACTAM 3.38 G: 3; .375 INJECTION, POWDER, LYOPHILIZED, FOR SOLUTION INTRAVENOUS at 08:01

## 2020-01-01 RX ADMIN — SODIUM CHLORIDE SOLN NEBU 3% 2 ML: 3 NEBU SOLN at 01:13

## 2020-01-01 RX ADMIN — ACETAMINOPHEN ORAL SOLUTION 650 MG: 650 SOLUTION ORAL at 23:02

## 2020-01-01 RX ADMIN — GUAIFENESIN 400 MG: 400 TABLET, FILM COATED ORAL at 21:49

## 2020-01-01 RX ADMIN — BENZOCAINE: 200 SPRAY DENTAL; ORAL; PERIODONTAL at 20:27

## 2020-01-01 RX ADMIN — ENOXAPARIN SODIUM 40 MG: 40 INJECTION SUBCUTANEOUS at 08:23

## 2020-01-01 RX ADMIN — IPRATROPIUM BROMIDE AND ALBUTEROL SULFATE 1 AMPULE: 2.5; .5 SOLUTION RESPIRATORY (INHALATION) at 09:07

## 2020-01-01 RX ADMIN — Medication 10 ML: at 10:24

## 2020-01-01 RX ADMIN — PANTOPRAZOLE SODIUM 40 MG: 40 INJECTION, POWDER, FOR SOLUTION INTRAVENOUS at 08:57

## 2020-01-01 RX ADMIN — ENOXAPARIN SODIUM 40 MG: 40 INJECTION SUBCUTANEOUS at 08:43

## 2020-01-01 RX ADMIN — PIPERACILLIN AND TAZOBACTAM 3.38 G: 3; .375 INJECTION, POWDER, LYOPHILIZED, FOR SOLUTION INTRAVENOUS at 18:18

## 2020-01-01 RX ADMIN — DORNASE ALFA 2.5 MG: 1 SOLUTION RESPIRATORY (INHALATION) at 14:42

## 2020-01-01 RX ADMIN — SODIUM CHLORIDE SOLN NEBU 3% 2 ML: 3 NEBU SOLN at 03:17

## 2020-01-01 RX ADMIN — SODIUM CHLORIDE SOLN NEBU 3% 2 ML: 3 NEBU SOLN at 00:45

## 2020-01-01 RX ADMIN — SODIUM CHLORIDE SOLN NEBU 3% 2 ML: 3 NEBU SOLN at 00:22

## 2020-01-01 RX ADMIN — ACETAMINOPHEN ORAL SOLUTION 650 MG: 650 SOLUTION ORAL at 16:11

## 2020-01-01 RX ADMIN — NYSTATIN 500000 UNITS: 100000 SUSPENSION ORAL at 20:49

## 2020-01-01 RX ADMIN — SODIUM CHLORIDE SOLN NEBU 3% 2 ML: 3 NEBU SOLN at 17:47

## 2020-01-01 RX ADMIN — CARBIDOPA AND LEVODOPA 2 TABLET: 25; 100 TABLET ORAL at 23:56

## 2020-01-01 RX ADMIN — GUAIFENESIN 400 MG: 400 TABLET, FILM COATED ORAL at 20:58

## 2020-01-01 RX ADMIN — IPRATROPIUM BROMIDE AND ALBUTEROL SULFATE 1 AMPULE: 2.5; .5 SOLUTION RESPIRATORY (INHALATION) at 12:41

## 2020-01-01 RX ADMIN — NYSTATIN 500000 UNITS: 100000 SUSPENSION ORAL at 07:51

## 2020-01-01 RX ADMIN — Medication 10 ML: at 08:35

## 2020-01-01 RX ADMIN — PIPERACILLIN AND TAZOBACTAM 3.38 G: 3; .375 INJECTION, POWDER, LYOPHILIZED, FOR SOLUTION INTRAVENOUS at 18:40

## 2020-01-01 RX ADMIN — SODIUM CHLORIDE SOLN NEBU 3% 2 ML: 3 NEBU SOLN at 04:04

## 2020-01-01 RX ADMIN — FLUDROCORTISONE ACETATE 0.1 MG: 0.1 TABLET ORAL at 08:57

## 2020-01-01 RX ADMIN — DORNASE ALFA 2.5 MG: 1 SOLUTION RESPIRATORY (INHALATION) at 08:35

## 2020-01-01 RX ADMIN — MIDAZOLAM 2 MG: 1 INJECTION INTRAMUSCULAR; INTRAVENOUS at 15:42

## 2020-01-01 RX ADMIN — IPRATROPIUM BROMIDE AND ALBUTEROL SULFATE 1 AMPULE: 2.5; .5 SOLUTION RESPIRATORY (INHALATION) at 10:04

## 2020-01-01 RX ADMIN — ENOXAPARIN SODIUM 40 MG: 40 INJECTION SUBCUTANEOUS at 08:57

## 2020-01-01 RX ADMIN — NYSTATIN 500000 UNITS: 100000 SUSPENSION ORAL at 12:29

## 2020-01-01 RX ADMIN — LEVETIRACETAM 1000 MG: 10 INJECTION, SOLUTION INTRAVENOUS at 20:18

## 2020-01-01 RX ADMIN — PIPERACILLIN AND TAZOBACTAM 3.38 G: 3; .375 INJECTION, POWDER, LYOPHILIZED, FOR SOLUTION INTRAVENOUS at 11:18

## 2020-01-01 RX ADMIN — SODIUM CHLORIDE SOLN NEBU 3% 2 ML: 3 NEBU SOLN at 12:17

## 2020-01-01 RX ADMIN — PETROLATUM: 42 OINTMENT TOPICAL at 18:16

## 2020-01-01 RX ADMIN — Medication 10 ML: at 20:22

## 2020-01-01 RX ADMIN — DORNASE ALFA 2.5 MG: 1 SOLUTION RESPIRATORY (INHALATION) at 08:46

## 2020-01-01 RX ADMIN — BUMETANIDE 1 MG: 0.25 INJECTION INTRAMUSCULAR; INTRAVENOUS at 08:28

## 2020-01-01 RX ADMIN — GUAIFENESIN 400 MG: 400 TABLET, FILM COATED ORAL at 07:50

## 2020-01-01 RX ADMIN — POTASSIUM BICARBONATE 40 MEQ: 782 TABLET, EFFERVESCENT ORAL at 08:40

## 2020-01-01 RX ADMIN — Medication 10 ML: at 09:48

## 2020-01-01 RX ADMIN — Medication 10 ML: at 14:01

## 2020-01-01 RX ADMIN — IPRATROPIUM BROMIDE AND ALBUTEROL SULFATE 1 AMPULE: 2.5; .5 SOLUTION RESPIRATORY (INHALATION) at 18:00

## 2020-01-01 RX ADMIN — ALBUTEROL SULFATE 2.5 MG: 2.5 SOLUTION RESPIRATORY (INHALATION) at 16:00

## 2020-01-01 RX ADMIN — DORNASE ALFA 2.5 MG: 1 SOLUTION RESPIRATORY (INHALATION) at 09:50

## 2020-01-01 RX ADMIN — ACETAMINOPHEN ORAL SOLUTION 650 MG: 650 SOLUTION ORAL at 01:32

## 2020-01-01 RX ADMIN — GUAIFENESIN 400 MG: 400 TABLET, FILM COATED ORAL at 21:47

## 2020-01-01 RX ADMIN — CARBIDOPA AND LEVODOPA 2 TABLET: 25; 100 TABLET ORAL at 23:46

## 2020-01-01 RX ADMIN — SODIUM CHLORIDE SOLN NEBU 3% 4 ML: 3 NEBU SOLN at 18:56

## 2020-01-01 RX ADMIN — Medication 10 ML: at 20:33

## 2020-01-01 RX ADMIN — CARBIDOPA AND LEVODOPA 2.5 TABLET: 25; 100 TABLET ORAL at 14:41

## 2020-01-01 RX ADMIN — CETIRIZINE HYDROCHLORIDE 5 MG: 10 TABLET, FILM COATED ORAL at 09:35

## 2020-01-01 RX ADMIN — Medication 10 ML: at 08:21

## 2020-01-01 RX ADMIN — GUAIFENESIN 400 MG: 400 TABLET, FILM COATED ORAL at 20:06

## 2020-01-01 RX ADMIN — NYSTATIN 500000 UNITS: 100000 SUSPENSION ORAL at 08:41

## 2020-01-01 RX ADMIN — IPRATROPIUM BROMIDE AND ALBUTEROL SULFATE 1 AMPULE: 2.5; .5 SOLUTION RESPIRATORY (INHALATION) at 12:17

## 2020-01-01 RX ADMIN — ACETAMINOPHEN ORAL SOLUTION 650 MG: 650 SOLUTION ORAL at 17:53

## 2020-01-01 RX ADMIN — CHLORHEXIDINE GLUCONATE 0.12% ORAL RINSE 15 ML: 1.2 LIQUID ORAL at 15:15

## 2020-01-01 RX ADMIN — MIDAZOLAM 1 MG: 1 INJECTION INTRAMUSCULAR; INTRAVENOUS at 12:21

## 2020-01-01 RX ADMIN — Medication 10 ML: at 20:53

## 2020-01-01 RX ADMIN — DORNASE ALFA 2.5 MG: 1 SOLUTION RESPIRATORY (INHALATION) at 10:22

## 2020-01-01 RX ADMIN — Medication 10 ML: at 09:45

## 2020-01-01 RX ADMIN — GUAIFENESIN 400 MG: 400 TABLET, FILM COATED ORAL at 13:45

## 2020-01-01 RX ADMIN — ACETAMINOPHEN ORAL SOLUTION 650 MG: 650 SOLUTION ORAL at 08:21

## 2020-01-01 RX ADMIN — ENOXAPARIN SODIUM 40 MG: 40 INJECTION SUBCUTANEOUS at 08:07

## 2020-01-01 RX ADMIN — NYSTATIN 500000 UNITS: 100000 SUSPENSION ORAL at 12:39

## 2020-01-01 RX ADMIN — SODIUM CHLORIDE SOLN NEBU 3% 2 ML: 3 NEBU SOLN at 11:51

## 2020-01-01 RX ADMIN — Medication 10 ML: at 20:54

## 2020-01-01 RX ADMIN — CARBIDOPA AND LEVODOPA 2.5 TABLET: 25; 100 TABLET ORAL at 10:49

## 2020-01-01 RX ADMIN — ALBUTEROL SULFATE 2.5 MG: 2.5 SOLUTION RESPIRATORY (INHALATION) at 20:51

## 2020-01-01 RX ADMIN — ALBUTEROL SULFATE 2.5 MG: 2.5 SOLUTION RESPIRATORY (INHALATION) at 09:50

## 2020-01-01 RX ADMIN — CHLORHEXIDINE GLUCONATE 0.12% ORAL RINSE 15 ML: 1.2 LIQUID ORAL at 22:20

## 2020-01-01 RX ADMIN — CARBIDOPA AND LEVODOPA 2.5 TABLET: 25; 100 TABLET ORAL at 17:24

## 2020-01-01 RX ADMIN — SODIUM CHLORIDE SOLN NEBU 3% 2 ML: 3 NEBU SOLN at 13:33

## 2020-01-01 RX ADMIN — DORNASE ALFA 2.5 MG: 1 SOLUTION RESPIRATORY (INHALATION) at 09:44

## 2020-01-01 RX ADMIN — SODIUM CHLORIDE SOLN NEBU 3% 2 ML: 3 NEBU SOLN at 08:02

## 2020-01-01 RX ADMIN — Medication 10 ML: at 08:47

## 2020-01-01 RX ADMIN — IPRATROPIUM BROMIDE AND ALBUTEROL SULFATE 1 AMPULE: 2.5; .5 SOLUTION RESPIRATORY (INHALATION) at 20:01

## 2020-01-01 RX ADMIN — DORNASE ALFA 2.5 MG: 1 SOLUTION RESPIRATORY (INHALATION) at 11:11

## 2020-01-01 RX ADMIN — CARBIDOPA AND LEVODOPA 2.5 TABLET: 25; 100 TABLET ORAL at 20:18

## 2020-01-01 RX ADMIN — SODIUM CHLORIDE SOLN NEBU 3% 2 ML: 3 NEBU SOLN at 21:43

## 2020-01-01 RX ADMIN — GUAIFENESIN 400 MG: 400 TABLET, FILM COATED ORAL at 20:31

## 2020-01-01 RX ADMIN — Medication 10 ML: at 08:58

## 2020-01-01 RX ADMIN — NYSTATIN 500000 UNITS: 100000 SUSPENSION ORAL at 20:34

## 2020-01-01 RX ADMIN — CHLORHEXIDINE GLUCONATE 0.12% ORAL RINSE 15 ML: 1.2 LIQUID ORAL at 08:59

## 2020-01-01 RX ADMIN — PANTOPRAZOLE SODIUM 40 MG: 40 INJECTION, POWDER, FOR SOLUTION INTRAVENOUS at 09:45

## 2020-01-01 RX ADMIN — CARBIDOPA AND LEVODOPA 2.5 TABLET: 25; 100 TABLET ORAL at 21:48

## 2020-01-01 RX ADMIN — LEVETIRACETAM 1000 MG: 10 INJECTION, SOLUTION INTRAVENOUS at 08:45

## 2020-01-01 RX ADMIN — IPRATROPIUM BROMIDE AND ALBUTEROL SULFATE 1 AMPULE: 2.5; .5 SOLUTION RESPIRATORY (INHALATION) at 13:33

## 2020-01-01 RX ADMIN — LEVETIRACETAM 1000 MG: 10 INJECTION, SOLUTION INTRAVENOUS at 08:54

## 2020-01-01 RX ADMIN — CARBIDOPA AND LEVODOPA 2.5 TABLET: 25; 100 TABLET ORAL at 20:07

## 2020-01-01 RX ADMIN — PIPERACILLIN AND TAZOBACTAM 3.38 G: 3; .375 INJECTION, POWDER, LYOPHILIZED, FOR SOLUTION INTRAVENOUS at 16:23

## 2020-01-01 RX ADMIN — ENOXAPARIN SODIUM 40 MG: 40 INJECTION SUBCUTANEOUS at 14:55

## 2020-01-01 RX ADMIN — PETROLATUM: 42 OINTMENT TOPICAL at 16:11

## 2020-01-01 RX ADMIN — GUAIFENESIN 400 MG: 400 TABLET, FILM COATED ORAL at 20:49

## 2020-01-01 RX ADMIN — POTASSIUM CHLORIDE 20 MEQ: 29.8 INJECTION, SOLUTION INTRAVENOUS at 11:33

## 2020-01-01 RX ADMIN — SODIUM CHLORIDE SOLN NEBU 3% 2 ML: 3 NEBU SOLN at 09:07

## 2020-01-01 RX ADMIN — NYSTATIN 500000 UNITS: 100000 SUSPENSION ORAL at 20:31

## 2020-01-01 RX ADMIN — IPRATROPIUM BROMIDE AND ALBUTEROL SULFATE 1 AMPULE: 2.5; .5 SOLUTION RESPIRATORY (INHALATION) at 20:02

## 2020-01-01 RX ADMIN — PETROLATUM: 42 OINTMENT TOPICAL at 12:22

## 2020-01-01 RX ADMIN — CARBIDOPA AND LEVODOPA 2 TABLET: 25; 100 TABLET ORAL at 22:45

## 2020-01-01 RX ADMIN — SODIUM CHLORIDE SOLN NEBU 3% 2 ML: 3 NEBU SOLN at 20:02

## 2020-01-01 RX ADMIN — SODIUM CHLORIDE SOLN NEBU 3% 2 ML: 3 NEBU SOLN at 14:42

## 2020-01-01 RX ADMIN — ACETAMINOPHEN ORAL SOLUTION 650 MG: 650 SOLUTION ORAL at 11:08

## 2020-01-01 RX ADMIN — IPRATROPIUM BROMIDE 0.5 MG: 0.5 SOLUTION RESPIRATORY (INHALATION) at 16:57

## 2020-01-01 RX ADMIN — IPRATROPIUM BROMIDE AND ALBUTEROL SULFATE 1 AMPULE: 2.5; .5 SOLUTION RESPIRATORY (INHALATION) at 21:42

## 2020-01-01 RX ADMIN — PANTOPRAZOLE SODIUM 40 MG: 40 INJECTION, POWDER, FOR SOLUTION INTRAVENOUS at 08:35

## 2020-01-01 RX ADMIN — CARBIDOPA AND LEVODOPA 2.5 TABLET: 25; 100 TABLET ORAL at 08:20

## 2020-01-01 RX ADMIN — CARBIDOPA AND LEVODOPA 2.5 TABLET: 25; 100 TABLET ORAL at 11:15

## 2020-01-01 RX ADMIN — NYSTATIN 500000 UNITS: 100000 SUSPENSION ORAL at 14:00

## 2020-01-01 RX ADMIN — PIPERACILLIN AND TAZOBACTAM 3.38 G: 3; .375 INJECTION, POWDER, LYOPHILIZED, FOR SOLUTION INTRAVENOUS at 03:35

## 2020-01-01 RX ADMIN — SODIUM CHLORIDE SOLN NEBU 3% 2 ML: 3 NEBU SOLN at 13:35

## 2020-01-01 RX ADMIN — CARBIDOPA AND LEVODOPA 2.5 TABLET: 25; 100 TABLET ORAL at 20:03

## 2020-01-01 RX ADMIN — GUAIFENESIN 400 MG: 400 TABLET, FILM COATED ORAL at 08:24

## 2020-01-01 RX ADMIN — ALBUMIN (HUMAN) 25 G: 0.25 INJECTION, SOLUTION INTRAVENOUS at 11:08

## 2020-01-01 RX ADMIN — SODIUM CHLORIDE SOLN NEBU 3% 2 ML: 3 NEBU SOLN at 09:04

## 2020-01-01 RX ADMIN — ENOXAPARIN SODIUM 40 MG: 40 INJECTION SUBCUTANEOUS at 08:00

## 2020-01-01 RX ADMIN — LEVETIRACETAM 1000 MG: 10 INJECTION, SOLUTION INTRAVENOUS at 21:16

## 2020-01-01 RX ADMIN — PETROLATUM: 42 OINTMENT TOPICAL at 16:55

## 2020-01-01 RX ADMIN — ENOXAPARIN SODIUM 40 MG: 40 INJECTION SUBCUTANEOUS at 07:47

## 2020-01-01 RX ADMIN — NYSTATIN 500000 UNITS: 100000 SUSPENSION ORAL at 18:00

## 2020-01-01 RX ADMIN — SODIUM CHLORIDE SOLN NEBU 3% 2 ML: 3 NEBU SOLN at 12:45

## 2020-01-01 RX ADMIN — Medication: at 17:01

## 2020-01-01 RX ADMIN — CARBIDOPA AND LEVODOPA 2.5 TABLET: 25; 100 TABLET ORAL at 10:58

## 2020-01-01 RX ADMIN — SODIUM CHLORIDE SOLN NEBU 3% 2 ML: 3 NEBU SOLN at 23:55

## 2020-01-01 RX ADMIN — GABAPENTIN 300 MG: 300 CAPSULE ORAL at 21:06

## 2020-01-01 RX ADMIN — GUAIFENESIN 400 MG: 400 TABLET, FILM COATED ORAL at 07:47

## 2020-01-01 RX ADMIN — Medication 10 ML: at 08:40

## 2020-01-01 RX ADMIN — CETIRIZINE HYDROCHLORIDE 5 MG: 10 TABLET, FILM COATED ORAL at 08:31

## 2020-01-01 RX ADMIN — IPRATROPIUM BROMIDE AND ALBUTEROL SULFATE 1 AMPULE: 2.5; .5 SOLUTION RESPIRATORY (INHALATION) at 20:28

## 2020-01-01 RX ADMIN — DORNASE ALFA 2.5 MG: 1 SOLUTION RESPIRATORY (INHALATION) at 08:45

## 2020-01-01 RX ADMIN — PETROLATUM: 42 OINTMENT TOPICAL at 09:48

## 2020-01-01 RX ADMIN — FLUCONAZOLE 400 MG: 400 INJECTION, SOLUTION INTRAVENOUS at 09:55

## 2020-01-01 RX ADMIN — CARBIDOPA AND LEVODOPA 2.5 TABLET: 25; 100 TABLET ORAL at 20:50

## 2020-01-01 RX ADMIN — IPRATROPIUM BROMIDE AND ALBUTEROL SULFATE 1 AMPULE: 2.5; .5 SOLUTION RESPIRATORY (INHALATION) at 21:20

## 2020-01-01 RX ADMIN — LEVETIRACETAM 1000 MG: 10 INJECTION, SOLUTION INTRAVENOUS at 21:42

## 2020-01-01 RX ADMIN — ACETAMINOPHEN ORAL SOLUTION 650 MG: 650 SOLUTION ORAL at 18:26

## 2020-01-01 RX ADMIN — IPRATROPIUM BROMIDE AND ALBUTEROL SULFATE 1 AMPULE: 2.5; .5 SOLUTION RESPIRATORY (INHALATION) at 17:11

## 2020-01-01 RX ADMIN — NYSTATIN 500000 UNITS: 100000 SUSPENSION ORAL at 13:00

## 2020-01-01 RX ADMIN — GUAIFENESIN 400 MG: 400 TABLET, FILM COATED ORAL at 14:36

## 2020-01-01 RX ADMIN — NYSTATIN 500000 UNITS: 100000 SUSPENSION ORAL at 18:20

## 2020-01-01 RX ADMIN — SODIUM CHLORIDE SOLN NEBU 3% 2 ML: 3 NEBU SOLN at 08:52

## 2020-01-01 RX ADMIN — CARBIDOPA AND LEVODOPA 2.5 TABLET: 25; 100 TABLET ORAL at 10:39

## 2020-01-01 RX ADMIN — CARBIDOPA AND LEVODOPA 2.5 TABLET: 25; 100 TABLET ORAL at 20:21

## 2020-01-01 RX ADMIN — CARBIDOPA AND LEVODOPA 2.5 TABLET: 25; 100 TABLET ORAL at 16:43

## 2020-01-01 RX ADMIN — IOPAMIDOL 110 ML: 755 INJECTION, SOLUTION INTRAVENOUS at 14:01

## 2020-01-01 RX ADMIN — SODIUM CHLORIDE SOLN NEBU 3% 2 ML: 3 NEBU SOLN at 17:26

## 2020-01-01 RX ADMIN — CETIRIZINE HYDROCHLORIDE 5 MG: 10 TABLET, FILM COATED ORAL at 08:56

## 2020-01-01 RX ADMIN — BUMETANIDE 1 MG: 0.25 INJECTION INTRAMUSCULAR; INTRAVENOUS at 20:23

## 2020-01-01 RX ADMIN — TRAMADOL HYDROCHLORIDE 50 MG: 50 TABLET, FILM COATED ORAL at 17:40

## 2020-01-01 RX ADMIN — DEXTROSE MONOHYDRATE: 50 INJECTION, SOLUTION INTRAVENOUS at 11:33

## 2020-01-01 RX ADMIN — CHLORHEXIDINE GLUCONATE 0.12% ORAL RINSE 15 ML: 1.2 LIQUID ORAL at 08:50

## 2020-01-01 RX ADMIN — NYSTATIN 500000 UNITS: 100000 SUSPENSION ORAL at 08:36

## 2020-01-01 RX ADMIN — PANTOPRAZOLE SODIUM 40 MG: 40 INJECTION, POWDER, FOR SOLUTION INTRAVENOUS at 08:08

## 2020-01-01 RX ADMIN — LEVETIRACETAM 1000 MG: 10 INJECTION, SOLUTION INTRAVENOUS at 19:42

## 2020-01-01 RX ADMIN — CETIRIZINE HYDROCHLORIDE 5 MG: 10 TABLET, FILM COATED ORAL at 11:17

## 2020-01-01 RX ADMIN — NYSTATIN 500000 UNITS: 100000 SUSPENSION ORAL at 21:49

## 2020-01-01 RX ADMIN — NYSTATIN 500000 UNITS: 100000 SUSPENSION ORAL at 19:47

## 2020-01-01 RX ADMIN — POTASSIUM BICARBONATE 40 MEQ: 782 TABLET, EFFERVESCENT ORAL at 08:42

## 2020-01-01 RX ADMIN — SODIUM CHLORIDE SOLN NEBU 3% 2 ML: 3 NEBU SOLN at 16:08

## 2020-01-01 RX ADMIN — SODIUM CHLORIDE SOLN NEBU 3% 2 ML: 3 NEBU SOLN at 18:50

## 2020-01-01 RX ADMIN — LEVETIRACETAM 1000 MG: 10 INJECTION, SOLUTION INTRAVENOUS at 19:06

## 2020-01-01 RX ADMIN — CARBIDOPA AND LEVODOPA 2.5 TABLET: 25; 100 TABLET ORAL at 08:44

## 2020-01-01 RX ADMIN — SODIUM CHLORIDE, PRESERVATIVE FREE 10 ML: 5 INJECTION INTRAVENOUS at 16:23

## 2020-01-01 RX ADMIN — NYSTATIN 500000 UNITS: 100000 SUSPENSION ORAL at 18:10

## 2020-01-01 RX ADMIN — IPRATROPIUM BROMIDE AND ALBUTEROL SULFATE 1 AMPULE: 2.5; .5 SOLUTION RESPIRATORY (INHALATION) at 11:51

## 2020-01-01 RX ADMIN — CARBIDOPA AND LEVODOPA 2.5 TABLET: 25; 100 TABLET ORAL at 10:22

## 2020-01-01 RX ADMIN — GUAIFENESIN 400 MG: 400 TABLET, FILM COATED ORAL at 09:35

## 2020-01-01 RX ADMIN — IPRATROPIUM BROMIDE AND ALBUTEROL SULFATE 1 AMPULE: 2.5; .5 SOLUTION RESPIRATORY (INHALATION) at 20:33

## 2020-01-01 RX ADMIN — POTASSIUM BICARBONATE 40 MEQ: 782 TABLET, EFFERVESCENT ORAL at 08:45

## 2020-01-01 RX ADMIN — IPRATROPIUM BROMIDE 0.5 MG: 0.5 SOLUTION RESPIRATORY (INHALATION) at 08:50

## 2020-01-01 RX ADMIN — IPRATROPIUM BROMIDE AND ALBUTEROL SULFATE 1 AMPULE: 2.5; .5 SOLUTION RESPIRATORY (INHALATION) at 21:36

## 2020-01-01 RX ADMIN — SODIUM CHLORIDE SOLN NEBU 3%: 3 NEBU SOLN at 10:23

## 2020-01-01 RX ADMIN — IPRATROPIUM BROMIDE AND ALBUTEROL SULFATE 1 AMPULE: 2.5; .5 SOLUTION RESPIRATORY (INHALATION) at 21:17

## 2020-01-01 RX ADMIN — FLUCONAZOLE 400 MG: 400 INJECTION, SOLUTION INTRAVENOUS at 09:30

## 2020-01-01 RX ADMIN — SODIUM CHLORIDE SOLN NEBU 3% 2 ML: 3 NEBU SOLN at 23:43

## 2020-01-01 RX ADMIN — CARBIDOPA AND LEVODOPA 2 TABLET: 25; 100 TABLET ORAL at 23:20

## 2020-01-01 RX ADMIN — GUAIFENESIN 400 MG: 400 TABLET, FILM COATED ORAL at 14:30

## 2020-01-01 RX ADMIN — PIPERACILLIN AND TAZOBACTAM 3.38 G: 3; .375 INJECTION, POWDER, LYOPHILIZED, FOR SOLUTION INTRAVENOUS at 03:36

## 2020-01-01 RX ADMIN — MELATONIN 3 MG ORAL TABLET 6 MG: 3 TABLET ORAL at 22:08

## 2020-01-01 RX ADMIN — CETIRIZINE HYDROCHLORIDE 5 MG: 10 TABLET, FILM COATED ORAL at 08:34

## 2020-01-01 RX ADMIN — PIPERACILLIN AND TAZOBACTAM 3.38 G: 3; .375 INJECTION, POWDER, LYOPHILIZED, FOR SOLUTION INTRAVENOUS at 08:28

## 2020-01-01 RX ADMIN — SODIUM CHLORIDE SOLN NEBU 3% 4 ML: 3 NEBU SOLN at 11:57

## 2020-01-01 RX ADMIN — SODIUM CHLORIDE SOLN NEBU 3% 4 ML: 3 NEBU SOLN at 11:10

## 2020-01-01 RX ADMIN — CARBIDOPA AND LEVODOPA 2 TABLET: 25; 100 TABLET ORAL at 22:00

## 2020-01-01 RX ADMIN — POTASSIUM CHLORIDE 20 MEQ: 29.8 INJECTION, SOLUTION INTRAVENOUS at 08:20

## 2020-01-01 RX ADMIN — IPRATROPIUM BROMIDE AND ALBUTEROL SULFATE 1 AMPULE: 2.5; .5 SOLUTION RESPIRATORY (INHALATION) at 14:42

## 2020-01-01 RX ADMIN — POTASSIUM PHOSPHATE, MONOBASIC AND POTASSIUM PHOSPHATE, DIBASIC 20 MMOL: 224; 236 INJECTION, SOLUTION, CONCENTRATE INTRAVENOUS at 08:24

## 2020-01-01 RX ADMIN — CARBIDOPA AND LEVODOPA 2.5 TABLET: 25; 100 TABLET ORAL at 15:03

## 2020-01-01 RX ADMIN — Medication 10 ML: at 08:51

## 2020-01-01 RX ADMIN — CARBIDOPA AND LEVODOPA 2.5 TABLET: 25; 100 TABLET ORAL at 20:32

## 2020-01-01 RX ADMIN — METHYLPREDNISOLONE SODIUM SUCCINATE 60 MG: 125 INJECTION, POWDER, FOR SOLUTION INTRAMUSCULAR; INTRAVENOUS at 01:51

## 2020-01-01 RX ADMIN — GUAIFENESIN 400 MG: 400 TABLET, FILM COATED ORAL at 13:01

## 2020-01-01 RX ADMIN — SODIUM CHLORIDE SOLN NEBU 3% 2 ML: 3 NEBU SOLN at 23:31

## 2020-01-01 RX ADMIN — IPRATROPIUM BROMIDE AND ALBUTEROL SULFATE 1 AMPULE: 2.5; .5 SOLUTION RESPIRATORY (INHALATION) at 18:56

## 2020-01-01 RX ADMIN — Medication 10 ML: at 20:25

## 2020-01-01 RX ADMIN — NYSTATIN 500000 UNITS: 100000 SUSPENSION ORAL at 17:18

## 2020-01-01 RX ADMIN — Medication 10 ML: at 08:55

## 2020-01-01 RX ADMIN — Medication 10 ML: at 07:51

## 2020-01-01 RX ADMIN — Medication 10 ML: at 20:09

## 2020-01-01 RX ADMIN — GUAIFENESIN 400 MG: 400 TABLET, FILM COATED ORAL at 08:29

## 2020-01-01 RX ADMIN — IPRATROPIUM BROMIDE AND ALBUTEROL SULFATE 1 AMPULE: 2.5; .5 SOLUTION RESPIRATORY (INHALATION) at 14:00

## 2020-01-01 RX ADMIN — CARBIDOPA AND LEVODOPA 2.5 TABLET: 25; 100 TABLET ORAL at 11:59

## 2020-01-01 RX ADMIN — GUAIFENESIN 400 MG: 400 TABLET, FILM COATED ORAL at 09:30

## 2020-01-01 RX ADMIN — IPRATROPIUM BROMIDE AND ALBUTEROL SULFATE 1 AMPULE: 2.5; .5 SOLUTION RESPIRATORY (INHALATION) at 17:01

## 2020-01-01 RX ADMIN — SODIUM CHLORIDE SOLN NEBU 3% 2 ML: 3 NEBU SOLN at 12:00

## 2020-01-01 RX ADMIN — IPRATROPIUM BROMIDE AND ALBUTEROL SULFATE 1 AMPULE: 2.5; .5 SOLUTION RESPIRATORY (INHALATION) at 14:05

## 2020-01-01 RX ADMIN — PETROLATUM: 42 OINTMENT TOPICAL at 20:32

## 2020-01-01 RX ADMIN — CHLORHEXIDINE GLUCONATE 0.12% ORAL RINSE 15 ML: 1.2 LIQUID ORAL at 08:20

## 2020-01-01 RX ADMIN — POTASSIUM CHLORIDE 40 MEQ: 29.8 INJECTION, SOLUTION INTRAVENOUS at 06:56

## 2020-01-01 RX ADMIN — ACETAMINOPHEN ORAL SOLUTION 650 MG: 650 SOLUTION ORAL at 08:25

## 2020-01-01 RX ADMIN — LEVETIRACETAM 1000 MG: 10 INJECTION, SOLUTION INTRAVENOUS at 21:17

## 2020-01-01 RX ADMIN — NYSTATIN 500000 UNITS: 100000 SUSPENSION ORAL at 13:45

## 2020-01-01 RX ADMIN — CARBIDOPA AND LEVODOPA 2.5 TABLET: 25; 100 TABLET ORAL at 15:24

## 2020-01-01 RX ADMIN — NYSTATIN 500000 UNITS: 100000 SUSPENSION ORAL at 20:58

## 2020-01-01 RX ADMIN — IPRATROPIUM BROMIDE AND ALBUTEROL SULFATE 1 AMPULE: 2.5; .5 SOLUTION RESPIRATORY (INHALATION) at 16:18

## 2020-01-01 RX ADMIN — Medication 10 ML: at 08:22

## 2020-01-01 RX ADMIN — SODIUM CHLORIDE SOLN NEBU 3% 2 ML: 3 NEBU SOLN at 10:09

## 2020-01-01 RX ADMIN — DORNASE ALFA 2.5 MG: 1 SOLUTION RESPIRATORY (INHALATION) at 08:44

## 2020-01-01 RX ADMIN — IPRATROPIUM BROMIDE 0.5 MG: 0.5 SOLUTION RESPIRATORY (INHALATION) at 20:33

## 2020-01-01 RX ADMIN — ALBUTEROL SULFATE 2.5 MG: 2.5 SOLUTION RESPIRATORY (INHALATION) at 11:42

## 2020-01-01 RX ADMIN — LEVETIRACETAM 1000 MG: 10 INJECTION, SOLUTION INTRAVENOUS at 21:48

## 2020-01-01 RX ADMIN — LEVETIRACETAM 1000 MG: 10 INJECTION, SOLUTION INTRAVENOUS at 20:30

## 2020-01-01 RX ADMIN — GUAIFENESIN 400 MG: 400 TABLET, FILM COATED ORAL at 20:32

## 2020-01-01 RX ADMIN — BENZOCAINE: 200 SPRAY DENTAL; ORAL; PERIODONTAL at 20:21

## 2020-01-01 RX ADMIN — CETIRIZINE HYDROCHLORIDE 5 MG: 10 TABLET, FILM COATED ORAL at 07:47

## 2020-01-01 RX ADMIN — PIPERACILLIN AND TAZOBACTAM 3.38 G: 3; .375 INJECTION, POWDER, LYOPHILIZED, FOR SOLUTION INTRAVENOUS at 16:18

## 2020-01-01 RX ADMIN — CARBIDOPA AND LEVODOPA 2.5 TABLET: 25; 100 TABLET ORAL at 13:35

## 2020-01-01 RX ADMIN — ENOXAPARIN SODIUM 40 MG: 40 INJECTION SUBCUTANEOUS at 09:34

## 2020-01-01 RX ADMIN — NYSTATIN 500000 UNITS: 100000 SUSPENSION ORAL at 20:18

## 2020-01-01 RX ADMIN — POTASSIUM BICARBONATE 40 MEQ: 782 TABLET, EFFERVESCENT ORAL at 08:55

## 2020-01-01 RX ADMIN — CETIRIZINE HYDROCHLORIDE 5 MG: 10 TABLET, FILM COATED ORAL at 08:40

## 2020-01-01 RX ADMIN — METOCLOPRAMIDE HYDROCHLORIDE 5 MG: 5 SOLUTION ORAL at 17:25

## 2020-01-01 RX ADMIN — POTASSIUM CHLORIDE 20 MEQ: 29.8 INJECTION, SOLUTION INTRAVENOUS at 12:00

## 2020-01-01 RX ADMIN — PANTOPRAZOLE SODIUM 40 MG: 40 INJECTION, POWDER, FOR SOLUTION INTRAVENOUS at 07:51

## 2020-01-01 RX ADMIN — CARBIDOPA AND LEVODOPA 2.5 TABLET: 25; 100 TABLET ORAL at 08:46

## 2020-01-01 RX ADMIN — ALBUTEROL SULFATE 2.5 MG: 2.5 SOLUTION RESPIRATORY (INHALATION) at 08:35

## 2020-01-01 RX ADMIN — Medication 10 ML: at 08:39

## 2020-01-01 RX ADMIN — IPRATROPIUM BROMIDE AND ALBUTEROL SULFATE 1 AMPULE: 2.5; .5 SOLUTION RESPIRATORY (INHALATION) at 08:01

## 2020-01-01 RX ADMIN — LEVETIRACETAM 1000 MG: 10 INJECTION, SOLUTION INTRAVENOUS at 08:34

## 2020-01-01 RX ADMIN — NYSTATIN 500000 UNITS: 100000 SUSPENSION ORAL at 08:21

## 2020-01-01 RX ADMIN — IPRATROPIUM BROMIDE AND ALBUTEROL SULFATE 1 AMPULE: 2.5; .5 SOLUTION RESPIRATORY (INHALATION) at 18:50

## 2020-01-01 RX ADMIN — POTASSIUM BICARBONATE 40 MEQ: 782 TABLET, EFFERVESCENT ORAL at 14:55

## 2020-01-01 RX ADMIN — Medication 10 ML: at 08:37

## 2020-01-01 RX ADMIN — CARBIDOPA AND LEVODOPA 2.5 TABLET: 25; 100 TABLET ORAL at 12:29

## 2020-01-01 RX ADMIN — GUAIFENESIN 400 MG: 400 TABLET, FILM COATED ORAL at 20:20

## 2020-01-01 RX ADMIN — DORNASE ALFA 2.5 MG: 1 SOLUTION RESPIRATORY (INHALATION) at 10:11

## 2020-01-01 RX ADMIN — IPRATROPIUM BROMIDE AND ALBUTEROL SULFATE 1 AMPULE: 2.5; .5 SOLUTION RESPIRATORY (INHALATION) at 10:24

## 2020-01-01 RX ADMIN — Medication 10 ML: at 08:36

## 2020-01-01 RX ADMIN — LEVETIRACETAM 1000 MG: 10 INJECTION, SOLUTION INTRAVENOUS at 18:18

## 2020-01-01 RX ADMIN — PANTOPRAZOLE SODIUM 40 MG: 40 INJECTION, POWDER, FOR SOLUTION INTRAVENOUS at 08:50

## 2020-01-01 RX ADMIN — NYSTATIN 500000 UNITS: 100000 SUSPENSION ORAL at 15:15

## 2020-01-01 RX ADMIN — PANTOPRAZOLE SODIUM 40 MG: 40 INJECTION, POWDER, FOR SOLUTION INTRAVENOUS at 09:29

## 2020-01-01 RX ADMIN — NYSTATIN 500000 UNITS: 100000 SUSPENSION ORAL at 16:05

## 2020-01-01 RX ADMIN — DORNASE ALFA 2.5 MG: 1 SOLUTION RESPIRATORY (INHALATION) at 12:47

## 2020-01-01 RX ADMIN — ACETAMINOPHEN ORAL SOLUTION 650 MG: 650 SOLUTION ORAL at 19:46

## 2020-01-01 RX ADMIN — LEVETIRACETAM 1000 MG: 10 INJECTION, SOLUTION INTRAVENOUS at 20:06

## 2020-01-01 RX ADMIN — LABETALOL HYDROCHLORIDE 10 MG: 5 INJECTION INTRAVENOUS at 08:48

## 2020-01-01 RX ADMIN — PROPOFOL 20 MCG/KG/MIN: 10 INJECTION, EMULSION INTRAVENOUS at 21:24

## 2020-01-01 RX ADMIN — LEVETIRACETAM 1000 MG: 10 INJECTION, SOLUTION INTRAVENOUS at 20:27

## 2020-01-01 RX ADMIN — IPRATROPIUM BROMIDE AND ALBUTEROL SULFATE 1 AMPULE: 2.5; .5 SOLUTION RESPIRATORY (INHALATION) at 13:45

## 2020-01-01 RX ADMIN — SODIUM CHLORIDE SOLN NEBU 3% 2 ML: 3 NEBU SOLN at 08:31

## 2020-01-01 RX ADMIN — GUAIFENESIN 400 MG: 400 TABLET, FILM COATED ORAL at 15:12

## 2020-01-01 RX ADMIN — PANTOPRAZOLE SODIUM 40 MG: 40 INJECTION, POWDER, FOR SOLUTION INTRAVENOUS at 08:36

## 2020-01-01 RX ADMIN — CARBIDOPA AND LEVODOPA 2.5 TABLET: 25; 100 TABLET ORAL at 20:51

## 2020-01-01 RX ADMIN — CARBIDOPA AND LEVODOPA 2.5 TABLET: 25; 100 TABLET ORAL at 18:11

## 2020-01-01 RX ADMIN — SODIUM CHLORIDE SOLN NEBU 3% 2 ML: 3 NEBU SOLN at 12:48

## 2020-01-01 RX ADMIN — DORNASE ALFA 2.5 MG: 1 SOLUTION RESPIRATORY (INHALATION) at 08:22

## 2020-01-01 RX ADMIN — ALBUTEROL SULFATE 2.5 MG: 2.5 SOLUTION RESPIRATORY (INHALATION) at 19:35

## 2020-01-01 RX ADMIN — SODIUM CHLORIDE SOLN NEBU 3% 2 ML: 3 NEBU SOLN at 23:45

## 2020-01-01 RX ADMIN — PIPERACILLIN AND TAZOBACTAM 3.38 G: 3; .375 INJECTION, POWDER, LYOPHILIZED, FOR SOLUTION INTRAVENOUS at 03:10

## 2020-01-01 RX ADMIN — CARBIDOPA AND LEVODOPA 2 TABLET: 25; 100 TABLET ORAL at 00:12

## 2020-01-01 RX ADMIN — Medication 10 ML: at 20:23

## 2020-01-01 RX ADMIN — ACETAMINOPHEN ORAL SOLUTION 650 MG: 650 SOLUTION ORAL at 15:41

## 2020-01-01 RX ADMIN — IPRATROPIUM BROMIDE AND ALBUTEROL SULFATE 1 AMPULE: 2.5; .5 SOLUTION RESPIRATORY (INHALATION) at 09:00

## 2020-01-01 RX ADMIN — GUAIFENESIN 400 MG: 400 TABLET, FILM COATED ORAL at 20:07

## 2020-01-01 RX ADMIN — NYSTATIN 500000 UNITS: 100000 SUSPENSION ORAL at 20:20

## 2020-01-01 RX ADMIN — SODIUM CHLORIDE SOLN NEBU 3% 2 ML: 3 NEBU SOLN at 03:40

## 2020-01-01 RX ADMIN — PIPERACILLIN AND TAZOBACTAM 3.38 G: 3; .375 INJECTION, POWDER, LYOPHILIZED, FOR SOLUTION INTRAVENOUS at 19:11

## 2020-01-01 RX ADMIN — NYSTATIN 500000 UNITS: 100000 SUSPENSION ORAL at 15:23

## 2020-01-01 RX ADMIN — CARBIDOPA AND LEVODOPA 2.5 TABLET: 25; 100 TABLET ORAL at 16:05

## 2020-01-01 RX ADMIN — GUAIFENESIN 400 MG: 400 TABLET, FILM COATED ORAL at 23:16

## 2020-01-01 RX ADMIN — ACETAMINOPHEN ORAL SOLUTION 650 MG: 650 SOLUTION ORAL at 08:37

## 2020-01-01 RX ADMIN — IPRATROPIUM BROMIDE AND ALBUTEROL SULFATE 1 AMPULE: 2.5; .5 SOLUTION RESPIRATORY (INHALATION) at 12:45

## 2020-01-01 RX ADMIN — CHLORHEXIDINE GLUCONATE 0.12% ORAL RINSE 15 ML: 1.2 LIQUID ORAL at 20:18

## 2020-01-01 RX ADMIN — NYSTATIN 500000 UNITS: 100000 SUSPENSION ORAL at 14:27

## 2020-01-01 RX ADMIN — POTASSIUM BICARBONATE 40 MEQ: 782 TABLET, EFFERVESCENT ORAL at 08:44

## 2020-01-01 RX ADMIN — TRAZODONE HYDROCHLORIDE 50 MG: 50 TABLET ORAL at 02:21

## 2020-01-01 RX ADMIN — PANTOPRAZOLE SODIUM 40 MG: 40 INJECTION, POWDER, FOR SOLUTION INTRAVENOUS at 10:26

## 2020-01-01 RX ADMIN — PANTOPRAZOLE SODIUM 40 MG: 40 INJECTION, POWDER, FOR SOLUTION INTRAVENOUS at 08:37

## 2020-01-01 RX ADMIN — SODIUM CHLORIDE SOLN NEBU 3% 2 ML: 3 NEBU SOLN at 09:44

## 2020-01-01 RX ADMIN — ENOXAPARIN SODIUM 40 MG: 40 INJECTION SUBCUTANEOUS at 08:36

## 2020-01-01 RX ADMIN — ENOXAPARIN SODIUM 40 MG: 40 INJECTION SUBCUTANEOUS at 09:08

## 2020-01-01 RX ADMIN — CARBIDOPA AND LEVODOPA 2.5 TABLET: 25; 100 TABLET ORAL at 14:30

## 2020-01-01 RX ADMIN — IPRATROPIUM BROMIDE AND ALBUTEROL SULFATE 1 AMPULE: 2.5; .5 SOLUTION RESPIRATORY (INHALATION) at 11:56

## 2020-01-01 RX ADMIN — IPRATROPIUM BROMIDE AND ALBUTEROL SULFATE 1 AMPULE: 2.5; .5 SOLUTION RESPIRATORY (INHALATION) at 21:33

## 2020-01-01 RX ADMIN — POTASSIUM BICARBONATE 40 MEQ: 782 TABLET, EFFERVESCENT ORAL at 08:28

## 2020-01-01 RX ADMIN — SODIUM CHLORIDE SOLN NEBU 3% 2 ML: 3 NEBU SOLN at 04:21

## 2020-01-01 RX ADMIN — NYSTATIN 500000 UNITS: 100000 SUSPENSION ORAL at 13:43

## 2020-01-01 RX ADMIN — IPRATROPIUM BROMIDE AND ALBUTEROL SULFATE 1 AMPULE: 2.5; .5 SOLUTION RESPIRATORY (INHALATION) at 08:50

## 2020-01-01 RX ADMIN — MORPHINE SULFATE 1 MG: 2 INJECTION, SOLUTION INTRAMUSCULAR; INTRAVENOUS at 15:38

## 2020-01-01 RX ADMIN — PROPOFOL 30 MG: 10 INJECTION, EMULSION INTRAVENOUS at 12:21

## 2020-01-01 RX ADMIN — CARBIDOPA AND LEVODOPA 2.5 TABLET: 25; 100 TABLET ORAL at 15:23

## 2020-01-01 RX ADMIN — ACETAMINOPHEN ORAL SOLUTION 650 MG: 650 SOLUTION ORAL at 17:13

## 2020-01-01 RX ADMIN — IPRATROPIUM BROMIDE AND ALBUTEROL SULFATE 1 AMPULE: 2.5; .5 SOLUTION RESPIRATORY (INHALATION) at 17:55

## 2020-01-01 RX ADMIN — CARBIDOPA AND LEVODOPA 2.5 TABLET: 25; 100 TABLET ORAL at 08:34

## 2020-01-01 RX ADMIN — LEVETIRACETAM 1000 MG: 10 INJECTION, SOLUTION INTRAVENOUS at 08:56

## 2020-01-01 RX ADMIN — DORNASE ALFA 2.5 MG: 1 SOLUTION RESPIRATORY (INHALATION) at 09:07

## 2020-01-01 RX ADMIN — CARBIDOPA AND LEVODOPA 2.5 TABLET: 25; 100 TABLET ORAL at 17:33

## 2020-01-01 RX ADMIN — GUAIFENESIN 400 MG: 400 TABLET, FILM COATED ORAL at 14:56

## 2020-01-01 RX ADMIN — PROPOFOL 60 MG: 10 INJECTION, EMULSION INTRAVENOUS at 13:23

## 2020-01-01 RX ADMIN — PIPERACILLIN AND TAZOBACTAM 3.38 G: 3; .375 INJECTION, POWDER, LYOPHILIZED, FOR SOLUTION INTRAVENOUS at 11:08

## 2020-01-01 RX ADMIN — CARBIDOPA AND LEVODOPA 2.5 TABLET: 25; 100 TABLET ORAL at 08:42

## 2020-01-01 RX ADMIN — GUAIFENESIN 400 MG: 400 TABLET, FILM COATED ORAL at 20:03

## 2020-01-01 RX ADMIN — IPRATROPIUM BROMIDE AND ALBUTEROL SULFATE 1 AMPULE: 2.5; .5 SOLUTION RESPIRATORY (INHALATION) at 08:53

## 2020-01-01 RX ADMIN — SODIUM CHLORIDE SOLN NEBU 3% 2 ML: 3 NEBU SOLN at 12:28

## 2020-01-01 RX ADMIN — SODIUM CHLORIDE SOLN NEBU 3% 2 ML: 3 NEBU SOLN at 04:01

## 2020-01-01 RX ADMIN — PIPERACILLIN AND TAZOBACTAM 3.38 G: 3; .375 INJECTION, POWDER, LYOPHILIZED, FOR SOLUTION INTRAVENOUS at 03:57

## 2020-01-01 RX ADMIN — SODIUM CHLORIDE SOLN NEBU 3% 2 ML: 3 NEBU SOLN at 21:07

## 2020-01-01 RX ADMIN — SODIUM CHLORIDE SOLN NEBU 3% 2 ML: 3 NEBU SOLN at 05:45

## 2020-01-01 RX ADMIN — LEVETIRACETAM 1000 MG: 10 INJECTION, SOLUTION INTRAVENOUS at 17:41

## 2020-01-01 RX ADMIN — Medication 10 ML: at 21:03

## 2020-01-01 RX ADMIN — CARBIDOPA AND LEVODOPA 2.5 TABLET: 25; 100 TABLET ORAL at 08:38

## 2020-01-01 RX ADMIN — NYSTATIN 500000 UNITS: 100000 SUSPENSION ORAL at 16:10

## 2020-01-01 RX ADMIN — PIPERACILLIN AND TAZOBACTAM 3.38 G: 3; .375 INJECTION, POWDER, LYOPHILIZED, FOR SOLUTION INTRAVENOUS at 08:21

## 2020-01-01 RX ADMIN — METOCLOPRAMIDE HYDROCHLORIDE 5 MG: 5 SOLUTION ORAL at 20:23

## 2020-01-01 RX ADMIN — PIPERACILLIN AND TAZOBACTAM 3.38 G: 3; .375 INJECTION, POWDER, LYOPHILIZED, FOR SOLUTION INTRAVENOUS at 00:02

## 2020-01-01 RX ADMIN — METOCLOPRAMIDE HYDROCHLORIDE 5 MG: 5 SOLUTION ORAL at 06:58

## 2020-01-01 RX ADMIN — Medication 10 ML: at 09:47

## 2020-01-01 RX ADMIN — LEVETIRACETAM 1000 MG: 10 INJECTION, SOLUTION INTRAVENOUS at 19:44

## 2020-01-01 RX ADMIN — CARBIDOPA AND LEVODOPA 2.5 TABLET: 25; 100 TABLET ORAL at 14:06

## 2020-01-01 RX ADMIN — CARBIDOPA AND LEVODOPA 2.5 TABLET: 25; 100 TABLET ORAL at 14:27

## 2020-01-01 RX ADMIN — FUROSEMIDE 40 MG: 10 INJECTION, SOLUTION INTRAMUSCULAR; INTRAVENOUS at 11:12

## 2020-01-01 RX ADMIN — IPRATROPIUM BROMIDE AND ALBUTEROL SULFATE 1 AMPULE: 2.5; .5 SOLUTION RESPIRATORY (INHALATION) at 08:46

## 2020-01-01 RX ADMIN — PANTOPRAZOLE SODIUM 40 MG: 40 INJECTION, POWDER, FOR SOLUTION INTRAVENOUS at 08:40

## 2020-01-01 RX ADMIN — CARBIDOPA AND LEVODOPA 2.5 TABLET: 25; 100 TABLET ORAL at 20:05

## 2020-01-01 RX ADMIN — POTASSIUM BICARBONATE 40 MEQ: 782 TABLET, EFFERVESCENT ORAL at 09:35

## 2020-01-01 RX ADMIN — Medication 10 ML: at 20:02

## 2020-01-01 RX ADMIN — CARBIDOPA AND LEVODOPA 2.5 TABLET: 25; 100 TABLET ORAL at 20:20

## 2020-01-01 RX ADMIN — Medication 10 ML: at 23:16

## 2020-01-01 RX ADMIN — CARBIDOPA AND LEVODOPA 2 TABLET: 25; 100 TABLET ORAL at 23:01

## 2020-01-01 RX ADMIN — POTASSIUM BICARBONATE 40 MEQ: 782 TABLET, EFFERVESCENT ORAL at 08:35

## 2020-01-01 RX ADMIN — LIDOCAINE 1 EACH: 40 CREAM TOPICAL at 00:30

## 2020-01-01 RX ADMIN — GUAIFENESIN 400 MG: 400 TABLET, FILM COATED ORAL at 14:28

## 2020-01-01 RX ADMIN — CARBIDOPA AND LEVODOPA 2.5 TABLET: 25; 100 TABLET ORAL at 11:08

## 2020-01-01 RX ADMIN — NYSTATIN 500000 UNITS: 100000 SUSPENSION ORAL at 08:20

## 2020-01-01 RX ADMIN — CARBIDOPA AND LEVODOPA 2 TABLET: 25; 100 TABLET ORAL at 22:09

## 2020-01-01 RX ADMIN — SODIUM CHLORIDE SOLN NEBU 3% 2 ML: 3 NEBU SOLN at 17:04

## 2020-01-01 RX ADMIN — SODIUM CHLORIDE, PRESERVATIVE FREE 10 ML: 5 INJECTION INTRAVENOUS at 04:30

## 2020-01-01 RX ADMIN — IPRATROPIUM BROMIDE AND ALBUTEROL SULFATE 1 AMPULE: 2.5; .5 SOLUTION RESPIRATORY (INHALATION) at 20:29

## 2020-01-01 RX ADMIN — CARBIDOPA AND LEVODOPA 2.5 TABLET: 25; 100 TABLET ORAL at 11:11

## 2020-01-01 RX ADMIN — GUAIFENESIN 400 MG: 400 TABLET, FILM COATED ORAL at 14:16

## 2020-01-01 RX ADMIN — GUAIFENESIN 400 MG: 400 TABLET, FILM COATED ORAL at 20:27

## 2020-01-01 RX ADMIN — PIPERACILLIN AND TAZOBACTAM 3.38 G: 3; .375 INJECTION, POWDER, LYOPHILIZED, FOR SOLUTION INTRAVENOUS at 03:11

## 2020-01-01 RX ADMIN — CARBIDOPA AND LEVODOPA 2.5 TABLET: 25; 100 TABLET ORAL at 13:01

## 2020-01-01 RX ADMIN — LEVETIRACETAM 1000 MG: 10 INJECTION, SOLUTION INTRAVENOUS at 08:30

## 2020-01-01 RX ADMIN — POTASSIUM CHLORIDE 20 MEQ: 29.8 INJECTION, SOLUTION INTRAVENOUS at 06:40

## 2020-01-01 RX ADMIN — SODIUM CHLORIDE SOLN NEBU 3% 2 ML: 3 NEBU SOLN at 14:12

## 2020-01-01 RX ADMIN — NYSTATIN 500000 UNITS: 100000 SUSPENSION ORAL at 20:06

## 2020-01-01 RX ADMIN — POTASSIUM BICARBONATE 40 MEQ: 782 TABLET, EFFERVESCENT ORAL at 09:07

## 2020-01-01 RX ADMIN — SODIUM CHLORIDE SOLN NEBU 3% 2 ML: 3 NEBU SOLN at 04:31

## 2020-01-01 RX ADMIN — Medication 10 ML: at 20:45

## 2020-01-01 RX ADMIN — DORNASE ALFA 2.5 MG: 1 SOLUTION RESPIRATORY (INHALATION) at 09:22

## 2020-01-01 RX ADMIN — IPRATROPIUM BROMIDE AND ALBUTEROL SULFATE 1 AMPULE: 2.5; .5 SOLUTION RESPIRATORY (INHALATION) at 16:49

## 2020-01-01 RX ADMIN — CARBIDOPA AND LEVODOPA 2.5 TABLET: 25; 100 TABLET ORAL at 17:15

## 2020-01-01 RX ADMIN — Medication 10 ML: at 21:49

## 2020-01-01 RX ADMIN — PANTOPRAZOLE SODIUM 40 MG: 40 INJECTION, POWDER, FOR SOLUTION INTRAVENOUS at 08:45

## 2020-01-01 RX ADMIN — CHLORHEXIDINE GLUCONATE 0.12% ORAL RINSE 15 ML: 1.2 LIQUID ORAL at 21:46

## 2020-01-01 RX ADMIN — SODIUM CHLORIDE SOLN NEBU 3% 2 ML: 3 NEBU SOLN at 09:12

## 2020-01-01 RX ADMIN — IPRATROPIUM BROMIDE AND ALBUTEROL SULFATE 1 AMPULE: 2.5; .5 SOLUTION RESPIRATORY (INHALATION) at 12:19

## 2020-01-01 RX ADMIN — NYSTATIN 500000 UNITS: 100000 SUSPENSION ORAL at 18:52

## 2020-01-01 RX ADMIN — ACETAMINOPHEN ORAL SOLUTION 650 MG: 650 SOLUTION ORAL at 02:12

## 2020-01-01 RX ADMIN — PANTOPRAZOLE SODIUM 40 MG: 40 INJECTION, POWDER, FOR SOLUTION INTRAVENOUS at 08:55

## 2020-01-01 RX ADMIN — Medication 10 ML: at 21:18

## 2020-01-01 RX ADMIN — CARBIDOPA AND LEVODOPA 2.5 TABLET: 25; 100 TABLET ORAL at 12:03

## 2020-01-01 RX ADMIN — DORNASE ALFA 2.5 MG: 1 SOLUTION RESPIRATORY (INHALATION) at 09:56

## 2020-01-01 RX ADMIN — CARBIDOPA AND LEVODOPA 2 TABLET: 25; 100 TABLET ORAL at 23:04

## 2020-01-01 RX ADMIN — ENOXAPARIN SODIUM 40 MG: 40 INJECTION SUBCUTANEOUS at 08:37

## 2020-01-01 RX ADMIN — IPRATROPIUM BROMIDE AND ALBUTEROL SULFATE 1 AMPULE: 2.5; .5 SOLUTION RESPIRATORY (INHALATION) at 03:10

## 2020-01-01 RX ADMIN — LEVETIRACETAM 1000 MG: 10 INJECTION, SOLUTION INTRAVENOUS at 21:22

## 2020-01-01 RX ADMIN — SODIUM CHLORIDE SOLN NEBU 3% 2 ML: 3 NEBU SOLN at 18:07

## 2020-01-01 RX ADMIN — IPRATROPIUM BROMIDE AND ALBUTEROL SULFATE 1 AMPULE: 2.5; .5 SOLUTION RESPIRATORY (INHALATION) at 09:11

## 2020-01-01 RX ADMIN — SODIUM CHLORIDE SOLN NEBU 3% 2 ML: 3 NEBU SOLN at 20:28

## 2020-01-01 RX ADMIN — CARBIDOPA AND LEVODOPA 2 TABLET: 25; 100 TABLET ORAL at 22:36

## 2020-01-01 RX ADMIN — TRAMADOL HYDROCHLORIDE 50 MG: 50 TABLET, FILM COATED ORAL at 00:34

## 2020-01-01 RX ADMIN — NYSTATIN 500000 UNITS: 100000 SUSPENSION ORAL at 08:42

## 2020-01-01 RX ADMIN — PANTOPRAZOLE SODIUM 40 MG: 40 INJECTION, POWDER, FOR SOLUTION INTRAVENOUS at 08:31

## 2020-01-01 RX ADMIN — GUAIFENESIN 400 MG: 400 TABLET, FILM COATED ORAL at 14:41

## 2020-01-01 RX ADMIN — CARBIDOPA AND LEVODOPA 2.5 TABLET: 25; 100 TABLET ORAL at 08:35

## 2020-01-01 RX ADMIN — CARBIDOPA AND LEVODOPA 2.5 TABLET: 25; 100 TABLET ORAL at 11:23

## 2020-01-01 RX ADMIN — CETIRIZINE HYDROCHLORIDE 5 MG: 10 TABLET, FILM COATED ORAL at 09:07

## 2020-01-01 RX ADMIN — FENTANYL CITRATE 50 MCG: 50 INJECTION, SOLUTION INTRAMUSCULAR; INTRAVENOUS at 12:21

## 2020-01-01 RX ADMIN — LEVETIRACETAM 1000 MG: 10 INJECTION, SOLUTION INTRAVENOUS at 07:47

## 2020-01-01 RX ADMIN — Medication 10 ML: at 21:06

## 2020-01-01 RX ADMIN — SODIUM CHLORIDE SOLN NEBU 3% 2 ML: 3 NEBU SOLN at 12:42

## 2020-01-01 RX ADMIN — SODIUM CHLORIDE SOLN NEBU 3% 2 ML: 3 NEBU SOLN at 16:15

## 2020-01-01 RX ADMIN — LEVETIRACETAM 1000 MG: 10 INJECTION, SOLUTION INTRAVENOUS at 20:02

## 2020-01-01 RX ADMIN — GUAIFENESIN 400 MG: 400 TABLET, FILM COATED ORAL at 08:44

## 2020-01-01 RX ADMIN — SODIUM CHLORIDE SOLN NEBU 3% 2 ML: 3 NEBU SOLN at 03:37

## 2020-01-01 RX ADMIN — Medication 10 ML: at 10:27

## 2020-01-01 RX ADMIN — GUAIFENESIN 400 MG: 400 TABLET, FILM COATED ORAL at 15:04

## 2020-01-01 RX ADMIN — CARBIDOPA AND LEVODOPA 2 TABLET: 25; 100 TABLET ORAL at 23:44

## 2020-01-01 RX ADMIN — SODIUM CHLORIDE SOLN NEBU 3% 2 ML: 3 NEBU SOLN at 20:30

## 2020-01-01 RX ADMIN — GUAIFENESIN 400 MG: 400 TABLET, FILM COATED ORAL at 08:39

## 2020-01-01 RX ADMIN — IPRATROPIUM BROMIDE 0.5 MG: 0.5 SOLUTION RESPIRATORY (INHALATION) at 08:31

## 2020-01-01 RX ADMIN — CARBIDOPA AND LEVODOPA 2.5 TABLET: 25; 100 TABLET ORAL at 18:10

## 2020-01-01 RX ADMIN — NYSTATIN 500000 UNITS: 100000 SUSPENSION ORAL at 21:54

## 2020-01-01 RX ADMIN — CARBIDOPA AND LEVODOPA 2 TABLET: 25; 100 TABLET ORAL at 22:40

## 2020-01-01 RX ADMIN — CARBIDOPA AND LEVODOPA 2.5 TABLET: 25; 100 TABLET ORAL at 16:28

## 2020-01-01 RX ADMIN — CARBIDOPA AND LEVODOPA 2.5 TABLET: 25; 100 TABLET ORAL at 12:00

## 2020-01-01 RX ADMIN — Medication 10 ML: at 09:06

## 2020-01-01 RX ADMIN — MAGNESIUM SULFATE 1 G: 1 INJECTION INTRAVENOUS at 07:50

## 2020-01-01 RX ADMIN — LEVETIRACETAM 1000 MG: 10 INJECTION, SOLUTION INTRAVENOUS at 08:37

## 2020-01-01 RX ADMIN — SODIUM CHLORIDE SOLN NEBU 3% 2 ML: 3 NEBU SOLN at 09:51

## 2020-01-01 RX ADMIN — Medication 10 ML: at 08:57

## 2020-01-01 RX ADMIN — CARBIDOPA AND LEVODOPA 2.5 TABLET: 25; 100 TABLET ORAL at 12:48

## 2020-01-01 RX ADMIN — CARBIDOPA AND LEVODOPA 2.5 TABLET: 25; 100 TABLET ORAL at 14:02

## 2020-01-01 RX ADMIN — ENOXAPARIN SODIUM 40 MG: 40 INJECTION SUBCUTANEOUS at 08:41

## 2020-01-01 RX ADMIN — POTASSIUM BICARBONATE 40 MEQ: 782 TABLET, EFFERVESCENT ORAL at 07:47

## 2020-01-01 RX ADMIN — SODIUM CHLORIDE SOLN NEBU 3% 2 ML: 3 NEBU SOLN at 00:30

## 2020-01-01 RX ADMIN — SODIUM CHLORIDE, PRESERVATIVE FREE 10 ML: 5 INJECTION INTRAVENOUS at 11:13

## 2020-01-01 RX ADMIN — POTASSIUM BICARBONATE 40 MEQ: 782 TABLET, EFFERVESCENT ORAL at 08:46

## 2020-01-01 RX ADMIN — CARBIDOPA AND LEVODOPA 2.5 TABLET: 25; 100 TABLET ORAL at 20:00

## 2020-01-01 RX ADMIN — IPRATROPIUM BROMIDE AND ALBUTEROL SULFATE 1 AMPULE: 2.5; .5 SOLUTION RESPIRATORY (INHALATION) at 12:48

## 2020-01-01 RX ADMIN — Medication 10 ML: at 20:35

## 2020-01-01 RX ADMIN — CARBIDOPA AND LEVODOPA 2.5 TABLET: 25; 100 TABLET ORAL at 16:42

## 2020-01-01 RX ADMIN — GUAIFENESIN 400 MG: 400 TABLET, FILM COATED ORAL at 13:44

## 2020-01-01 RX ADMIN — POTASSIUM BICARBONATE 40 MEQ: 782 TABLET, EFFERVESCENT ORAL at 08:54

## 2020-01-01 RX ADMIN — PETROLATUM: 42 OINTMENT TOPICAL at 08:34

## 2020-01-01 RX ADMIN — LEVETIRACETAM 1000 MG: 10 INJECTION, SOLUTION INTRAVENOUS at 06:40

## 2020-01-01 RX ADMIN — CARBIDOPA AND LEVODOPA 2.5 TABLET: 25; 100 TABLET ORAL at 14:17

## 2020-01-01 RX ADMIN — LEVETIRACETAM 1000 MG: 10 INJECTION, SOLUTION INTRAVENOUS at 20:35

## 2020-01-01 RX ADMIN — SODIUM CHLORIDE SOLN NEBU 3% 2 ML: 3 NEBU SOLN at 13:25

## 2020-01-01 RX ADMIN — PIPERACILLIN AND TAZOBACTAM 3.38 G: 3; .375 INJECTION, POWDER, LYOPHILIZED, FOR SOLUTION INTRAVENOUS at 01:20

## 2020-01-01 RX ADMIN — SODIUM CHLORIDE SOLN NEBU 3% 2 ML: 3 NEBU SOLN at 21:36

## 2020-01-01 RX ADMIN — CARBIDOPA AND LEVODOPA 2.5 TABLET: 25; 100 TABLET ORAL at 17:41

## 2020-01-01 RX ADMIN — NYSTATIN 500000 UNITS: 100000 SUSPENSION ORAL at 13:36

## 2020-01-01 RX ADMIN — CARBIDOPA AND LEVODOPA 2.5 TABLET: 25; 100 TABLET ORAL at 14:20

## 2020-01-01 RX ADMIN — SODIUM CHLORIDE SOLN NEBU 3% 2 ML: 3 NEBU SOLN at 13:11

## 2020-01-01 RX ADMIN — Medication 10 ML: at 21:22

## 2020-01-01 RX ADMIN — SODIUM CHLORIDE SOLN NEBU 3% 2 ML: 3 NEBU SOLN at 12:50

## 2020-01-01 RX ADMIN — IPRATROPIUM BROMIDE AND ALBUTEROL SULFATE 1 AMPULE: 2.5; .5 SOLUTION RESPIRATORY (INHALATION) at 11:11

## 2020-01-01 RX ADMIN — GABAPENTIN 100 MG: 100 CAPSULE ORAL at 20:35

## 2020-01-01 RX ADMIN — POTASSIUM PHOSPHATE, MONOBASIC AND POTASSIUM PHOSPHATE, DIBASIC 20 MMOL: 224; 236 INJECTION, SOLUTION, CONCENTRATE INTRAVENOUS at 08:10

## 2020-01-01 RX ADMIN — NYSTATIN 500000 UNITS: 100000 SUSPENSION ORAL at 16:20

## 2020-01-01 RX ADMIN — CETIRIZINE HYDROCHLORIDE 5 MG: 10 TABLET, FILM COATED ORAL at 08:08

## 2020-01-01 ASSESSMENT — PULMONARY FUNCTION TESTS
PIF_VALUE: 16
PIF_VALUE: 13
PIF_VALUE: 16
PIF_VALUE: 20
PIF_VALUE: 15
PIF_VALUE: 16
PIF_VALUE: 11
PIF_VALUE: 15
PIF_VALUE: 16
PIF_VALUE: 34
PIF_VALUE: 22
PIF_VALUE: 16
PIF_VALUE: 13
PIF_VALUE: 15
PIF_VALUE: 0
PIF_VALUE: 14
PIF_VALUE: 18
PIF_VALUE: 14
PIF_VALUE: 16
PIF_VALUE: 0
PIF_VALUE: 14
PIF_VALUE: 12
PIF_VALUE: 18
PIF_VALUE: 24
PIF_VALUE: 25
PIF_VALUE: 13
PIF_VALUE: 18
PIF_VALUE: 16
PIF_VALUE: 27
PIF_VALUE: 12
PIF_VALUE: 13
PIF_VALUE: 20
PIF_VALUE: 16
PIF_VALUE: 12
PIF_VALUE: 24
PIF_VALUE: 16
PIF_VALUE: 0
PIF_VALUE: 16
PIF_VALUE: 15
PIF_VALUE: 16
PIF_VALUE: 0
PIF_VALUE: 21
PIF_VALUE: 14
PIF_VALUE: 14
PIF_VALUE: 20
PIF_VALUE: 18
PIF_VALUE: 19
PIF_VALUE: 16
PIF_VALUE: 24
PIF_VALUE: 17
PIF_VALUE: 15
PIF_VALUE: 19
PIF_VALUE: 15
PIF_VALUE: 12
PIF_VALUE: 15
PIF_VALUE: 12
PIF_VALUE: 15
PIF_VALUE: 0
PIF_VALUE: 36
PIF_VALUE: 0
PIF_VALUE: 16
PIF_VALUE: 0
PIF_VALUE: 18
PIF_VALUE: 15
PIF_VALUE: 15
PIF_VALUE: 16
PIF_VALUE: 17
PIF_VALUE: 13
PIF_VALUE: 20
PIF_VALUE: 12
PIF_VALUE: 14
PIF_VALUE: 15
PIF_VALUE: 16
PIF_VALUE: 17
PIF_VALUE: 15
PIF_VALUE: 17
PIF_VALUE: 15
PIF_VALUE: 13
PIF_VALUE: 18
PIF_VALUE: 21
PIF_VALUE: 18
PIF_VALUE: 17
PIF_VALUE: 34
PIF_VALUE: 16
PIF_VALUE: 0
PIF_VALUE: 19
PIF_VALUE: 23
PIF_VALUE: 12
PIF_VALUE: 17
PIF_VALUE: 16
PIF_VALUE: 17
PIF_VALUE: 27
PIF_VALUE: 14
PIF_VALUE: 0
PIF_VALUE: 16
PIF_VALUE: 19
PIF_VALUE: 16
PIF_VALUE: 16
PIF_VALUE: 20
PIF_VALUE: 13
PIF_VALUE: 16
PIF_VALUE: 12
PIF_VALUE: 18
PIF_VALUE: 12
PIF_VALUE: 19
PIF_VALUE: 19
PIF_VALUE: 22
PIF_VALUE: 19
PIF_VALUE: 23
PIF_VALUE: 15
PIF_VALUE: 13
PIF_VALUE: 0
PIF_VALUE: 19
PIF_VALUE: 20
PIF_VALUE: 15
PIF_VALUE: 12
PIF_VALUE: 3
PIF_VALUE: 19
PIF_VALUE: 17
PIF_VALUE: 15
PIF_VALUE: 0
PIF_VALUE: 14
PIF_VALUE: 0
PIF_VALUE: 13
PIF_VALUE: 16
PIF_VALUE: 18
PIF_VALUE: 17
PIF_VALUE: 22
PIF_VALUE: 16
PIF_VALUE: 18
PIF_VALUE: 15
PIF_VALUE: 14
PIF_VALUE: 25
PIF_VALUE: 27
PIF_VALUE: 18
PIF_VALUE: 19
PIF_VALUE: 22
PIF_VALUE: 16
PIF_VALUE: 29
PIF_VALUE: 14
PIF_VALUE: 20
PIF_VALUE: 22
PIF_VALUE: 17
PIF_VALUE: 16
PIF_VALUE: 17
PIF_VALUE: 15
PIF_VALUE: 18
PIF_VALUE: 14
PIF_VALUE: 16
PIF_VALUE: 21
PIF_VALUE: 17
PIF_VALUE: 15
PIF_VALUE: 13
PIF_VALUE: 15
PIF_VALUE: 15
PIF_VALUE: 18
PIF_VALUE: 18
PIF_VALUE: 19
PIF_VALUE: 15
PIF_VALUE: 19
PIF_VALUE: 16
PIF_VALUE: 15
PIF_VALUE: 20
PIF_VALUE: 21
PIF_VALUE: 0
PIF_VALUE: 15
PIF_VALUE: 0
PIF_VALUE: 14
PIF_VALUE: 0
PIF_VALUE: 19
PIF_VALUE: 13
PIF_VALUE: 26
PIF_VALUE: 20
PIF_VALUE: 17
PIF_VALUE: 16
PIF_VALUE: 12
PIF_VALUE: 18
PIF_VALUE: 12
PIF_VALUE: 20
PIF_VALUE: 23
PIF_VALUE: 14
PIF_VALUE: 16
PIF_VALUE: 17
PIF_VALUE: 0
PIF_VALUE: 18
PIF_VALUE: 12
PIF_VALUE: 19
PIF_VALUE: 12
PIF_VALUE: 22
PIF_VALUE: 14
PIF_VALUE: 17

## 2020-01-01 ASSESSMENT — PAIN SCALES - PAIN ASSESSMENT IN ADVANCED DEMENTIA (PAINAD)
FACIALEXPRESSION: 0
BODYLANGUAGE: 0
BREATHING: 1
FACIALEXPRESSION: 0
CONSOLABILITY: 0
BODYLANGUAGE: 0
BREATHING: 1
NEGVOCALIZATION: 1
CONSOLABILITY: 1
TOTALSCORE: 3
CONSOLABILITY: 1
TOTALSCORE: 3
NEGVOCALIZATION: 1
BREATHING: 1
FACIALEXPRESSION: 0
CONSOLABILITY: 1
NEGVOCALIZATION: 1
TOTALSCORE: 2
NEGVOCALIZATION: 1
CONSOLABILITY: 1
NEGVOCALIZATION: 1
NEGVOCALIZATION: 1
BREATHING: 1
FACIALEXPRESSION: 0
BODYLANGUAGE: 0
BREATHING: 1
TOTALSCORE: 3
TOTALSCORE: 3
BODYLANGUAGE: 0
NEGVOCALIZATION: 1
BODYLANGUAGE: 1
BREATHING: 1
BODYLANGUAGE: 0
BREATHING: 1
NEGVOCALIZATION: 1
CONSOLABILITY: 1
BREATHING: 1
FACIALEXPRESSION: 0
FACIALEXPRESSION: 0
TOTALSCORE: 3
CONSOLABILITY: 1
TOTALSCORE: 5
TOTALSCORE: 3
BODYLANGUAGE: 0
BODYLANGUAGE: 0
TOTALSCORE: 4
BODYLANGUAGE: 0
TOTALSCORE: 3
BREATHING: 1
FACIALEXPRESSION: 0
FACIALEXPRESSION: 1
BREATHING: 1
CONSOLABILITY: 1
FACIALEXPRESSION: 0
FACIALEXPRESSION: 0
NEGVOCALIZATION: 1
TOTALSCORE: 2
BODYLANGUAGE: 1
NEGVOCALIZATION: 1
BREATHING: 1
CONSOLABILITY: 1
CONSOLABILITY: 1
TOTALSCORE: 3
CONSOLABILITY: 1
NEGVOCALIZATION: 1
BREATHING: 1
BODYLANGUAGE: 0
FACIALEXPRESSION: 0
CONSOLABILITY: 0
FACIALEXPRESSION: 0
BODYLANGUAGE: 0
CONSOLABILITY: 1
BREATHING: 1
BODYLANGUAGE: 0
NEGVOCALIZATION: 1
BODYLANGUAGE: 0
NEGVOCALIZATION: 1
FACIALEXPRESSION: 1
NEGVOCALIZATION: 1
BODYLANGUAGE: 0
FACIALEXPRESSION: 1
NEGVOCALIZATION: 1
CONSOLABILITY: 1
FACIALEXPRESSION: 0
TOTALSCORE: 3
BODYLANGUAGE: 0
BREATHING: 1
TOTALSCORE: 5
BREATHING: 1
TOTALSCORE: 3
NEGVOCALIZATION: 1
CONSOLABILITY: 1
CONSOLABILITY: 1
BREATHING: 1
FACIALEXPRESSION: 0
TOTALSCORE: 3

## 2020-01-01 ASSESSMENT — PAIN SCALES - GENERAL
PAINLEVEL_OUTOF10: 5
PAINLEVEL_OUTOF10: 6
PAINLEVEL_OUTOF10: 8
PAINLEVEL_OUTOF10: 0
PAINLEVEL_OUTOF10: 5
PAINLEVEL_OUTOF10: 0
PAINLEVEL_OUTOF10: 3
PAINLEVEL_OUTOF10: 0
PAINLEVEL_OUTOF10: 5
PAINLEVEL_OUTOF10: 2
PAINLEVEL_OUTOF10: 0
PAINLEVEL_OUTOF10: 0
PAINLEVEL_OUTOF10: 1
PAINLEVEL_OUTOF10: 5
PAINLEVEL_OUTOF10: 0
PAINLEVEL_OUTOF10: 5
PAINLEVEL_OUTOF10: 5
PAINLEVEL_OUTOF10: 0
PAINLEVEL_OUTOF10: 6
PAINLEVEL_OUTOF10: 6
PAINLEVEL_OUTOF10: 3
PAINLEVEL_OUTOF10: 3
PAINLEVEL_OUTOF10: 1
PAINLEVEL_OUTOF10: 0
PAINLEVEL_OUTOF10: 5
PAINLEVEL_OUTOF10: 0
PAINLEVEL_OUTOF10: 6
PAINLEVEL_OUTOF10: 0
PAINLEVEL_OUTOF10: 5
PAINLEVEL_OUTOF10: 0
PAINLEVEL_OUTOF10: 5
PAINLEVEL_OUTOF10: 0
PAINLEVEL_OUTOF10: 10
PAINLEVEL_OUTOF10: 0
PAINLEVEL_OUTOF10: 3
PAINLEVEL_OUTOF10: 0
PAINLEVEL_OUTOF10: 10
PAINLEVEL_OUTOF10: 0
PAINLEVEL_OUTOF10: 3
PAINLEVEL_OUTOF10: 0
PAINLEVEL_OUTOF10: 4
PAINLEVEL_OUTOF10: 0
PAINLEVEL_OUTOF10: 3
PAINLEVEL_OUTOF10: 4
PAINLEVEL_OUTOF10: 0
PAINLEVEL_OUTOF10: 3
PAINLEVEL_OUTOF10: 5
PAINLEVEL_OUTOF10: 0
PAINLEVEL_OUTOF10: 8
PAINLEVEL_OUTOF10: 3
PAINLEVEL_OUTOF10: 10
PAINLEVEL_OUTOF10: 0
PAINLEVEL_OUTOF10: 4
PAINLEVEL_OUTOF10: 0
PAINLEVEL_OUTOF10: 10
PAINLEVEL_OUTOF10: 0
PAINLEVEL_OUTOF10: 5
PAINLEVEL_OUTOF10: 5
PAINLEVEL_OUTOF10: 0
PAINLEVEL_OUTOF10: 3

## 2020-01-01 ASSESSMENT — PAIN DESCRIPTION - DESCRIPTORS
DESCRIPTORS: ACHING;SORE
DESCRIPTORS: ACHING
DESCRIPTORS: ACHING;SORE
DESCRIPTORS: ACHING
DESCRIPTORS: PATIENT UNABLE TO DESCRIBE
DESCRIPTORS: ACHING;SORE;DISCOMFORT
DESCRIPTORS: ACHING;DISCOMFORT;THROBBING
DESCRIPTORS: ACHING;DISCOMFORT;SORE

## 2020-01-01 ASSESSMENT — PAIN DESCRIPTION - FREQUENCY
FREQUENCY: CONTINUOUS

## 2020-01-01 ASSESSMENT — PAIN DESCRIPTION - ORIENTATION
ORIENTATION: MID

## 2020-01-01 ASSESSMENT — PAIN DESCRIPTION - PAIN TYPE
TYPE: ACUTE PAIN
TYPE: CHRONIC PAIN
TYPE: ACUTE PAIN
TYPE: CHRONIC PAIN
TYPE: ACUTE PAIN
TYPE: CHRONIC PAIN
TYPE: CHRONIC PAIN
TYPE: ACUTE PAIN
TYPE: CHRONIC PAIN
TYPE: CHRONIC PAIN

## 2020-01-01 ASSESSMENT — PAIN DESCRIPTION - LOCATION
LOCATION: BACK
LOCATION: GENERALIZED
LOCATION: GENERALIZED
LOCATION: BACK
LOCATION: FOOT
LOCATION: BUTTOCKS;FOOT
LOCATION: BACK
LOCATION: GENERALIZED
LOCATION: BACK
LOCATION: BACK

## 2020-01-01 ASSESSMENT — PAIN DESCRIPTION - ONSET
ONSET: ON-GOING
ONSET: AWAKENED FROM SLEEP
ONSET: GRADUAL
ONSET: ON-GOING
ONSET: ON-GOING
ONSET: GRADUAL
ONSET: AWAKENED FROM SLEEP

## 2020-01-01 ASSESSMENT — PAIN DESCRIPTION - PROGRESSION
CLINICAL_PROGRESSION: NOT CHANGED
CLINICAL_PROGRESSION: NOT CHANGED
CLINICAL_PROGRESSION: GRADUALLY WORSENING
CLINICAL_PROGRESSION: NOT CHANGED

## 2020-01-01 ASSESSMENT — PAIN - FUNCTIONAL ASSESSMENT
PAIN_FUNCTIONAL_ASSESSMENT: PREVENTS OR INTERFERES SOME ACTIVE ACTIVITIES AND ADLS
PAIN_FUNCTIONAL_ASSESSMENT: PREVENTS OR INTERFERES SOME ACTIVE ACTIVITIES AND ADLS

## 2020-01-01 ASSESSMENT — ENCOUNTER SYMPTOMS
SHORTNESS OF BREATH: 1
SHORTNESS OF BREATH: 1

## 2020-01-01 NOTE — PROGRESS NOTES
Dr. Curt Bautista notified blood pressure 178/83 (119) and heart rate 111. Will continue to monitor.

## 2020-01-01 NOTE — PROGRESS NOTES
[address] [facility]   Department of Internal Medicine   Internal Medicine Residency  MICU Progress Note    Patient:  Latasha Kumar 62 y.o. female   MRN: [id number]       Date of Service: 2020    Allergy: Sulfa antibiotics    Subjective     Patient was seen and examined this morning at bedside in no acute distress. 24 hour change: Extubated yesterday. Saturating well on nasal cannula. Initially, patient was hoarse after extubation, but this morning developed difficulty with speech. No other acute issues at this time. Objective     TEMPERATURE:  Current - Temp: 98.2 °F (36.8 °C); Max - Temp  Av.6 °F (37 °C)  Min: 96.8 °F (36 °C)  Max: 99.8 °F (37.7 °C)  RESPIRATIONS RANGE: Resp  Av.8  Min: 10  Max: 26  PULSE RANGE: Pulse  Av.9  Min: 90  Max: 111  BLOOD PRESSURE RANGE:  Systolic (67EKH), SK , Min:175 , DHL:924   ; Diastolic (99LXU), YEN:07, Min:86, Max:86    PULSE OXIMETRY RANGE: SpO2  Av.5 %  Min: 96 %  Max: 100 %    I & O - 24hr:    Intake/Output Summary (Last 24 hours) at 2020 0747  Last data filed at 2020 0606  Gross per 24 hour   Intake 3745 ml   Output 3215 ml   Net 530 ml     I/O last 3 completed shifts: In: 2251 [I.V.:2910; Blood:350; NG/GT:185; IV Piggyback:300]  Out: 7172 [Urine:1790; JYYHB:2788] No intake/output data recorded. Weight change: 8 lb 13.1 oz (4 kg)    Physical Exam:  General Appearance:    AAOx3, mild distress   HEENT:    NC/AT, mucous membranes are moist   Neck:   Supple, no jugular venous distention. Resp:     CTAB, No wheezes, No rhonchi, no use of accessory muscles   Heart:    RRR, S1 and S2 normal, no murmur, rub or gallop.     Abdomen:     Soft, non-tender, surgical incision sites clean and dry with no drainage, colostomy bag with black fluid   Extremities:   Atraumatic, no cyanosis or edema   Pulses:  Radial and pedal pulses are intact bilaterally   Neurologic:   DTRs intact, no focal deficits grossly         Medications Continuous Infusions:   lactated ringers 75 mL/hr at 01/01/20 0432     Scheduled Meds:   traZODone  50 mg Oral Nightly    pantoprazole  40 mg Intravenous Daily    And    sodium chloride (PF)  10 mL Intravenous Daily    chlorhexidine  15 mL Mouth/Throat BID    [Held by provider] midodrine  5 mg Oral BID    sodium chloride flush  10 mL Intravenous 2 times per day    enoxaparin  40 mg Subcutaneous Daily    levetiracetam  1,000 mg Intravenous Q12H    piperacillin-tazobactam  3.375 g Intravenous Q8H    lidocaine   Topical Once    fludrocortisone  0.1 mg Oral BID    carbidopa-levodopa  2 tablet Oral 5x Daily     PRN Meds: labetalol, acetaminophen, sodium chloride flush  Nutrition:   Hold TF, patient unable to swallow    Labs and Imaging Studies     CBC:   Recent Labs     12/31/19  0458 12/31/19  1210 12/31/19  1802 01/01/20  0020 01/01/20  0600   WBC 5.4 8.5  --   --  9.9   HGB 7.3* 8.8* 8.6* 9.0* 8.5*   HCT 22.9* 28.1* 27.7* 28.8* 28.0*   MCV 92.7 94.0  --   --  94.6   * 120*  --   --  113*       BMP:    Recent Labs     12/30/19  1505 12/31/19  0458 01/01/20  0600    140 142   K 4.5 4.2 3.7   * 108* 108*   CO2 18* 20* 21*   BUN 59* 60* 46*   CREATININE 1.1* 1.4* 0.9   GLUCOSE 136* 119* 93       LIVER PROFILE:   Recent Labs     12/29/19  1735  12/30/19  1505 12/31/19  0458 01/01/20  0600   AST  --    < > 39* 31 40*   ALT  --    < > 6 5 <5   LIPASE 27  --   --   --   --    BILITOT  --    < > 0.2 0.2 0.2   ALKPHOS  --    < > 209* 155* 139*    < > = values in this interval not displayed. PT/INR:   Recent Labs     12/30/19  0656   PROTIME 11.9   INR 1.1       APTT:   No results for input(s): APTT in the last 72 hours.     Fasting Lipid Panel:    No results found for: CHOL, TRIG, HDL    Cardiac Enzymes:    Lab Results   Component Value Date    CKTOTAL 137 12/29/2019    CKTOTAL 238 (H) 01/07/2016    CKMB 5.7 (H) 01/07/2016    TROPONINI <0.01 12/08/2017    TROPONINI <0.01 09/27/2017 megacolon pattern which cause significant mass effect   throughout the entire abdomen peritoneal cavity. .      5. Mild ascites in the abdomen. 6. No free intraperitoneal air observed. XR CHEST PORTABLE   Final Result      Gaseous distention of a loop of bowel within the left upper abdomen   may be stomach or colon. Consider further workup with computed   tomography for better characterization. Bilateral pleural effusions with contiguous atelectasis. Endotracheal tube is present with the distal tip 5 cm above the   adriano. Nasogastric tube follows the expected contours of the esophagus into   stomach with distal sideholes overlying the left upper quadrant of the   abdomen. Esophageal manometry device appears present terminating at the level   of the esophagogastric junction. XR ABDOMEN FOR NG/OG/NE TUBE PLACEMENT   Final Result      Gaseous distention of a loop of bowel within the left upper abdomen   may be stomach or colon. Consider further workup with computed   tomography for better characterization. Bilateral pleural effusions with contiguous atelectasis. Endotracheal tube is present with the distal tip 5 cm above the   adriano. Nasogastric tube follows the expected contours of the esophagus into   stomach with distal sideholes overlying the left upper quadrant of the   abdomen. Esophageal manometry device appears present terminating at the level   of the esophagogastric junction. XR ABDOMEN (KUB) (SINGLE AP VIEW)   Final Result   Right hemicolonic fecal distention with functional outflow obstruction   of the distal small bowel. The transverse colon is distended with gas,   raising questions regarding possible \"institutional bowel\" or chronic   constipation. Limited radiographic appearance does not suggest small bowel   obstruction. XR CHEST PORTABLE   Final Result   Left basilar opacity.  Differential includes atelectasis, infection, and/or layering pleural effusion. US ORGAN ELASTOGRAPHY    (Results Pending)   XR CHEST PORTABLE    (Results Pending)   XR CHEST PORTABLE    (Results Pending)   CT HEAD WO CONTRAST    (Results Pending)       Resident's Assessment and Plan     58-year-old female with a past medical history of seizure disorder, multiple systemic sclerosis, arthritis, hypotension presented to the ED after her  noted her blood pressure to be lower than usual and generalized weakness worse in the lower extremities.  Patient is a poor historian.      Patient was diagnosed with urinary tract infection about 10 days ago and she was treated with a course of Rocephin.      Neurologic  Sedation  · Propofol gtt  · No fentanyl gtt d/t ileus     Seizure disorder  · Resume home dose Keppra 1000mg BID     Parkinson's Disease  · Resume Sinemet     Pulmonary   Acute hypoxic respiratory failure  · 2/2 aspiration PNA  · Intubated 12/29, extubated 12/31  · CT chest w/ bilateral lower lobe infiltrates and effusion  · Continue Zosyn  · Hoarseness post-extubation, now with speech difficulty, f/u head CT r/o intracranial process     Cardiovascular  Septic Shock, resolved  · likely 2/2 to UTI vs aspiration PNA vs ischemic bowel  · UA positive for leukocyte esterase nitrites, bacteriuria, WBC 5-10  · Continue Zosyn  · Off Levophed, wean as able, MAP goal > 65mmHg  · Hold home dose midodrine d/t HTN  · Continue fludrocortisone 0.1mg BID  · F/u cultures     Gastrointestinal  Ischemic Colitis   · CT abdomen with distended large intestine, large amounts of feces from rectum to proximal sigmoid colon  · S/p ex lap with subtotal colectomy, ischemic sigmoid colon, but no perforations  · Monitor CBC qd for Hb and transfuse prn  · Continue to hold Lovenox   · OK for NGT meds and TF     Hyperammonemia  · Likely 2/2 fecal impaction/constipation  · Trending down     Infectious Disease  Aspiration PNA  · procalcitonin 3.15  · CT chest and CXR w/ bilateral lower lobe infiltrates  · Continue Zosyn   · F/u respiratory culture     UTI  · UA w/ pyuria, bacteriuria and +LE  · UCx with coag neg staph > 100k CFU, likely contamination  · Continue Zosyn  · F/u urine culture     Genitourinary/Renal  Hypermagnesemia  · Mg 7.0, on mag oxide at home  · DTRs remain intact  · Hold all magnesium containing medications/supplementation  · F/u repeat mag     Hematology/Oncology  Normocytic Hypochromic Anemia  · Likely multifactorial d/t dilution, blood loss  · Baseline Hb 11-12  · Monitor CBC q6h, then qd when Hb stable  · 1 unit RBCs ordered  · iCa replaced  · Transfuse if Hb < 7        # Peptic ulcer prophylaxis: Protonix  # DVT Prophylaxis: Lovenox held  # Disposition: Cont current care     Elisabeth Styles M.D., PGY-1     Attending Physician: Martinez Whitfield  Addendum ICU Attending Statement     Ryan Hagen was seen, examined and discussed with the multi-disciplinary ICU team during rounds. A addendum note may be separate to the residents note. If not then, I have personally seen and examined the patient and the key elements of the encounter were performed by me (> 85 % time). The medications & laboratory data was discussed and adjusted where necessary. The radiographic images were reviewed either as a group or with radiologist.  Any changes are document or changed if felt dis-concordant with the exam or history. The above findings were corroborated, plans confirmed and changes made if needed. Family was updated at the bedside as available. Key issues of the case were discussed among consultants. Critical Care time is documented if appropriate.       Jemima Pisano DO, MPH  Professor of Medicine

## 2020-01-01 NOTE — PROGRESS NOTES
Subjective:  Feeling better-still having difficulty talking per family but somewhat improved  No CP or SOB  No fever or chills   No uncontrolled pain  No vomiting or diarrhea   Family bedside all questions answered  Objective:    BP (!) 175/86   Pulse 103   Temp 98 °F (36.7 °C)   Resp 25   Ht 5' 6\" (1.676 m)   Wt 132 lb 15 oz (60.3 kg)   SpO2 99%   BMI 21.46 kg/m²     24HR INTAKE/OUTPUT:      Intake/Output Summary (Last 24 hours) at 1/1/2020 1533  Last data filed at 1/1/2020 1429  Gross per 24 hour   Intake 2692 ml   Output 2135 ml   Net 557 ml     NAD some words extubated on nasal cannula NG present  Heart:  RRR, no murmurs, gallops, or rubs. Lungs: Bilateral rhonchi rhonchi  Abd: bowel sounds present, nontender, nondistended, no masses  Extrem:  No clubbing, cyanosis, or edema  Weak x4 extremities  Most Recent Labs  Lab Results   Component Value Date    WBC 9.9 01/01/2020    HGB 8.5 (L) 01/01/2020    HCT 28.0 (L) 01/01/2020     (L) 01/01/2020     01/01/2020    K 3.7 01/01/2020     (H) 01/01/2020    CREATININE 0.9 01/01/2020    BUN 46 (H) 01/01/2020    CO2 21 (L) 01/01/2020    GLUCOSE 93 01/01/2020    ALT <5 01/01/2020    AST 40 (H) 01/01/2020    INR 1.1 12/30/2019    TSH 1.950 12/29/2019     Recent Labs     01/01/20  0600   MG 3.6*     Lab Results   Component Value Date    CALCIUM 8.7 01/01/2020    PHOS 3.4 01/01/2020        CT HEAD WO CONTRAST   Final Result      NO ACUTE INTRACRANIAL PROCESS         XR CHEST PORTABLE   Final Result      Right upper lung patchy infiltrate about the right minor fissure. Cardiomegaly      Nasogastric tube in satisfactory position with the tip not well seen   but appears to be in the distal stomach. XR Chest Abdomen Ng Placement   Final Result   Enteric catheter with the tip projecting over the expected region of   the stomach. Pneumoperitoneum which may be normal given the patient's history of   recent abdominal surgery.       XR ABDOMEN FOR NG/OG/NE TUBE PLACEMENT   Final Result   Addendum 1 of 1   Consider CT scan if better assessment of the abdomen as clinically   indicated. Final      XR CHEST PORTABLE   Final Result   NG tube reaches the stomach               XR CHEST PORTABLE   Final Result   Partial clearing of bibasilar infiltrates and left effusion               XR CHEST PORTABLE   Final Result   Stable abnormal chest with  improving CHF. Superimposed pneumonia is   not excluded. CT CHEST W CONTRAST   Final Result   1. Atelectasis consolidation of both lower lobes with patent central   airways. Findings to be correlated clinically. Can be relate with the   hypoventilation of the lower lung bases. Cannot exclude a pneumonia. 2. Discrete patchy bronchocentric infiltrate in the left upper lobe   lingular segment. 3. No acute pulmonary emboli seen although there are some limitations   for evaluation of the lower lobes and motion artifacts. 4. No aneurysm formation dissection thoracic aorta. 5. Discrete to mild pericardial effusion. CT ABDOMEN PELVIS W IV CONTRAST Additional Contrast? None   Final Result   1. Findings for severe constipation with a large amount of fecal   contents/rotation/impaction from the sigmoid colon to the rectum,   causing a pattern of megacolon with marked fluid/air distentions or   more proximal segments of the colon. 2. Consider decompression of the rectum/sigmoid to permit relieve of   the more proximal segments of the colon. 3. Marked: Distention can explain hypoventilation lower lung bases   causing atelectasis or consolidations with overall patent central   airways of both lower lobes. Please correlate clinically. 4. There is no dilatation of the biliary tree, to be reevaluated   relieve of the megacolon pattern which cause significant mass effect   throughout the entire abdomen peritoneal cavity. .      5. Mild ascites in the abdomen.       6. No free Hypotension    Multiple system atrophy (HCC)    Fatigue    Ischemic colitis ([facility] [address]  Resolved Problems:    * No resolved hospital problems.  *      Plan:  62 F history of multisystem atrophy, hypotension, seizure disorder Adm to ICU w ischemic colitis septic shockstatus post Exploratory laparotomy with subtotal colectomy and end-ileostomy    Extubated  NG for dysphagia  Wean per Pulm [facility]  IV vasopressors-- off  IV fluid   IV antibiotics  Monitor labs closely speech therapy eval  Pulmonary critical care consult appreciated  General surgery consult appreciated  Extensive discussion with family regarding diagnosis, prognosis and plan of care       Electronically signed by Elsie Hernandez MD on 1/1/2020 at 3:33 PM

## 2020-01-02 PROBLEM — E43 SEVERE PROTEIN-CALORIE MALNUTRITION (HCC): Chronic | Status: ACTIVE | Noted: 2020-01-01

## 2020-01-02 PROBLEM — J96.02 ACUTE RESPIRATORY FAILURE WITH HYPOXIA AND HYPERCAPNIA (HCC): Status: ACTIVE | Noted: 2020-01-01

## 2020-01-02 PROBLEM — J96.01 ACUTE RESPIRATORY FAILURE WITH HYPOXIA AND HYPERCAPNIA (HCC): Status: ACTIVE | Noted: 2020-01-01

## 2020-01-02 NOTE — ANESTHESIA POSTPROCEDURE EVALUATION
Department of Anesthesiology  Postprocedure Note    Patient: Gisel Gil  MRN: [id number]  YOB: 1965  Date of evaluation: 1/2/2020  Time:  2:45 PM     Procedure Summary     Date:  01/02/20 [place]  [facility] [place] [facility]    Anesthesia Start:  1320 Anesthesia Stop:  1343    Procedure:  BRONCHOSCOPY DIAGNOSTIC OR CELL [address] ONLY (N/A ) Diagnosis:  (Pneumonia)    Surgeon:  Chin Floyd DO Responsible Provider:  Katlin Leo DO    Anesthesia Type:  MAC ASA Status:  4          Anesthesia Type: MAC    Janessa Phase I:      Janessa Phase II:      Last vitals: Reviewed and per EMR flowsheets.        Anesthesia Post Evaluation    Patient location during evaluation: bedside  Patient participation: complete - patient cannot participate  Level of consciousness: awake and alert  Airway patency: patent  Nausea & Vomiting: no nausea and no vomiting  Complications: no  Cardiovascular status: blood pressure returned to baseline  Respiratory status: acceptable  Hydration status: euvolemic

## 2020-01-02 NOTE — PROGRESS NOTES
Occupational Therapy  OT consult received to eval/treat. Chart review complete. Evaluation attempted but pt having procedure at bedside. OT to re-attempt at a later time. Thank you.     Frannie OTR/L [id number]

## 2020-01-02 NOTE — PLAN OF CARE
Problem: Malnutrition  (NI-5.2)  Goal: Food and/or Nutrient Delivery  Description- Resume EN  Individualized approach for food/nutrient provision.   Outcome: Met This Shift

## 2020-01-02 NOTE — PROGRESS NOTES
Nutrition Assessment (Enteral Nutrition)    Type and Reason for Visit: Initial    Nutrition Recommendations: Continue NPO, Modify current Tube Feeding:    When TF resumed Rec:   Semi-Elemental (Vital AF) for optimal GI tolerance @ 50 ml/hr. Will provide 1200 ml tv, 1440 kcals, 90 gm pro, 973 ml free water  Regimen meets ~96% est calorie & 100% est protein needs     Nutrition Summary: Pt w/ severe malnutrition on admit AEB fat/muscle wasting 2/2 Parkinson's/MS. Pt now at further risk d/t surgical wound s/p ex lap w/ new ileostomy. EN support initiated (off post proceure) Will provide appropriate TF rec to meet estimated needs. Malnutrition Assessment:  · Malnutrition Status: Meets the criteria for severe malnutrition  · Context: Chronic illness  · Findings of the 6 clinical characteristics of malnutrition (Minimum of 2 out of 6 clinical characteristics is required to make the diagnosis of moderate or severe Protein Calorie Malnutrition based on AND/ASPEN Guidelines):  1. Energy Intake-Less than or equal to 50% of estimated energy requirement, (since admit - RADHA PTA intake hx)    2. Weight Loss-Unable to assess(no hx on file)   3. Fat Loss-Severe subcutaneous fat loss, Orbital  4. Muscle Loss-Severe muscle mass loss, Temples (temporalis muscle), Clavicles (pectoralis and deltoids), Thigh (quadriceps), Calf (gastrocnemius)  5. Fluid Accumulation-No significant fluid accumulation  6.  Strength-Not measured    Nutrition Risk Level:  Moderate    Nutrition Needs:  · Estimated Daily Total Kcal: 5905-8883 (MSJ REE 1183 x 1.3 SF)  · Estimated Daily Protein (g): 75-85(1.3-1.5 g/kg )  · Estimated Daily Fluid (ml/day): per critical care     Nutrition Diagnosis:   · Problem: Severe malnutrition, In context of chronic illness  · Etiology: related to Insufficient energy/nutrient consumption(2/2 Parkinson's & MS )     Signs and symptoms:  as evidenced by Severe muscle loss, Severe loss of subcutaneous fat    Objective Information:  · Nutrition-Focused Physical Findings: pt lethargic on bipap, abd distention PTA s/p ex lap, new ileowstomy RLQ, active BS, +I/O's, trace edema, NGT in place      · Wound Type: Surgical Wound(abdomen )     · Current Nutrition Therapies:  · Oral Diet Orders: NPO   · Tube Feeding (TF) Orders:   · Feeding Route: Nasogastric(TF off just had bronch )  · Formula: 1.5 Calorie without Fiber  · Rate (ml/hr):40 ml/hr    · Volume (ml/day): 960 ml tv   · Duration: Continuous  · Water Flushes: N/A  · Goal TF & Flush Orders Provides: 1440 kcals, 60 gm pro, 732 ml free water      · Anthropometric Measures:  · Ht: 5' 6\" (167.6 cm)   · Current Body Wt: 134 lb (60.8 kg)(1/2 actual )  · Admission Body Wt: 126 lb (57.2 kg)(12/30 first measured )  · Usual Body Wt: (UTO no EMR hx on file)  · Weight Change: CBW elevated since admit +fluids at this time.  Unable to assess PTA hx    · Ideal Body Wt: 130 lb (59 kg), % Marilla Body 97%(using adm wt )  · BMI Classification: BMI 18.5 - 24.9 Normal Weight(using adm wt 20.3)    Nutrition Interventions:   Continued Inpatient Monitoring, Education not appropriate at this time(Pt status d/w RN - plans to resume TF later today )    Nutrition Evaluation:   · Evaluation: Goals set   · Goals: Resume TF, tolerate at goal rate    · Monitoring: Nutrition Progression, TF Tolerance, TF Intake, Skin Integrity, Wound Healing, Mental Status/Confusion, Monitor Bowel Function, Weight, Pertinent Labs, Monitor Hemodynamic Status      Electronically signed by Darleen Jimenez RD, LD on 1/2/20 at 2:24 PM    Contact Number: Ext [phone]

## 2020-01-02 NOTE — PROGRESS NOTES
POWER PICC LINE  Placement 1/2/2020    Product number: [id number]   Lot Number: [id number]      Ultrasound: YES WITH VPS   Anatomy; iv placement site: RIGHT BASILIC VEIN      Upper Arm Circumference: 21 CM    Size: 5 FR DL    Exposed Length: 3 CM    Internal Length: 38 CM   Cut: 9 CM   Vein Measurement: 0.55 CM    Nelli Gandara  1/2/2020  11:28 AM  PICC PLACED PER Jacinda Monge RN. Jacklyn Encarnacion PICC PLACED WITH VPS TIP CONFIRMATION. . TIP IN LOWER 1/3 SVC/CAJ.

## 2020-01-02 NOTE — PROGRESS NOTES
[facility] Quality Flow/Interdisciplinary Rounds Progress Note        Quality Flow Rounds held on January 2, 2020    Disciplines Attending:  Bedside Nurse, , , Nursing Unit Leadership, Physical Therapy, Occupational Therapy and ICU team     Socorro De La Torre was admitted on 12/29/2019  9:30 AM    Anticipated Discharge Date:  Expected Discharge Date: 12/04/21    Disposition:    Zay Score:  Zay Scale Score: 14    Readmission Risk              Risk of Unplanned Readmission:        15           Discussed patient goal for the day, patient clinical progression, and barriers to discharge. The following Goal(s) of the Day/Commitment(s) have been identified:  Bronch today.        Larson Gave  January 2, 2020

## 2020-01-02 NOTE — ANESTHESIA PRE PROCEDURE
times daily    Historical Provider, MD       Current medications:    Current Facility-Administered Medications   Medication Dose Route Frequency Provider Last Rate Last Dose    piperacillin-tazobactam (ZOSYN) 3.375 g in dextrose 5 % 100 mL IVPB extended infusion (mini-bag)  3.375 g Intravenous Q8H Elvia Ahumada, MD   Stopped at 01/02/20 1228    nystatin (MYCOSTATIN) 089349 UNIT/ML suspension 500,000 Units  5 mL Oral 4x Daily Elvia Ahumada, MD   500,000 Units at 01/02/20 0847    perflutren lipid microspheres (DEFINITY) injection 1.65 mg  1.5 mL Intravenous ONCE PRN Elvia Ahumada, MD        sodium chloride flush 0.9 % injection 10 mL  10 mL Intravenous PRN Elvia Ahumada, MD        labetalol (NORMODYNE;TRANDATE) injection 10 mg  10 mg Intravenous Q6H PRN Tiffanie Coates MD   10 mg at 01/02/20 0271    carbidopa-levodopa (SINEMET)  MG per tablet 2.5 tablet  2.5 tablet Oral 5x Daily Elvia Ahumada, MD   2.5 tablet at 01/02/20 1150    carbidopa-levodopa (SINEMET)  MG per tablet 2 tablet  2 tablet Oral Nightly Elvia Ahumada, MD   2 tablet at 01/01/20 2209    benzocaine (HURRICAINE) 20 % oral spray   Mouth/Throat 4x Daily PRN Elvia Ahumada, MD        albuterol (PROVENTIL) nebulizer solution 2.5 mg  2.5 mg Nebulization 4x daily Elvia Ahumada, MD   2.5 mg at 01/02/20 1142    morphine (PF) injection 1 mg  1 mg Intravenous Q4H PRN Jero Olivares MD   1 mg at 01/01/20 1538    melatonin tablet 6 mg  6 mg Oral Nightly PRN Elisabeth Henrietta Osuna MD   6 mg at 01/01/20 2208    traZODone (DESYREL) tablet 50 mg  50 mg Oral Nightly Elisabeth Thy Jagdish Osuna MD   50 mg at 01/01/20 2209    pantoprazole (PROTONIX) injection 40 mg  40 mg Intravenous Daily Belle Phi, DO   40 mg at 01/02/20 1761    And    sodium chloride (PF) 0.9 % injection 10 mL  10 mL Intravenous Daily Nancylee Pride Verdone, DO   10 mL at 01/02/20 0831    acetaminophen (TYLENOL) 160 MG/5ML solution 650 mg  650 mg Per NG tube Q6H PRN Elisabeth Henrietta Osuna MD   650 mg at 12/31/19 6953  sodium chloride flush 0.9 % injection 10 mL  10 mL Intravenous 2 times per day [place] Root, DO   10 mL at 01/02/20 0828    sodium chloride flush 0.9 % injection 10 mL  10 mL Intravenous PRN Karen Root, DO   10 mL at 01/01/20 0430    enoxaparin (LOVENOX) injection 40 mg  40 mg Subcutaneous Daily Karen Root, DO   40 mg at 01/02/20 0850    levetiracetam (KEPPRA) 1000 mg/100 mL IVPB  1,000 mg Intravenous Q12H Karen Root, DO   Stopped at 01/02/20 5243    lidocaine (XYLOCAINE) 2 % jelly   Topical Once Karen Root, DO           Allergies: Allergies   Allergen Reactions    Sulfa Antibiotics Rash       Problem List:    Patient Active Problem List   Diagnosis Code    Hypotension due to hypovolemia I95.89, E86.1    MS (multiple sclerosis) ([address] G35    Parkinson disease ([address] G20    Hypotension I95.9    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    Fatigue R53.83    Ischemic colitis ([address] K55.9    Septic shock ([address] A41.9, R65.21       Past Medical History:        Diagnosis Date    Arthritis     Depression     Falls frequently     multi system atrophy per family    Hypertension     Parkinson's disease ([address]     Seizure ([address]        Past Surgical History:        Procedure Laterality Date    APPENDECTOMY      SMALL INTESTINE SURGERY N/A 12/30/2019    EXPLORATORY LAPAROTOMY, SUBTOTAL COLECTOMY , ILEOSTOMY performed by Latasha Dougherty MD at [facility]      gyneological       Social History:    Social History     Tobacco Use    Smoking status: Never Smoker    Smokeless tobacco: Never Used   Substance Use Topics    Alcohol use:  No                                Counseling given: Not Answered      Vital Signs (Current):   Vitals:    01/02/20 0900 01/02/20 1000 01/02/20 1100 01/02/20 1200   BP: (!) 174/91 (!) 164/99 (!) 161/103 (!) 175/88   Pulse: 104 107 109 111   Resp: (!) 34 (!) 33 (!) 32 (!) 31   Temp:    99 °F (37.2 °C)   TempSrc:

## 2020-01-02 NOTE — PROGRESS NOTES
Physical Therapy    PT consult to evaluate/treat received and appreciated. Pt chart reviewed and evaluation attempted. Pt having bedside procedure at time of attempt. Will check back as able. Thank you.         Manuel Brooks, PT, DPT   [id number]

## 2020-01-02 NOTE — PROGRESS NOTES
Subjective:  On BiPAP nodding yes and no to questions no CP or SOB  No fever or chills   No uncontrolled pain  No vomiting or diarrhea   No family bedside     Objective:    BP (!) 161/103   Pulse 109   Temp 97.7 °F (36.5 °C) (Axillary)   Resp (!) 32   Ht 5' 6\" (1.676 m)   Wt 134 lb 7.7 oz (61 kg)   SpO2 96%   BMI 21.71 kg/m²     24HR INTAKE/OUTPUT:      Intake/Output Summary (Last 24 hours) at 1/2/2020 1151  Last data filed at 1/2/2020 1122  Gross per 24 hour   Intake 2668 ml   Output 2600 ml   Net 68 ml     On BiPAP NG present  Heart:  RRR, no murmurs, gallops, or rubs. Lungs: Bilateral rhonchi rhonchi  Abd: bowel sounds present, nontender, nondistended, no masses  Extrem:  No clubbing, cyanosis, or edema  Weak x4 extremities  Most Recent Labs  Lab Results   Component Value Date    WBC 9.9 01/02/2020    HGB 8.2 (L) 01/02/2020    HCT 27.2 (L) 01/02/2020     (L) 01/02/2020     (H) 01/02/2020    K 3.5 01/02/2020     (H) 01/02/2020    CREATININE 0.6 01/02/2020    BUN 31 (H) 01/02/2020    CO2 25 01/02/2020    GLUCOSE 108 (H) 01/02/2020    ALT <5 01/02/2020    AST 36 (H) 01/02/2020    INR 1.1 12/30/2019    TSH 1.950 12/29/2019     Recent Labs     01/02/20  0425   MG 2.5     Lab Results   Component Value Date    CALCIUM 8.8 01/02/2020    PHOS 1.8 (L) 01/02/2020        XR CHEST PORTABLE   Final Result   Worsening CHF with edema. Suggestion of free intraperitoneal air better seen in the previous   examination and could be related to recent surgery. Clinical   assessment is recommended. ALERT:  THIS IS AN ABNORMAL REPORT            CT HEAD WO CONTRAST   Final Result      NO ACUTE INTRACRANIAL PROCESS         XR CHEST PORTABLE   Final Result      Right upper lung patchy infiltrate about the right minor fissure. Cardiomegaly      Nasogastric tube in satisfactory position with the tip not well seen   but appears to be in the distal stomach.          XR Chest Abdomen Ng Placement   Final tree, to be reevaluated   relieve of the megacolon pattern which cause significant mass effect   throughout the entire abdomen peritoneal cavity. .      5. Mild ascites in the abdomen. 6. No free intraperitoneal air observed. XR CHEST PORTABLE   Final Result      Gaseous distention of a loop of bowel within the left upper abdomen   may be stomach or colon. Consider further workup with computed   tomography for better characterization. Bilateral pleural effusions with contiguous atelectasis. Endotracheal tube is present with the distal tip 5 cm above the   adriano. Nasogastric tube follows the expected contours of the esophagus into   stomach with distal sideholes overlying the left upper quadrant of the   abdomen. Esophageal manometry device appears present terminating at the level   of the esophagogastric junction. XR ABDOMEN FOR NG/OG/NE TUBE PLACEMENT   Final Result      Gaseous distention of a loop of bowel within the left upper abdomen   may be stomach or colon. Consider further workup with computed   tomography for better characterization. Bilateral pleural effusions with contiguous atelectasis. Endotracheal tube is present with the distal tip 5 cm above the   adriano. Nasogastric tube follows the expected contours of the esophagus into   stomach with distal sideholes overlying the left upper quadrant of the   abdomen. Esophageal manometry device appears present terminating at the level   of the esophagogastric junction. XR ABDOMEN (KUB) (SINGLE AP VIEW)   Final Result   Right hemicolonic fecal distention with functional outflow obstruction   of the distal small bowel. The transverse colon is distended with gas,   raising questions regarding possible \"institutional bowel\" or chronic   constipation. Limited radiographic appearance does not suggest small bowel   obstruction. XR CHEST PORTABLE   Final Result   Left basilar opacity. Differential includes atelectasis, infection,   and/or layering pleural effusion. Assessment    Principal Problem:    Septic shock ([address]  Active Problems:    Parkinson disease ([address]    Hypotension    Multiple system atrophy ([address]    Fatigue    Ischemic colitis ([address]  Resolved Problems:    * No resolved hospital problems.  *      Plan:  62 F history of multisystem atrophy, hypotension, seizure disorder Adm to ICU w ischemic colitis septic shock status post Exploratory laparotomy with subtotal colectomy and end-ileostomy 12/30, bronchoscopy today    Extubated on BiPAP today  NG for dysphagia  Wean per Pulm [facility]  IV vasopressors-- off  IV fluid   IV antibiotics--Zosyn for suspected pneumonia/  Monitor labs closely speech therapy eval  Pulmonary critical care consult appreciated  General surgery consult appreciated        Electronically signed by Hugo Calvo MD on 1/2/2020 at 11:51 AM

## 2020-01-02 NOTE — PROGRESS NOTES
12/29/2019 24 Hours- no growth  Preliminary     Respiratory cultures No results found for: RESPCULTURE   Gram Stain Result   Date Value Ref Range Status   12/30/2019 Refer to ordered Gram stain for results  Final     Urine   Urine Culture, Routine   Date Value Ref Range Status   12/29/2019 >100,000 CFU/ml  Corrected     Legionella No results found for: LABLEGI  C Diff PCR No results found for: CDIFPCR  Wound culture/abscess: No results for input(s): WNDABS in the last 72 hours. Tip culture:No results for input(s): CXCATHTIP in the last 72 hours. Oxygen:     Vent Information  $Ventilation: $Subsequent Day  Equipment ID: [id number]  Vent Type: 980  Vent Mode: PS  Vt Ordered: 0 mL  Rate Set: 0 bmp  Peak Flow: 0 L/min  Pressure Support: 10 cmH20  FiO2 : 40 %  Sensitivity: 2  PEEP/CPAP: 5  I Time/ I Time %: 0 s  Cuff Pressure (cm H2O): 29 cm H2O  Humidification Source: Heated wire  Humidification Temp: 37  Humidification Temp Measured: 37  Circuit Condensation: Drained  Additional Respiratory  Assessments  Pulse: 102  Resp: 20  SpO2: 100 %  pCO2 (TCOM, mmHg): 35 mmHg  Position: Semi-Vyas's  Humidification Source: Heated wire  Humidification Temp: 37  Circuit Condensation: Drained  Oral Care: Mouth swabbed, Mouth suctioned  Subglottic Suction Done?: Yes  Airway Type: ET  Airway Size: 7.5  Measured From: Lips  Cuff Pressure (cm H2O): 29 cm H2O  Swallow: Other (Comment)(too weak to swallow)     Nasal cannula L/min  4   Face mask %     Reservoirs mask %         Lines:  Site  Day  Date inserted     TLC   R fem      12/29     PICC              Arterial line   R radial      12/29     Peripheral line              HD cath            Urethral Catheter 16 fr-Output (mL): 300 mL    Imaging Studies:  CT HEAD WO CONTRAST   Final Result      NO ACUTE INTRACRANIAL PROCESS         XR CHEST PORTABLE   Final Result      Right upper lung patchy infiltrate about the right minor fissure.       Cardiomegaly      Nasogastric tube in satisfactory position with the tip not well seen   but appears to be in the distal stomach. XR Chest Abdomen Ng Placement   Final Result   Enteric catheter with the tip projecting over the expected region of   the stomach. Pneumoperitoneum which may be normal given the patient's history of   recent abdominal surgery. XR ABDOMEN FOR NG/OG/NE TUBE PLACEMENT   Final Result   Addendum 1 of 1   Consider CT scan if better assessment of the abdomen as clinically   indicated. Final      XR CHEST PORTABLE   Final Result   NG tube reaches the stomach               XR CHEST PORTABLE   Final Result   Partial clearing of bibasilar infiltrates and left effusion               XR CHEST PORTABLE   Final Result   Stable abnormal chest with  improving CHF. Superimposed pneumonia is   not excluded. CT CHEST W CONTRAST   Final Result   1. Atelectasis consolidation of both lower lobes with patent central   airways. Findings to be correlated clinically. Can be relate with the   hypoventilation of the lower lung bases. Cannot exclude a pneumonia. 2. Discrete patchy bronchocentric infiltrate in the left upper lobe   lingular segment. 3. No acute pulmonary emboli seen although there are some limitations   for evaluation of the lower lobes and motion artifacts. 4. No aneurysm formation dissection thoracic aorta. 5. Discrete to mild pericardial effusion. CT ABDOMEN PELVIS W IV CONTRAST Additional Contrast? None   Final Result   1. Findings for severe constipation with a large amount of fecal   contents/rotation/impaction from the sigmoid colon to the rectum,   causing a pattern of megacolon with marked fluid/air distentions or   more proximal segments of the colon. 2. Consider decompression of the rectum/sigmoid to permit relieve of   the more proximal segments of the colon.       3. Marked: Distention can explain hypoventilation lower lung bases   causing atelectasis or Limited radiographic appearance does not suggest small bowel   obstruction. XR CHEST PORTABLE   Final Result   Left basilar opacity. Differential includes atelectasis, infection,   and/or layering pleural effusion. Resident's Assessment and Plan     57-year-old female with a past medical history of seizure disorder, multiple systemic sclerosis, arthritis, hypotension presented to the ED after her  noted her blood pressure to be lower than usual and generalized weakness worse in the lower extremities.  Patient is a poor historian.      Patient was diagnosed with urinary tract infection about 10 days ago and she was treated with a course of Rocephin.      Neurologic  Seizure disorder  · Resume home dose Keppra 1000mg BID     Parkinson's Disease  · Resume Sinemet    Insomnia  · Resumed home dose trazodone, QTc on repeat EKG wnl  · Melatonin 6mg qhs prn     Pulmonary   Acute hypoxic respiratory failure, resolved  · 2/2 aspiration PNA  · Intubated 12/29, extubated 12/31  · CT chest w/ bilateral lower lobe infiltrates and effusion  · Discontinue Zosyn     Cardiovascular  Septic Shock, resolved  · likely 2/2 to UTI vs aspiration PNA vs ischemic bowel  · UA positive for leukocyte esterase nitrites, bacteriuria, WBC 5-10  · Discontinue Zosyn  · Off Levophed  · Hold home dose midodrine d/t HTN  · Continue fludrocortisone 0.1mg BID     Gastrointestinal  Ischemic Colitis   · CT abdomen with distended large intestine, large amounts of feces from rectum to proximal sigmoid colon  · S/p ex lap with subtotal colectomy, ischemic sigmoid colon, but no perforations  · Monitor H/H q12h for Hb and transfuse prn  · Lomotil if ostomy output > 1.5L qd  · Morphine 1mg q4h prn for pain  · Maintenance IV LR 50cc/hr  · Continue to hold Lovenox   · OK for NGT meds and TF     Hyperammonemia  · Likely 2/2 fecal impaction/constipation  · Trending down     Infectious Disease  Aspiration PNA  · procalcitonin 3.15  · CT chest and CXR w/ bilateral lower lobe infiltrates  · Discontinue Zosyn   · F/u respiratory culture     UTI  · UA w/ pyuria, bacteriuria and +LE  · UCx with coag neg staph > 100k CFU, likely contamination  · Discontinue Zosyn     Genitourinary/Renal  Hypermagnesemia  · Mg 7.0, on mag oxide at home  · DTRs remain intact  · Hold all magnesium containing medications/supplementation  · F/u repeat mag, trending down     Hematology/Oncology  Normocytic Hypochromic Anemia  · Likely multifactorial d/t dilution, blood loss  · Baseline Hb 11-12  · H/H q12h  · Transfuse if Hb < 7        # Peptic ulcer prophylaxis: Protonix  # DVT Prophylaxis: Lovenox held  # Disposition: Cont current care     Elisabeth Henrietta Maci Styles M.D., PGY-1     Attending Physician: Yolette Linder   Department of Internal Medicine  Division of Pulmonary, Critical Care & Sleep Medicine  ICU Attending    This patient has a high probability of sudden clinically significant deterioration, which requires the highest level of physician preparedness to intervene urgently. I managed/supervised life or organ supporting interventions that required frequent physician assessment. I devoted my full attention to the direct care of this patient for the period of time indicated below. Time I spent with family of surrogate(s) is included only if the patient was incapable of providing the necessary information or participating in medical decision making. Time devoted to teaching and to any procedure. CCT 41 minutes    Poor airway clearance from Muscle disease  Bronchoscopy today    In addition Nella Estrada was seen and examined. All imaging was independently reviewed. Patient discussed during comprehensive ICU rounds. Medications and testing was reviewed. Above findings corroborated, plan discussed and where needed changes made.

## 2020-01-02 NOTE — OP NOTE
[place]  Department of Pulmonary, Critical Care and Sleep Medicine  [address] [facility]  Department of Internal Medicine  Bronchoscopy Note     DATE OF PROCEDURE: 1/2/2020    INDICATIONS & HISTORY:  Kimberly Streeter is a 62 y.o. female with chronic NM disease with respiratory failure and inability to clear secretions. The risks, benefits, complications, treatment options and expected outcomes were discussed with the patient and appropriate care givers. The possibilities of reaction to medication, pulmonary aspiration, perforation of an organ, bleeding, failure to diagnose a condition and creating a complication requiring transfusion or operation were discussed with the patient/POA who freely signed the informed consent. PREOPERATIVE DIAGNOSIS:    [] Lung Nodule,  [] Lung Cancer Evaluation,  [] Adenopathy,  [x] Respiratory Failure,   [x] Mucous Plug,  [x] Abnormal Imaging CXR/CT scan   [] Diffuse lung disease/ARDS    POSTOPERATIVE DIAGNOSES:  [x] Diffuse airway secretions tan green,   [x] Normal Anatomy,  [x] Diffuse Tracheobronchitis    PROCEDURE PERFORMED:   [x] Fiberoptic Bronchoscopy,   [x] Bronchial Wash      SURGEON:    Charles Turcios DO, MPH, FCCP, FACP, FACOI     Valeri  ASSISTANT:    Bronchoscopy Nursing, ICU Team    SEDATION:  []  General Anesthesia [] Regional Anesthesia [x] ICU Protocol Team,       ANESTHESIA:     [] Lidocaine 2% via intranasal instillation,    [] Epinephrine 1:10,000 diluted Endobronchial administration   [x] LMAC     ANESTHESIOLOGIST:  Per [address] [place] [address] Rd /    SPECIMENS:  [x] Bronchial wash sent for:   [] Cytology   [x] Acid-fast bacillus smear and culture   [x] Fungal smear & culture   [x] Gram stain, C&S    ESTIMATED BLOOD LOSS: Minimal    FINDINGS:     1. Normal nasal passages, posterior pharynx, & vocal cords. 2. Diffuse tracheobronchitis  3. Diffuse airway edema without endobronchial masses/lesions.   4. Endobronchial edema is MILD with irregularities from secretions. DESCRIPTION OF PROCEDURE:  After confirming informed consent a time out was held and the above information confirmed. Robyne Needles was administration of appropriate sedation. After an adequate level of sedation was achieved an Olympus fiberoptic bronchoscopy was inserted into the oral airway device and upper airway was reviewed and VC were normal but she does not clear secretions well. We then advance the scopy into the trachea. No Lidocaine was used topically within the airway. Careful inspection of the tracheal lumen was accomplished. Upon underling the trachea - diffuse tan secretion were noted and that plus the distal sub segments were cleared from secretions. We sent the tracheal wash for cultures. COMPLICATIONS:   Severe Desaturations     IMPRESSIONS:   [x]  Airway examination with diffuse tracheobronchitis and weak airway clearance  [x]  No signs of diffuse alveolar damage or hemorrhage. RECOMMENDATIONS:   [x]  Adjust airway care  [x]  Await final test/sample results.       Charles Turcios DO, MPH, Blaine Catalan  Professor of [address]  1/2/2020

## 2020-01-03 NOTE — PATIENT CARE CONFERENCE
[facility] Quality Flow/Interdisciplinary Rounds Progress Note        Quality Flow Rounds held on January 3, 2020    Disciplines Attending:  , pt/ot, resp therapy, bedside nurse, charge nurse    Marisol Nick was admitted on 12/29/2019  9:30 AM    Anticipated Discharge Date:  Expected Discharge Date: 12/04/21    Disposition:    Zay Score:  Zay Scale Score: 11    Readmission Risk              Risk of Unplanned Readmission:        16           Discussed patient goal for the day, patient clinical progression, and barriers to discharge. The following Goal(s) of the Day/Commitment(s) have been identified:  Continue to support resp status as indicated.       Catarino Davis  January 3, 2020

## 2020-01-03 NOTE — CONSULTS
Margaree Backbone, DO   40 mg at 01/03/20 0971    And    sodium chloride (PF) 0.9 % injection 10 mL  10 mL Intravenous Daily Margaree Backbone, DO   10 mL at 01/03/20 4980    acetaminophen (TYLENOL) 160 MG/5ML solution 650 mg  650 mg Per NG tube Q6H PRN Elisabeth Thy Khoa Schreiber MD   650 mg at 12/31/19 2226    sodium chloride flush 0.9 % injection 10 mL  10 mL Intravenous 2 times per day Margaree Backbone, DO   10 mL at 01/03/20 0825    sodium chloride flush 0.9 % injection 10 mL  10 mL Intravenous PRN Margaree Backbone, DO   10 mL at 01/02/20 1837    enoxaparin (LOVENOX) injection 40 mg  40 mg Subcutaneous Daily Margaree Backbone, DO   40 mg at 01/03/20 0820    levetiracetam (KEPPRA) 1000 mg/100 mL IVPB  1,000 mg Intravenous Q12H Margaree Backbone, DO   Stopped at 01/03/20 0749    lidocaine (XYLOCAINE) 2 % jelly   Topical Once Margaree Backbone, DO           Allergies   Allergen Reactions    Sulfa Antibiotics Rash       Surgical History  Past Surgical History:   Procedure Laterality Date    APPENDECTOMY      BRONCHOSCOPY N/A 1/2/2020    BRONCHOSCOPY DIAGNOSTIC OR CELL [address] ONLY performed by Louanne Olszewski, DO at [address] [place] [address] N/A 12/30/2019    EXPLORATORY LAPAROTOMY, SUBTOTAL COLECTOMY , ILEOSTOMY performed by Nathaniel Rausch MD at [address] [facility]      gyneological        Social History  Social History     Socioeconomic History    Marital status:    Tobacco Use    Smoking status: Never Smoker    Smokeless tobacco: Never Used   Substance and Sexual Activity    Alcohol use: No    Drug use: No       Family Medical History  Family History   Problem Relation Age of Onset    Arthritis Other     Cancer Other     Depression Other     Heart Disease Other     Hypertension Other     Kidney Disease Other     Stroke Other        Review of Systems:  Unable to obtain due to patient's clinical condition being intubated and

## 2020-01-03 NOTE — PROGRESS NOTES
Subjective: Intubated sedated mechanically ventilated   Objective:    BP (!) 146/83   Pulse 108   Temp 100.2 °F (37.9 °C)   Resp 20   Ht 5' 6\" (1.676 m)   Wt 130 lb 1.1 oz (59 kg)   SpO2 95%   BMI 20.99 kg/m²     24HR INTAKE/OUTPUT:      Intake/Output Summary (Last 24 hours) at 1/3/2020 1208  Last data filed at 1/3/2020 1102  Gross per 24 hour   Intake 613 ml   Output 3260 ml   Net -2647 ml     Intubated sedated mechanically ventilated   Heart:  RRR, no murmurs, gallops, or rubs. Lungs: Bilateral rhonchi rhonchi  Abd: bowel sounds present, nontender, nondistended, no masses  Extrem:  No clubbing, cyanosis, or edema  Weak x4 extremities  Most Recent Labs  Lab Results   Component Value Date    WBC 10.2 01/03/2020    HGB 8.2 (L) 01/03/2020    HCT 27.3 (L) 01/03/2020     (L) 01/03/2020     (H) 01/03/2020    K 3.7 01/03/2020     (H) 01/03/2020    CREATININE 0.6 01/03/2020    BUN 31 (H) 01/03/2020    CO2 27 01/03/2020    GLUCOSE 132 (H) 01/03/2020    ALT <5 01/03/2020    AST 26 01/03/2020    INR 1.1 12/30/2019    TSH 1.950 12/29/2019     Recent Labs     01/03/20  0510   MG 2.0     Lab Results   Component Value Date    CALCIUM 9.2 01/03/2020    PHOS 2.6 01/03/2020        XR CHEST PORTABLE   Final Result   Progressive diffuse bilateral infiltrates and pleural effusion with a   thick consolidation in the right upper lobe. CHF with edema and/or   diffuse pneumonia considered. Gastric distention with a nasogastric tube in place. XR CHEST PORTABLE   Final Result   Partial clearing of right lower lobe atelectasis/infiltrate               XR CHEST PORTABLE   Final Result   Worsening CHF with edema. Suggestion of free intraperitoneal air better seen in the previous   examination and could be related to recent surgery. Clinical   assessment is recommended.       ALERT:  THIS IS AN ABNORMAL REPORT            CT HEAD WO CONTRAST   Final Result      NO ACUTE INTRACRANIAL PROCESS XR CHEST PORTABLE   Final Result      Right upper lung patchy infiltrate about the right minor fissure. Cardiomegaly      Nasogastric tube in satisfactory position with the tip not well seen   but appears to be in the distal stomach. XR Chest Abdomen Ng Placement   Final Result   Enteric catheter with the tip projecting over the expected region of   the stomach. Pneumoperitoneum which may be normal given the patient's history of   recent abdominal surgery. XR ABDOMEN FOR NG/OG/NE TUBE PLACEMENT   Final Result   Addendum 1 of 1   Consider CT scan if better assessment of the abdomen as clinically   indicated. Final      XR CHEST PORTABLE   Final Result   NG tube reaches the stomach               XR CHEST PORTABLE   Final Result   Partial clearing of bibasilar infiltrates and left effusion               XR CHEST PORTABLE   Final Result   Stable abnormal chest with  improving CHF. Superimposed pneumonia is   not excluded. CT CHEST W CONTRAST   Final Result   1. Atelectasis consolidation of both lower lobes with patent central   airways. Findings to be correlated clinically. Can be relate with the   hypoventilation of the lower lung bases. Cannot exclude a pneumonia. 2. Discrete patchy bronchocentric infiltrate in the left upper lobe   lingular segment. 3. No acute pulmonary emboli seen although there are some limitations   for evaluation of the lower lobes and motion artifacts. 4. No aneurysm formation dissection thoracic aorta. 5. Discrete to mild pericardial effusion. CT ABDOMEN PELVIS W IV CONTRAST Additional Contrast? None   Final Result   1. Findings for severe constipation with a large amount of fecal   contents/rotation/impaction from the sigmoid colon to the rectum,   causing a pattern of megacolon with marked fluid/air distentions or   more proximal segments of the colon.       2. Consider decompression of the rectum/sigmoid to permit relieve of bowel. The transverse colon is distended with gas,   raising questions regarding possible \"institutional bowel\" or chronic   constipation. Limited radiographic appearance does not suggest small bowel   obstruction. XR CHEST PORTABLE   Final Result   Left basilar opacity. Differential includes atelectasis, infection,   and/or layering pleural effusion. XR CHEST PORTABLE    (Results Pending)   XR CHEST PORTABLE    (Results Pending)   XR CHEST PORTABLE    (Results Pending)       Assessment    Principal Problem:    Septic shock (HCC)  Active Problems:    Parkinson disease ([address]    Hypotension    Multiple system atrophy (HCC)    Fatigue    Ischemic colitis (HCC)    Severe protein-calorie malnutrition ([address]    Acute respiratory failure with hypoxia and hypercapnia (HCC)  Resolved Problems:    * No resolved hospital problems.  *      Plan:  62 F history of multisystem atrophy, hypotension, seizure disorder Adm to ICU w ischemic colitis septic shock status post Exploratory laparotomy with subtotal colectomy and end-ileostomy 12/30, bronchoscopy 1/3--intubated today for worsening respiratory failure    Intubated sedated mechanically ventilated   Wean per Pulm [facility]  IV vasopressors-- off  IV fluid   IV antibiotics--Zosyn for suspected pneumonia  Monitor labs closely speech therapy eval  Pulmonary critical care consult appreciated  General surgery consult appreciated  Palliative care consult      Electronically signed by Chelsi Rhodes MD on 1/3/2020 at 12:08 PM

## 2020-01-03 NOTE — PROCEDURES
Date: 1/3/2020  Time: 1050  Patient identity confirmed:  Yes  Indications: acute hypoxic hypercapnic respiratory failure  Sedation Standing Order Used: Yes  Preoxygenation: BVM with 100% O2  Laryngoscope size and type Glidescope  Airway introducer used: No  Evac: No  Tube size:a 7.0 cuffed  Number of attempts:1   Cords visualized:  [x] Clearly  [] Poorly  Breath sounds present bilaterally: Yes   ETCO2 36 mmhg  ETT to lip: 23  Chest x-ray ordered: Yes  Difficulties encountered:     Difficult Airway: No    Performed by: Danie Melagr M.D, PGY 3    Attending: Dr. Andreas Murillo    The procedure was supervised by Dr. Andreas Murillo who was present for the entire time of the procedure. [place]  Department of Pulmonary, Critical Care and Sleep Medicine  [facility]  Department of Internal Medicine  Attending Procedural Note Addendum Statement    This procedure was supervised. The critical elements of this procedure was monitored and if needed I was available for the needs of the procedure.      Hayes Brown DO, Neila Alf  Director, [facility]  Professor of Medicine

## 2020-01-03 NOTE — PROGRESS NOTES
discussed with Patient  The Patient understand the diagnosis, prognosis and plan of care. The admitting diagnosis and active problem list, as listed below have been reviewed prior to initiation of this evaluation.         ACTIVE PROBLEM LIST:   Patient Active Problem List   Diagnosis    Hypotension due to hypovolemia    MS (multiple sclerosis) (HCC)    Parkinson disease (HCC)    Hypotension    Multiple system atrophy ([address]    Fatigue    Ischemic colitis ([address]    Septic shock (HCC)    Severe protein-calorie malnutrition (HCC)    Acute respiratory failure with hypoxia and hypercapnia (HCC)

## 2020-01-03 NOTE — PROGRESS NOTES
Pt is more alert at this time; opens eyes to name and follows some simple commands. Not answering any questions or speaking at this time. Bipap remains on. Will continue to monitor.

## 2020-01-03 NOTE — PROGRESS NOTES
[address] [facility]   Department of Internal Medicine   Internal Medicine Residency  MICU Progress Note    Patient:  Gisel Gil 62 y.o. female   MRN: [id number]       Date of Service: 1/3/2020    Allergy: Sulfa antibiotics    Subjective     Patient was seen and examined this morning at bedside in no acute distress. 24 hour change: s/p bronchoscopy yesterday with diffuse secretions noted. Overnight, patient lethargic, placed on AVAPS d/t hypercapnea. Improved mentation this AM. No other acute issues at this time. Objective     TEMPERATURE:  Current - Temp: 101.4 °F (38.6 °C); Max - Temp  Av.2 °F (37.3 °C)  Min: 97.7 °F (36.5 °C)  Max: 101.4 °F (38.6 °C)  RESPIRATIONS RANGE: Resp  Av  Min: 21  Max: 42  PULSE RANGE: Pulse  Av.5  Min: 94  Max: 120  BLOOD PRESSURE RANGE:  Systolic (45VKE), KZT:595 , Min:107 , EOA:503   ; Diastolic (83GTV), OJ, Min:76, Max:103    PULSE OXIMETRY RANGE: SpO2  Av.1 %  Min: 88 %  Max: 100 %    I & O - 24hr:    Intake/Output Summary (Last 24 hours) at 1/3/2020 0400  Last data filed at 1/3/2020 0200  Gross per 24 hour   Intake 1691 ml   Output 3195 ml   Net -1504 ml     I/O last 3 completed shifts: In: 7527 [I.V.:552; NG/GT:669; IV Piggyback:450]  Out: 1016 [Urine:3005; HJIDH:256] I/O this shift:  In: 20 [I.V.:20]  Out: 250 [Stool:250]   Weight change:     Physical Exam:  General Appearance:    AAOx3, mild distress   HEENT:    NC/AT, mucous membranes are moist   Neck:   Supple, no jugular venous distention. Resp:     CTAB, No wheezes, No rhonchi, no use of accessory muscles   Heart:    RRR, S1 and S2 normal, no murmur, rub or gallop.     Abdomen:     Soft, non-tender, mildly distended, tympanic, surgical incision sites clean and dry with no drainage, colostomy bag with black/green fluid   Extremities:   Atraumatic, no cyanosis or edema   Pulses:  Radial and pedal pulses are intact bilaterally   Neurologic:   DTRs intact, no focal deficits Cultures:      Blood cultures   Blood Culture, Routine   Date Value Ref Range Status   12/29/2019 24 Hours- no growth  Preliminary     Respiratory cultures No results found for: RESPCULTURE   Gram Stain Result   Date Value Ref Range Status   12/30/2019 Refer to ordered Gram stain for results  Final     Urine   Urine Culture, Routine   Date Value Ref Range Status   01/01/2020 Growth not present, incubation continues  Preliminary     Legionella No results found for: LABLEGI  C Diff PCR No results found for: CDIFPCR  Wound culture/abscess: No results for input(s): WNDABS in the last 72 hours. Tip culture:No results for input(s): CXCATHTIP in the last 72 hours. Antibiotic  Days  Day started   Zosyn                             Oxygen:     Vent Information  $Ventilation: $Subsequent Day  Equipment ID: [id number]  Vent Type: 980  Vent Mode: PS  Vt Ordered: (S) 500 mL  Rate Set: 0 bmp  Peak Flow: 0 L/min  Pressure Support: 10 cmH20  FiO2 : 80 %  Sensitivity: 2  PEEP/CPAP: 5  I Time/ I Time %: 0 s  Cuff Pressure (cm H2O): 29 cm H2O  Humidification Source: Heated wire  Humidification Temp: 37  Humidification Temp Measured: 37  Circuit Condensation: Drained  Additional Respiratory  Assessments  Pulse: 101  Resp: 21  SpO2: 100 %  pCO2 (TCOM, mmHg): 35 mmHg  Position: Semi-Vyas's  Humidification Source: Heated wire  Humidification Temp: 37  Circuit Condensation: Drained  Oral Care: Mouth swabbed, Mouth suctioned, Suction toothette, Mouthwash, Lip moisturizer applied  Subglottic Suction Done?: Yes  Airway Type: ET  Airway Size: 7.5  Measured From: Lips  Cuff Pressure (cm H2O): 29 cm H2O  Swallow:  Other (Comment)(too weak to swallow)         Lines:  Site  Day  Date inserted     TLC              PICC   R basilic v      1/2     Arterial line              Peripheral line              HD cath            Urethral Catheter 16 fr-Output (mL): 400 mL    Imaging Studies:  XR CHEST PORTABLE   Final Result   Partial clearing of right lower lobe atelectasis/infiltrate               XR CHEST PORTABLE   Final Result   Worsening CHF with edema. Suggestion of free intraperitoneal air better seen in the previous   examination and could be related to recent surgery. Clinical   assessment is recommended. ALERT:  THIS IS AN ABNORMAL REPORT            CT HEAD WO CONTRAST   Final Result      NO ACUTE INTRACRANIAL PROCESS         XR CHEST PORTABLE   Final Result      Right upper lung patchy infiltrate about the right minor fissure. Cardiomegaly      Nasogastric tube in satisfactory position with the tip not well seen   but appears to be in the distal stomach. XR Chest Abdomen Ng Placement   Final Result   Enteric catheter with the tip projecting over the expected region of   the stomach. Pneumoperitoneum which may be normal given the patient's history of   recent abdominal surgery. XR ABDOMEN FOR NG/OG/NE TUBE PLACEMENT   Final Result   Addendum 1 of 1   Consider CT scan if better assessment of the abdomen as clinically   indicated. Final      XR CHEST PORTABLE   Final Result   NG tube reaches the stomach               XR CHEST PORTABLE   Final Result   Partial clearing of bibasilar infiltrates and left effusion               XR CHEST PORTABLE   Final Result   Stable abnormal chest with  improving CHF. Superimposed pneumonia is   not excluded. CT CHEST W CONTRAST   Final Result   1. Atelectasis consolidation of both lower lobes with patent central   airways. Findings to be correlated clinically. Can be relate with the   hypoventilation of the lower lung bases. Cannot exclude a pneumonia. 2. Discrete patchy bronchocentric infiltrate in the left upper lobe   lingular segment. 3. No acute pulmonary emboli seen although there are some limitations   for evaluation of the lower lobes and motion artifacts. 4. No aneurysm formation dissection thoracic aorta. 5. Discrete to mild pericardial effusion. CT ABDOMEN PELVIS W IV CONTRAST Additional Contrast? None   Final Result   1. Findings for severe constipation with a large amount of fecal   contents/rotation/impaction from the sigmoid colon to the rectum,   causing a pattern of megacolon with marked fluid/air distentions or   more proximal segments of the colon. 2. Consider decompression of the rectum/sigmoid to permit relieve of   the more proximal segments of the colon. 3. Marked: Distention can explain hypoventilation lower lung bases   causing atelectasis or consolidations with overall patent central   airways of both lower lobes. Please correlate clinically. 4. There is no dilatation of the biliary tree, to be reevaluated   relieve of the megacolon pattern which cause significant mass effect   throughout the entire abdomen peritoneal cavity. .      5. Mild ascites in the abdomen. 6. No free intraperitoneal air observed. XR CHEST PORTABLE   Final Result      Gaseous distention of a loop of bowel within the left upper abdomen   may be stomach or colon. Consider further workup with computed   tomography for better characterization. Bilateral pleural effusions with contiguous atelectasis. Endotracheal tube is present with the distal tip 5 cm above the   adriano. Nasogastric tube follows the expected contours of the esophagus into   stomach with distal sideholes overlying the left upper quadrant of the   abdomen. Esophageal manometry device appears present terminating at the level   of the esophagogastric junction. XR ABDOMEN FOR NG/OG/NE TUBE PLACEMENT   Final Result      Gaseous distention of a loop of bowel within the left upper abdomen   may be stomach or colon. Consider further workup with computed   tomography for better characterization. Bilateral pleural effusions with contiguous atelectasis. Endotracheal tube is present with the distal tip 5 cm above the   adriano.        Nasogastric tube follows the expected contours of the esophagus into   stomach with distal sideholes overlying the left upper quadrant of the   abdomen. Esophageal manometry device appears present terminating at the level   of the esophagogastric junction. XR ABDOMEN (KUB) (SINGLE AP VIEW)   Final Result   Right hemicolonic fecal distention with functional outflow obstruction   of the distal small bowel. The transverse colon is distended with gas,   raising questions regarding possible \"institutional bowel\" or chronic   constipation. Limited radiographic appearance does not suggest small bowel   obstruction. XR CHEST PORTABLE   Final Result   Left basilar opacity. Differential includes atelectasis, infection,   and/or layering pleural effusion. XR CHEST PORTABLE    (Results Pending)       Resident's Assessment and Plan     78-year-old female with a past medical history of seizure disorder, multiple systemic sclerosis, arthritis, hypotension presented to the ED after her  noted her blood pressure to be lower than usual and generalized weakness worse in the lower extremities.  Patient is a poor historian.      Patient was diagnosed with urinary tract infection about 10 days ago and she was treated with a course of Rocephin.      Neurologic  Seizure disorder  · Resume home dose Keppra 1000mg BID     Parkinson's Disease  · Resume Sinemet     Insomnia  · Hold home dose trazodone d/t lethargy, QTc on repeat EKG wnl  · Melatonin 6mg qhs prn     Pulmonary   Acute hypoxic hypercapnic respiratory failure  · 2/2 aspiration PNA vs pulmonary edema vs muscular weakness 2/2 Parkinson's  · Intubated 12/29, extubated 12/31  · CT chest w/ bilateral lower lobe infiltrates and effusion  · Continue Zosyn  · Bumex 1mg BID X 2 doses  · S/p bronchoscopy f/u cultures  · Pulmozyme qd     Cardiovascular  Septic Shock, resolved  · likely 2/2 to UTI vs aspiration PNA vs ischemic bowel  · UA positive for leukocyte esterase nitrites, bacteriuria, WBC 5-10  · Discontinue Zosyn  · Off Levophed     Gastrointestinal  Ischemic Colitis s/p subtotal colectomy  · CT abdomen with distended large intestine, large amounts of feces from rectum to proximal sigmoid colon  · S/p ex lap with subtotal colectomy, ischemic sigmoid colon, but no perforations  · Monitor H/H q12h for Hb and transfuse prn  · Lomotil if ostomy output > 1.5L qd  · Morphine 1mg q4h prn for pain  · Maintenance IV LR 50cc/hr  · Continue to hold Lovenox   · OK for NGT meds and TF  · NGT to LIWS d/t gastric distension 2/2 NIV     Hyperammonemia  · Likely 2/2 fecal impaction/constipation  · Trending down     Infectious Disease  Aspiration PNA  · procalcitonin 3.15 on admission, now 1.89  · CT chest and CXR w/ bilateral lower lobe infiltrates  · Continue Zosyn  · F/u respiratory culture     UTI  · UA w/ pyuria, bacteriuria and +LE  · UCx with coag neg staph > 100k CFU, likely contamination     Genitourinary/Renal  Hypermagnesemia, resolved  · Mg 7.0, on mag oxide at home  · DTRs remain intact  · Hold all magnesium containing medications/supplementation  · F/u repeat mag, trending down     Hematology/Oncology  Normocytic Hypochromic Anemia  · Likely multifactorial d/t dilution, blood loss  · Baseline Hb 11-12  · H/H q12h  · Transfuse if Hb < 7        # Peptic ulcer prophylaxis: Protonix  # DVT Prophylaxis: Lovenox held  # Disposition: Cont current care     Elisabeth Henrietta Tameka Rose Styles M.D., PGY-1     Attending Physician: Andrews Garcia  Pulmonary [address] [facility]  Department of Internal Medicine  Division of Pulmonary, Critical Care & Sleep Medicine  ICU Attending    This patient has a high probability of sudden clinically significant deterioration, which requires the highest level of physician preparedness to intervene urgently. I managed/supervised life or organ supporting interventions that required frequent physician assessment.  I devoted my full attention to the direct care of this patient for the period of time indicated below. Time I spent with family of surrogate(s) is included only if the patient was incapable of providing the necessary information or participating in medical decision making. Time devoted to teaching and to any procedure. CCT 41 minutes    In addition Parth Schuster was seen and examined. All imaging was independently reviewed. Patient discussed during comprehensive ICU rounds. Medications and testing was reviewed. Above findings corroborated, plan discussed and where needed changes made.

## 2020-01-03 NOTE — PROGRESS NOTES
[facility]  PROGRESS NOTE  ATTENDING NOTE    Ischemic colitis--s/p ex lap, subtotal colectomy, end ileostomy    Still with productive cough. No c/o pain  Distended this morning, but also on BiPAP and NGT was clamped. BP (!) 146/83   Pulse 108   Temp 100.2 °F (37.9 °C)   Resp 20   Ht 5' 6\" (1.676 m)   Wt 130 lb 1.1 oz (59 kg)   SpO2 95%   BMI 20.99 kg/m²   Physical Exam  Constitutional:       Appearance: Normal appearance. HENT:      Head: Normocephalic and atraumatic. Nose: Nose normal.   Eyes:      Extraocular Movements: Extraocular movements intact. Pupils: Pupils are equal, round, and reactive to light. Cardiovascular:      Rate and Rhythm: Normal rate. Pulmonary:      Effort: Pulmonary effort is normal.      Breath sounds: Rhonchi (bilateral) present. Comments: reintubated this afternoon. Was not intubated when I saw her. Productive cough that she is not able to cough up secretions. On BiPAP  Abdominal:      General: There is distension (mild). Palpations: Abdomen is soft. Tenderness: There is tenderness (mild diffuse). Comments: Ostomy pink, functioning--succus present in bag  Dressing with betadine strike through   Skin:     General: Skin is warm and dry. Neurological:      General: No focal deficit present. Mental Status: She is alert.       Comments: Face is symmetrical  TURNER x 4     ASSESSMENT/PLAN:  Ischemic colitis--s/p STC with end ileostomy  --monitor ostomy output  --keep output to about 1-1.5L/day  --add lomotil if output too much  --ostomy nurse eval  --ok for tube feeds       DVT/GI ppx    Kera Tidwell MD, MSc, FACS  1/3/2020  11:42 AM

## 2020-01-04 NOTE — CONSULTS
Subjective/Events/Discussions:    Patient seen,  and father at bedside. Palliative medicine  also at bedside for conversation. , Natalie Noonan, explains that he was patient's primary caregiver at home. Patient was dependent with all ADLs and required help from her  for dressing, bathing, cooking, cleaning, etc.  hired ancillary help to assist with care for patient at home (5x/ week). Patient was using a walker to get around however she required someone to walk next to her because she was falling so frequently. At this time, Natalie Noonan remains hopeful that patient can be extubated. We discussed code status and  would like to keep patient FULL CODE at this time. Natalie Noonan states that patient has never been through anything like this before and he would like to give her every chance to get better. Patient's father, Honorio Schultz, in agreement with this plan. Natalie Noonan thinks that patient may have a living will/HCPOA and is going to look for it-he is to bring in a copy if found. Natalie Noonan also discussed d/c planning and states he has chosen [facility] for [facility] and then possibly [facility] for rehab after [facility]. Support provided and questions answered. Will continue to follow.      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      gyneological       Family History   Problem Relation Age of Onset    Arthritis Other     Cancer Other     Depression Other     Heart Disease Other     Hypertension Other     Kidney Disease Other     Stroke Other          Allergies   Allergen Reactions    Sulfa Antibiotics Rash       ROS: UNABLE TO ASSESS DUE TO CURRENT CRITICAL CONDITION    Objective:     Physical Exam  BP (!) 151/99   Pulse 98   Temp 99.9 °F (37.7 °C) (Esophageal)   Resp 19   Ht 5' 6\" (1.676 m)   Wt 123 lb 14.4 oz (56.2 kg)   SpO2 96%   BMI 20.00 kg/m²     Gen: Intubated, sedated, thin, ill-appearing  HEENT:  Normocephalic, conjunctiva pink, no drainage, mucosa moist  Neck:  Supple  Lungs:  Intubated, CTA bilaterally, no audible rhonchi or wheezes noted  Heart[de-identified]  RRR, no murmur, rub, or gallop noted during exam  Abd:  Soft, non tender, non distended, BS hypoactive  Ext:  Moves extremities to pain, no edema, pulses present  Skin:  Warm and dry  Neuro:  PERRL, sedated, does not follow commands at this time         Current Medications:  Inpatient medications reviewed: yes  Home Medications reviewed: yes    Results/Verification of Data Review  Objective data reviewed: labs, images, records, medication use, vitals and chart      - Advanced Directives: Living Will and HC-POA   -  - Spiritual assessment: No spiritual distress identified     - Bereavement and grief: to be determined    - Discharge planning: discharge to ECF    - Prognosis: unknown    - Referrals to: none today    Time/Communication  Greater than 51% of time spent, total 50 minutes in counseling and coordination of care at the bedside regarding goals of care, diagnosis and prognosis and see above. Assessment/Plan   1.   Septic shock: UA + on ED, aspiration PNA, ischemic colitis s/p subtotal colectomy, intubated, on IV antibiotics, general surgery and ID following  2. Code: FULL CODE  3. Advanced directives: , Anthony Opitz, legal NOK-will look for living will and HCPOA paperwork  4. Support:  and father very involved in care      [facility] SAEZ-CNP  Palliative Medicine    Discussed patient and the plan of care with the other IDT members of Palliative Care, and with patient, family and floor nurse. Thank you for allowing Palliative Medicine to participate in the care of [address] [place] [address]. Note: This report was completed using [employer] voiced recognition software. Every effort has been made to ensure accuracy; however, inadvertent computerized transcription errors may be present.

## 2020-01-04 NOTE — PLAN OF CARE
Problem: Respiratory Function - Compromised:  Goal: Absence of respiratory distress  Description  Maintaining head of bed at least 30 degrees. Outcome: Met This Shift     Problem: Pain:  Goal: Pain level will decrease  Description  Pain level will decrease  Outcome: Met This Shift  Goal: Control of acute pain  Description  Control of acute pain  Outcome: Met This Shift  Goal: Control of chronic pain  Description  Control of chronic pain  Outcome: Met This Shift     Problem: Bowel/Gastric:  Goal: Complications related to the disease process, condition or treatment will be avoided or minimized  Outcome: Met This Shift     Problem: Skin Integrity:  Goal: Absence of new skin breakdown  Description  Absence of new skin breakdown  Outcome: Met This Shift     Problem: Skin Integrity:  Goal: Will show no infection signs and symptoms  Description  Will show no infection signs and symptoms  Outcome: Ongoing     Problem:  Bowel/Gastric:  Goal: Will be independent with ostomy care  Outcome: Not Met This Shift

## 2020-01-04 NOTE — PROGRESS NOTES
rhythm, no murmurs  Gastrointestinal: Bowel sounds present, soft, nontender, ostomy in place  Skin: Warm and dry, no active dermatoses  Musculoskeletal: No joint swelling, no joint erythema    Labs, imaging, and medical records/notes were personally reviewed. Assessment:  HAP   Septic shock, resolved, secondary to peritonitis from ischemic colitis s/p exploratory laparotomy, subtotal colectomy, and ileostomy on 12/13     Recommendations:  Continue piperacillin-tazobactam 3.375g q8h for now. Follow up blood cultures.     Thank you for involving me in the care of Lola Estrada. I will continue to follow. Please do not hesitate to call for any questions or concerns.     Electronically signed by Terrie Hale MD on 1/4/2020 at 9:31 AM

## 2020-01-04 NOTE — PROGRESS NOTES
nasogastric tube in place. XR CHEST PORTABLE   Final Result   Partial clearing of right lower lobe atelectasis/infiltrate               XR CHEST PORTABLE   Final Result   Worsening CHF with edema. Suggestion of free intraperitoneal air better seen in the previous   examination and could be related to recent surgery. Clinical   assessment is recommended. ALERT:  THIS IS AN ABNORMAL REPORT            CT HEAD WO CONTRAST   Final Result      NO ACUTE INTRACRANIAL PROCESS         XR CHEST PORTABLE   Final Result      Right upper lung patchy infiltrate about the right minor fissure. Cardiomegaly      Nasogastric tube in satisfactory position with the tip not well seen   but appears to be in the distal stomach. XR Chest Abdomen Ng Placement   Final Result   Enteric catheter with the tip projecting over the expected region of   the stomach. Pneumoperitoneum which may be normal given the patient's history of   recent abdominal surgery. XR ABDOMEN FOR NG/OG/NE TUBE PLACEMENT   Final Result   Addendum 1 of 1   Consider CT scan if better assessment of the abdomen as clinically   indicated. Final      XR CHEST PORTABLE   Final Result   NG tube reaches the stomach               XR CHEST PORTABLE   Final Result   Partial clearing of bibasilar infiltrates and left effusion               XR CHEST PORTABLE   Final Result   Stable abnormal chest with  improving CHF. Superimposed pneumonia is   not excluded. CT CHEST W CONTRAST   Final Result   1. Atelectasis consolidation of both lower lobes with patent central   airways. Findings to be correlated clinically. Can be relate with the   hypoventilation of the lower lung bases. Cannot exclude a pneumonia. 2. Discrete patchy bronchocentric infiltrate in the left upper lobe   lingular segment. 3. No acute pulmonary emboli seen although there are some limitations   for evaluation of the lower lobes and motion artifacts. 4. No aneurysm formation dissection thoracic aorta. 5. Discrete to mild pericardial effusion. CT ABDOMEN PELVIS W IV CONTRAST Additional Contrast? None   Final Result   1. Findings for severe constipation with a large amount of fecal   contents/rotation/impaction from the sigmoid colon to the rectum,   causing a pattern of megacolon with marked fluid/air distentions or   more proximal segments of the colon. 2. Consider decompression of the rectum/sigmoid to permit relieve of   the more proximal segments of the colon. 3. Marked: Distention can explain hypoventilation lower lung bases   causing atelectasis or consolidations with overall patent central   airways of both lower lobes. Please correlate clinically. 4. There is no dilatation of the biliary tree, to be reevaluated   relieve of the megacolon pattern which cause significant mass effect   throughout the entire abdomen peritoneal cavity. .      5. Mild ascites in the abdomen. 6. No free intraperitoneal air observed. XR CHEST PORTABLE   Final Result      Gaseous distention of a loop of bowel within the left upper abdomen   may be stomach or colon. Consider further workup with computed   tomography for better characterization. Bilateral pleural effusions with contiguous atelectasis. Endotracheal tube is present with the distal tip 5 cm above the   adriano. Nasogastric tube follows the expected contours of the esophagus into   stomach with distal sideholes overlying the left upper quadrant of the   abdomen. Esophageal manometry device appears present terminating at the level   of the esophagogastric junction. XR ABDOMEN FOR NG/OG/NE TUBE PLACEMENT   Final Result      Gaseous distention of a loop of bowel within the left upper abdomen   may be stomach or colon. Consider further workup with computed   tomography for better characterization. Bilateral pleural effusions with contiguous atelectasis.

## 2020-01-04 NOTE — PROGRESS NOTES
[facility]  PROGRESS NOTE  ATTENDING NOTE    Ischemic colitis--s/p ex lap, subtotal colectomy, end ileostomy    Still with productive cough. No c/o pain  Distended this morning, but also on BiPAP and NGT was clamped. BP (!) 150/96   Pulse 97   Temp 99.9 °F (37.7 °C) (Esophageal)   Resp 20   Ht 5' 6\" (1.676 m)   Wt 130 lb 1.1 oz (59 kg)   SpO2 96%   BMI 20.99 kg/m²   Physical Exam  Constitutional:       Appearance: Normal appearance. HENT:      Head: Normocephalic and atraumatic. Nose: Nose normal.   Eyes:      Extraocular Movements: Extraocular movements intact. Pupils: Pupils are equal, round, and reactive to light. Cardiovascular:      Rate and Rhythm: Normal rate. Pulmonary:      Effort: Pulmonary effort is normal.      Breath sounds: Rhonchi (bilateral) present. Comments: intubated  Abdominal:      General: There is no distension. Palpations: Abdomen is soft. Tenderness: There is no tenderness. Comments: Ostomy pink, functioning--succus present in bag  Wound intermittently closed with staples   Skin:     General: Skin is warm and dry. Neurological:      General: No focal deficit present. Mental Status: She is alert.      ASSESSMENT/PLAN:  Ischemic colitis--s/p STC with end ileostomy  --monitor ostomy output  --keep output to about 1-1.5L/day  --add lomotil if output too much  --ostomy nurse eval  --ok for tube feeds       DVT/GI ppx    Deepti Feliciano MD, MSc, FACS  1/4/2020  7:10 AM

## 2020-01-04 NOTE — PROGRESS NOTES
[address] [facility]   Department of Internal Medicine   Internal Medicine Residency  MICU Progress Note    Patient:  Blake Norman 62 y.o. female   MRN: [id number]       Date of Service: 2020    Allergy: Sulfa antibiotics    Subjective     Patient was seen and examined in AM. Sedated and intubated. Pt was Reintubated yesterday   ABG after intubation 7.464/39.5/156.2/27.7   Decreased RR to 12  Continue Zosyn  Per ID   Add lomotil per GS if stool output 1-1.5L/day  Na 150. On D5 @ 70cc/hr    Objective     TEMPERATURE:  Current - Temp: 100.8 °F (38.2 °C); Max - Temp  Av.6 °F (38.1 °C)  Min: 99 °F (37.2 °C)  Max: 101.7 °F (38.7 °C)  RESPIRATIONS RANGE: Resp  Av.6  Min: 14  Max: 43  PULSE RANGE: Pulse  Av.6  Min: 96  Max: 115  BLOOD PRESSURE RANGE:  Systolic (24THU), JLS:809 , Min:139 , DPO:505   ; Diastolic (51XYI), NHC:25, Min:83, Max:108    PULSE OXIMETRY RANGE: SpO2  Av.5 %  Min: 84 %  Max: 100 %    I & O - 24hr:    Intake/Output Summary (Last 24 hours) at 2020 0228  Last data filed at 2020 0100  Gross per 24 hour   Intake 1967 ml   Output 2207 ml   Net -240 ml     I/O last 3 completed shifts: In:  [I.V.:1827; NG/GT:160]  Out: 2657 [ZSRSF:5258; Emesis/NG output:800; Stool:325] I/O this shift:  In: -   Out: 100 [Urine:100]   Weight change:     Physical Exam:  General Appearance:    Sedated and intubated    HEENT:    moist   Neck:   Supple, no jugular venous distention. Resp:    rhonchi b/l   Heart:    RRR, S1 and S2 normal, no murmur, rub or gallop.     Abdomen:     Soft, non-tender, mildly distended, tympanic, surgical incision sites clean and dry with no drainage, colostomy bag with black/green fluid   Extremities:   Atraumatic, no cyanosis or edema   Pulses:  Radial and pedal pulses are intact bilaterally   Neurologic:   Sedated and intubated        Medications     Continuous Infusions:   dextrose 70 mL/hr at 20 1133    propofol 20 mcg/kg/min (20 Blood cultures   Blood Culture, Routine   Date Value Ref Range Status   12/29/2019 5 Days- no growth  Final     Respiratory cultures No results found for: RESPCULTURE   Gram Stain Result   Date Value Ref Range Status   12/30/2019 Refer to ordered Gram stain for results  Final     Urine   Urine Culture, Routine   Date Value Ref Range Status   01/01/2020 Growth not present  Final     Legionella No results found for: LABLEGI  C Diff PCR No results found for: CDIFPCR  Wound culture/abscess: No results for input(s): WNDABS in the last 72 hours. Tip culture:No results for input(s): CXCATHTIP in the last 72 hours. Antibiotic  Days  Day started                                Oxygen:     Vent Information  $Ventilation: $Initial Day  Ventilator Started: Yes  Ventilation Day(s): 1  Equipment ID: [id number]  Vent Type: [id number]  Vent Mode: AC/VC  Vt Ordered: 350 mL  Rate Set: 12 bmp  Peak Flow: 65 L/min  Pressure Support: 0 cmH20  FiO2 : 70 %  Sensitivity: 2  PEEP/CPAP: 5  I Time/ I Time %: 0 s  Cuff Pressure (cm H2O): 29 cm H2O  Humidification Source: Heated wire  Humidification Temp: 37  Humidification Temp Measured: 37  Circuit Condensation: Drained  Additional Respiratory  Assessments  Pulse: 99  Resp: 15  SpO2: 96 %  pCO2 (TCOM, mmHg): 33 mmHg  Position: Semi-Vyas's  Humidification Source: Heated wire  Humidification Temp: 37  Circuit Condensation: Drained  Oral Care Completed?: Yes  Oral Care: Mouth swabbed, Mouth suctioned  Subglottic Suction Done?: Yes  Airway Type: ET  Airway Size: 8  Measured From: Lips(23cm)  Cuff Pressure (cm H2O): 29 cm H2O  Swallow:  Other (Comment)(too weak to swallow)     Nasal cannula L/min     Face mask %     Reservoirs mask %       ABG   Vent Settings     PH   Mode      PCO2   TV      PO2   RR      HCO3   PS      Sat%   PEEP      FIO2   FIO2        P/F          Lines:  Site  Day  Date inserted     TLC              PICC              Arterial line              Peripheral line HD cath            Urethral Catheter 16 fr-Output (mL): 50 mL    Imaging Studies:    EKG: Rhythm Strip:    Resident's Assessment and Plan      Benito Mcdonald is 62 y.o. female with a PMH of     Neurologic  Seizure disorder  Resume home dose Keppra 1000mg BID     Parkinson's Disease  Resume Sinemet     Insomnia  Hold home dose trazodone d/t lethargy, QTc on repeat EKG wnl  Melatonin 6mg qhs prn     Pulmonary   Acute hypoxic hypercapnic respiratory failure  2/2 aspiration PNA vs pulmonary edema vs muscular weakness 2/2 Parkinson's  Intubated 12/29, extubated 12/31, Reintubated 1/3  CT chest w/ bilateral lower lobe infiltrates and effusion  Bumex 1mg BID X 2 doses  S/p bronchoscopy 1/2  f/u cultures  Pulmozyme qd  On Zosyn   ID on board      Cardiovascular  Septic Shock, resolved  likely 2/2 to UTI vs aspiration PNA vs ischemic bowel  UA positive for leukocyte esterase nitrites, bacteriuria, WBC 5-10  Off Levophed  On Zosyn   ID on board      Gastrointestinal  Ischemic Colitis s/p subtotal colectomy  CT abdomen with distended large intestine, large amounts of feces from rectum to proximal sigmoid colon  S/p ex lap with subtotal colectomy, ischemic sigmoid colon, but no perforations  Lomotil if ostomy output > 1.5L qd  Morphine 1mg q4h prn for pain  OK for NGT meds and TF per GS      Hyperammonemia  Likely 2/2 fecal impaction/constipation  Trending down     Infectious Disease  Aspiration PNA  procalcitonin 3.15 on admission, 1.89  On 1/1  CT chest and CXR w/ bilateral lower lobe infiltrates  Continue Zosyn  F/u respiratory culture - rare GPR diphtheroid like on bronchial washing      UTI  UA w/ pyuria, bacteriuria and +LE  UCx with coag neg staph > 100k CFU, likely contamination     Genitourinary/Renal  Hypernatremia   Na 150  On D5 @ 70 cc/hr     Hypermagnesemia, resolved  Mg 7.0, on mag oxide at home  DTRs remain intact  Hold all magnesium containing medications/supplementation  F/u repeat mag, trending down     Hematology/Oncology  Normocytic Hypochromic Anemia  Likely multifactorial d/t dilution, blood loss  Baseline Hb 11-12  CBC daily   Transfuse if Hb < 7     # Peptic ulcer prophylaxis: Protonix  # DVT Prophylaxis: Lovenox   # Disposition: Cont current care      Bess Reynoso M.D., PGY-1    Attending Physician: Dr. Zoila Heredia  Addendum ICU Attending Statement     Marisol Nick was seen, examined and discussed with the multi-disciplinary ICU team during rounds. A addendum note may be separate to the residents note. If not then, I have personally seen and examined the patient and the key elements of the encounter were performed by me (> 85 % time). The medications & laboratory data was discussed and adjusted where necessary. The radiographic images were reviewed either as a group or with radiologist.  Any changes are document or changed if felt dis-concordant with the exam or history. The above findings were corroborated, plans confirmed and changes made if needed. Family was updated at the bedside as available. Key issues of the case were discussed among consultants. Critical Care time is documented if appropriate.    Tayo Reyes DO, MPH  Professor of Medicine

## 2020-01-04 NOTE — PROGRESS NOTES
chosen [facility] for discharge, with [facility] as [facility]ATTIE following. Sven Laughlin would like pt to remain full code at this time. Psychosocial support provided and contact information for PM provided. Plan: Continue to follow for support.

## 2020-01-05 NOTE — PROGRESS NOTES
murmurs  Gastrointestinal: Bowel sounds present, soft, nontender, ostomy in place  Skin: Warm and dry, no active dermatoses  Musculoskeletal: No joint swelling, no joint erythema    Labs, imaging, and medical records/notes were personally reviewed. Assessment:  HAP   Septic shock, resolved, secondary to peritonitis from ischemic colitis s/p exploratory laparotomy, subtotal colectomy, and ileostomy on 12/13     Recommendations:  Continue piperacillin-tazobactam 3.375g q8h for now.     Thank you for involving me in the care of Lola Estrada. I will continue to follow. Please do not hesitate to call for any questions or concerns.     Electronically signed by Guillermo Iraheta MD on 1/5/2020 at 8:13 AM

## 2020-01-05 NOTE — PROGRESS NOTES
[facility] Quality Flow/Interdisciplinary Rounds Progress Note        Quality Flow Rounds held on January 5, 2020    Disciplines Attending:   Bedside Nurse,  Nursing Unit Leadership; ICU Medical Team.    Blake Norman was admitted on 12/29/2019  9:30 AM    Anticipated Discharge Date:  Expected Discharge Date: 12/04/21    Disposition:    Zay Score:  Zay Scale Score: 11    Readmission Risk              Risk of Unplanned Readmission:        16           Discussed patient goal for the day, patient clinical progression, and barriers to discharge. The following Goal(s) of the Day/Commitment(s) have been identified:    Wean oxygen; give diuretic.       [facility]  January 5, 2020

## 2020-01-05 NOTE — PROGRESS NOTES
[address] [facility]   Department of Internal Medicine   Internal Medicine Residency  MICU Progress Note    Patient:  Benito Mcdonald 62 y.o. female   MRN: [id number]       Date of Service: 2020    Allergy: Sulfa antibiotics    Subjective     Patient was seen and examined in AM. Sedated and intubated. Will wean off sedation this morning for possible extubation     Was given a dose of Albumin 25g and Lasix 40 x1 as pt was +4L   Had 3.7L of UO   Na 141 this morning   CXR with improved aeration  Chest vest added   To continue Zosyn per ID   Procal down to 0.45 from 1.89 - appropriate   Free water boluses added with TF for hypernatremia     Objective     TEMPERATURE:  Current - Temp: 101.5 °F (38.6 °C); Max - Temp  Av.5 °F (38.1 °C)  Min: 99.9 °F (37.7 °C)  Max: 101.5 °F (38.6 °C)  RESPIRATIONS RANGE: Resp  Av.1  Min: 0  Max: 47  PULSE RANGE: Pulse  Av.2  Min: 95  Max: 103  BLOOD PRESSURE RANGE:  Systolic (32ZNH), PFM:694 , Min:104 , CIP:689   ; Diastolic (09TUR), LBM:70, Min:65, Max:99    PULSE OXIMETRY RANGE: SpO2  Av.8 %  Min: 86 %  Max: 97 %    I & O - 24hr:    Intake/Output Summary (Last 24 hours) at 2020 0101  Last data filed at 2020 2200  Gross per 24 hour   Intake 3103 ml   Output 4475 ml   Net -1372 ml     I/O last 3 completed shifts: In: 3103 [I.V.:2358; NG/GT:745]  Out: 8582 [Urine:4025; Emesis/NG output:200; Stool:350] No intake/output data recorded. Weight change:     Physical Exam:  General Appearance:    Sedated and intubated        Neck:   Supple, no jugular venous distention. Resp:    rhonchi b/l   Heart:    RRR, S1 and S2 normal, no murmur, rub or gallop.     Abdomen:     Soft, non-tender, mildly distended, tympanic, surgical incision sites clean and dry with no drainage, colostomy bag with black/green fluid   Extremities:   Atraumatic, no cyanosis or edema   Pulses:  Radial and pedal pulses are intact bilaterally   Neurologic:   Sedated and intubated Emmit [place]oll <0.01 09/27/2017    TROPONINI <0.01 01/07/2016       Notable Cultures:      Blood cultures   Blood Culture, Routine   Date Value Ref Range Status   12/29/2019 5 Days- no growth  Final     Respiratory cultures No results found for: RESPCULTURE   Gram Stain Result   Date Value Ref Range Status   12/30/2019 Refer to ordered Gram stain for results  Final     Urine   Urine Culture, Routine   Date Value Ref Range Status   01/01/2020 Growth not present  Final     Legionella No results found for: LABLEGI  C Diff PCR No results found for: CDIFPCR  Wound culture/abscess: No results for input(s): WNDABS in the last 72 hours. Tip culture:No results for input(s): CXCATHTIP in the last 72 hours. Antibiotic  Days  Day started                                Oxygen:     Vent Information  $Ventilation: $Subsequent Day  Ventilator Started: Yes  Ventilation Day(s): 1  Skin Assessment: Clean, dry, & intact  Equipment ID: [id number]  Vent Type: 840  Vent Mode: AC/VC  Vt Ordered: 350 mL  Rate Set: 12 bmp  Peak Flow: 65 L/min  Pressure Support: 0 cmH20  FiO2 : 60 %  Sensitivity: 0  PEEP/CPAP: 5  I Time/ I Time %: 0 s  Cuff Pressure (cm H2O): 29 cm H2O  Humidification Source: Heated wire  Humidification Temp: 37  Humidification Temp Measured: 36.1  Circuit Condensation: Drained  Additional Respiratory  Assessments  Pulse: 102  Resp: 21  SpO2: 96 %  pCO2 (TCOM, mmHg): 33 mmHg  Position: Semi-Vyas's  Humidification Source: Heated wire  Humidification Temp: 37  Circuit Condensation: Drained  Oral Care Completed?: Yes  Oral Care: Mouth swabbed, Mouth suctioned  Subglottic Suction Done?: Yes  Airway Type: ET  Airway Size: 8  Measured From: Lips(23 cm)  Cuff Pressure (cm H2O): 29 cm H2O  Skin barrier applied: Yes(Peyman tube rodriguez intact)  Swallow:  Other (Comment)(too weak to swallow)     Nasal cannula L/min     Face mask %     Reservoirs mask %       ABG   Vent Settings     PH   Mode      PCO2   TV      PO2   RR      HCO3   PS Sat%   PEEP      FIO2   FIO2        P/F          Lines:  Site  Day  Date inserted     TLC              PICC              Arterial line              Peripheral line              HD cath            Urethral Catheter 16 fr-Output (mL): 75 mL    Imaging Studies:    EKG: Rhythm Strip:     Resident's Assessment and Plan      Sundar Carney is 62 y.o. female with a PMH of     Neurologic  Seizure disorder  Resume home dose Keppra 1000mg BID     Parkinson's Disease  Resume Sinemet     Insomnia  Hold home dose trazodone d/t lethargy, QTc on repeat EKG wnl  Melatonin 6mg qhs prn     Pulmonary   Acute hypoxic hypercapnic respiratory failure  2/2 aspiration PNA vs pulmonary edema vs muscular weakness 2/2 Parkinson's  Intubated 12/29, extubated 12/31, Reintubated 1/3  CT chest w/ bilateral lower lobe infiltrates and effusion  Bumex 1mg BID X 2 doses  S/p bronchoscopy 1/2  f/u cultures - negative so far   Pulmozyme qd  On Zosyn Day 7  ID on board   Chest vest   Albumin and Lasix 40 x1 - with 3.7L UO   CXR with improved aeration      Cardiovascular  Septic Shock, resolved  likely 2/2 to UTI vs aspiration PNA vs ischemic bowel  UA positive for leukocyte esterase nitrites, bacteriuria, WBC 5-10  Off Levophed  On Zosyn day 7   ID on board      Gastrointestinal  Ischemic Colitis s/p subtotal colectomy  CT abdomen with distended large intestine, large amounts of feces from rectum to proximal sigmoid colon  S/p ex lap with subtotal colectomy, ischemic sigmoid colon, but no perforations  Lomotil if ostomy output > 1.5L qd  Morphine 1mg q4h prn for pain  OK for NGT meds and TF per GS      Hyperammonemia  Likely 2/2 fecal impaction/constipation  Trending down     Infectious Disease  Aspiration PNA  CT chest and CXR w/ bilateral lower lobe infiltrates  F/u respiratory culture - rare GPR diphtheroid like on bronchial washing   CXR with slightly improved aeration   procalcitonin 3.15 on admission, 1.89  On 1/1,  0.45  On 1/4  Continue

## 2020-01-06 NOTE — PROGRESS NOTES
Subjective: Intubated sedated mechanically ventilated   Family at bedside all questions answered  Objective:    /80   Pulse 94   Temp 99.9 °F (37.7 °C) (Esophageal)   Resp 21   Ht 5' 6\" (1.676 m)   Wt 123 lb 14.4 oz (56.2 kg)   SpO2 95%   BMI 20.00 kg/m²     24HR INTAKE/OUTPUT:      Intake/Output Summary (Last 24 hours) at 1/6/2020 7584  Last data filed at 1/6/2020 0600  Gross per 24 hour   Intake 2113 ml   Output 2135 ml   Net -22 ml     Intubated sedated mechanically ventilated   Heart:  RRR, no murmurs, gallops, or rubs. Lungs: Bilateral rhonchi rhonchi  Abd: bowel sounds present, nontender, nondistended, no masses  Extrem:  No clubbing, cyanosis, or edema  Weak x4 extremities  Most Recent Labs  Lab Results   Component Value Date    WBC 10.1 01/06/2020    HGB 8.0 (L) 01/06/2020    HCT 26.4 (L) 01/06/2020     01/06/2020     01/06/2020    K 3.4 (L) 01/06/2020    CL 98 01/06/2020    CREATININE 0.5 01/06/2020    BUN 18 01/06/2020    CO2 30 (H) 01/06/2020    GLUCOSE 135 (H) 01/06/2020    ALT <5 01/06/2020    AST 40 (H) 01/06/2020    INR 1.1 12/30/2019    TSH 1.950 12/29/2019     Recent Labs     01/06/20  0455   MG 1.8     Lab Results   Component Value Date    CALCIUM 8.8 01/06/2020    PHOS 2.4 (L) 01/06/2020        XR CHEST PORTABLE   Final Result   Improved yet persistent right mid and lower lung opacities with trace   effusion. Persistent retrocardiac consolidation. XR CHEST PORTABLE   Final Result      Subtle improved aeration of the right lung. No new or worsening   airspace opacities. Lines and tubes as described. XR CHEST PORTABLE   Final Result      Cardiomegaly      Airspace consolidation the right upper lobe appears to be unchanged      Small to moderate right-sided pleural effusion      Support line including endotracheal tube and other lines appears to be   in satisfactory position.          XR CHEST PORTABLE   Final Result   Progressive diffuse bilateral infiltrates and pleural effusion with a   thick consolidation in the right upper lobe. CHF with edema and/or   diffuse pneumonia considered. Gastric distention with a nasogastric tube in place. XR CHEST PORTABLE   Final Result   Partial clearing of right lower lobe atelectasis/infiltrate               XR CHEST PORTABLE   Final Result   Worsening CHF with edema. Suggestion of free intraperitoneal air better seen in the previous   examination and could be related to recent surgery. Clinical   assessment is recommended. ALERT:  THIS IS AN ABNORMAL REPORT            CT HEAD WO CONTRAST   Final Result      NO ACUTE INTRACRANIAL PROCESS         XR CHEST PORTABLE   Final Result      Right upper lung patchy infiltrate about the right minor fissure. Cardiomegaly      Nasogastric tube in satisfactory position with the tip not well seen   but appears to be in the distal stomach. XR Chest Abdomen Ng Placement   Final Result   Enteric catheter with the tip projecting over the expected region of   the stomach. Pneumoperitoneum which may be normal given the patient's history of   recent abdominal surgery. XR ABDOMEN FOR NG/OG/NE TUBE PLACEMENT   Final Result   Addendum 1 of 1   Consider CT scan if better assessment of the abdomen as clinically   indicated. Final      XR CHEST PORTABLE   Final Result   NG tube reaches the stomach               XR CHEST PORTABLE   Final Result   Partial clearing of bibasilar infiltrates and left effusion               XR CHEST PORTABLE   Final Result   Stable abnormal chest with  improving CHF. Superimposed pneumonia is   not excluded. CT CHEST W CONTRAST   Final Result   1. Atelectasis consolidation of both lower lobes with patent central   airways. Findings to be correlated clinically. Can be relate with the   hypoventilation of the lower lung bases. Cannot exclude a pneumonia.       2. Discrete patchy bronchocentric infiltrate in the left upper lobe   lingular segment. 3. No acute pulmonary emboli seen although there are some limitations   for evaluation of the lower lobes and motion artifacts. 4. No aneurysm formation dissection thoracic aorta. 5. Discrete to mild pericardial effusion. CT ABDOMEN PELVIS W IV CONTRAST Additional Contrast? None   Final Result   1. Findings for severe constipation with a large amount of fecal   contents/rotation/impaction from the sigmoid colon to the rectum,   causing a pattern of megacolon with marked fluid/air distentions or   more proximal segments of the colon. 2. Consider decompression of the rectum/sigmoid to permit relieve of   the more proximal segments of the colon. 3. Marked: Distention can explain hypoventilation lower lung bases   causing atelectasis or consolidations with overall patent central   airways of both lower lobes. Please correlate clinically. 4. There is no dilatation of the biliary tree, to be reevaluated   relieve of the megacolon pattern which cause significant mass effect   throughout the entire abdomen peritoneal cavity. .      5. Mild ascites in the abdomen. 6. No free intraperitoneal air observed. XR CHEST PORTABLE   Final Result      Gaseous distention of a loop of bowel within the left upper abdomen   may be stomach or colon. Consider further workup with computed   tomography for better characterization. Bilateral pleural effusions with contiguous atelectasis. Endotracheal tube is present with the distal tip 5 cm above the   adriano. Nasogastric tube follows the expected contours of the esophagus into   stomach with distal sideholes overlying the left upper quadrant of the   abdomen. Esophageal manometry device appears present terminating at the level   of the esophagogastric junction.        XR ABDOMEN FOR NG/OG/NE TUBE PLACEMENT   Final Result      Gaseous distention of a loop of bowel within the left upper abdomen

## 2020-01-06 NOTE — PLAN OF CARE
Problem: Respiratory Function - Compromised:  Goal: Absence of respiratory distress  Description  Maintaining head of bed at least 30 degrees. Outcome: Met This Shift     Problem: Pain:  Goal: Pain level will decrease  Description  Pain level will decrease  Outcome: Met This Shift  Goal: Control of acute pain  Description  Control of acute pain  Outcome: Met This Shift  Goal: Control of chronic pain  Description  Control of chronic pain  Outcome: Met This Shift     Problem: Bowel/Gastric:  Goal: Complications related to the disease process, condition or treatment will be avoided or minimized  Outcome: Met This Shift     Problem: Skin Integrity:  Goal: Will show no infection signs and symptoms  Description  Will show no infection signs and symptoms  Outcome: Met This Shift  Goal: Absence of new skin breakdown  Description  Absence of new skin breakdown  Outcome: Met This Shift     Problem:  Bowel/Gastric:  Goal: Will be independent with ostomy care  Outcome: Not Met This Shift

## 2020-01-06 NOTE — PROGRESS NOTES
Limited radiographic appearance does not suggest small bowel obstruction. Ct Chest W Contrast    Result Date: 2019  Patient MRN:  [id number] : 1965 Age: 62 years Gender: Female Order Date:  2019 2:00 PM TECHNIQUE/NUMBER OF IMAGES/COMPARISON/CLINICAL HISTORY: CT chest After IV contrast administration axial images were obtained with sagittal and coronal MIP reconstructions of pulmonary arterial circulation thoracic aorta. Clinical history abdominal distention, abdominal pain, for evolution the pulmonary arterial circulation. 17-year-old female patient had previous appendectomy. The history of Parkinson's disease is seizures. FINDINGS: There are motion artifacts which cause misregistration of images makes difficult evaluation of the pulmonary arterial circulation towards the lower lung bases. There is no conspicuous acute central pulmonary embolus in the main PA, right and left main PAs in their lobar and segmental and more proximal subsegmental branches. There is no aneurysm formation dissection thoracic aorta. Heart has normal size. Inner diameter for the left ventricular cavity is 4.5 cm and for the right ventricular cavity is 4.4 cm. There is discrete to mild pericardial effusion. There is no mediastinal masses or adenopathy. Diameter for the ascending aorta is 3.5 cm and for the main pulmonary arteries up to 2.5 cm . There is no pericardial effusion. Presence of a consolidation with atelectasis of the right and left lower lobe with patent central airways. Air bronchograms are present. Findings can be relate with hypoventilation lower lung base. Please correlate clinically. There is no conspicuous or pleural effusions. Endotracheal tube is in good position. The patient has a NG tube with the tip in the stomach. There are also compression atelectasis in the posterior aspect of the both upper lobes. The anterior aspect of the upper lobes are better aerated.  A discrete the area bronchocentric infiltrate seen towards the lingular segment of the left upper lobe. Upper abdominal structures are not fully covered on this study. There is a generalized fluid distention of the bowel including the colon. The abdomen is evaluate on the CT abdomen performed same time. 1. Atelectasis consolidation of both lower lobes with patent central airways. Findings to be correlated clinically. Can be relate with the hypoventilation of the lower lung bases. Cannot exclude a pneumonia. 2. Discrete patchy bronchocentric infiltrate in the left upper lobe lingular segment. 3. No acute pulmonary emboli seen although there are some limitations for evaluation of the lower lobes and motion artifacts. 4. No aneurysm formation dissection thoracic aorta. 5. Discrete to mild pericardial effusion. Ct Abdomen Pelvis W Iv Contrast Additional Contrast? None    Result Date: 2019  Patient MRN:  [id number] : 1965 Age: 62 years Gender: Female Order Date:  2019 6:45 PM TECHNIQUE/NUMBER OF IMAGES/COMPARISON/CLINICAL HISTORY: CT abdomen pelvis IV contrast After IV contrast administration axial images were obtained with sagittal and coronal reconstructions are. Clinical history abdominal pain. FINDINGS: There is massive dilatation of the colon with a predominant fluid contents. Large amount of solid fecal contents form a pattern of constipation/fecal rectal retention/impaction is seen in the sigmoid colon to the rectum. This can causes bowel obstruction to critical a large amount of fluid the recommendation the more proximal segments of the colon. Consider decompression of the rectum/sigmoid to relieve the contents in more proximal segments of the colon. There are normal size and enhancement for the liver, spleen and the pancreas are. Gallbladder is well distended and has a hydrops appearance.  There is some dilatation of the intrahepatic biliary tree and the common bile duct measures close to 9 mm in the head of the pancreas but can be traced with unremarkable appearance through the papilla level. The pancreatic duct is not dilated. Adrenal glands are not enlarged. Kidneys have preserved size and cortical thickness are. There is an area of hypodensity in the upper pole of the left kidney which can represent a lobulated the cyst but of relative hypodensity. Further evaluation with renal ultrasound is initially recommended. Aorta and IVC appear unremarkable. There is a Samuel catheter in the bladder. The bladder is not distended. The There is a venous catheter through the right common femoral vein with the tip in the distal IVC. For findings in the chest see report of CT scan of the chest performed the same time. 1. Findings for severe constipation with a large amount of fecal contents/rotation/impaction from the sigmoid colon to the rectum, causing a pattern of megacolon with marked fluid/air distentions or more proximal segments of the colon. 2. Consider decompression of the rectum/sigmoid to permit relieve of the more proximal segments of the colon. 3. Marked: Distention can explain hypoventilation lower lung bases causing atelectasis or consolidations with overall patent central airways of both lower lobes. Please correlate clinically. 4. There is no dilatation of the biliary tree, to be reevaluated relieve of the megacolon pattern which cause significant mass effect throughout the entire abdomen peritoneal cavity. . 5. Mild ascites in the abdomen. 6. No free intraperitoneal air observed. Xr Chest Portable    Result Date: 2019  Patient MRN:  [id number] : 1965 Age: 62 years Gender: Female Order Date:  2019 3:15 PM EXAM: XR CHEST PORTABLE INDICATION:  ETT exchanged, verify OGT placement ETT exchanged, verify OGT placement COMPARISON: Same date 0655 hours FINDINGS:  ET and NG tubes are appropriate. There has been some clearing of right basilar infiltrate since the prior study.  There is some right midlung infiltrate. Infiltrate left retrocardiac region is smaller and there is less fluid/pleural thickening on the left. There appears to be old healed left-sided rib fractures     Partial clearing of bibasilar infiltrates and left effusion     Xr Chest Portable    Result Date: 2019  Patient MRN:  [id number] : 1965 Age: 62 years Gender: Female Order Date:  2019 6:00 AM EXAM: XR CHEST PORTABLE one image INDICATION:  Line / tube management Line / tube management COMPARISON: 2019 FINDINGS: There is cardiomegaly. Endotracheal tube and nasogastric tube are unchanged. There is vascular congestion with a marginal improvement in persistent perihilar and bibasilar infiltrates and pleural effusions. There is improved gastric distention. Stable abnormal chest with  improving CHF. Superimposed pneumonia is not excluded. Xr Chest Portable    Result Date: 2019  Comparison: 2019. Frontal view of the chest. Frontal view of the abdomen. FINDINGS: Endotracheal tube is present with the distal tip 5 cm above the adriano. Nasogastric tube follows the expected contours of the esophagus into stomach with distal sideholes overlying the left upper quadrant of the abdomen. Esophageal manometry device appears present terminating at the level of the esophagogastric junction. Hazy density right lung base. Meniscus left lung base. Contiguous atelectasis or infiltrate. The heart is enlarged. There is calcification within thoracic aorta. Severe gaseous distention of bowel within the left abdomen may be stomach or colon. Remote left rib fractures. Degenerative spondylosis and facet arthropathy within the spine. Gaseous distention of a loop of bowel within the left upper abdomen may be stomach or colon. Consider further workup with computed tomography for better characterization. Bilateral pleural effusions with contiguous atelectasis.  Endotracheal tube is present with the distal tip 5 cm above

## 2020-01-06 NOTE — PLAN OF CARE
Problem: Skin Integrity:  Goal: Absence of new skin breakdown  Description  Absence of new skin breakdown  1/6/2020 1224 by Lance Reyes RN  Outcome: Met This Shift     Problem: Respiratory Function - Compromised:  Goal: Absence of respiratory distress  Description  Maintaining head of bed at least 30 degrees. 1/6/2020 1224 by Lance Reyes RN  Outcome: Not Met This Shift     Problem: Respiratory Function - Compromised:  Goal: Absence of respiratory distress  Description  Maintaining head of bed at least 30 degrees. 1/6/2020 1224 by Lance Reyes RN  Outcome: Not Met This Shift     Problem: Bowel/Gastric:  Goal: Complications related to the disease process, condition or treatment will be avoided or minimized  1/6/2020 1224 by Lance Reyes RN  Outcome: Not Met This Shift     Problem:  Bowel/Gastric:  Goal: Will be independent with ostomy care  1/6/2020 1224 by Lance Reyes RN  Outcome: Not Met This Shift

## 2020-01-06 NOTE — PROGRESS NOTES
Patient seen- mother, father, and  at bedside. Sedation off. Patient not following commands at this time. Plan is for CT abdomen today. Family remains hopeful that she can be weaned and extubated soon. No immediate needs from palliative medicine standpoint. Will continue to follow.

## 2020-01-07 NOTE — CARE COORDINATION
Pt is intubated. No family present. Per previous  note Discharge Plan is [facility] [place] [facility] - 1. Lb, 2. [facility], when medically stable. Left Spouse John Barber eb VM to confirm Discharge plan has not changed.

## 2020-01-07 NOTE — PROGRESS NOTES
[address] [facility]  Department of Internal Medicine   Internal Medicine Residency   MICU Progress Note    Patient:  Calvin Mercy 62 y.o. female  MRN: [id number]     Date of Service: 1/7/2020    Allergy: Sulfa antibiotics    Subjective      Patient is intubated and sedated. Patient was unable to be interviewed and ROS was not preformed. 24h change:     As per ICU nursing none  Plan for weaning trial today with possible extubation depending on parameters. Patient continues to have fevers and is found to be positive for fungal culture. We will hold off antifungals as per ID. Objective   I & O - 24hr:     Intake/Output Summary (Last 24 hours) at 1/7/2020 1858  Last data filed at 1/7/2020 1700  Gross per 24 hour   Intake 3008 ml   Output 1551 ml   Net 1457 ml       Physical Exam:  · Vitals: /80   Pulse 96   Temp 100.4 °F (38 °C) (Esophageal)   Resp 26   Ht 5' 6\" (1.676 m)   Wt 110 lb 14.3 oz (50.3 kg)   SpO2 96%   BMI 17.90 kg/m²       Constitutional: Patient is sedated and intubated. Head: Normocephalic and atraumatic. Eyes: Pupils constricted but reactive to light conjunctiva normal, (-) scleral icterus. Mucus membranes moist.  Mouth: Intubated. Neck: (-)  swelling, (-) JVD   Respiratory: Lungs clear to auscultation bilaterally. (-)  wheezes, (-)  rales, (-)  rhonchi. Cardiovascular: RRR. (-)  murmurs, (-) gallops,  (-) rubs. S1 and S2 were normal.   GI:  Abdomen soft, non-tender, non-distended. (+) BS. (-)  rebound, (-) guarding, (-) rigidity. Extremities: Warm and well perfused. (-) clubbing, (-) cyanosis. 2+ distal pulses. (-) peripheral edema. Neurologic:   Currently sedated.   (-)Follows commands. (+) Withdrawal from pain    Lines     site day    Art line   None    TLC None    PICC R Arm    Hemoaccess None      ABG:     Lab Results   Component Value Date    PH 7.505 01/07/2020    NTV7ETZ 46.5 12/30/2019    PCO2 44.4 01/07/2020    PO2ART 171.5 12/30/2019    PO2 84.1 01/07/2020    CREATININE 0.5 01/07/2020    CALCIUM 8.9 01/07/2020    GFRAA >60 01/07/2020    LABGLOM >60 01/07/2020    GLUCOSE 133 01/07/2020       Imaging Studies:   XR CHEST PORTABLE   FINDINGS:  Heart size in the upper limits of normal. Endotracheal tube,  nasogastric tube and right PICC line are unchanged. There is vascular  congestion with the perihilar and bibasilar infiltrates with some  improvement. There is some infiltrates in the right upper lobe.    Impression:      Stable abnormal chest with the persistent bilateral infiltrates and  pleural effusions with a marginal improvement which may be due to  improving CHF with edema and/or pneumonia. Resident's Assessment and Plan   Fred Witt is a 62 y.o. female who was brought to the MICU for     Neurologic  1. Seizures   Sinew Keppra 100 mg twice daily  2. H/o Parkinson's   Resumed Sinemet  3. H/oh insomnia  · Hold home trazodone  · Continue melatonin PRN    Cardiovascular  1. Hypotension (resolved)   Only off levo fed   We will continue to monitor BP   Secondary to pneumonia and ischemic bowel     Pulmonary  1. Acute hypoxic respiratory failure   Most likely 2/2 aspiration pneumonia   Currently intubated   Bronchoscopy 1/2/2020, bronchial washing is growing Saccharomyces cerevisiae   Will attempt weaning trial today    Gastrointestinal  1. Ischemic colitis s/p subtotal right colectomy and ileostomy   Repeat CT abdomen shows proctitis and contracted gallbladder with thickening of walls   Follow-up abdominal ultrasound  2. Elevated LFTs   AST and ALT improved   Alk phos continues to trend up   Possible gallbladder obstruction   Follow-up ultrasound    Infectious Disease  1. Aspiration pneumonia   · Procalcitonin downtrending currently 0.45   Continue Zosyn   ID on board   Continues to have intermittent fevers   White count trending up   Follow-up repeat CBC  Genitourinary/Renal  1. Hypernatremia (resolved)    2. Hypermagnesemia (resolved)  3. Hypophosphatemia   Phosphorus continues to be low   We will replete as needed   Follow-up BMP  4. Hypokalemia    Potassium continues to be low   We will replete as needed   Follow-up BMP    Hematology/Oncology  1. Normocytic anemia   Hemoglobin 8.3 today   Most likely secondary to surgery, dilution and blood draws   Follow-up repeat CBC    DVT prophylaxis/ GI prophylaxis: Lovenox/Protonix  Disposition: Continue current medical management    Humberto Garcia MD, PGY-1  Attending physician: Dr. Jin Medina     I personally saw, examined and provided care for the patient. Radiographs, labs and medication list were reviewed by me independently. I spoke with bedside nursing, therapists and consultants. Critical care services and times documented are independent of procedures and multidisciplinary rounds with Residents. Additionally comprehensive, multidisciplinary rounds were conducted with the MICU team. The case was discussed in detail and plans for care were established. Review of Residents documentation was conducted and revisions were made as appropriate. I agree with the above documented exam, problem list and plan of care. Alk phos trending up as is leukocytosis. If the cxr worsens a bronchoscopy for cultures maybe needed. Discussed with ID starting anti-fungal   Us gall bladder. Ct chest - illustrates pneumonia and atelectasis   Ct abd pelvis - enteritis and distended gb   ? CMV reactivation     Louvella Romberg, M.D.    Pulmonary/Critical Care Medicine   40 min cct excluding procedures

## 2020-01-07 NOTE — PROGRESS NOTES
Occupational Therapy  OCCUPATIONAL THERAPY INITIAL EVALUATION      Date:2020  Patient Name: Donte Yañez  MRN: [id number]  : 1965  Room: [address] [place], [place] [address]    Evaluating OT: STEVEN KathleenR/L [id number]    AM-PAC Daily Activity Raw Score:   Recommended Adaptive Equipment: Continue to assess    Comments: Based on patient's functional performance as stated above and level of assistance needed prior to admission, this therapist believes that the patient would benefit from continued skilled OT during/following hospital stay in an effort to increase safety, functional independence with ADLs/IADLs, and quality of life. Diagnosis: Hypotension  Procedures/additional info:  Exlap, subtotal colectomy, end ileostomy; - intubated;  bronch; 1/3 re-intubated  Pertinent Medical History: arthritis, depression, frequent falls, MSA, HTN, Parkinson's disease, seizure    Precautions:  Falls, seizures, ileostomy, vent via ETT, NGT, NPO, abdominal precautions, B prevalon boots     Home Living: Pt lives with  in a 1 story home with ramp to enter;; bed/bath on 1 floor. Bathroom setup: Pt using walk-in tub with grab bars; elevated toilet riser with rails    Equipment owned: U-step walker, electric wheelchair    Prior Level of Function: assist with majority of ADLs- was able to feed self and groom-  reports caretakers 4-5 days/week; assist with IADLs; using walker vs electric wheelchair for ambulation. Multiple falls. Supportive father. Driving: no    Pain Level: facial grimace with LUE ROM  Communication: P ability to express needs. Inconsistent with yes/no head nods (faint); Fair- eye contact. Cognition: A&O: 1/4 to self only; Follows 1 step directions <50%. P Attention to task.    Memory: Long term- P  Short term-P  Initiation/termination: P   Sequencing: P              Comprehension: P   Problem solving: P   Judgement/safety: P     RASS: -1 to -2  CAM-ICU: positive for delirium     Functional Assessment:   Initial Eval Status  Date: 1/7/20 Treatment Status  Date: Short Term Goals  Treatment frequency: 1-3x/week on ICU; PRN on stepdown unit  -pt will improve. .. Feeding NPO      SBA  Sitting upright in chair for majority of meals; pending cleared diet restriction   Grooming DEP  Hand over hand assist with P initiation and follow-through. Sitting EOB Max A     Min A   while seated EOB;   G BUE functional use     UB Dressing DEP     Min A  while seated EOB;   G BUE functional use   LB Dressing DEP    Mod A   with AE/device PRN   Bathing UB-  DEP  LB-  DEP simulation    UB-  Min A  LB-  Mod A with AE/DME prn   Toileting DEP     Mod A  including clothing mgmt and toileting hygiene using DME/device prn   Bed Mobility  Supine to sit: DEPx2  Sit to supine: DEPx2  Rolling: NT   Mod A   in prep for EOB ADL tasks   Functional/  Bathroom  Transfers Sit to stand: NT  Stand to sit: NT  Stand pivot: NT   Max A  from varying surfaces using device/DME prn with G safety   Functional Mobility NT     Max A   Bed>bathroom/sink distance using device prn   Balance Sitting:     Static:  Max A    Dynamic:Max A  Standing:     Static:  NT    Dynamic:NT  demo Min A dynamic sitting balance EOB during ADL tasks; Mod dynamic standing balance during ADL tasks using device prn   Endurance/  Activity Tolerance P+ activity tolerance/endurance during light ADL task     Sitting EOB tolerance >10 min  demo G activity tolerance/endurance during [id number] min ADL task     G demo of EC, pacing and proper breathing techniques   Visual/  Perceptual Glasses: yes- request family bring in    Impairments:  F eye contact-   Cervical stiffness; head rotated to R, positioning required at bed level to maintain cervical midline position.     F scanning L of midline        Safety P      F+   BUE strength/endurance See below  G tolerance to BUE AAROM/AROM exercise to improve overall UE use during ADLs and functional tasks sequencing, posture and energy conservation/pacing/breathing techniques. Pt/family additionally educated on OT POC, OT role, importance of EOB/OOB activity and completing ADL tasks daily as IND as possible to aide in recovery process, fall risk precautions/call light use, UB ROM; handout provided delirium mgmt, sensory modifications for in room, and proper positioning in bed. Therapist provided skilled monitoring of HR, O2 saturation, blood pressure and patients response during treatment session. Prior to and at the end of session, environmental modifications/line management completed for patients safety and efficiency of treatment session. Non-pharmacologic treatment for ICU delirium initiated this date. Environmental and sensory modifications assessed and implemented to decrease pts internal distraction caused by delirium, and improve overall mentation. Pt re-oriented to date, time, time of day, place, and situation as well as provided with visual and auditory stimuli in order to improve cognition and combat effects of ICU delirium. Eval Complexity:   · High Complexity  · History: Extensive review of medical records and additional review of physical, cognitive, or psychosocial history related to current functional performance  · Exam: 5+ performance deficits  · Assistance/Modification: Max assistance or modifications required to perform tasks. May have comorbidities that affect occupational performance.     Assessment of current deficits:   Functional mobility [x]  ADLs [x] Strength [x]  Cognition [x]  Functional transfers  [x] IADLs [x] Safety Awareness [x]  Endurance [x]  Fine Motor Coordination [x] Balance [x] Vision/perception [x] Sensation []   Gross Motor Coordination [] ROM [x] Delirium [x]                  Motor Control []    Plan of Care:  ADL retraining [x]   Equipment needs [x]   Neuromuscular re-education [x] Energy Conservation Techniques [x]  Functional Transfer training [x] Patient and/or Family Education [x]  Functional Mobility training [x]  Environmental Modifications [x]  Cognitive re-training [x]   Compensatory techniques for ADLs [x]  Splinting Needs []   Positioning to improve overall function [x]   Therapeutic Activity [x]                       Therapeutic Exercise  [x]  Visual/Perceptual: [x]    Delirium prevention/treatment  [x]   Other:  []    Rehab Potential: Good for established goals    Patient / Family Goal: improve strength    Patient and/or family were instructed/educated on diagnosis, prognosis/goals and plan of care. Demonstrated G understanding, further information not needed. [] Malnutrition indicators have been identified and nursing has been notified to ensure a dietitian consult is ordered. Evaluation time includes thorough review of current medical information, gathering information on past medical & social history & PLOF, completion of standardized testing, informal observation of tasks, consultation with other medical professions/disciplines, assessment of data & development of POC/goals.      High evaluation + 45 treatment minutes  Time in: 1105  Time out: [address] [place] [address], [address] [facility]

## 2020-01-07 NOTE — PROGRESS NOTES
Subjective: Intubated sedated mechanically ventilated   Patient less responsive today  Objective:    /81   Pulse 96   Temp 99.7 °F (37.6 °C)   Resp 23   Ht 5' 6\" (1.676 m)   Wt 110 lb 14.3 oz (50.3 kg)   SpO2 96%   BMI 17.90 kg/m²     24HR INTAKE/OUTPUT:      Intake/Output Summary (Last 24 hours) at 1/7/2020 1103  Last data filed at 1/7/2020 0900  Gross per 24 hour   Intake 2431 ml   Output 4061 ml   Net -1630 ml     Intubated sedated mechanically ventilated   Heart:  RRR, no murmurs, gallops, or rubs. Lungs: Bilateral rhonchi rhonchi  Abd: bowel sounds present, nontender, nondistended, no masses  Extrem:  No clubbing, cyanosis, or edema  Weak x4 extremities  Most Recent Labs  Lab Results   Component Value Date    WBC 12.5 (H) 01/07/2020    HGB 8.3 (L) 01/07/2020    HCT 27.5 (L) 01/07/2020     01/07/2020     01/07/2020    K 3.4 (L) 01/07/2020    CL 95 (L) 01/07/2020    CREATININE 0.5 01/07/2020    BUN 21 (H) 01/07/2020    CO2 32 (H) 01/07/2020    GLUCOSE 133 (H) 01/07/2020    ALT 5 01/07/2020    AST 48 (H) 01/07/2020    INR 1.1 12/30/2019    TSH 1.950 12/29/2019     Recent Labs     01/07/20  0451   MG 1.9     Lab Results   Component Value Date    CALCIUM 8.9 01/07/2020    PHOS 2.3 (L) 01/07/2020        XR CHEST PORTABLE   Final Result   Stable abnormal chest with the persistent bilateral infiltrates and   pleural effusions with a marginal improvement which may be due to   improving CHF with edema and/or pneumonia. CT ABDOMEN PELVIS W IV CONTRAST Additional Contrast? None   Final Result   Subtotal colectomy with right lower quadrant ileostomy with a   moderately dilated/ thickened small bowel loops which may be due to   ileus/enteritis or low-grade bowel obstruction. The rectal remnant is significantly dilated/ thickened with retained   fecal matter and high density material with the presacral edema   concerning for proctitis.       Infiltrates and pleural effusions in the lung bases. Somewhat thickened contracted gallbladder with the some   pericholecystic edema. CT CHEST W CONTRAST   Final Result   Persistent patchy bibasilar infiltrates and pleural effusions and   groundglass infiltrates in the right upper lobe likely diffuse   pneumonia. No lung abscess is identified. XR CHEST PORTABLE   Final Result   Tortuous ectatic aorta   Cardiomegaly    Airspace disease compatible with pneumonia, there may be a component   of pulmonary vascular congestion present   The chest does not appear to be significantly changed in the interval                  XR CHEST PORTABLE   Final Result   Improved yet persistent right mid and lower lung opacities with trace   effusion. Persistent retrocardiac consolidation. XR CHEST PORTABLE   Final Result      Subtle improved aeration of the right lung. No new or worsening   airspace opacities. Lines and tubes as described. XR CHEST PORTABLE   Final Result      Cardiomegaly      Airspace consolidation the right upper lobe appears to be unchanged      Small to moderate right-sided pleural effusion      Support line including endotracheal tube and other lines appears to be   in satisfactory position. XR CHEST PORTABLE   Final Result   Progressive diffuse bilateral infiltrates and pleural effusion with a   thick consolidation in the right upper lobe. CHF with edema and/or   diffuse pneumonia considered. Gastric distention with a nasogastric tube in place. XR CHEST PORTABLE   Final Result   Partial clearing of right lower lobe atelectasis/infiltrate               XR CHEST PORTABLE   Final Result   Worsening CHF with edema. Suggestion of free intraperitoneal air better seen in the previous   examination and could be related to recent surgery. Clinical   assessment is recommended.       ALERT:  THIS IS AN ABNORMAL REPORT            CT HEAD WO CONTRAST   Final Result      NO ACUTE INTRACRANIAL PROCESS         XR CHEST PORTABLE   Final Result      Right upper lung patchy infiltrate about the right minor fissure. Cardiomegaly      Nasogastric tube in satisfactory position with the tip not well seen   but appears to be in the distal stomach. XR Chest Abdomen Ng Placement   Final Result   Enteric catheter with the tip projecting over the expected region of   the stomach. Pneumoperitoneum which may be normal given the patient's history of   recent abdominal surgery. XR ABDOMEN FOR NG/OG/NE TUBE PLACEMENT   Final Result   Addendum 1 of 1   Consider CT scan if better assessment of the abdomen as clinically   indicated. Final      XR CHEST PORTABLE   Final Result   NG tube reaches the stomach               XR CHEST PORTABLE   Final Result   Partial clearing of bibasilar infiltrates and left effusion               XR CHEST PORTABLE   Final Result   Stable abnormal chest with  improving CHF. Superimposed pneumonia is   not excluded. CT CHEST W CONTRAST   Final Result   1. Atelectasis consolidation of both lower lobes with patent central   airways. Findings to be correlated clinically. Can be relate with the   hypoventilation of the lower lung bases. Cannot exclude a pneumonia. 2. Discrete patchy bronchocentric infiltrate in the left upper lobe   lingular segment. 3. No acute pulmonary emboli seen although there are some limitations   for evaluation of the lower lobes and motion artifacts. 4. No aneurysm formation dissection thoracic aorta. 5. Discrete to mild pericardial effusion. CT ABDOMEN PELVIS W IV CONTRAST Additional Contrast? None   Final Result   1. Findings for severe constipation with a large amount of fecal   contents/rotation/impaction from the sigmoid colon to the rectum,   causing a pattern of megacolon with marked fluid/air distentions or   more proximal segments of the colon.       2. Consider decompression of the rectum/sigmoid to permit

## 2020-01-07 NOTE — PLAN OF CARE
Problem: Pain:  Goal: Pain level will decrease  Outcome: Met This Shift  Goal: Control of acute pain  Outcome: Met This Shift  Goal: Control of chronic pain  Outcome: Met This Shift     Problem: Respiratory Function - Compromised:  Goal: Absence of respiratory distress  1/7/2020 0022 by Marleny Fontaine RN  Outcome: Ongoing  1/6/2020 1224 by Yoel Knight RN  Outcome: Not Met This Shift     Problem: Skin Integrity:  Goal: Absence of new skin breakdown  1/7/2020 0022 by Marleny Fontaine RN  Outcome: Ongoing  1/6/2020 1224 by Yoel Knight RN  Outcome: Met This Shift     Problem:  Bowel/Gastric:  Goal: Complications related to the disease process, condition or treatment will be avoided or minimized  1/7/2020 0022 by Marleny Fontaine RN  Outcome: Not Met This Shift  1/6/2020 1224 by Yoel Knight RN  Outcome: Not Met This Shift  Goal: Will be independent with ostomy care  1/7/2020 0022 by Marleny Fontaine RN  Outcome: Not Met This Shift  1/6/2020 1224 by Yoel Knight RN  Outcome: Not Met This Shift     Problem: Skin Integrity:  Goal: Will show no infection signs and symptoms  Outcome: Not Met This Shift

## 2020-01-07 NOTE — PLAN OF CARE
[facility] Quality Flow/Interdisciplinary Rounds Progress Note        Quality Flow Rounds held on January 7, 2020    Disciplines Attending: Dr. Jin Medina, ICU team, pharmacist, bedside nurse, charge nurse. Wing Donato was admitted on 12/29/2019  9:30 AM    Anticipated Discharge Date:  Expected Discharge Date: 12/04/21    Disposition: ICU    Zay Score:  Zay Scale Score: 12    Readmission Risk              Risk of Unplanned Readmission:        17           Discussed patient goal for the day, patient clinical progression, and barriers to discharge. The following Goal(s) of the Day/Commitment(s) have been identified: RUQ US, daily potassium replacement, continue current care.        Jackie Mike  January 7, 2020

## 2020-01-07 NOTE — PROGRESS NOTES
Physical Therapy    Initial Assessment     Name: Brenda Blackmon  : 1965  MRN: [id number]    Date of Service: 2020    Evaluating PT:  Deonte Osorio, PT, DPT [id number]    Room #:  0740/7634-R  Diagnosis:  Hypotension   PMHx:  Arthritis, depression, MSA, HTN, Parkinson's disease, seizure   Procedures:  Exlap, subtotal colectomy, end ileostomy 19;  Bronch 20   Pertinent info: Intubated -, Bronch , Re-intubated 1/3   Precautions:  Falls, Vent via ETT, NGT, Ostomy, B prevalon boots   Equipment Needs:  TBD    Pt lives with  in a 1 story home with ramp entry. Bedroom and bathroom are on the first level. Pt ambulated with U-Step Walker vs. Motor wheelchair PTA. Requires assistance for ADLs at baseline. Mostly helped by her  and her parents assist as well. Initial Evaluation  Date: 20 Treatment Short Term/ Long Term   Goals   AM-PAC 6 Clicks      Was pt agreeable to Eval/treatment? Yes     Does pt have pain? No c/o pain. Grimace with LLE passive ROM     Bed Mobility  Rolling: Dep  Supine to sit: Dep x2  Sit to supine: Dep x2  Scooting: Dep   Rolling: Mod A  Supine to sit: Mod A  Sit to supine: Mod A  Scooting: Mod A   Transfers Sit to stand: NT  Stand to sit: NT  Stand pivot: NT  Sit to stand: Max A  Stand to sit: Max A  Stand pivot: Max A with AAD   Ambulation    NT  >10 feet with AAD Max A   Stair negotiation: ascended and descended  NT  NA   ROM BUE:  Per OT eval   RLE:  Passively WFL  LLE:  Plantarflexion/supination at foot/ankle and slight adduction at hip      Strength BUE:  Per OT eval   RLE:  Grossly 2/5 at hip and ankle; 3+/5 at knee  LLE:  Grossly 1/5 at ankle, 3-/5 at knee, 2/5 at hip   RLE>3+/5  LLE>3/5    Balance Sitting EOB:  Max A  Dynamic Standing:  NT  Sitting EOB:  SBA  Dynamic Standing:   Mod A     Pt is A & O x 1 -- inconsistent head nods   RASS:  -2 to -1   CAM-ICU:  Positive   Sensation:  Unable to assess accurately   Edema:  None noted     Vitals:  Blood Pressure at rest 134/78 Blood Pressure post session 137/87   Heart Rate at rest 96 bpm Heart Rate post session 98 bpm   SPO2 at rest 97% via vent  SPO2 post session 95% via vent    Vent settings: AC, 50%, 5    Functional Status Score-Intensive Care Unit (FSS-ICU)   Rolling 1/7   Supine to sit transfer 1/7   Unsupported sitting  2/7   Sit to stand transfers 0/7   Ambulation 0/7   Total  4/35     Patient education  Pt educated on PT role, safety during functional mobility, hand placement during bed mobility and transfers, positioning in bed, upright posture and upright sitting balance, midline orientation in supine and sitting. Pt oriented to date, time, time of day, place, and situation as well as provided with visual and auditory stimuli in order to improve cognition and combat effects of ICU delirium. Family educated on PT role, PT POC, positioning, PROM    Patient response to education:   Pt verbalized understanding Pt demonstrated skill Pt requires further education in this area   No  No  Yes      Comments: Discussed pt case at interdisciplinary rounds in AM.  Pt received supine with  (and later father) present and agreeable to PT evaluation with OT collaboration. Pt cleared for participation by RN prior to session. Vitals monitored during session. Pt lethargic, minimally participative initially, and inconsistent head nods. She did participate more at EOB. In supine performed PROM and AAROM x5 reps in all planes. Able to participate more on RLE. LLE resistive into knee flexion with facial grimacing. L ankle noted to be plantarflexed and supinated. L hip adducted and slightly IR. Pt assisted with trunk and supine>sit. Performed light stretching to LLE while sitting EOB. Pt more awake and alert at EOB. Assisted with scapular protraction/retraction, scapular ROM, and light cervical ROM.   Performed soft tissue mobilization to L shoulder, upper back, and neck musculature. Pt with L side lean and gaze to R. Encouraged pt to gaze to L and range cervical spine. Pt sat EOB >10 minutes and returned to supine. Scooted up in bed. Pt placed in the chair position with pillows utilized to facilitate upright posture, joint and skin integrity, and interaction with environment. Spoke with RN. Pt left with call button in reach, lines attached, and needs met. Family present. Pt would benefit from continued PT services at discharge. Pts/ family goals   1. Rehab when stable     Patient and or family understand(s) diagnosis, prognosis, and plan of care. Yes     PLAN  PT care will be provided in accordance with the objectives noted above. Whenever appropriate, clear delegation orders will be provided for nursing staff. Exercises and functional mobility practice will be used as well as appropriate assistive devices or modalities to obtain goals. Patient and family education will also be administered as needed. Frequency of treatments: 2-5x/week x 7-10 days.     Time in  1100  Time out  [address] [place] [address]  [id number]

## 2020-01-07 NOTE — PLAN OF CARE
Problem: Respiratory Function - Compromised:  Goal: Absence of respiratory distress  Description  Maintaining head of bed at least 30 degrees. Outcome: Met This Shift     Problem: Pain:  Goal: Pain level will decrease  Description  Pain level will decrease  Outcome: Met This Shift  Goal: Control of acute pain  Description  Control of acute pain  Outcome: Met This Shift  Goal: Control of chronic pain  Description  Control of chronic pain  Outcome: Met This Shift     Problem: Skin Integrity:  Goal: Will show no infection signs and symptoms  Description  Will show no infection signs and symptoms  Outcome: Met This Shift  Goal: Absence of new skin breakdown  Description  Absence of new skin breakdown  Outcome: Met This Shift     Problem:  Bowel/Gastric:  Goal: Complications related to the disease process, condition or treatment will be avoided or minimized  Outcome: Not Met This Shift  Goal: Will be independent with ostomy care  Outcome: Not Met This Shift

## 2020-01-07 NOTE — PROGRESS NOTES
concerning for proctitis. Subjective: Patient was seen and examined. Intubated, awake, but not responding to questions. Continues to have intermittent febrile episodes. Objective:  /81   Pulse 96   Temp 99.7 °F (37.6 °C)   Resp 23   Ht 5' 6\" (1.676 m)   Wt 110 lb 14.3 oz (50.3 kg)   SpO2 96%   BMI 17.90 kg/m²   Constitutional: Intubated, no MV dyssynchrony  Respiratory: Clear breath sounds, no crackles, no wheezes  Cardiovascular: Regular rate and rhythm, no murmurs  Gastrointestinal: Bowel sounds present, soft, nontender, ostomy in place  Skin: Warm and dry, no active dermatoses  Musculoskeletal: No joint swelling, no joint erythema    Labs, imaging, and medical records/notes were personally reviewed. Assessment:  HAP   Septic shock, resolved, secondary to peritonitis from ischemic colitis s/p exploratory laparotomy, subtotal colectomy, and ileostomy on 12/13   Saccharomyces cerevisiae on bronchial washing, likely contaminant    Recommendations:  Continue piperacillin-tazobactam 3.375g q8h for now. Check serum beta-D-glucan. Will add antifungal if leukocytosis worsens. Plan was discussed with Dr. Michael Booth and the ICU team.     Thank you for involving me in the care of Lola Estrada. I will continue to follow. Please do not hesitate to call for any questions or concerns.     Electronically signed by Rocky Castano MD on 1/7/2020 at 10:03 AM

## 2020-01-08 NOTE — PROGRESS NOTES
Physical Therapy    Daily treatment note      Name: Jorge Bernard  : 1965  MRN: [id number]    Date of Service: 2020    Evaluating PT:  Rosy Polk, PT, DPT [id number]    Room #:  8163/9050-W  Diagnosis:  Hypotension   PMHx:  Arthritis, depression, MSA, HTN, Parkinson's disease, seizure   Procedures:  Exlap, subtotal colectomy, end ileostomy 19;  Bronch 20   Pertinent info: Intubated -, Bronch , Re-intubated 1/3   Precautions:  Falls, Vent via ETT, NGT, Ostomy, B prevalon boots   Equipment Needs:  TBD    Pt lives with  in a 1 story home with ramp entry. Bedroom and bathroom are on the first level. Pt ambulated with U-Step Walker vs. Motor wheelchair PTA. Requires assistance for ADLs at baseline. Mostly helped by her  and her parents assist as well. Initial Evaluation  Date: 20 Treatment  20 Short Term/ Long Term   Goals   AM-PAC 6 Clicks 1/76 3/06    Was pt agreeable to Eval/treatment? Yes Yes    Does pt have pain? No c/o pain. Grimace with LLE passive ROM No c/o pain     Bed Mobility  Rolling: Dep  Supine to sit: Dep x2  Sit to supine: Dep x2  Scooting: Dep  Rolling: Dep  Supine to sit: Dep  Sit to supine: Dep  Scooting: Dep  Rolling: Mod A  Supine to sit: Mod A  Sit to supine: Mod A  Scooting: Mod A   Transfers Sit to stand: NT  Stand to sit: NT  Stand pivot: NT NT Sit to stand: Max A  Stand to sit: Max A  Stand pivot:  Max A with AAD   Ambulation    NT NT >10 feet with AAD Max A   Stair negotiation: ascended and descended  NT NT NA   ROM BUE:  Per OT eval   RLE:  Passively WFL  LLE:  Plantarflexion/supination at foot/ankle and slight adduction at hip  Tightness of L cervical, upper back, and shoulder musculature     Strength BUE:  Per OT eval   RLE:  Grossly 2/5 at hip and ankle; 3+/5 at knee  LLE:  Grossly 1/5 at ankle, 3-/5 at knee, 2/5 at hip  See eval  RLE>3+/5  LLE>3/5    Balance Sitting EOB:  Max A  Dynamic Standing:  NT Sitting EOB: Max A Sitting EOB:  SBA  Dynamic Standing: Mod A     Pt is A & O x 1 -- inconsistent head nods   RASS:  -2 to -1   CAM-ICU:  Positive   Sensation:  Unable to assess accurately   Edema:  None noted     Vitals:  Blood Pressure at rest 165/94 Blood Pressure post session 150/90   Heart Rate at rest 100 bpm Heart Rate post session 99 bpm   SPO2 at rest 94% via vent  SPO2 post session 94% via vent    Vent settings: AC, 50%, 5    Functional Status Score-Intensive Care Unit (FSS-ICU)   Rolling 1/7   Supine to sit transfer 1/7   Unsupported sitting  2/7   Sit to stand transfers 0/7   Ambulation 0/7   Total  4/35     Patient education  Pt educated on  safety during functional mobility, hand placement during bed mobility and transfers, positioning in bed, upright posture and upright sitting balance, midline orientation in supine and sitting, passive stretching, lower extremity therapeutic exercises. Pt oriented to date, time, time of day, place, and situation as well as provided with visual and auditory stimuli in order to improve cognition and combat effects of ICU delirium. Family educated on PT role, PT POC, positioning, PROM, ICU delirium     Patient response to education:   Pt verbalized understanding Pt demonstrated skill Pt requires further education in this area   No  No  Yes      Comments: Discussed pt case at interdisciplinary rounds in AM.  Pt received supine with  and father present (later in session mother and friend also present) and agreeable to PT treatment. Pt cleared for participation by RN prior to session. Vitals monitored during session. Performed supine exercises in bed with AAROM. Pt in and out of participation but able to participate with increased verbal cueing. Performed passive cervical stretching and AAROM to cervical spine also in supine. Performed soft tissue mobilization to upper back, neck, and posterior shoulder musculature to prevent tightness, pain, and contracture. Assisted with BLE and trunk to EOB. Pt more awake and more alert when sitting EOB. She was able to interact with her family and friend more while sitting at EOB. Pt sat EOB > 15 minutes while working on upright sitting balance and upright sitting posture while interacting with family to combat effects of ICU delirium and improve arousal.  Educated on deep breathing and relaxation at EOB. Returned to supine when fatigued. Scooted up in bed. Pt placed in the chair position with pillows utilized to facilitate upright posture, joint and skin integrity, and interaction with environment. Pt left with family present, call button in reach, all lines attached and intact. Therapeutic exercise performed:  · AAROM BLE hip/knee flexion/extension with manual resistance into extension x12 reps   · AAROM B ankle pumps x12 reps   · AAROM B hip abduction/adduction x12 reps   · AAROM B hip internal/external rotation x12 reps   · Passive stretching to B hamstring and B gastrocnemius/soleus x30 seconds   · Passive ROM to cervical rotation L<>R and cervical side bending L<>R    PLAN  Pt is making slow progress towards established goals. Continue PT POC.        Time in  1020  Time out  [address], PT, DPT  [id number]

## 2020-01-08 NOTE — PLAN OF CARE
Problem: Malnutrition  (NI-5.2)  Goal: Food and/or Nutrient Delivery  Description- EN support   Individualized approach for food/nutrient provision.   Outcome: Met This Shift

## 2020-01-08 NOTE — PROCEDURES
Bronchoscopy Inpatient Procedure Note    Date of Procedure: 1/8/2020    Pre-op Diagnosis: fevers abnormal cxr     Post-op Diagnosis: bilateral lower lobe mucous plugs     Bronchoscopist: MERLE URBANO      Anesthesia: versed and fentanyl     Procedure: Flexible fiberoptic bronchoscopy with bal and endobronchial biopsy     Estimated Blood Loss: none     Complications: None    Consent to Procedure  The risks, benefits, complications, treatment options and expected outcomes were discussed with the patient and/or POA  The possibilities of reaction to medication, pulmonary aspiration, perforation of a viscus, bleeding, failure to diagnose a condition and creating a complication requiring transfusion or operation were discussed with the patient who freely signed the consent. Description of Procedure  The patient was intubated on mechanical ventilation and placed on 100% oxygen. Arturo Polite was monitored by the Critical Nursing and Respiratory therapy staff and the standard ICU monitoring devices. Lucy Rack Trump and the procedure were verified as Flexible Fiberoptic Bronchoscopy. A Time Out was held and the above information confirmed. The patient was placed in the appropriate position. The patient was sedated using midazolam (Versed) intravenously and fentanyl intravenously    The bronchoscope was then passed into the trachea via the ET tube. Lidocaine 2% solution 2 ml at a time was applied topically to the heidi. After careful inspection of the tracheal, the bronchoscope was sequentially passed into all segments of the left and right endobronchial trees to the second and/or third divisions. Endobronchial findings    Trachea: distal  tracheal stenosis. Thick white hard plaque on the anterior trachea distal to the ET tube.  Biopsy sent to pathology    Heidi  Normal mucosa    Right Main Stem Bronchus  Normal mucosa  Right Upper Lobe Bronchi Collapse with mucus plugging  Right Middle Lobe Bronchi Collapse with mucus plugging  Right Lower Lobe Bronchi (including the Superior segment)  Collapse with mucus plugging    Left Main Stem Bronchus Normal mucosa  Left Upper Lobe Bronchus, Upper Division Collapse with mucus plugging  Left Upper Lobe Bronchus, Lingula  Collapse with mucus plugging  Left Lower Lobe Bronchus (including the Superior segment)  Collapse with mucus plugging    Specimens Taken    Washings -  Amount - 30cc main airways, tracheal washing 20cc    BAL -  Amount - 30cc rll, 20 cc lll   Endobronchial Biopsies - Number of Biopsies -  1  Location(s) - anterior distal trachea      Jana Marr M.D.    Pulmonary/Critical Care Medicine

## 2020-01-08 NOTE — PROGRESS NOTES
Subjective: Intubated sedated mechanically ventilated   Patient or responsive today  Objective:    BP (!) 165/94   Pulse 100   Temp 99.9 °F (37.7 °C) (Rectal)   Resp 13   Ht 5' 6\" (1.676 m)   Wt 108 lb 0.4 oz (49 kg)   SpO2 95%   BMI 17.44 kg/m²     24HR INTAKE/OUTPUT:      Intake/Output Summary (Last 24 hours) at 1/8/2020 1118  Last data filed at 1/8/2020 1000  Gross per 24 hour   Intake 2963 ml   Output 2580 ml   Net 383 ml     Intubated sedated mechanically ventilated   Heart:  RRR, no murmurs, gallops, or rubs. Lungs: Bilateral rhonchi rhonchi  Abd: bowel sounds present, nontender, nondistended, no masses  Extrem:  No clubbing, cyanosis, or edema  Weak x4 extremities  Most Recent Labs  Lab Results   Component Value Date    WBC 13.2 (H) 01/08/2020    HGB 8.3 (L) 01/08/2020    HCT 27.5 (L) 01/08/2020     01/08/2020     01/08/2020    K 3.4 (L) 01/08/2020    CL 93 (L) 01/08/2020    CREATININE 0.5 01/08/2020    BUN 21 (H) 01/08/2020    CO2 28 01/08/2020    GLUCOSE 147 (H) 01/08/2020    ALT 8 01/08/2020    AST 81 (H) 01/08/2020    INR 1.1 12/30/2019    TSH 1.950 12/29/2019     Recent Labs     01/08/20  0459   MG 1.9     Lab Results   Component Value Date    CALCIUM 9.1 01/08/2020    PHOS 3.2 01/08/2020        XR CHEST PORTABLE   Final Result   Patchy bilateral pulmonary infiltrates, worst in the lower lungs, with   some improvement in the left lower lung. Small right pleural effusion. US GALLBLADDER RUQ   Final Result      Color wall thickness is increased to 3.2 mm. No other evidence to suggest acute cholecystitis. XR CHEST PORTABLE   Final Result   Stable abnormal chest with the persistent bilateral infiltrates and   pleural effusions with a marginal improvement which may be due to   improving CHF with edema and/or pneumonia.             CT ABDOMEN PELVIS W IV CONTRAST Additional Contrast? None   Final Result   Subtotal colectomy with right lower quadrant ileostomy with a Contrast? None   Final Result   1. Findings for severe constipation with a large amount of fecal   contents/rotation/impaction from the sigmoid colon to the rectum,   causing a pattern of megacolon with marked fluid/air distentions or   more proximal segments of the colon. 2. Consider decompression of the rectum/sigmoid to permit relieve of   the more proximal segments of the colon. 3. Marked: Distention can explain hypoventilation lower lung bases   causing atelectasis or consolidations with overall patent central   airways of both lower lobes. Please correlate clinically. 4. There is no dilatation of the biliary tree, to be reevaluated   relieve of the megacolon pattern which cause significant mass effect   throughout the entire abdomen peritoneal cavity. .      5. Mild ascites in the abdomen. 6. No free intraperitoneal air observed. XR CHEST PORTABLE   Final Result      Gaseous distention of a loop of bowel within the left upper abdomen   may be stomach or colon. Consider further workup with computed   tomography for better characterization. Bilateral pleural effusions with contiguous atelectasis. Endotracheal tube is present with the distal tip 5 cm above the   adriano. Nasogastric tube follows the expected contours of the esophagus into   stomach with distal sideholes overlying the left upper quadrant of the   abdomen. Esophageal manometry device appears present terminating at the level   of the esophagogastric junction. XR ABDOMEN FOR NG/OG/NE TUBE PLACEMENT   Final Result      Gaseous distention of a loop of bowel within the left upper abdomen   may be stomach or colon. Consider further workup with computed   tomography for better characterization. Bilateral pleural effusions with contiguous atelectasis. Endotracheal tube is present with the distal tip 5 cm above the   adriano.        Nasogastric tube follows the expected contours of the esophagus into   stomach with distal sideholes overlying the left upper quadrant of the   abdomen. Esophageal manometry device appears present terminating at the level   of the esophagogastric junction. XR ABDOMEN (KUB) (SINGLE AP VIEW)   Final Result   Right hemicolonic fecal distention with functional outflow obstruction   of the distal small bowel. The transverse colon is distended with gas,   raising questions regarding possible \"institutional bowel\" or chronic   constipation. Limited radiographic appearance does not suggest small bowel   obstruction. XR CHEST PORTABLE   Final Result   Left basilar opacity. Differential includes atelectasis, infection,   and/or layering pleural effusion. XR CHEST PORTABLE    (Results Pending)   XR CHEST PORTABLE    (Results Pending)       Assessment    Principal Problem:    Septic shock (HCC)  Active Problems:    Parkinson disease ([address]    Hypotension    Multiple system atrophy (HCC)    Fatigue    Ischemic colitis (HCC)    Severe protein-calorie malnutrition ([address]    Acute respiratory failure with hypoxia and hypercapnia (HCC)  Resolved Problems:    * No resolved hospital problems.  *      Plan:  62 F history of multisystem atrophy, hypotension, seizure disorder Adm to ICU w ischemic colitis septic shock status post Exploratory laparotomy with subtotal colectomy and end-ileostomy 12/30, bronchoscopy 1/3--reintubated    Intubated sedated mechanically ventilated   Wean per Pulm [facility]  IV vasopressors-- off  IV fluid   IV antibiotics--Zosyn for suspected pneumonia  Monitor labs closely speech therapy eval  Pulmonary critical care consult appreciated  General surgery consult appreciated-  Palliative care Consult appreciated   Plan for LTAC      Electronically signed by Pradeep Raza MD on 1/8/2020 at 11:18 AM

## 2020-01-08 NOTE — PROGRESS NOTES
last 72 hours. APTT:   No results for input(s): APTT in the last 72 hours. Fasting Lipid Panel:    Lab Results   Component Value Date    TRIG 142 2020       Cardiac Enzymes:    Lab Results   Component Value Date    CKTOTAL 137 2019    CKTOTAL 238 (H) 2016    CKMB 5.7 (H) 2016    TROPONINI <0.01 2017    TROPONINI <0.01 2017    TROPONINI <0.01 2016         Notable Cultures:     Culture  Date sent  Result    Bronchial washing    Saccharomyces cerevisiae    Sputum        Blood  19  no growth     Urine  20  no growth        Antibiotic  Day started  Days      Unasyn      9        Lines:  Site   Date inserted Days     PICC   R basilic                           DVT Prophylaxis       Enoxaparin           PPI Prophylaxis       Protonix         Imaging studies:  Xr Abdomen (kub) (single Ap View)    Result Date: 2019  Patient MRN:  [id number] : 1965 Age: 62 years Gender: Female Order Date:  2019 12:45 PM EXAM: XR ABDOMEN (KUB) (SINGLE AP VIEW) NUMBER OF IMAGES:  1 view-2 images  INDICATION: Abdominal distention COMPARISON: None FINDINGS: Right hemicolon is distended with fecal content, and there is fluid filled pelvic small bowel which may be functional outflow obstruction. The transverse colon is distended with gas. There is no evidence of obstructive dilation, perforation, or pathologic fluid levels. Flank and psoas margins are well-defined. No pathologic calcifications are noted, and there is no evidence of organomegaly. Included portions of the lower chest show no evidence of acute pathology. Skeletal structures are unremarkable for acute findings. Tubal sterilization clips are noted in the pelvis. Overlying EKG leads are present. Right hemicolonic fecal distention with functional outflow obstruction of the distal small bowel.  The transverse colon is distended with gas, raising questions regarding possible \"institutional bowel\" or chronic constipation. Limited radiographic appearance does not suggest small bowel obstruction. Ct Chest W Contrast    Result Date: 2019  Patient MRN:  [id number] : 1965 Age: 62 years Gender: Female Order Date:  2019 2:00 PM TECHNIQUE/NUMBER OF IMAGES/COMPARISON/CLINICAL HISTORY: CT chest After IV contrast administration axial images were obtained with sagittal and coronal MIP reconstructions of pulmonary arterial circulation thoracic aorta. Clinical history abdominal distention, abdominal pain, for evolution the pulmonary arterial circulation. 78-year-old female patient had previous appendectomy. The history of Parkinson's disease is seizures. FINDINGS: There are motion artifacts which cause misregistration of images makes difficult evaluation of the pulmonary arterial circulation towards the lower lung bases. There is no conspicuous acute central pulmonary embolus in the main PA, right and left main PAs in their lobar and segmental and more proximal subsegmental branches. There is no aneurysm formation dissection thoracic aorta. Heart has normal size. Inner diameter for the left ventricular cavity is 4.5 cm and for the right ventricular cavity is 4.4 cm. There is discrete to mild pericardial effusion. There is no mediastinal masses or adenopathy. Diameter for the ascending aorta is 3.5 cm and for the main pulmonary arteries up to 2.5 cm . There is no pericardial effusion. Presence of a consolidation with atelectasis of the right and left lower lobe with patent central airways. Air bronchograms are present. Findings can be relate with hypoventilation lower lung base. Please correlate clinically. There is no conspicuous or pleural effusions. Endotracheal tube is in good position. The patient has a NG tube with the tip in the stomach. There are also compression atelectasis in the posterior aspect of the both upper lobes. The anterior aspect of the upper lobes are better aerated.  A discrete the right midlung infiltrate. Infiltrate left retrocardiac region is smaller and there is less fluid/pleural thickening on the left. There appears to be old healed left-sided rib fractures     Partial clearing of bibasilar infiltrates and left effusion     Xr Chest Portable    Result Date: 2019  Patient MRN:  [id number] : 1965 Age: 62 years Gender: Female Order Date:  2019 6:00 AM EXAM: XR CHEST PORTABLE one image INDICATION:  Line / tube management Line / tube management COMPARISON: 2019 FINDINGS: There is cardiomegaly. Endotracheal tube and nasogastric tube are unchanged. There is vascular congestion with a marginal improvement in persistent perihilar and bibasilar infiltrates and pleural effusions. There is improved gastric distention. Stable abnormal chest with  improving CHF. Superimposed pneumonia is not excluded. Xr Chest Portable    Result Date: 2019  Comparison: 2019. Frontal view of the chest. Frontal view of the abdomen. FINDINGS: Endotracheal tube is present with the distal tip 5 cm above the adriano. Nasogastric tube follows the expected contours of the esophagus into stomach with distal sideholes overlying the left upper quadrant of the abdomen. Esophageal manometry device appears present terminating at the level of the esophagogastric junction. Hazy density right lung base. Meniscus left lung base. Contiguous atelectasis or infiltrate. The heart is enlarged. There is calcification within thoracic aorta. Severe gaseous distention of bowel within the left abdomen may be stomach or colon. Remote left rib fractures. Degenerative spondylosis and facet arthropathy within the spine. Gaseous distention of a loop of bowel within the left upper abdomen may be stomach or colon. Consider further workup with computed tomography for better characterization. Bilateral pleural effusions with contiguous atelectasis.  Endotracheal tube is present with the distal tip 5 cm above the adriano. Nasogastric tube follows the expected contours of the esophagus into stomach with distal sideholes overlying the left upper quadrant of the abdomen. Esophageal manometry device appears present terminating at the level of the esophagogastric junction. Xr Chest Portable    Result Date: 2019  Patient MRN:  [id number] : 1965 Age: 62 years Gender: Female Order Date:  2019 9:45 AM EXAM: XR CHEST PORTABLE NUMBER OF IMAGES:    1 INDICATION:  Possible Stroke Possible Stroke COMPARISON: 2017 TECHNIQUE: AP view of the chest. FINDINGS: Stable cardiomediastinal silhouette. Pulmonary vasculature is normal. There is left basilar opacity. No pneumothorax seen. Degenerative changes noted in the spine. Left basilar opacity. Differential includes atelectasis, infection, and/or layering pleural effusion. Xr Abdomen For Ng/og/ne Tube Placement    Result Date: 2019  Comparison: 2019. Frontal view of the chest. Frontal view of the abdomen. FINDINGS: Endotracheal tube is present with the distal tip 5 cm above the adriano. Nasogastric tube follows the expected contours of the esophagus into stomach with distal sideholes overlying the left upper quadrant of the abdomen. Esophageal manometry device appears present terminating at the level of the esophagogastric junction. Hazy density right lung base. Meniscus left lung base. Contiguous atelectasis or infiltrate. The heart is enlarged. There is calcification within thoracic aorta. Severe gaseous distention of bowel within the left abdomen may be stomach or colon. Remote left rib fractures. Degenerative spondylosis and facet arthropathy within the spine. Gaseous distention of a loop of bowel within the left upper abdomen may be stomach or colon. Consider further workup with computed tomography for better characterization. Bilateral pleural effusions with contiguous atelectasis.  Endotracheal tube is present with the distal tip 5 cm above the adriano. Nasogastric tube follows the expected contours of the esophagus into stomach with distal sideholes overlying the left upper quadrant of the abdomen. Esophageal manometry device appears present terminating at the level of the esophagogastric junction. Resident's Assessment & Plan     78-year-old female with a past medical history of seizure disorder, multiple systemic sclerosis, arthritis, hypo tension presented to the ED after her  noted her blood pressure to be lower than usual and generalized weakness worse in the lower extremities.      Neurologic      Off Sedation, open eyes but does not follow commands. Seizure disorder  - On Keppra 1000 twice daily at home  - Resume home med      Parkinson's Disease  - On Sinemet - currently holding for hypotension      Insomnia   - Hold home dose trazodone  - Melatonin 6mg qhs prn     Pulmonary     Acute hypoxic respiratory failure  - Likely 2/2 Aspiration pneumonia vs pulmonary edema   - Intubated 12/29, extubated 12/31, Reintubated 1/3  - s/p Bronchoscopy 1/2/20, bronchial washing is growing Saccharomyces cerevisiae.  Plan for repeat bronchoscopy today 1/8  - CXR 1/6 - significant for pulmonary vascular congestion, Airspace disease   - Procalcitonin 3.15 on admission, 1.89  On 1/1,  0.45  On 1/4  - On Zosyn   - One dose of lasix - monitor urine output   - Follow daily labs and chest X-ray   - Weaning trial tomorrow      Cardiovascular     Hypotension - resolved   - off levophed     Gastrointestinal     Ischemic Colitis s/p subtotal colectomy and ileostomy   - CT abdomen with distended large intestine, large amounts of feces from rectum to proximal sigmoid colon  - s/p subtotal colectomy and ileostomy wo perforation   - Lomotil if ostomy output > 1.5L qd  - Morphine 1mg q4h prn for pain  - NGT meds and TF per GS   - On Zosyn   - General surgery following      Elevated Liver enzymes   - ALP - 265, ALT/AST- 6/63 on admission   - 1/8/20 - ALP- 308, AST/ALT - 8/81  - CT abdomen and pelvis -   - US gallbladder - No other evidence to suggest acute cholecystitis     Elevated Alkaline phosphatase   - trending up. 308 today   - CT abdomen - Somewhat thickened contracted gallbladder with the some pericholecystic edema  - US gall bladder - Gallbladder wall thickness is increased to 3.2 mm. No intraluminal echogenic density is identified within the gallbladder lumen. No other evidence to suggest acute cholecystitis. Hyperammoniemia - resolving  - Trending down, last 1/3- 52   - Follow Ammonia levels tomorrow      Infectious Disease     Septic shock - resolved   - likely 2/2 ischemic colitis vs UTI  - Hypotensive requiring Levophed- currently off levophed  - Procal trending down to 0.45, Wbc - 13.2     Leukocytosis   - Beta-D-glucan - pending   - Temp overnight - Tmax 100.6, pro annie - 0.45   - Plan for bronchoscopy today   - On Zosyn      Genitourinary/Renal     UA w/ pyuria, bacteriuria and +LE  UCx with coag neg staph > 100k CFU, likely contamination     Hypernatremia - resolved      Hypermagnesemia - resolved      Hypophosphatemia - resolved     Hypokalemia   - K of - 3.4  - replaced       Hematology/Oncology     Normocytic Anemia  - Hb of 11.1 on admission, Hb today - 8.3  - Multifactorial- likely 2/2 blood loss in surgery, dilutional, frequent blood draws   - Follow CBC daily        Dermatologic/Musculoskeletal     Multiple system atrophy  - Chronic condition, parkinson variant      Diet: tube feed   DVT/GI prophylaxis: Lovenox/ Protonix  Disposition: KENIA Rebolledo MD, PGY-1  Attending physician: Dr. Gage Marshall    I personally saw, examined and provided care for the patient. Radiographs, labs and medication list were reviewed by me independently. I spoke with bedside nursing, therapists and consultants.  Critical care services and times documented are independent of procedures and multidisciplinary rounds with

## 2020-01-08 NOTE — PROGRESS NOTES
[facility] Quality Flow/Interdisciplinary Rounds Progress Note        Quality Flow Rounds held on January 8, 2020    Disciplines Attending:  Bedside Nurse, , , Nursing Unit Leadership, Physical Therapy, Occupational Therapy and ICU team     Willie Griffith was admitted on 12/29/2019  9:30 AM    Anticipated Discharge Date:  Expected Discharge Date: 12/04/21    Disposition:    Zay Score:  Zay Scale Score: 12    Readmission Risk              Risk of Unplanned Readmission:        17           Discussed patient goal for the day, patient clinical progression, and barriers to discharge. The following Goal(s) of the Day/Commitment(s) have been identified:  Wean ventilator, replace potassium.        Clydene Ego  January 8, 2020

## 2020-01-09 NOTE — PROGRESS NOTES
[facility] PROGRESS NOTE      Chief complaint: Follow-up of HAP    The patient is a 62 y.o. female with history of seizure disorder, Parkinson's disease, multisystem atrophy, presented on 12/29 with increasing weakness found to be in septic shock with ischemic colitis based on CT abdomen and pelvis showing megacolon with marked fluid/air distention on more proximal segments of the colon. She underwent exploratory laparotomy, subtotal colectomy, and ileostomy on 12/13 during which large volume turbid tan malodorous fluid, profoundly distended large bowel with large volume stool burden and ischemic sigmoid colon with out evidence of perforation were noted. Peritoneal fluid Gram stain and culture showed few polymorphonuclear leukocytes, no epithelial cells, no organisms, no growth. Blood cultures showed no growth to date. She was extubated and was off vasopressor support on 12/31 but had oxygen desaturation with CXR showing development of right upper lung infiltrate. She underwent bronchoscopy on 01/02 during which diffuse tracheobronchitis and secretions with airway edema without endobronchial masses/lesions were noted. Bronchial wash Gram stain and culture showed moderate polymorphonuclear leukocytes, rare epithelial cells, rare gram-positive diphtheroid-like rods, oral pharyngeal lakshmi, light growth of Saccharomyces cerevisiae. She was reintubated on 01/03 due to acute on chronic respiratory failure despite on NIPPV. She received a dose of ceftriaxone on admission and has been on piperacillin-tazobactam since admission. She continued to have intermittent febrile episodes.  CT abdomen and pelvis on 01/06 showed subtotal colectomy with right lower quadrant ileostomy with a moderately dilated/ thickened small bowel loops which may be due to ileus/enteritis or low-grade bowel obstruction, rectal remnant significantly dilated/thickened with retained fecal matter and high density material with presacral edema concerning for proctitis. She underwent bronchoscopy on 01/08 during which mucus plugging was noted with Gram stain and culture showing no polymorphonuclear leukocytes, no epithelial cells, rare Gram-positive diphtheroid-like rods. Subjective: Patient was seen and examined. Intubated, awake, but not responding to questions. Tmax is 100.1 F so far today, decreased from previous. Objective:  BP (!) 144/81   Pulse 97   Temp 100.1 °F (37.8 °C) (Temporal)   Resp 18   Ht 5' 6\" (1.676 m)   Wt 113 lb 8.6 oz (51.5 kg)   SpO2 95%   BMI 18.33 kg/m²   Constitutional: Intubated, no MV dyssynchrony  Respiratory: Clear breath sounds, no crackles, no wheezes  Cardiovascular: Regular rate and rhythm, no murmurs  Gastrointestinal: Bowel sounds present, soft, nontender, ostomy in place  Skin: Warm and dry, no active dermatoses  Musculoskeletal: No joint swelling, no joint erythema    Labs, imaging, and medical records/notes were personally reviewed. Assessment:  HAP   Septic shock, resolved, secondary to peritonitis from ischemic colitis s/p exploratory laparotomy, subtotal colectomy, and ileostomy on 12/13   Saccharomyces cerevisiae on bronchial washing, likely contaminant    Recommendations:  Continue piperacillin-tazobactam 3.375g q8h for now. Follow up serum beta-D-glucan. Continue empiric antifungal coverage with fluconazole. Check respiratory pathogen PCR panel. Follow up bronchial wash cultures. Case was discussed with Dr. Stanley Law and the ICU team.    Thank you for involving me in the care of Lola Estrada. I will continue to follow. Please do not hesitate to call for any questions or concerns.     Electronically signed by Carmelina Rosenberg MD on 1/9/2020 at 10:16 AM

## 2020-01-09 NOTE — PROGRESS NOTES
LSW for Palliative Medicine updated with bedside RN and met with pt's mother, Najma Monte, and aunt, Yohan Herron, at bedside. They advised that they are aware of plan today as is pt's , Sven Laughlin, who had stepped out. Family is hopeful that pt will be able to be extubated. Psychosocial support provided.

## 2020-01-09 NOTE — PROGRESS NOTES
[address] [facility]  Department of Internal Medicine  Internal Medicine Residency Program  Resident MICU Progress  Note      Patient:  Dexter Crouch 62 y.o. female MRN: [id number]     Date of Service: 1/9/2020    Allergy: Sulfa antibiotics      Subjective       Patient seen and examined. Patient opens eyes to verbal stimulus. Does not follow commands     Overnight events: Febrile, Tmax of 100.8     Objective   Physical Exam:  · Vitals: BP (!) 154/84   Pulse 90   Temp 99.5 °F (37.5 °C) (Temporal)   Resp 29   Ht 5' 6\" (1.676 m)   Wt 113 lb 8.6 oz (51.5 kg)   SpO2 96%   BMI 18.33 kg/m²     · I & O - 24hr: In: 1347.3 [I.V.:321.3; NG/GT:576]  · Out: 0838 [Urine:1205]    Physical Exam  Vitals signs and nursing note reviewed. HENT:      Head: Normocephalic and atraumatic. Mouth/Throat:      Mouth: Mucous membranes are moist.   Abdominal:      General: Bowel sounds are normal.      Palpations: Abdomen is soft. Comments: Pink stoma, dark brown stool in colostomy bag, well healing abdominal post surgical wound   Skin:     General: Skin is warm. Capillary Refill: Capillary refill takes less than 2 seconds. Neurological:      Comments: Awake, does not follow commands          Pertinent New Labs & Imaging Studies     CBC:   Recent Labs     01/07/20  0451 01/08/20  0459 01/09/20  0500   WBC 12.5* 13.2* 13.3*   HGB 8.3* 8.3* 7.5*   HCT 27.5* 27.5* 24.8*   MCV 95.5 95.2 95.4    287 323       BMP:    Recent Labs     01/07/20  0451 01/08/20  0459 01/09/20  0500    134 135   K 3.4* 3.4* 4.1   CL 95* 93* 96*   CO2 32* 28 24   BUN 21* 21* 19   CREATININE 0.5 0.5 0.5   GLUCOSE 133* 147* 112*       LIVER PROFILE:   Recent Labs     01/07/20  0451 01/08/20  0459 01/09/20  0500   AST 48* 81* 98*   ALT 5 8 9   BILITOT 0.4 0.4 0.5   ALKPHOS 256* 308* 365*       PT/INR:   No results for input(s): PROTIME, INR in the last 72 hours. APTT:   No results for input(s):  APTT in the last 72 \"institutional bowel\" or chronic constipation. Limited radiographic appearance does not suggest small bowel obstruction. Ct Chest W Contrast    Result Date: 2019  Patient MRN:  [id number] : 1965 Age: 62 years Gender: Female Order Date:  2019 2:00 PM TECHNIQUE/NUMBER OF IMAGES/COMPARISON/CLINICAL HISTORY: CT chest After IV contrast administration axial images were obtained with sagittal and coronal MIP reconstructions of pulmonary arterial circulation thoracic aorta. Clinical history abdominal distention, abdominal pain, for evolution the pulmonary arterial circulation. 22-year-old female patient had previous appendectomy. The history of Parkinson's disease is seizures. FINDINGS: There are motion artifacts which cause misregistration of images makes difficult evaluation of the pulmonary arterial circulation towards the lower lung bases. There is no conspicuous acute central pulmonary embolus in the main PA, right and left main PAs in their lobar and segmental and more proximal subsegmental branches. There is no aneurysm formation dissection thoracic aorta. Heart has normal size. Inner diameter for the left ventricular cavity is 4.5 cm and for the right ventricular cavity is 4.4 cm. There is discrete to mild pericardial effusion. There is no mediastinal masses or adenopathy. Diameter for the ascending aorta is 3.5 cm and for the main pulmonary arteries up to 2.5 cm . There is no pericardial effusion. Presence of a consolidation with atelectasis of the right and left lower lobe with patent central airways. Air bronchograms are present. Findings can be relate with hypoventilation lower lung base. Please correlate clinically. There is no conspicuous or pleural effusions. Endotracheal tube is in good position. The patient has a NG tube with the tip in the stomach. There are also compression atelectasis in the posterior aspect of the both upper lobes.  The anterior aspect of the upper lobes are better aerated. A discrete the area bronchocentric infiltrate seen towards the lingular segment of the left upper lobe. Upper abdominal structures are not fully covered on this study. There is a generalized fluid distention of the bowel including the colon. The abdomen is evaluate on the CT abdomen performed same time. 1. Atelectasis consolidation of both lower lobes with patent central airways. Findings to be correlated clinically. Can be relate with the hypoventilation of the lower lung bases. Cannot exclude a pneumonia. 2. Discrete patchy bronchocentric infiltrate in the left upper lobe lingular segment. 3. No acute pulmonary emboli seen although there are some limitations for evaluation of the lower lobes and motion artifacts. 4. No aneurysm formation dissection thoracic aorta. 5. Discrete to mild pericardial effusion. Ct Abdomen Pelvis W Iv Contrast Additional Contrast? None    Result Date: 2019  Patient MRN:  [id number] : 1965 Age: 62 years Gender: Female Order Date:  2019 6:45 PM TECHNIQUE/NUMBER OF IMAGES/COMPARISON/CLINICAL HISTORY: CT abdomen pelvis IV contrast After IV contrast administration axial images were obtained with sagittal and coronal reconstructions are. Clinical history abdominal pain. FINDINGS: There is massive dilatation of the colon with a predominant fluid contents. Large amount of solid fecal contents form a pattern of constipation/fecal rectal retention/impaction is seen in the sigmoid colon to the rectum. This can causes bowel obstruction to critical a large amount of fluid the recommendation the more proximal segments of the colon. Consider decompression of the rectum/sigmoid to relieve the contents in more proximal segments of the colon. There are normal size and enhancement for the liver, spleen and the pancreas are. Gallbladder is well distended and has a hydrops appearance.  There is some dilatation of the intrahepatic biliary tree and the common bile duct measures study. There is some right midlung infiltrate. Infiltrate left retrocardiac region is smaller and there is less fluid/pleural thickening on the left. There appears to be old healed left-sided rib fractures     Partial clearing of bibasilar infiltrates and left effusion     Xr Chest Portable    Result Date: 2019  Patient MRN:  [id number] : 1965 Age: 62 years Gender: Female Order Date:  2019 6:00 AM EXAM: XR CHEST PORTABLE one image INDICATION:  Line / tube management Line / tube management COMPARISON: 2019 FINDINGS: There is cardiomegaly. Endotracheal tube and nasogastric tube are unchanged. There is vascular congestion with a marginal improvement in persistent perihilar and bibasilar infiltrates and pleural effusions. There is improved gastric distention. Stable abnormal chest with  improving CHF. Superimposed pneumonia is not excluded. Xr Chest Portable    Result Date: 2019  Comparison: 2019. Frontal view of the chest. Frontal view of the abdomen. FINDINGS: Endotracheal tube is present with the distal tip 5 cm above the adriano. Nasogastric tube follows the expected contours of the esophagus into stomach with distal sideholes overlying the left upper quadrant of the abdomen. Esophageal manometry device appears present terminating at the level of the esophagogastric junction. Hazy density right lung base. Meniscus left lung base. Contiguous atelectasis or infiltrate. The heart is enlarged. There is calcification within thoracic aorta. Severe gaseous distention of bowel within the left abdomen may be stomach or colon. Remote left rib fractures. Degenerative spondylosis and facet arthropathy within the spine. Gaseous distention of a loop of bowel within the left upper abdomen may be stomach or colon. Consider further workup with computed tomography for better characterization. Bilateral pleural effusions with contiguous atelectasis.  Endotracheal tube is General surgery following      Elevated Liver enzymes   - ALP - 265, ALT/AST- 6/63 on admission   - 1/9/20 - ALP- 365, AST/ALT -9/98  - CT abdomen and pelvis - Somewhat thickened contracted gallbladder with the some pericholecystic edema  - US gallbladder - No other evidence to suggest acute cholecystitis     Elevated Alkaline phosphatase   - Trending up. 365 today   - CT abdomen - Somewhat thickened contracted gallbladder with the some pericholecystic edema  - US gall bladder - Gallbladder wall thickness is increased to 3.2 mm. No intraluminal echogenic density is identified within the gallbladder lumen. No other evidence to suggest acute cholecystitis. - Follow GGT      Hyperammoniemia - resolving  - Trending down, last 1/3- 52   - Follow Ammonia levels tomorrow      Infectious Disease     Septic shock - resolved   - likely 2/2 ischemic colitis vs UTI  - Hypotensive requiring Levophed- currently off levophed  - Procal trending down to 0.45, Wbc - 13.2      Leukocytosis   - WBC - 13.3 today   - Beta-D-glucan - pending   - Temp overnight - Tmax 100.8, pro annie - 0.27   - s/p bronchoscopy. BAL had neutrophilic predominance, follow Cx    - On Zosyn      Genitourinary/Renal     UA w/ pyuria, bacteriuria and +LE  UCx with coag neg staph > 100k CFU, likely contamination     Hypernatremia - resolved      Hypermagnesemia - resolved      Hypophosphatemia - resolved     Hypokalemia   - K of - 3.4  - replaced       Hematology/Oncology     Normocytic Anemia  - Hb of 11.1 on admission, Hb today - 7.5  - Multifactorial- likely 2/2 blood loss in surgery, dilutional, frequent blood draws   - Follow CBC daily        Dermatologic/Musculoskeletal     Multiple system atrophy  - Chronic condition, parkinson variant      Diet: tube feed   DVT/GI prophylaxis: Lovenox/ Protonix  Disposition: KENIA Weeks MD, PGY-1  Attending physician: Tasia Weeks    I personally saw, examined and provided care for the patient.  Radiographs, labs and medication list were reviewed by me independently. I spoke with bedside nursing, therapists and consultants. Critical care services and times documented are independent of procedures and multidisciplinary rounds with Residents. Additionally comprehensive, multidisciplinary rounds were conducted with the MICU team. The case was discussed in detail and plans for care were established. Review of Residents documentation was conducted and revisions were made as appropriate. I agree with the above documented exam, problem list and plan of care. S/p bronchoscopy follow up cultures   Abx dw ID   lft still trending up. May need to redose or even stop fluconazole if fungitell is negative. Vidal Banks M.D.    Pulmonary/Critical Care Medicine   35 min cct excluding procedures

## 2020-01-09 NOTE — PROGRESS NOTES
mild pericardial effusion. CT ABDOMEN PELVIS W IV CONTRAST Additional Contrast? None   Final Result   1. Findings for severe constipation with a large amount of fecal   contents/rotation/impaction from the sigmoid colon to the rectum,   causing a pattern of megacolon with marked fluid/air distentions or   more proximal segments of the colon. 2. Consider decompression of the rectum/sigmoid to permit relieve of   the more proximal segments of the colon. 3. Marked: Distention can explain hypoventilation lower lung bases   causing atelectasis or consolidations with overall patent central   airways of both lower lobes. Please correlate clinically. 4. There is no dilatation of the biliary tree, to be reevaluated   relieve of the megacolon pattern which cause significant mass effect   throughout the entire abdomen peritoneal cavity. .      5. Mild ascites in the abdomen. 6. No free intraperitoneal air observed. XR CHEST PORTABLE   Final Result      Gaseous distention of a loop of bowel within the left upper abdomen   may be stomach or colon. Consider further workup with computed   tomography for better characterization. Bilateral pleural effusions with contiguous atelectasis. Endotracheal tube is present with the distal tip 5 cm above the   adriano. Nasogastric tube follows the expected contours of the esophagus into   stomach with distal sideholes overlying the left upper quadrant of the   abdomen. Esophageal manometry device appears present terminating at the level   of the esophagogastric junction. XR ABDOMEN FOR NG/OG/NE TUBE PLACEMENT   Final Result      Gaseous distention of a loop of bowel within the left upper abdomen   may be stomach or colon. Consider further workup with computed   tomography for better characterization. Bilateral pleural effusions with contiguous atelectasis.       Endotracheal tube is present with the distal tip 5 cm above the adriano. Nasogastric tube follows the expected contours of the esophagus into   stomach with distal sideholes overlying the left upper quadrant of the   abdomen. Esophageal manometry device appears present terminating at the level   of the esophagogastric junction. XR ABDOMEN (KUB) (SINGLE AP VIEW)   Final Result   Right hemicolonic fecal distention with functional outflow obstruction   of the distal small bowel. The transverse colon is distended with gas,   raising questions regarding possible \"institutional bowel\" or chronic   constipation. Limited radiographic appearance does not suggest small bowel   obstruction. XR CHEST PORTABLE   Final Result   Left basilar opacity. Differential includes atelectasis, infection,   and/or layering pleural effusion. XR CHEST PORTABLE    (Results Pending)   XR CHEST PORTABLE    (Results Pending)   XR CHEST PORTABLE    (Results Pending)       Assessment    Principal Problem:    Septic shock (HCC)  Active Problems:    Parkinson disease ([address]    Hypotension    Multiple system atrophy (HCC)    Fatigue    Ischemic colitis (HCC)    Severe protein-calorie malnutrition ([address]    Acute respiratory failure with hypoxia and hypercapnia (HCC)  Resolved Problems:    * No resolved hospital problems.  *      Plan:  62 F history of multisystem atrophy, hypotension, seizure disorder Adm to ICU w ischemic colitis septic shock status post Exploratory laparotomy with subtotal colectomy and end-ileostomy 12/30, bronchoscopy 1/3--reintubated    Intubated sedated mechanically ventilated   Wean per Pulm [facility]  IV vasopressors-- off  IV fluid   IV antibiotics--Zosyn for suspected pneumonia  Diflucan IV  Monitor labs closely speech therapy eval  Pulmonary critical care consult appreciated  General surgery consult appreciated-  Palliative care Consult appreciated   ID consult appreciated  Plan for LTAC      Electronically signed by Aron Pham MD on 1/9/2020 at

## 2020-01-09 NOTE — PROGRESS NOTES
Patient extubated to a 5 L/m nasal cannula. Tolerated well. Patient verbalized well after extubation with no stridor.

## 2020-01-10 NOTE — PROGRESS NOTES
[facility] PROGRESS NOTE      Chief complaint: Follow-up of HAP    The patient is a 62 y.o. female with history of seizure disorder, Parkinson's disease, multisystem atrophy, presented on 12/29 with increasing weakness found to be in septic shock with ischemic colitis based on CT abdomen and pelvis showing megacolon with marked fluid/air distention on more proximal segments of the colon. She underwent exploratory laparotomy, subtotal colectomy, and ileostomy on 12/13 during which large volume turbid tan malodorous fluid, profoundly distended large bowel with large volume stool burden and ischemic sigmoid colon with out evidence of perforation were noted. Peritoneal fluid Gram stain and culture showed few polymorphonuclear leukocytes, no epithelial cells, no organisms, no growth. Blood cultures showed no growth to date. She was extubated and was off vasopressor support on 12/31 but had oxygen desaturation with CXR showing development of right upper lung infiltrate. She underwent bronchoscopy on 01/02 during which diffuse tracheobronchitis and secretions with airway edema without endobronchial masses/lesions were noted. Bronchial wash Gram stain and culture showed moderate polymorphonuclear leukocytes, rare epithelial cells, rare gram-positive diphtheroid-like rods, oral pharyngeal lakshmi, light growth of Saccharomyces cerevisiae. She was reintubated on 01/03 due to acute on chronic respiratory failure despite on NIPPV. She received a dose of ceftriaxone on admission and has been on piperacillin-tazobactam since admission. She continued to have intermittent febrile episodes.  CT abdomen and pelvis on 01/06 showed subtotal colectomy with right lower quadrant ileostomy with a moderately dilated/ thickened small bowel loops which may be due to ileus/enteritis or low-grade bowel obstruction, rectal remnant significantly dilated/thickened with retained fecal matter and high density material with presacral edema

## 2020-01-10 NOTE — DISCHARGE INSTR - COC
([facility] K55.9    Septic shock ([facility]) A41.9, R65.21    Severe protein-calorie malnutrition ([facility] E43    Acute respiratory failure with hypoxia and hypercapnia ([facility]) J96.01, J96.02       Isolation/Infection:   Isolation          No Isolation        Patient Infection Status     None to display          Nurse Assessment:  Last Vital Signs: BP (!) 143/90   Pulse 103   Temp 97.8 °F (36.6 °C)   Resp 19   Ht 5' 6\" (1.676 m)   Wt 109 lb 5.6 oz (49.6 kg)   SpO2 92%   BMI 17.65 kg/m²     Last documented pain score (0-10 scale): Pain Level: 0  Last Weight:   Wt Readings from Last 1 Encounters:   01/10/20 109 lb 5.6 oz (49.6 kg)     Mental Status:  {IP PT MENTAL STATUS:}    IV Access:  { DAVID IV ACCESS:278783258}    Nursing Mobility/ADLs:  Walking   {P DME YHJ}  Transfer  {P DME TIIW:622871562}  Bathing  {P DME BJAW:108470987}  Dressing  {P DME FJQY:082839728}  Toileting  {P DME SJFX:945667744}  Feeding  {P DME YXNB:480224592}  Med Admin  {P DME KPWY:241893307}  Med Delivery   { DAVID MED Delivery:155049495}    Wound Care Documentation and Therapy:  Wound 20 Buttocks Left (Active)   Wound Image   2020  2:40 PM   Wound Deep tissue/Injury 2020  2:40 PM   Dressing/Treatment Moisturizing cream;Open to air 1/10/2020 10:00 AM   Wound Length (cm) 0.4 cm 2020  2:40 PM   Wound Width (cm) 0.5 cm 2020  2:40 PM   Wound Surface Area (cm^2) 0.2 cm^2 2020  2:40 PM   Change in Wound Size % (l*w) 20 2020  2:40 PM   Wound Assessment Purple;Dry; Intact 1/10/2020 10:00 AM   Drainage Amount None 2020  2:40 PM   Number of days: 2        Elimination:  Continence:   · Bowel: {YES / RR:52089}  · Bladder: {YES / ES:85198}  Urinary Catheter: {Urinary Catheter:798950433}   Colostomy/Ileostomy/Ileal Conduit: {YES / GL:57915}  Ileostomy RLQ-Stomal Appliance: 1 piece  Ileostomy RLQ-Stoma  Assessment: Red  Ileostomy RLQ-Mucocutaneous Junction: Intact  Ileostomy RLQ-Peristomal Assessment: Intact  Ileostomy RLQ-Treatment: Bag change  Ileostomy RLQ-Stool Appearance: Mucous, Watery  Ileostomy RLQ-Stool Color: Juanita Carmona  Ileostomy RLQ-Stool Amount: Small  Ileostomy RLQ-Output (mL): 270 ml    Date of Last BM: ***    Intake/Output Summary (Last 24 hours) at 1/10/2020 1144  Last data filed at 1/10/2020 1037  Gross per 24 hour   Intake 1729.5 ml   Output 1590 ml   Net 139.5 ml     I/O last 3 completed shifts:   In: 1609.5 [I.V.:710.5; NG/GT:599; IV Piggyback:300]  Out: 1660 [Urine:1155; Stool:505]    Safety Concerns:     [address] [employer] Safety Concerns:595172896}    Impairments/Disabilities:      [address] [employer] Impairments/Disabilities:770071764}    Nutrition Therapy:  Current Nutrition Therapy:   [employer] Diet List:689873246}    Routes of Feeding: {P DME Other Feedings:016743943}  Liquids: {Slp liquid thickness:88896}  Daily Fluid Restriction: {CHP DME Yes amt example:632097373}  Last Modified Barium Swallow with Video (Video Swallowing Test): {Done Not Done GAY:348207507}    Treatments at the Time of Hospital Discharge:   Respiratory Treatments: ***  Oxygen Therapy:  {Therapy; copd oxygen:66238}  Ventilator:    { CC Vent WKYF:464437831}    Rehab Therapies: {THERAPEUTIC INTERVENTION:0221543145}  Weight Bearing Status/Restrictions: [address]  Weight Bearin}  Other Medical Equipment (for information only, NOT a DME order):  {EQUIPMENT:564749853}  Other Treatments: ***    Patient's personal belongings (please select all that are sent with patient):  {[facility] DME Belongings:292050756}    RN SIGNATURE:  {Esignature:160303662}    CASE MANAGEMENT/SOCIAL WORK SECTION    Inpatient Status Date: ***    Readmission Risk Assessment Score:  Readmission Risk              Risk of Unplanned Readmission:        16           Discharging to Facility/ Agency   · Name: [facility]  · Address:  · Phone:  · Fax:    Dialysis Facility (if applicable)   · Name:  · Address:  · Dialysis Schedule:  · Phone:  · Fax:    Case Manager/ signature: {Esignature:362798844}    PHYSICIAN SECTION    Prognosis: {Prognosis:5311175335}    Condition at Discharge: [address] Patient Condition:165104559}    Rehab Potential (if transferring to Rehab): {Prognosis:9915862104}    Recommended Labs or Other Treatments After Discharge: ***    Physician Certification: I certify the above information and transfer of Gisel Gil  is necessary for the continuing treatment of the diagnosis listed and that she requires {Admit to Appropriate Level of Care:52836} for {GREATER/LESS:867318324} 30 days.      Update Admission H&P: {CHP DME Changes in APOBT:823126213}    PHYSICIAN SIGNATURE:  Electronically signed by Tameka Mcnamara MD on 1/27/20 at 2:53 PM

## 2020-01-10 NOTE — PROGRESS NOTES
Date: 1/9/2020    Time: 11:47 PM    Patient Placed On BIPAP/CPAP/ Non-Invasive Ventilation? No    If no must comment. Facial area red/color change? No           If YES are Blister/Lesion present? No   If yes must notify nursing staff  BIPAP/CPAP skin barrier?   Yes    Skin barrier type:mepilex       Comments:18/400/90/10        Oracio Chong

## 2020-01-10 NOTE — PROGRESS NOTES
Extubated on bipap,    Surgically she has done well and could be discharged from that standpoint.       At this stage we will sign off, if there is a need for trach/peg, or seems to develop any other complications please give us a call, otherwise we will have her follow up in clinic     Carolina Fernandez MD

## 2020-01-10 NOTE — PROGRESS NOTES
20  0500    135   K 3.4* 4.1   CL 93* 96*   CO2 28 24   BUN 21* 19   CREATININE 0.5 0.5   GLUCOSE 147* 112*       LIVER PROFILE:   Recent Labs     20  0459 20  0500   AST 81* 98*   ALT 8 9   BILITOT 0.4 0.5   ALKPHOS 308* 365*       PT/INR:   No results for input(s): PROTIME, INR in the last 72 hours. APTT:   No results for input(s): APTT in the last 72 hours. Fasting Lipid Panel:    Lab Results   Component Value Date    TRIG 142 2020       Cardiac Enzymes:    Lab Results   Component Value Date    CKTOTAL 137 2019    CKTOTAL 238 (H) 2016    CKMB 5.7 (H) 2016    TROPONINI <0.01 2017    TROPONINI <0.01 2017    TROPONINI <0.01 2016         Notable Cultures:      Notable Cultures:     Culture  Date sent  Result    Bronchial washing    Saccharomyces cerevisiae    Sputum  1/      Blood  19  no growth     Pneumocystis     neg    Urine  20  no growth        Antibiotic  Day started  Days      Vancomycin x 1          Fluconazole            Zosyn     11        Lines:  Site   Date inserted Days     PICC   R basilic                       DVT Prophylaxis       Enoxaparin          PPI Prophylaxis       Protonix         Imaging studies:  Xr Abdomen (kub) (single Ap View)    Result Date: 2019  Patient MRN:  [id number] : 1965 Age: 62 years Gender: Female Order Date:  2019 12:45 PM EXAM: XR ABDOMEN (KUB) (SINGLE AP VIEW) NUMBER OF IMAGES:  1 view-2 images  INDICATION: Abdominal distention COMPARISON: None FINDINGS: Right hemicolon is distended with fecal content, and there is fluid filled pelvic small bowel which may be functional outflow obstruction. The transverse colon is distended with gas. There is no evidence of obstructive dilation, perforation, or pathologic fluid levels. Flank and psoas margins are well-defined. No pathologic calcifications are noted, and there is no evidence of organomegaly.  Included portions are present. Findings can be relate with hypoventilation lower lung base. Please correlate clinically. There is no conspicuous or pleural effusions. Endotracheal tube is in good position. The patient has a NG tube with the tip in the stomach. There are also compression atelectasis in the posterior aspect of the both upper lobes. The anterior aspect of the upper lobes are better aerated. A discrete the area bronchocentric infiltrate seen towards the lingular segment of the left upper lobe. Upper abdominal structures are not fully covered on this study. There is a generalized fluid distention of the bowel including the colon. The abdomen is evaluate on the CT abdomen performed same time. 1. Atelectasis consolidation of both lower lobes with patent central airways. Findings to be correlated clinically. Can be relate with the hypoventilation of the lower lung bases. Cannot exclude a pneumonia. 2. Discrete patchy bronchocentric infiltrate in the left upper lobe lingular segment. 3. No acute pulmonary emboli seen although there are some limitations for evaluation of the lower lobes and motion artifacts. 4. No aneurysm formation dissection thoracic aorta. 5. Discrete to mild pericardial effusion. Ct Abdomen Pelvis W Iv Contrast Additional Contrast? None    Result Date: 2019  Patient MRN:  [id number] : 1965 Age: 62 years Gender: Female Order Date:  2019 6:45 PM TECHNIQUE/NUMBER OF IMAGES/COMPARISON/CLINICAL HISTORY: CT abdomen pelvis IV contrast After IV contrast administration axial images were obtained with sagittal and coronal reconstructions are. Clinical history abdominal pain. FINDINGS: There is massive dilatation of the colon with a predominant fluid contents. Large amount of solid fecal contents form a pattern of constipation/fecal rectal retention/impaction is seen in the sigmoid colon to the rectum.  This can causes bowel obstruction to critical a large amount of fluid the recommendation the more proximal segments of the colon. Consider decompression of the rectum/sigmoid to relieve the contents in more proximal segments of the colon. There are normal size and enhancement for the liver, spleen and the pancreas are. Gallbladder is well distended and has a hydrops appearance. There is some dilatation of the intrahepatic biliary tree and the common bile duct measures close to 9 mm in the head of the pancreas but can be traced with unremarkable appearance through the papilla level. The pancreatic duct is not dilated. Adrenal glands are not enlarged. Kidneys have preserved size and cortical thickness are. There is an area of hypodensity in the upper pole of the left kidney which can represent a lobulated the cyst but of relative hypodensity. Further evaluation with renal ultrasound is initially recommended. Aorta and IVC appear unremarkable. There is a Samuel catheter in the bladder. The bladder is not distended. The There is a venous catheter through the right common femoral vein with the tip in the distal IVC. For findings in the chest see report of CT scan of the chest performed the same time. 1. Findings for severe constipation with a large amount of fecal contents/rotation/impaction from the sigmoid colon to the rectum, causing a pattern of megacolon with marked fluid/air distentions or more proximal segments of the colon. 2. Consider decompression of the rectum/sigmoid to permit relieve of the more proximal segments of the colon. 3. Marked: Distention can explain hypoventilation lower lung bases causing atelectasis or consolidations with overall patent central airways of both lower lobes. Please correlate clinically. 4. There is no dilatation of the biliary tree, to be reevaluated relieve of the megacolon pattern which cause significant mass effect throughout the entire abdomen peritoneal cavity. . 5. Mild ascites in the abdomen. 6. No free intraperitoneal air observed.      Xr Chest Portable    Result Date: 2019  Patient MRN:  [id number] : 1965 Age: 62 years Gender: Female Order Date:  2019 3:15 PM EXAM: XR CHEST PORTABLE INDICATION:  ETT exchanged, verify OGT placement ETT exchanged, verify OGT placement COMPARISON: Same date 0655 hours FINDINGS:  ET and NG tubes are appropriate. There has been some clearing of right basilar infiltrate since the prior study. There is some right midlung infiltrate. Infiltrate left retrocardiac region is smaller and there is less fluid/pleural thickening on the left. There appears to be old healed left-sided rib fractures     Partial clearing of bibasilar infiltrates and left effusion     Xr Chest Portable    Result Date: 2019  Patient MRN:  [id number] : 1965 Age: 62 years Gender: Female Order Date:  2019 6:00 AM EXAM: XR CHEST PORTABLE one image INDICATION:  Line / tube management Line / tube management COMPARISON: 2019 FINDINGS: There is cardiomegaly. Endotracheal tube and nasogastric tube are unchanged. There is vascular congestion with a marginal improvement in persistent perihilar and bibasilar infiltrates and pleural effusions. There is improved gastric distention. Stable abnormal chest with  improving CHF. Superimposed pneumonia is not excluded. Xr Chest Portable    Result Date: 2019  Comparison: 2019. Frontal view of the chest. Frontal view of the abdomen. FINDINGS: Endotracheal tube is present with the distal tip 5 cm above the adriano. Nasogastric tube follows the expected contours of the esophagus into stomach with distal sideholes overlying the left upper quadrant of the abdomen. Esophageal manometry device appears present terminating at the level of the esophagogastric junction. Hazy density right lung base. Meniscus left lung base. Contiguous atelectasis or infiltrate. The heart is enlarged. There is calcification within thoracic aorta.  Severe gaseous distention of bowel    Cardiovascular     Hypotension - resolved   - off levophed     Gastrointestinal     Ischemic Colitis s/p subtotal colectomy and ileostomy   - CT abdomen with distended large intestine, large amounts of feces from rectum to proximal sigmoid colon  - s/p subtotal colectomy and ileostomy wo perforation   - Lomotil if ostomy output > 1.5L qd  - Morphine 1mg q4h prn for pain  - NGT meds and TF per GS   - On Zosyn, fluconazole. - Producing watery green stool in colostomy bag- monitor OP  - General surgery following      Elevated Liver enzymes   - ALP - 265, ALT/AST- 6/63 on admission   - 1/9/20 - ALP- 366, AST/ALT -17/67  - CT abdomen and pelvis - Somewhat thickened contracted gallbladder with the some pericholecystic edema  - US gallbladder - No other evidence to suggest acute cholecystitis  - Follow Acetaminophen levels      Elevated Alkaline phosphatase   - Trending up. 369 today   - CT abdomen - Somewhat thickened contracted gallbladder with the some pericholecystic edema  - US gall bladder - Gallbladder wall thickness is increased to 3.2 mm. No intraluminal echogenic density is identified within the gallbladder lumen. No other evidence to suggest acute cholecystitis. - Follow GGT      Hyperammoniemia - resolved  - Trending down, last 1/3- 52   - Ammonia 1/10 - 37     Infectious Disease     Septic shock - resolved   - likely 2/2 ischemic colitis vs UTI  - Hypotensive requiring Levophed- currently off levophed  - Procal trending down to 0.45, Wbc - 13.2      Leukocytosis - worsening   - WBC - 17.1 today   - Beta-D-glucan - pending   - Temp overnight - Tmax 99.9, pro annie - 0.27 , follow repeat procal  - s/p bronchoscopy.  BAL had neutrophilic predominance, follow Cx    - On Zosyn      Genitourinary/Renal     HAGMA  - Anion gap 20, LA - 1.0, bun 19   - Follow beta hydroxybutyrate, acetaminophen level, oxoproline level     UA w/ pyuria, bacteriuria and +LE  UCx with coag neg staph > 100k CFU, likely contamination     Hypernatremia - resolved      Hypermagnesemia - resolved      Hypophosphatemia - resolved     Hypokalemia - resolved  - follow daily BMP, replace as needed      Hematology/Oncology     Normocytic Anemia  - Hb of 11.1 on admission, Hb today - 8.5  - Multifactorial- likely 2/2 blood loss in surgery, dilutional, frequent blood draws   - Follow CBC daily        Dermatologic/Musculoskeletal     Multiple system atrophy  - Chronic condition, parkinson variant      Diet: tube feed   DVT/GI prophylaxis: Lovenox/ Protonix  Disposition: MICU     Gila Oconnor MD, PGY-1  Attending physician: Floresita Hwang      I personally saw, examined and provided care for the patient. Radiographs, labs and medication list were reviewed by me independently. I spoke with bedside nursing, therapists and consultants. Critical care services and times documented are independent of procedures and multidisciplinary rounds with Residents. Additionally comprehensive, multidisciplinary rounds were conducted with the MICU team. The case was discussed in detail and plans for care were established. Review of Residents documentation was conducted and revisions were made as appropriate. I agree with the above documented exam, problem list and plan of care. metaneb  Wean o2 as tolerated   Ng for tube feeds   Speech therapy   Abx   Trend bmp, check urine lytes and urine gap    Cxr in am     Snia Lynn M.D.    Pulmonary/Critical Care Medicine    35 min cct excluding procedures

## 2020-01-10 NOTE — PROGRESS NOTES
Subjective:  Extubated on nasal cannula with NG  Family at bedside all questions answered    Objective:    /77   Pulse 95   Temp 99.3 °F (37.4 °C) (Temporal)   Resp 29   Ht 5' 6\" (1.676 m)   Wt 109 lb 5.6 oz (49.6 kg)   SpO2 97%   BMI 17.65 kg/m²     24HR INTAKE/OUTPUT:      Intake/Output Summary (Last 24 hours) at 1/10/2020 0954  Last data filed at 1/10/2020 0900  Gross per 24 hour   Intake 1684.5 ml   Output 1665 ml   Net 19.5 ml     Extubated alert NAD with NG  Heart:  RRR, no murmurs, gallops, or rubs. Lungs: Bilateral rhonchi rhonchi  Abd: bowel sounds present, nontender, nondistended, no masses  Extrem:  No clubbing, cyanosis, or edema  Weak x4 extremities  Most Recent Labs  Lab Results   Component Value Date    WBC 17.1 (H) 01/10/2020    HGB 8.5 (L) 01/10/2020    HCT 28.4 (L) 01/10/2020     (H) 01/10/2020     01/10/2020    K 4.0 01/10/2020    CL 93 (L) 01/10/2020    CREATININE 0.5 01/10/2020    BUN 19 01/10/2020    CO2 23 01/10/2020    GLUCOSE 105 (H) 01/10/2020    ALT 17 01/10/2020    AST 67 (H) 01/10/2020    INR 1.1 12/30/2019    TSH 1.950 12/29/2019     Recent Labs     01/10/20  0550   MG 2.0     Lab Results   Component Value Date    CALCIUM 9.6 01/10/2020    PHOS 3.5 01/10/2020        XR CHEST PORTABLE   Final Result      1. Progressive improvement in right lower lobe pneumonia and   atelectasis. 2. Apparent clearing of previous mild right upper lobe pneumonia. XR CHEST PORTABLE   Final Result      XR CHEST PORTABLE   Final Result   Patchy bilateral pulmonary infiltrates, worst in the lower lungs, with   some improvement in the left lower lung. Small right pleural effusion. US GALLBLADDER RUQ   Final Result      Color wall thickness is increased to 3.2 mm. No other evidence to suggest acute cholecystitis.       XR CHEST PORTABLE   Final Result   Stable abnormal chest with the persistent bilateral infiltrates and   pleural effusions with a CHF with edema and/or   diffuse pneumonia considered. Gastric distention with a nasogastric tube in place. XR CHEST PORTABLE   Final Result   Partial clearing of right lower lobe atelectasis/infiltrate               XR CHEST PORTABLE   Final Result   Worsening CHF with edema. Suggestion of free intraperitoneal air better seen in the previous   examination and could be related to recent surgery. Clinical   assessment is recommended. ALERT:  THIS IS AN ABNORMAL REPORT            CT HEAD WO CONTRAST   Final Result      NO ACUTE INTRACRANIAL PROCESS         XR CHEST PORTABLE   Final Result      Right upper lung patchy infiltrate about the right minor fissure. Cardiomegaly      Nasogastric tube in satisfactory position with the tip not well seen   but appears to be in the distal stomach. XR Chest Abdomen Ng Placement   Final Result   Enteric catheter with the tip projecting over the expected region of   the stomach. Pneumoperitoneum which may be normal given the patient's history of   recent abdominal surgery. XR ABDOMEN FOR NG/OG/NE TUBE PLACEMENT   Final Result   Addendum 1 of 1   Consider CT scan if better assessment of the abdomen as clinically   indicated. Final      XR CHEST PORTABLE   Final Result   NG tube reaches the stomach               XR CHEST PORTABLE   Final Result   Partial clearing of bibasilar infiltrates and left effusion               XR CHEST PORTABLE   Final Result   Stable abnormal chest with  improving CHF. Superimposed pneumonia is   not excluded. CT CHEST W CONTRAST   Final Result   1. Atelectasis consolidation of both lower lobes with patent central   airways. Findings to be correlated clinically. Can be relate with the   hypoventilation of the lower lung bases. Cannot exclude a pneumonia. 2. Discrete patchy bronchocentric infiltrate in the left upper lobe   lingular segment.       3. No acute pulmonary emboli seen although there are some limitations   for evaluation of the lower lobes and motion artifacts. 4. No aneurysm formation dissection thoracic aorta. 5. Discrete to mild pericardial effusion. CT ABDOMEN PELVIS W IV CONTRAST Additional Contrast? None   Final Result   1. Findings for severe constipation with a large amount of fecal   contents/rotation/impaction from the sigmoid colon to the rectum,   causing a pattern of megacolon with marked fluid/air distentions or   more proximal segments of the colon. 2. Consider decompression of the rectum/sigmoid to permit relieve of   the more proximal segments of the colon. 3. Marked: Distention can explain hypoventilation lower lung bases   causing atelectasis or consolidations with overall patent central   airways of both lower lobes. Please correlate clinically. 4. There is no dilatation of the biliary tree, to be reevaluated   relieve of the megacolon pattern which cause significant mass effect   throughout the entire abdomen peritoneal cavity. .      5. Mild ascites in the abdomen. 6. No free intraperitoneal air observed. XR CHEST PORTABLE   Final Result      Gaseous distention of a loop of bowel within the left upper abdomen   may be stomach or colon. Consider further workup with computed   tomography for better characterization. Bilateral pleural effusions with contiguous atelectasis. Endotracheal tube is present with the distal tip 5 cm above the   adriano. Nasogastric tube follows the expected contours of the esophagus into   stomach with distal sideholes overlying the left upper quadrant of the   abdomen. Esophageal manometry device appears present terminating at the level   of the esophagogastric junction. XR ABDOMEN FOR NG/OG/NE TUBE PLACEMENT   Final Result      Gaseous distention of a loop of bowel within the left upper abdomen   may be stomach or colon.  Consider further workup with computed   tomography for better consult appreciated  Plan for LTAC      Electronically signed by Hellen Apgar, MD on 1/10/2020 at 9:54 AM

## 2020-01-11 PROBLEM — K65.9 PERITONITIS (HCC): Status: ACTIVE | Noted: 2020-01-01

## 2020-01-11 PROBLEM — I95.9 HYPOTENSION: Status: RESOLVED | Noted: 2019-01-01 | Resolved: 2020-01-01

## 2020-01-11 PROBLEM — G90.3 MULTIPLE SYSTEM ATROPHY (HCC): Chronic | Status: RESOLVED | Noted: 2019-01-01 | Resolved: 2020-01-01

## 2020-01-11 PROBLEM — E86.1 HYPOTENSION DUE TO HYPOVOLEMIA: Status: RESOLVED | Noted: 2019-01-01 | Resolved: 2020-01-01

## 2020-01-11 PROBLEM — I95.89 HYPOTENSION DUE TO HYPOVOLEMIA: Status: RESOLVED | Noted: 2019-01-01 | Resolved: 2020-01-01

## 2020-01-11 NOTE — PROGRESS NOTES
labetalol (NORMODYNE;TRANDATE) injection 10 mg  10 mg Intravenous Q6H PRN Maryann Avery MD   10 mg at 01/03/20 0429    carbidopa-levodopa (SINEMET)  MG per tablet 2.5 tablet  2.5 tablet Oral 5x Daily Pacheco Osman MD   2.5 tablet at 01/11/20 1118    carbidopa-levodopa (SINEMET)  MG per tablet 2 tablet  2 tablet Oral Nightly Pacheco Osman MD   2 tablet at 01/10/20 2256    benzocaine (HURRICAINE) 20 % oral spray   Mouth/Throat 4x Daily PRN Pacheco Osman MD        melatonin tablet 6 mg  6 mg Oral Nightly PRN Elisabeth Henrietta Ruby MD   6 mg at 01/01/20 2208    pantoprazole (PROTONIX) injection 40 mg  40 mg Intravenous Daily Natan Dexter, DO   40 mg at 01/11/20 0631    And    sodium chloride (PF) 0.9 % injection 10 mL  10 mL Intravenous Daily Natan Dexter DO   10 mL at 01/11/20 0839    [Held by provider] acetaminophen (TYLENOL) 160 MG/5ML solution 650 mg  650 mg Per NG tube Q6H PRN Elisabeth Henrietta Ruby MD   650 mg at 01/11/20 0837    sodium chloride flush 0.9 % injection 10 mL  10 mL Intravenous 2 times per day Natan Dexter, DO   10 mL at 01/11/20 8468    sodium chloride flush 0.9 % injection 10 mL  10 mL Intravenous PRN Natan Dexter DO   10 mL at 01/06/20 1113    enoxaparin (LOVENOX) injection 40 mg  40 mg Subcutaneous Daily Sebastian Jj, DO   40 mg at 01/11/20 7882    levetiracetam (KEPPRA) 1000 mg/100 mL IVPB  1,000 mg Intravenous Q12H Natan Dexter DO   Stopped at 01/11/20 700    lidocaine (XYLOCAINE) 2 % jelly   Topical Once Natan Dexter DO           VIEW OF SYSTEMS:      CONSTITUTIONAL: denies  fever  HEENT: denies sore throat, neck pain  RESPIRATORY: reports shortness of breath  . CARDIOVASCULAR:  Denies chest pain    GASTROINTESTINAL:  Denies abdomen pain  GENITOURINARY:  Denies burning urination  INTEGUMENT: denies wound , rash  HEMATOLOGIC/LYMPHATIC:  Denies lymph node swelling, gum bleeding or easy bruising.   MUSCULOSKELETAL: leg cerevisiae      Radiology :    Chest X ray-   Right lower lobe atelectasis/infiltrate, unchanged.       Assessment:    HAP  / Respiratory failure   Septic shock, resolved, secondary to peritonitis from ischemic colitis s/p exploratory laparotomy, subtotal colectomy, and ileostomy on 12/13   Saccharomyces cerevisiae - sputum colonization   Leukocytosis and fever,  Mucus plugging, s/p bronchoscopies     Recommendations:  Continue piperacillin-tazobactam 3.375g q8h X 1 day  Supportive care   CBC with diff      Electronically signed by Albin Escobedo MD on 1/11/2020 at 12:23 PM

## 2020-01-11 NOTE — PROGRESS NOTES
Speech Language Pathology  Consult Note      RN requested bedside swallow be held again this date due to respiratory distress. Will reattempt on Monday.     Jonas Scott., CCC-SLP/L  SP 1161  1/11/2020

## 2020-01-11 NOTE — PROGRESS NOTES
Date: 1/10/2020    Time: 10:16 PM    Patient Placed On BIPAP/CPAP/ Non-Invasive Ventilation? Yes    If no must comment. Facial area red/color change? No           If YES are Blister/Lesion present? No   If yes must notify nursing staff  BIPAP/CPAP skin barrier? Yes    Skin barrier type:mepilex       Comments:  spo2 85% on 15 LHFNC. RN attempted NTS. Patient placed on bipap.        Lucas Ramsey

## 2020-01-11 NOTE — PROGRESS NOTES
GLUCOSE 100 01/11/2020     Magnesium:    Lab Results   Component Value Date    MG 1.9 01/11/2020     Phosphorus:    Lab Results   Component Value Date    PHOS 3.2 01/11/2020     PT/INR:    Lab Results   Component Value Date    PROTIME 11.9 12/30/2019    INR 1.1 12/30/2019     PTT:    Lab Results   Component Value Date    APTT 33.4 01/07/2016   [APTT}     Assessment:    Patient Active Problem List   Diagnosis    MS (multiple sclerosis) ([address]    Parkinson disease ([address]    Ischemic colitis ([address]    Septic shock ([address]    Severe protein-calorie malnutrition ([address]    Acute respiratory failure with hypoxia and hypercapnia ([address]    Peritonitis ([address]       Plan:  Stable. Stable. Status post ex-lap, subtotal colectomy and ileostomy. Secondary to peritonitis from ischemic bowel. ID on board. HD stable. Supplement diet. Secondary to sepsis. As per surgery. Pt/Ot evaluations for discharge planning. Agree with LTAC.         Fransico Byrnes    9:41 AM  1/11/2020

## 2020-01-11 NOTE — PROGRESS NOTES
[address] [facility] [place]  Department of Internal Medicine  Internal Medicine Residency Program  Resident MICU Progress  Note      Patient:  Meliza Rater 62 y.o. female MRN: [id number]     Date of Service: 1/11/2020    Allergy: Sulfa antibiotics      Subjective       Patient seen and examined. Patient opens eyes to verbal stimulus. Requiring BiPAP. Overnight events: Remained Afebrile overnight, developed respiratory distress, was placed on BiPAP. Overnight nurse noted red blood streaks via stool (per rectum). Objective   Physical Exam:  · Vitals: BP (!) 146/82   Pulse 98   Temp 97.8 °F (36.6 °C) (Axillary)   Resp 28   Ht 5' 6\" (1.676 m)   Wt 110 lb 0.2 oz (49.9 kg)   SpO2 95%   BMI 17.76 kg/m²     I & O - 24hr: In: 665 [I.V.:495; NG/GT:170]  · Out: 1420 [Urine:620]    Physical Exam  Vitals signs and nursing note reviewed. HENT:      Head: Normocephalic and atraumatic. Mouth/Throat:      Mouth: Mucous membranes are moist.   Cardiovascular:      Rate and Rhythm: Normal rate and regular rhythm. Pulses: Normal pulses. Heart sounds: Normal heart sounds. Pulmonary:      Effort: Pulmonary effort is normal.      Breath sounds: Rhonchi present. Abdominal:      General: Bowel sounds are normal.      Palpations: Abdomen is soft. Comments: Pink stoma, dark green stool in colostomy bag, well healing abdominal post surgical wound   Skin:     General: Skin is warm. Capillary Refill: Capillary refill takes less than 2 seconds.    Neurological:      Comments: Awake, does not follow commands    Psychiatric:         Mood and Affect: Mood normal.       Pertinent New Labs & Imaging Studies     CBC:   Recent Labs     01/09/20  0500 01/10/20  0550 01/11/20  0145 01/11/20  0405   WBC 13.3* 17.1*  --  17.3*   HGB 7.5* 8.5* 8.5* 8.4*   HCT 24.8* 28.4* 29.1* 28.4*   MCV 95.4 97.3  --  98.3    457*  --  572*       BMP:    Recent Labs     01/10/20  0550 01/10/20  1440 01/11/20  0405   NA 136 138 137   K 4.0 4.1 3.6   CL 93* 97* 96*   CO2 23 27 23   BUN 19 21* 21*   CREATININE 0.5 0.6 0.5   GLUCOSE 105* 92 100*       LIVER PROFILE:   Recent Labs     20  0500 01/10/20  0550 20  0405   AST 98* 67* 35*   ALT 9 17 14   BILITOT 0.5 0.5 0.4   ALKPHOS 365* 369* 339*       PT/INR:   No results for input(s): PROTIME, INR in the last 72 hours. APTT:   No results for input(s): APTT in the last 72 hours. Fasting Lipid Panel:    Lab Results   Component Value Date    TRIG 142 2020       Cardiac Enzymes:    Lab Results   Component Value Date    CKTOTAL 137 2019    CKTOTAL 238 (H) 2016    CKMB 5.7 (H) 2016    TROPONINI <0.01 2017    TROPONINI <0.01 2017    TROPONINI <0.01 2016         Notable Cultures:      Notable Cultures:     Culture  Date sent  Result    Bronchial washing    Saccharomyces cerevisiae    Sputum  1/2      Blood  19  no growth     Pneumocystis     neg    Fungitell   neg    Urine  20  no growth        Antibiotic  Day started  Days      Vancomycin x 1          Fluconazole            Zosyn   11        Lines:  Site   Date inserted Days     PICC   R basilic                       DVT Prophylaxis       Enoxaparin          PPI Prophylaxis       Protonix         Imaging studies:  Xr Abdomen (kub) (single Ap View)    Result Date: 2019  Patient MRN:  [id number] : 1965 Age: 62 years Gender: Female Order Date:  2019 12:45 PM EXAM: XR ABDOMEN (KUB) (SINGLE AP VIEW) NUMBER OF IMAGES:  1 view-2 images  INDICATION: Abdominal distention COMPARISON: None FINDINGS: Right hemicolon is distended with fecal content, and there is fluid filled pelvic small bowel which may be functional outflow obstruction. The transverse colon is distended with gas. There is no evidence of obstructive dilation, perforation, or pathologic fluid levels. Flank and psoas margins are well-defined.  No pathologic calcifications are noted, and there is no evidence of organomegaly. Included portions of the lower chest show no evidence of acute pathology. Skeletal structures are unremarkable for acute findings. Tubal sterilization clips are noted in the pelvis. Overlying EKG leads are present. Right hemicolonic fecal distention with functional outflow obstruction of the distal small bowel. The transverse colon is distended with gas, raising questions regarding possible \"institutional bowel\" or chronic constipation. Limited radiographic appearance does not suggest small bowel obstruction. Ct Chest W Contrast    Result Date: 2019  Patient MRN:  [id number] : 1965 Age: 62 years Gender: Female Order Date:  2019 2:00 PM TECHNIQUE/NUMBER OF IMAGES/COMPARISON/CLINICAL HISTORY: CT chest After IV contrast administration axial images were obtained with sagittal and coronal MIP reconstructions of pulmonary arterial circulation thoracic aorta. Clinical history abdominal distention, abdominal pain, for evolution the pulmonary arterial circulation. 24-year-old female patient had previous appendectomy. The history of Parkinson's disease is seizures. FINDINGS: There are motion artifacts which cause misregistration of images makes difficult evaluation of the pulmonary arterial circulation towards the lower lung bases. There is no conspicuous acute central pulmonary embolus in the main PA, right and left main PAs in their lobar and segmental and more proximal subsegmental branches. There is no aneurysm formation dissection thoracic aorta. Heart has normal size. Inner diameter for the left ventricular cavity is 4.5 cm and for the right ventricular cavity is 4.4 cm. There is discrete to mild pericardial effusion. There is no mediastinal masses or adenopathy. Diameter for the ascending aorta is 3.5 cm and for the main pulmonary arteries up to 2.5 cm . There is no pericardial effusion.  Presence of a consolidation with atelectasis of the right and left intraperitoneal air observed. Xr Chest Portable    Result Date: 2019  Patient MRN:  [id number] : 1965 Age: 62 years Gender: Female Order Date:  2019 3:15 PM EXAM: XR CHEST PORTABLE INDICATION:  ETT exchanged, verify OGT placement ETT exchanged, verify OGT placement COMPARISON: Same date 0655 hours FINDINGS:  ET and NG tubes are appropriate. There has been some clearing of right basilar infiltrate since the prior study. There is some right midlung infiltrate. Infiltrate left retrocardiac region is smaller and there is less fluid/pleural thickening on the left. There appears to be old healed left-sided rib fractures     Partial clearing of bibasilar infiltrates and left effusion     Xr Chest Portable    Result Date: 2019  Patient MRN:  [id number] : 1965 Age: 62 years Gender: Female Order Date:  2019 6:00 AM EXAM: XR CHEST PORTABLE one image INDICATION:  Line / tube management Line / tube management COMPARISON: 2019 FINDINGS: There is cardiomegaly. Endotracheal tube and nasogastric tube are unchanged. There is vascular congestion with a marginal improvement in persistent perihilar and bibasilar infiltrates and pleural effusions. There is improved gastric distention. Stable abnormal chest with  improving CHF. Superimposed pneumonia is not excluded. Xr Chest Portable    Result Date: 2019  Comparison: 2019. Frontal view of the chest. Frontal view of the abdomen. FINDINGS: Endotracheal tube is present with the distal tip 5 cm above the adriano. Nasogastric tube follows the expected contours of the esophagus into stomach with distal sideholes overlying the left upper quadrant of the abdomen. Esophageal manometry device appears present terminating at the level of the esophagogastric junction. Hazy density right lung base. Meniscus left lung base. Contiguous atelectasis or infiltrate. The heart is enlarged.  There is calcification within thoracic 1/8.   - Follow daily labs and chest X-ray      Cardiovascular     Hypotension - resolved   - off levophed    Hypertension   - PRN labetalol 10 mg q6     Gastrointestinal     Ischemic Colitis s/p subtotal colectomy and ileostomy   - CT abdomen with distended large intestine, large amounts of feces from rectum to proximal sigmoid colon  - s/p subtotal colectomy and ileostomy wo perforation   - Lomotil if ostomy output > 1.5L qd  - Morphine 1mg q4h prn for pain  - NGT meds and TF per GS   - On Zosyn. Fluconazole stopped. Fungitell was neg  - Producing watery green stool in colostomy bag- monitor OP  - General surgery following      Elevated Liver enzymes - improving  - ALP - 265, ALT/AST- 6/63 on admission   - 1/11/20 - ALP- 339, AST/ALT -51/66  - CT abdomen and pelvis - Somewhat thickened contracted gallbladder with the some pericholecystic edema  - US gallbladder - No other evidence to suggest acute cholecystitis  - Acetaminophen levels <5     Elevated Alkaline phosphatase   - 339 today  - CT abdomen - Somewhat thickened contracted gallbladder with the some pericholecystic edema  - US gall bladder - Gallbladder wall thickness is increased to 3.2 mm. No intraluminal echogenic density is identified within the gallbladder lumen. No other evidence to suggest acute cholecystitis. - Follow GGT      Hyperammoniemia - resolved  - Trending down, last 1/3- 52   - Ammonia 1/10 - 37     Infectious Disease     Septic shock - resolved   - likely 2/2 ischemic colitis vs UTI  - Hypotensive requiring Levophed- currently off levophed  - Procal trending down to 0.45     Leukocytosis - worsening   - WBC - 17.3 today   - Beta-D-glucan - neg   - Temp overnight - Tmax 99.9, pro annie - 0.27  - s/p bronchoscopy.  BAL had neutrophilic predominance, cx growing Corynebacterium   - On Zosyn      Genitourinary/Renal     HAGMA  - Anion gap 20, LA - 1.0, bun 19   - beta hydroxybutyrate- 0.52, acetaminophen level - <5, oxoproline level - pending  - 1/11- AG today - 18, BUN- 21  - Urine gap - 106    UA w/ pyuria, bacteriuria and +LE  UCx with coag neg staph > 100k CFU, likely contamination     Hypernatremia - resolved      Hypermagnesemia - resolved      Hypophosphatemia - resolved     Hypokalemia - resolved  - follow daily BMP, replace as needed      Hematology/Oncology     Normocytic Anemia  - Hb of 11.1 on admission, Hb today - 8.4  - Multifactorial- likely 2/2 blood loss in surgery, dilutional, frequent blood draws   - Follow CBC daily        Dermatologic/Musculoskeletal     Multiple system atrophy  - Chronic condition, parkinson variant   - On Sinemet      Diet: tube feed   DVT/GI prophylaxis: Lovenox/ Protonix  Disposition: MICU     Rolo Hernandez MD, PGY-1  Attending physician: Tip Pena    I personally saw, examined and provided care for the patient. Radiographs, labs and medication list were reviewed by me independently. I spoke with bedside nursing, therapists and consultants. Critical care services and times documented are independent of procedures and multidisciplinary rounds with Residents. Additionally comprehensive, multidisciplinary rounds were conducted with the MICU team. The case was discussed in detail and plans for care were established. Review of Residents documentation was conducted and revisions were made as appropriate. I agree with the above documented exam, problem list and plan of care. Intermittent bipap, NT suctioning   Renal evaluation for rta   Tube feeds   Gwen Schmidt M.D.    Pulmonary/Critical Care Medicine   35 min cct excluding procedures

## 2020-01-12 NOTE — PROGRESS NOTES
Upon 1800 assessment, pt tachypneic, tachycardic and spo2 in mid to low 80s. NTS performed with no complications and applied bipap. After bipap applied, spo2 in mid to high 90s and breathing in the low 20s with an improved HR. Will continue to monitor.

## 2020-01-12 NOTE — PLAN OF CARE
Problem: Respiratory Function - Compromised:  Goal: Absence of respiratory distress  Description  Maintaining head of bed at least 30 degrees. 1/12/2020 0223 by Amanda Ruiz RN  Outcome: Ongoing     Problem: Pain:  Goal: Pain level will decrease  Description  Pain level will decrease  1/12/2020 0223 by Amanda Ruiz RN  Outcome: Ongoing     Problem: Pain:  Goal: Control of acute pain  Description  Control of acute pain  1/12/2020 0223 by Amanda Ruiz RN  Outcome: Ongoing     Problem: Pain:  Goal: Control of chronic pain  Description  Control of chronic pain  1/12/2020 0223 by Amanda Ruiz RN  Outcome: Ongoing     Problem: Bowel/Gastric:  Goal: Complications related to the disease process, condition or treatment will be avoided or minimized  1/12/2020 0223 by Amanda Ruiz RN  Outcome: Ongoing     Problem:  Bowel/Gastric:  Goal: Will be independent with ostomy care  1/12/2020 0223 by Amanda Ruiz RN  Outcome: Ongoing     Problem: Skin Integrity:  Goal: Will show no infection signs and symptoms  Description  Will show no infection signs and symptoms  1/12/2020 0223 by Amanda Ruiz RN  Outcome: Ongoing     Problem: Skin Integrity:  Goal: Absence of new skin breakdown  Description  Absence of new skin breakdown  1/12/2020 0223 by Amanda Ruiz RN  Outcome: Ongoing

## 2020-01-12 NOTE — PROGRESS NOTES
[facility] PROGRESS NOTE      Chief complaint:  HAP, respiratory failure       Pt is awake , and alert  On BiPAP due to respiratory distress   Denies fever   Afebrile     Current Facility-Administered Medications   Medication Dose Route Frequency Provider Last Rate Last Dose    ondansetron (ZOFRAN) injection 4 mg  4 mg Intravenous Q6H PRN Dev Patterosn MD   4 mg at 01/11/20 0132    Lidocaine HCl 4 % CREA 1 each  1 each Apply externally Q6H PRN Dev Patterson MD   1 each at 01/11/20 1743    cetirizine (ZYRTEC) tablet 5 mg  5 mg Oral Daily Kiran Hernandez MD   5 mg at 01/12/20 0846    lidocaine 4 % external patch 1 patch  1 patch Transdermal Daily Hipolito Dc MD   1 patch at 01/12/20 0848    iohexol (OMNIPAQUE 240) injection 50 mL  50 mL Oral ONCE PRN Isai Ansari MD        piperacillin-tazobactam (ZOSYN) 3.375 g in dextrose 5 % 100 mL IVPB extended infusion (mini-bag)  3.375 g Intravenous Q8H Carolyn Obrien MD 25 mL/hr at 01/12/20 1111 3.375 g at 01/12/20 1111    potassium bicarb-citric acid (EFFER-K) effervescent tablet 40 mEq  40 mEq Oral Daily Don Sanchez MD   40 mEq at 01/12/20 0847    ipratropium-albuterol (DUONEB) nebulizer solution 1 ampule  1 ampule Inhalation Q4H WA Dev Patterson MD   1 ampule at 01/12/20 0850    sodium chloride (Inhalant) 3 % nebulizer solution 4 mL  4 mL Nebulization PRN Garret Landis MD        guaiFENesin tablet 400 mg  400 mg Oral TID Garret Landis MD   400 mg at 01/12/20 0847    [Held by provider] acetaminophen (TYLENOL) suppository 650 mg  650 mg Rectal Q6H PRN Phyllis Roman MD   650 mg at 01/03/20 1342    sodium chloride (Inhalant) 3 % nebulizer solution 2 mL  2 mL Nebulization Q4H Rafy Reyes MD   2 mL at 01/12/20 0850    perflutren lipid microspheres (DEFINITY) injection 1.65 mg  1.5 mL Intravenous ONCE PRN Phyllis Roman MD        sodium chloride flush 0.9 % injection 10 mL  10 mL Intravenous PRN Phyllis Roman MD   10 mL at 01/10/20 1437    dornase alpha (PULMOZYME) nebulizer solution 2.5 mg  2.5 mg Inhalation Daily Nieves Nova MD   2.5 mg at 01/12/20 0850    labetalol (NORMODYNE;TRANDATE) injection 10 mg  10 mg Intravenous Q6H PRN Philip Pop MD   10 mg at 01/03/20 0429    carbidopa-levodopa (SINEMET)  MG per tablet 2.5 tablet  2.5 tablet Oral 5x Daily Nieves Nova MD   2.5 tablet at 01/12/20 1111    carbidopa-levodopa (SINEMET)  MG per tablet 2 tablet  2 tablet Oral Nightly Nieves Nova MD   2 tablet at 01/11/20 2338    benzocaine (HURRICAINE) 20 % oral spray   Mouth/Throat 4x Daily PRN Nieves Nova MD        melatonin tablet 6 mg  6 mg Oral Nightly PRN Elisabeth Henrietta Vargas MD   6 mg at 01/01/20 2208    pantoprazole (PROTONIX) injection 40 mg  40 mg Intravenous Daily Maryan Penta, DO   40 mg at 01/12/20 0846    And    sodium chloride (PF) 0.9 % injection 10 mL  10 mL Intravenous Daily Maryan Penta, DO   10 mL at 01/12/20 0848    [Held by provider] acetaminophen (TYLENOL) 160 MG/5ML solution 650 mg  650 mg Per NG tube Q6H PRN Elisabeth Henrietta Vargas MD   650 mg at 01/11/20 0837    sodium chloride flush 0.9 % injection 10 mL  10 mL Intravenous 2 times per day Maryan Penta, DO   10 mL at 01/12/20 0846    sodium chloride flush 0.9 % injection 10 mL  10 mL Intravenous PRN Maryan Penta, DO   10 mL at 01/06/20 1113    enoxaparin (LOVENOX) injection 40 mg  40 mg Subcutaneous Daily Naoma Venecia Verdone, DO   40 mg at 01/12/20 0846    levetiracetam (KEPPRA) 1000 mg/100 mL IVPB  1,000 mg Intravenous Q12H Maryan Penta, DO   Stopped at 01/12/20 0901    lidocaine (XYLOCAINE) 2 % jelly   Topical Once Maryan Penta, DO           VIEW OF SYSTEMS:      CONSTITUTIONAL: denies  fever  HEENT: denies sore throat, neck pain  RESPIRATORY: reports shortness of breath  .   CARDIOVASCULAR:  Denies chest pain    GASTROINTESTINAL:  Denies abdomen pain  GENITOURINARY:  Denies burning urination  INTEGUMENT: denies wound , :    Blood culture - negative   Fungitell assay - negative   Sputum Culture -Saccharomyces cerevisiae      Radiology :    Chest X ray-   tortuous ectatic aorta  Airspace disease compatible with atelectasis or pneumonia, more  prominent right lung base as compared to the left likely with  superimposed post pleural effusions, overall when compared to the  previous study of the chest is improved      Assessment:    HAP  / Respiratory failure   Septic shock, resolved,  Ischemic colitis s/p exploratory laparotomy, subtotal colectomy, and ileostomy on 12/13   Saccharomyces cerevisiae - sputum colonization   Leukocytosis and fever,  Mucus plugging, s/p bronchoscopies     Recommendations:  Continue piperacillin-tazobactam 3.375g q8h  ( stop after today's dose )  Supportive care   CBC with diff      Electronically signed by Elke Dsouza MD on 1/12/2020 at 12:26 PM

## 2020-01-12 NOTE — PROGRESS NOTES
GLUCOSE 97 01/12/2020     Magnesium:    Lab Results   Component Value Date    MG 2.1 01/12/2020     Phosphorus:    Lab Results   Component Value Date    PHOS 2.6 01/12/2020     PT/INR:    Lab Results   Component Value Date    PROTIME 11.9 12/30/2019    INR 1.1 12/30/2019     PTT:    Lab Results   Component Value Date    APTT 33.4 01/07/2016   [APTT}     Assessment:    Patient Active Problem List   Diagnosis    MS (multiple sclerosis) ([facility]    Parkinson disease ([facility]    Ischemic colitis ([address]    Septic shock ([address]    Severe protein-calorie malnutrition ([address]    Acute respiratory failure with hypoxia and hypercapnia ([address]    Peritonitis ([address]       Plan:  Stable. Stable. Status post ex-lap, subtotal colectomy and ileostomy. Secondary to peritonitis from ischemic bowel and hospital acquired pneumonia. ID on board. HD stable. Supplement diet. Secondary to sepsis. As per surgery. Pt/Ot evaluations for discharge planning. Agree with LTAC.         Cliff Perez    10:36 AM  1/12/2020

## 2020-01-12 NOTE — PROGRESS NOTES
*  --  572* 663*       BMP:    Recent Labs     01/10/20  1440 20  0405 20  0515    137 140   K 4.1 3.6 3.6   CL 97* 96* 98   CO2 27 23 23   BUN 21* 21* 19   CREATININE 0.6 0.5 0.6   GLUCOSE 92 100* 97       LIVER PROFILE:   Recent Labs     01/10/20  0550 20  0405 20  0515   AST 67* 35* 19   ALT 17 14 12   BILITOT 0.5 0.4 0.3   ALKPHOS 369* 339* 315*       PT/INR:   No results for input(s): PROTIME, INR in the last 72 hours. APTT:   No results for input(s): APTT in the last 72 hours. Fasting Lipid Panel:    Lab Results   Component Value Date    TRIG 142 2020       Cardiac Enzymes:    Lab Results   Component Value Date    CKTOTAL 137 2019    CKTOTAL 238 (H) 2016    CKMB 5.7 (H) 2016    TROPONINI <0.01 2017    TROPONINI <0.01 2017    TROPONINI <0.01 2016         Notable Cultures:      Notable Cultures:     Culture  Date sent  Result    Bronchial washing    Saccharomyces cerevisiae    Sputum  /      Blood  19  no growth     Pneumocystis     neg    Fungitell   neg    Urine  20  no growth        Antibiotic  Day started  Days      Vancomycin x 1          Fluconazole            Zosyn     11        Lines:  Site   Date inserted Days     PICC   R basilic                       DVT Prophylaxis       Enoxaparin          PPI Prophylaxis       Protonix         Imaging studies:  Xr Abdomen (kub) (single Ap View)    Result Date: 2019  Patient MRN:  [id number] : 1965 Age: 62 years Gender: Female Order Date:  2019 12:45 PM EXAM: XR ABDOMEN (KUB) (SINGLE AP VIEW) NUMBER OF IMAGES:  1 view-2 images  INDICATION: Abdominal distention COMPARISON: None FINDINGS: Right hemicolon is distended with fecal content, and there is fluid filled pelvic small bowel which may be functional outflow obstruction. The transverse colon is distended with gas. There is no evidence of obstructive dilation, perforation, or pathologic fluid levels. Flank and psoas margins are well-defined. No pathologic calcifications are noted, and there is no evidence of organomegaly. Included portions of the lower chest show no evidence of acute pathology. Skeletal structures are unremarkable for acute findings. Tubal sterilization clips are noted in the pelvis. Overlying EKG leads are present. Right hemicolonic fecal distention with functional outflow obstruction of the distal small bowel. The transverse colon is distended with gas, raising questions regarding possible \"institutional bowel\" or chronic constipation. Limited radiographic appearance does not suggest small bowel obstruction. Ct Chest W Contrast    Result Date: 2019  Patient MRN:  [id number] : 1965 Age: 62 years Gender: Female Order Date:  2019 2:00 PM TECHNIQUE/NUMBER OF IMAGES/COMPARISON/CLINICAL HISTORY: CT chest After IV contrast administration axial images were obtained with sagittal and coronal MIP reconstructions of pulmonary arterial circulation thoracic aorta. Clinical history abdominal distention, abdominal pain, for evolution the pulmonary arterial circulation. 61-year-old female patient had previous appendectomy. The history of Parkinson's disease is seizures. FINDINGS: There are motion artifacts which cause misregistration of images makes difficult evaluation of the pulmonary arterial circulation towards the lower lung bases. There is no conspicuous acute central pulmonary embolus in the main PA, right and left main PAs in their lobar and segmental and more proximal subsegmental branches. There is no aneurysm formation dissection thoracic aorta. Heart has normal size. Inner diameter for the left ventricular cavity is 4.5 cm and for the right ventricular cavity is 4.4 cm. There is discrete to mild pericardial effusion. There is no mediastinal masses or adenopathy.  Diameter for the ascending aorta is 3.5 cm and for the main pulmonary arteries up to 2.5 cm . There is no pericardial effusion. Presence of a consolidation with atelectasis of the right and left lower lobe with patent central airways. Air bronchograms are present. Findings can be relate with hypoventilation lower lung base. Please correlate clinically. There is no conspicuous or pleural effusions. Endotracheal tube is in good position. The patient has a NG tube with the tip in the stomach. There are also compression atelectasis in the posterior aspect of the both upper lobes. The anterior aspect of the upper lobes are better aerated. A discrete the area bronchocentric infiltrate seen towards the lingular segment of the left upper lobe. Upper abdominal structures are not fully covered on this study. There is a generalized fluid distention of the bowel including the colon. The abdomen is evaluate on the CT abdomen performed same time. 1. Atelectasis consolidation of both lower lobes with patent central airways. Findings to be correlated clinically. Can be relate with the hypoventilation of the lower lung bases. Cannot exclude a pneumonia. 2. Discrete patchy bronchocentric infiltrate in the left upper lobe lingular segment. 3. No acute pulmonary emboli seen although there are some limitations for evaluation of the lower lobes and motion artifacts. 4. No aneurysm formation dissection thoracic aorta. 5. Discrete to mild pericardial effusion. Ct Abdomen Pelvis W Iv Contrast Additional Contrast? None    Result Date: 2019  Patient MRN:  [id number] : 1965 Age: 62 years Gender: Female Order Date:  2019 6:45 PM TECHNIQUE/NUMBER OF IMAGES/COMPARISON/CLINICAL HISTORY: CT abdomen pelvis IV contrast After IV contrast administration axial images were obtained with sagittal and coronal reconstructions are. Clinical history abdominal pain. FINDINGS: There is massive dilatation of the colon with a predominant fluid contents.  Large amount of solid fecal contents form a pattern of constipation/fecal rectal retention/impaction is seen in the sigmoid colon to the rectum. This can causes bowel obstruction to critical a large amount of fluid the recommendation the more proximal segments of the colon. Consider decompression of the rectum/sigmoid to relieve the contents in more proximal segments of the colon. There are normal size and enhancement for the liver, spleen and the pancreas are. Gallbladder is well distended and has a hydrops appearance. There is some dilatation of the intrahepatic biliary tree and the common bile duct measures close to 9 mm in the head of the pancreas but can be traced with unremarkable appearance through the papilla level. The pancreatic duct is not dilated. Adrenal glands are not enlarged. Kidneys have preserved size and cortical thickness are. There is an area of hypodensity in the upper pole of the left kidney which can represent a lobulated the cyst but of relative hypodensity. Further evaluation with renal ultrasound is initially recommended. Aorta and IVC appear unremarkable. There is a Samuel catheter in the bladder. The bladder is not distended. The There is a venous catheter through the right common femoral vein with the tip in the distal IVC. For findings in the chest see report of CT scan of the chest performed the same time. 1. Findings for severe constipation with a large amount of fecal contents/rotation/impaction from the sigmoid colon to the rectum, causing a pattern of megacolon with marked fluid/air distentions or more proximal segments of the colon. 2. Consider decompression of the rectum/sigmoid to permit relieve of the more proximal segments of the colon. 3. Marked: Distention can explain hypoventilation lower lung bases causing atelectasis or consolidations with overall patent central airways of both lower lobes. Please correlate clinically.  4. There is no dilatation of the biliary tree, to be reevaluated relieve of the megacolon pattern which Meniscus left lung base. Contiguous atelectasis or infiltrate. The heart is enlarged. There is calcification within thoracic aorta. Severe gaseous distention of bowel within the left abdomen may be stomach or colon. Remote left rib fractures. Degenerative spondylosis and facet arthropathy within the spine. Gaseous distention of a loop of bowel within the left upper abdomen may be stomach or colon. Consider further workup with computed tomography for better characterization. Bilateral pleural effusions with contiguous atelectasis. Endotracheal tube is present with the distal tip 5 cm above the adriano. Nasogastric tube follows the expected contours of the esophagus into stomach with distal sideholes overlying the left upper quadrant of the abdomen. Esophageal manometry device appears present terminating at the level of the esophagogastric junction. Xr Chest Portable    Result Date: 2019  Patient MRN:  [id number] : 1965 Age: 62 years Gender: Female Order Date:  2019 9:45 AM EXAM: XR CHEST PORTABLE NUMBER OF IMAGES:    1 INDICATION:  Possible Stroke Possible Stroke COMPARISON: 2017 TECHNIQUE: AP view of the chest. FINDINGS: Stable cardiomediastinal silhouette. Pulmonary vasculature is normal. There is left basilar opacity. No pneumothorax seen. Degenerative changes noted in the spine. Left basilar opacity. Differential includes atelectasis, infection, and/or layering pleural effusion. Xr Abdomen For Ng/og/ne Tube Placement    Result Date: 2019  Comparison: 2019. Frontal view of the chest. Frontal view of the abdomen. FINDINGS: Endotracheal tube is present with the distal tip 5 cm above the adriano. Nasogastric tube follows the expected contours of the esophagus into stomach with distal sideholes overlying the left upper quadrant of the abdomen. Esophageal manometry device appears present terminating at the level of the esophagogastric junction.  Hazy density right lung base. Meniscus left lung base. Contiguous atelectasis or infiltrate. The heart is enlarged. There is calcification within thoracic aorta. Severe gaseous distention of bowel within the left abdomen may be stomach or colon. Remote left rib fractures. Degenerative spondylosis and facet arthropathy within the spine. Gaseous distention of a loop of bowel within the left upper abdomen may be stomach or colon. Consider further workup with computed tomography for better characterization. Bilateral pleural effusions with contiguous atelectasis. Endotracheal tube is present with the distal tip 5 cm above the adriano. Nasogastric tube follows the expected contours of the esophagus into stomach with distal sideholes overlying the left upper quadrant of the abdomen. Esophageal manometry device appears present terminating at the level of the esophagogastric junction. Resident's Assessment & Plan     64-year-old female with a past medical history of seizure disorder, multiple systemic sclerosis, arthritis, hypo tension presented to the ED after her  noted her blood pressure to be lower than usual and generalized weakness worse in the lower extremities.      Neurologic      Off Sedation, open eyes     Seizure disorder  - On Keppra 1000 twice daily at home  - Resume home med      Parkinson's Disease  - On Sinemet - currently holding for hypotension      Insomnia   - Hold home dose trazodone  - Melatonin 6mg qhs prn      Pulmonary     Acute hypoxic respiratory failure s/pextubation 1/9  - Likely 2/2 Aspiration pneumonia vs pulmonary edema   - Intubated 12/29, extubated 12/31, Reintubated 1/3. On T piece - monitor for 30 mins  - s/p Bronchoscopy 1/2/20, bronchial washing is growing Saccharomyces cerevisiae. Repeat cx growing corynebacterium.    - CXR 1/6 - significant for pulmonary vascular congestion, Airspace disease   - Has excessive secretions, started on Zyrtec.   - On Breathing treatments - Pulmozyme, duoneb, metabneb. Continue suctioning.   - Procalcitonin 3.15 on admission, 1.89  On 1/1,  0.45  On 1/4  - On Zosyn. Received 1 dose of vancomycin 1/8. - Follow daily labs and chest X-ray      Cardiovascular     Hypotension - resolved   - off levophed    Hypertension   - PRN labetalol 10 mg q6     Gastrointestinal     Ischemic Colitis s/p subtotal colectomy and ileostomy   - CT abdomen with distended large intestine, large amounts of feces from rectum to proximal sigmoid colon  - s/p subtotal colectomy and ileostomy wo perforation   - Lomotil if ostomy output > 1.5L qd  - Morphine 1mg q4h prn for pain  - NGT meds and TF per GS   - On Zosyn. Fluconazole stopped. Fungitell was neg  - Producing watery green stool in colostomy bag- OP of 1,510 over the last 24 hours      Elevated Liver enzymes - improving  - ALP - 265, ALT/AST- 6/63 on admission   - 1/12/20 - ALP- 315, AST/ALT -12/19  - CT abdomen and pelvis - Somewhat thickened contracted gallbladder with the some pericholecystic edema  - US gallbladder - No other evidence to suggest acute cholecystitis  - Acetaminophen levels <5     Elevated Alkaline phosphatase - improving  - 315 today  - CT abdomen - Somewhat thickened contracted gallbladder with the some pericholecystic edema  - US gall bladder - Gallbladder wall thickness is increased to 3.2 mm. No intraluminal echogenic density is identified within the gallbladder lumen. No other evidence to suggest acute cholecystitis. - GGT of 405 1/9     Hyperammoniemia - resolved  - Trending down, last 1/3- 52   - Ammonia 1/10 - 37     Infectious Disease     Septic shock - resolved   - likely 2/2 ischemic colitis vs UTI  - Hypotensive requiring Levophed- currently off levophed  - Procal trending down to 0.45     Leukocytosis - improving  - WBC - 14.2 today   - Beta-D-glucan - neg   - Temp overnight - Tmax 99.9, pro annie - 0.27  - s/p bronchoscopy.  BAL had neutrophilic predominance, cx growing Corynebacterium   -

## 2020-01-13 PROBLEM — J18.9 PNEUMONIA: Status: ACTIVE | Noted: 2020-01-01

## 2020-01-13 NOTE — PROGRESS NOTES
results for input(s): CULTRESP in the last 72 hours. No results for input(s): LABGRAM in the last 72 hours. No results for input(s): LEGUR in the last 72 hours. No results for input(s): STREPNEUMAGU in the last 72 hours. No results for input(s): LP1UAG in the last 72 hours. Assessment: 68-year-old female with a past medical history of seizure disorder, multiple systemic sclerosis, arthritis, hypo tension presented to the ED after her  noted her blood pressure to be lower than usual and generalized weakness worse in the lower extremities.    1. Acute respiratory failure with hypoxia , Intubated 12/29, extubated 12/31, Reintubated 1/3 extubation 1/9/20  2. Bronchoscopy 1/2/20, bronchial washing is growing Saccharomyces cerevisiae. Repeat cx growing corynebacterium. 3. Septic shock  4. Parkinsons disease   5. Hypotension improved off pressors , hx of HTN  6. Ischemic colitis s/p subtotal colectomy and ileostomy   7. Elevation of LFTs   8. Leukocytosis      Plan:   1. Chest physiotherapy , chest vest Q 8 hrs   2. Oxygen heated high optiflow cannula  3. NIV for respiratory support  4. Previously Stopped abx Zosyn, received 14 total days-s/p bronchoscopy. BAL had neutrophilic predominance, cx growing Corynebacterium   5. Scheduled bronchodilators, pulmozyme, guaifenesin  6. Follow CXR  7. PT, OT  8. Nutritional support- tube feeds via NG tube. Speech following  9. Remainder of care per ICU team    This plan of care was reviewed in collaboration with Dr. Kayla Banks  Electronically signed by NADJA Thorpe CNP on 1/13/2020 at 6:05 PM      I personally saw, examined, and cared for the patient. Labs, medications, radiographs reviewed. I agree with history exam and plans detailed in NP note.       Electronically signed by Nicholas Berg DO on 1/13/2020 at 10:43 PM

## 2020-01-13 NOTE — PROGRESS NOTES
Subjective:  Extubated on nasal cannula with NG more alert and responding appropriately  No family at bedside today  Discussed with nursing  Objective:    BP (!) 156/87   Pulse 101   Temp 99.2 °F (37.3 °C) (Temporal)   Resp (!) 31   Ht 5' 6\" (1.676 m)   Wt 110 lb (49.9 kg)   SpO2 97%   BMI 17.75 kg/m²     24HR INTAKE/OUTPUT:      Intake/Output Summary (Last 24 hours) at 1/13/2020 1411  Last data filed at 1/13/2020 1200  Gross per 24 hour   Intake 764 ml   Output 1270 ml   Net -506 ml     Extubated alert NAD with NG  Heart:  RRR, no murmurs, gallops, or rubs. Lungs: Bilateral rhonchi rhonchi  Abd: bowel sounds present, nontender, nondistended, no masses  Extrem:  No clubbing, cyanosis, or edema  Weak x4 extremities  Most Recent Labs  Lab Results   Component Value Date    WBC 15.6 (H) 01/13/2020    HGB 8.0 (L) 01/13/2020    HCT 27.3 (L) 01/13/2020     (H) 01/13/2020     01/13/2020    K 3.7 01/13/2020    CL 95 (L) 01/13/2020    CREATININE 0.5 01/13/2020    BUN 21 (H) 01/13/2020    CO2 22 01/13/2020    GLUCOSE 112 (H) 01/13/2020    ALT 10 01/13/2020    AST 16 01/13/2020    INR 1.1 12/30/2019    TSH 1.950 12/29/2019     Recent Labs     01/13/20  0412   MG 1.8     Lab Results   Component Value Date    CALCIUM 9.8 01/13/2020    PHOS 3.1 01/13/2020        XR CHEST PORTABLE   Final Result   tortuous ectatic aorta   Airspace disease compatible with atelectasis or pneumonia, more   prominent right lung base as compared to the left likely with   superimposed post pleural effusions, overall when compared to the   previous study of the chest is improved                     XR CHEST PORTABLE   Final Result   No interval change               XR CHEST PORTABLE   Final Result   Stable abnormal chest with persistent bibasilar infiltrates likely   pneumonia. Dilated bowel loops with improvement.             XR ABDOMEN (KUB) (SINGLE AP VIEW)   Final Result   Some dilated small bowel loops in left side of the abdomen. There is a   mid small bowel obstruction suggested      Significant left lower lobe infiltrate and right basilar infiltrate   and effusion. XR CHEST PORTABLE   Final Result   Progressive bibasilar infiltrates and pleural effusions which may be   due to pneumonia. Dilated bowel loops which may be due to postoperative ileus versus   bowel obstruction. XR CHEST PORTABLE   Final Result      1. Progressive improvement in right lower lobe pneumonia and   atelectasis. 2. Apparent clearing of previous mild right upper lobe pneumonia. XR CHEST PORTABLE   Final Result      XR CHEST PORTABLE   Final Result   Patchy bilateral pulmonary infiltrates, worst in the lower lungs, with   some improvement in the left lower lung. Small right pleural effusion. US GALLBLADDER RUQ   Final Result      Color wall thickness is increased to 3.2 mm. No other evidence to suggest acute cholecystitis. XR CHEST PORTABLE   Final Result   Stable abnormal chest with the persistent bilateral infiltrates and   pleural effusions with a marginal improvement which may be due to   improving CHF with edema and/or pneumonia. CT ABDOMEN PELVIS W IV CONTRAST Additional Contrast? None   Final Result   Subtotal colectomy with right lower quadrant ileostomy with a   moderately dilated/ thickened small bowel loops which may be due to   ileus/enteritis or low-grade bowel obstruction. The rectal remnant is significantly dilated/ thickened with retained   fecal matter and high density material with the presacral edema   concerning for proctitis. Infiltrates and pleural effusions in the lung bases. Somewhat thickened contracted gallbladder with the some   pericholecystic edema.             CT CHEST W CONTRAST   Final Result   Persistent patchy bibasilar infiltrates and pleural effusions and   groundglass infiltrates in the right upper lobe likely diffuse pneumonia. No lung abscess is identified. XR CHEST PORTABLE   Final Result   Tortuous ectatic aorta   Cardiomegaly    Airspace disease compatible with pneumonia, there may be a component   of pulmonary vascular congestion present   The chest does not appear to be significantly changed in the interval                  XR CHEST PORTABLE   Final Result   Improved yet persistent right mid and lower lung opacities with trace   effusion. Persistent retrocardiac consolidation. XR CHEST PORTABLE   Final Result      Subtle improved aeration of the right lung. No new or worsening   airspace opacities. Lines and tubes as described. XR CHEST PORTABLE   Final Result      Cardiomegaly      Airspace consolidation the right upper lobe appears to be unchanged      Small to moderate right-sided pleural effusion      Support line including endotracheal tube and other lines appears to be   in satisfactory position. XR CHEST PORTABLE   Final Result   Progressive diffuse bilateral infiltrates and pleural effusion with a   thick consolidation in the right upper lobe. CHF with edema and/or   diffuse pneumonia considered. Gastric distention with a nasogastric tube in place. XR CHEST PORTABLE   Final Result   Partial clearing of right lower lobe atelectasis/infiltrate               XR CHEST PORTABLE   Final Result   Worsening CHF with edema. Suggestion of free intraperitoneal air better seen in the previous   examination and could be related to recent surgery. Clinical   assessment is recommended. ALERT:  THIS IS AN ABNORMAL REPORT            CT HEAD WO CONTRAST   Final Result      NO ACUTE INTRACRANIAL PROCESS         XR CHEST PORTABLE   Final Result      Right upper lung patchy infiltrate about the right minor fissure. Cardiomegaly      Nasogastric tube in satisfactory position with the tip not well seen   but appears to be in the distal stomach.          XR Chest dilatation of the biliary tree, to be reevaluated   relieve of the megacolon pattern which cause significant mass effect   throughout the entire abdomen peritoneal cavity. .      5. Mild ascites in the abdomen. 6. No free intraperitoneal air observed. XR CHEST PORTABLE   Final Result      Gaseous distention of a loop of bowel within the left upper abdomen   may be stomach or colon. Consider further workup with computed   tomography for better characterization. Bilateral pleural effusions with contiguous atelectasis. Endotracheal tube is present with the distal tip 5 cm above the   adriano. Nasogastric tube follows the expected contours of the esophagus into   stomach with distal sideholes overlying the left upper quadrant of the   abdomen. Esophageal manometry device appears present terminating at the level   of the esophagogastric junction. XR ABDOMEN FOR NG/OG/NE TUBE PLACEMENT   Final Result      Gaseous distention of a loop of bowel within the left upper abdomen   may be stomach or colon. Consider further workup with computed   tomography for better characterization. Bilateral pleural effusions with contiguous atelectasis. Endotracheal tube is present with the distal tip 5 cm above the   adriano. Nasogastric tube follows the expected contours of the esophagus into   stomach with distal sideholes overlying the left upper quadrant of the   abdomen. Esophageal manometry device appears present terminating at the level   of the esophagogastric junction. XR ABDOMEN (KUB) (SINGLE AP VIEW)   Final Result   Right hemicolonic fecal distention with functional outflow obstruction   of the distal small bowel. The transverse colon is distended with gas,   raising questions regarding possible \"institutional bowel\" or chronic   constipation. Limited radiographic appearance does not suggest small bowel   obstruction.       XR CHEST PORTABLE   Final Result   Left

## 2020-01-13 NOTE — PLAN OF CARE
Problem: Respiratory Function - Compromised:  Goal: Absence of respiratory distress  Description  Maintaining head of bed at least 30 degrees. 1/13/2020 1140 by Armani Oviedo RN  Outcome: Met This Shift     Problem: Pain:  Goal: Control of acute pain  Description  Control of acute pain  1/13/2020 1140 by Armani Oviedo RN  Outcome: Met This Shift     Problem: Bowel/Gastric:  Goal: Complications related to the disease process, condition or treatment will be avoided or minimized  1/13/2020 1140 by Armani Oviedo RN  Outcome: Met This Shift     Problem: Skin Integrity:  Goal: Will show no infection signs and symptoms  Description  Will show no infection signs and symptoms  1/13/2020 1140 by Armani Oviedo RN  Outcome: Met This Shift    Plan of care discussed with patient and/or family. Patient and/or family included in plan of care.

## 2020-01-13 NOTE — CARE COORDINATION
Bela Monteiro unable to accommodate d/t NG tube. SW asked Brice Lord to follow in case criteria was lost for LTAC.

## 2020-01-13 NOTE — PROGRESS NOTES
Speech Language Pathology      NAME:  Marisol Nick  :  1965  DATE: 2020  ROOM:  4406/4406-A    Order received. Chart reviewed. HOLD per RN this date, as Pt weak and with weak cough. Will re-attempt as appropriate. Thank you.       Hypotension [I95.9]    Stewart Still M.S., CCC-SLP  Speech-Language Pathologist  [id number]  2020

## 2020-01-13 NOTE — CONSULTS
Neck:      Musculoskeletal: Neck rigidity present. Cardiovascular:      Rate and Rhythm: Normal rate and regular rhythm. Heart sounds: Normal heart sounds. Pulmonary:      Effort: Respiratory distress present. Comments: Decreased tidal volumes  Musculoskeletal:      Right lower leg: Edema present. Left lower leg: Edema present. Skin:     General: Skin is warm and dry. Neurological:      Mental Status: She is alert. Deep Tendon Reflexes:      Reflex Scores:       Achilles reflexes are 0 on the right side and 0 on the left side. Neurological Exam  Mental Status  Alert. Very slow to respond. Able to repeat. Name simple objects. Poor concentration. Appears to give up on many tasks. .    Cranial Nerves  CN II: Visual fields full to confrontation. CN III, IV, VI: Diminished smooth pursuit. Intermittent saccadic intrusions to the right. Impaired smooth pursuit. Slow saccades. .  CN V: Facial sensation is normal.  CN VII: Full and symmetric facial movement. CN VIII: Hearing is normal.  CN IX, X: Palate elevates symmetrically  CN XI:  Right: Sternocleidomastoid strength is weak. Trapezius strength is weak. Left: Sternocleidomastoid strength is weak. Trapezius strength is weak. CN XII: Tongue midline without atrophy or fasciculations. Motor  Decreased muscle bulk throughout. Increased muscle tone. No abnormal involuntary movements. Spasticity x4   Weakly antigravity in proximal arms. Spastic hands. 2/5 in the legs. .    Sensory  Light touch is normal in upper and lower extremities. Pinprick is normal in upper and lower extremities.      Reflexes                                           Right                      Left  Biceps                                 Tr                         Tr  Triceps                                Tr                         Tr  Patellar                                Tr                         Tr  Achilles 0                         0  Plantar                           [facility]    Coordination  Unable to complete. .      Laboratory/Radiology:     CBC with Differential:    Lab Results   Component Value Date    WBC 15.6 01/13/2020    RBC 2.77 01/13/2020    HGB 8.0 01/13/2020    HCT 27.3 01/13/2020     01/13/2020    MCV 98.6 01/13/2020    MCH 28.9 01/13/2020    MCHC 29.3 01/13/2020    RDW 19.2 01/13/2020    NRBC 0.9 01/05/2020    METASPCT 0.9 01/05/2020    LYMPHOPCT 7.4 01/13/2020    PROMYELOPCT 0.9 01/04/2020    MONOPCT 6.9 01/13/2020    MYELOPCT 0.9 01/05/2020    BASOPCT 0.4 01/13/2020    MONOSABS 1.08 01/13/2020    LYMPHSABS 1.16 01/13/2020    EOSABS 0.06 01/13/2020    BASOSABS 0.06 01/13/2020     CMP:    Lab Results   Component Value Date     01/13/2020    K 3.7 01/13/2020    CL 95 01/13/2020    CO2 22 01/13/2020    BUN 21 01/13/2020    CREATININE 0.5 01/13/2020    GFRAA >60 01/13/2020    LABGLOM >60 01/13/2020    GLUCOSE 112 01/13/2020    PROT 6.8 01/13/2020    LABALBU 3.5 01/13/2020    CALCIUM 9.8 01/13/2020    BILITOT 0.3 01/13/2020    ALKPHOS 316 01/13/2020    AST 16 01/13/2020    ALT 10 01/13/2020     HgBA1c:  No results found for: LABA1C  TSH:    Lab Results   Component Value Date    TSH 1.950 12/29/2019     VITAMIN B12: No components found for: B12      I independently reviewed the labs and imaging studies at today's appointment. 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 Recovering slowing from severe sepsis. She appears to be tolerating relatively high dose of sinemet. Patient Active Problem List   Diagnosis    MS (multiple sclerosis) ([address]    Parkinson disease ([address]    Multiple system atrophy ([address]    Ischemic colitis ([address]    Septic shock ([address]    Severe protein-calorie malnutrition ([address]    Acute respiratory failure with hypoxia and hypercapnia ([address]    Peritonitis ([address]    Pneumonia       Plan:     Supportive care. May need PEG. Continue Sinemet. Follow up outpatient. Neurology will be available as needed.      Sheri Sylvester  2:59 PM  1/13/2020

## 2020-01-13 NOTE — PROGRESS NOTES
20  0515 20  0412   WBC 17.3* 14.2* 15.6*   HGB 8.4* 8.5* 8.0*   HCT 28.4* 29.3* 27.3*   MCV 98.3 99.0 98.6   * 663* 658*       BMP:    Recent Labs     20  0405 20  0515 20  0412    140 136   K 3.6 3.6 3.7   CL 96* 98 95*   CO2 23 23 22   BUN 21* 19 21*   CREATININE 0.5 0.6 0.5   GLUCOSE 100* 97 112*       LIVER PROFILE:   Recent Labs     20  0405 20  0515 20  0412   AST 35* 19 16   ALT 14 12 10   BILITOT 0.4 0.3 0.3   ALKPHOS 339* 315* 316*       PT/INR:   No results for input(s): PROTIME, INR in the last 72 hours. APTT:   No results for input(s): APTT in the last 72 hours.     Fasting Lipid Panel:    Lab Results   Component Value Date    TRIG 142 2020       Cardiac Enzymes:    Lab Results   Component Value Date    CKTOTAL 137 2019    CKTOTAL 238 (H) 2016    CKMB 5.7 (H) 2016    TROPONINI <0.01 2017    TROPONINI <0.01 2017    TROPONINI <0.01 2016         Notable Cultures:      Notable Cultures:     Culture  Date sent  Result    Bronchial washing    Saccharomyces cerevisiae    Sputum  /      Blood  19  no growth     Pneumocystis     neg    Fungitell   neg    Urine  20  no growth        Antibiotic  Day started  Days      Vancomycin x 1          Fluconazole            Zosyn   11        Lines:  Site   Date inserted Days     PICC   R basilic                       DVT Prophylaxis       Enoxaparin          PPI Prophylaxis       Protonix         Imaging studies:  Xr Abdomen (kub) (single Ap View)    Result Date: 2019  Patient MRN:  [id number] : 1965 Age: 62 years Gender: Female Order Date:  2019 12:45 PM EXAM: XR ABDOMEN (KUB) (SINGLE AP VIEW) NUMBER OF IMAGES:  1 view-2 images  INDICATION: Abdominal distention COMPARISON: None FINDINGS: Right hemicolon is distended with fecal content, and there is fluid filled pelvic small bowel which may be functional outflow obstruction. The transverse colon is distended with gas. There is no evidence of obstructive dilation, perforation, or pathologic fluid levels. Flank and psoas margins are well-defined. No pathologic calcifications are noted, and there is no evidence of organomegaly. Included portions of the lower chest show no evidence of acute pathology. Skeletal structures are unremarkable for acute findings. Tubal sterilization clips are noted in the pelvis. Overlying EKG leads are present. Right hemicolonic fecal distention with functional outflow obstruction of the distal small bowel. The transverse colon is distended with gas, raising questions regarding possible \"institutional bowel\" or chronic constipation. Limited radiographic appearance does not suggest small bowel obstruction. Ct Chest W Contrast    Result Date: 2019  Patient MRN:  [id number] : 1965 Age: 62 years Gender: Female Order Date:  2019 2:00 PM TECHNIQUE/NUMBER OF IMAGES/COMPARISON/CLINICAL HISTORY: CT chest After IV contrast administration axial images were obtained with sagittal and coronal MIP reconstructions of pulmonary arterial circulation thoracic aorta. Clinical history abdominal distention, abdominal pain, for evolution the pulmonary arterial circulation. 53-year-old female patient had previous appendectomy. The history of Parkinson's disease is seizures. FINDINGS: There are motion artifacts which cause misregistration of images makes difficult evaluation of the pulmonary arterial circulation towards the lower lung bases. There is no conspicuous acute central pulmonary embolus in the main PA, right and left main PAs in their lobar and segmental and more proximal subsegmental branches. There is no aneurysm formation dissection thoracic aorta. Heart has normal size. Inner diameter for the left ventricular cavity is 4.5 cm and for the right ventricular cavity is 4.4 cm. There is discrete to mild pericardial effusion.  There is no mediastinal masses or adenopathy. Diameter for the ascending aorta is 3.5 cm and for the main pulmonary arteries up to 2.5 cm . There is no pericardial effusion. Presence of a consolidation with atelectasis of the right and left lower lobe with patent central airways. Air bronchograms are present. Findings can be relate with hypoventilation lower lung base. Please correlate clinically. There is no conspicuous or pleural effusions. Endotracheal tube is in good position. The patient has a NG tube with the tip in the stomach. There are also compression atelectasis in the posterior aspect of the both upper lobes. The anterior aspect of the upper lobes are better aerated. A discrete the area bronchocentric infiltrate seen towards the lingular segment of the left upper lobe. Upper abdominal structures are not fully covered on this study. There is a generalized fluid distention of the bowel including the colon. The abdomen is evaluate on the CT abdomen performed same time. 1. Atelectasis consolidation of both lower lobes with patent central airways. Findings to be correlated clinically. Can be relate with the hypoventilation of the lower lung bases. Cannot exclude a pneumonia. 2. Discrete patchy bronchocentric infiltrate in the left upper lobe lingular segment. 3. No acute pulmonary emboli seen although there are some limitations for evaluation of the lower lobes and motion artifacts. 4. No aneurysm formation dissection thoracic aorta. 5. Discrete to mild pericardial effusion. Ct Abdomen Pelvis W Iv Contrast Additional Contrast? None    Result Date: 2019  Patient MRN:  [id number] : 1965 Age: 62 years Gender: Female Order Date:  2019 6:45 PM TECHNIQUE/NUMBER OF IMAGES/COMPARISON/CLINICAL HISTORY: CT abdomen pelvis IV contrast After IV contrast administration axial images were obtained with sagittal and coronal reconstructions are. Clinical history abdominal pain.  FINDINGS: There is massive clinically. 4. There is no dilatation of the biliary tree, to be reevaluated relieve of the megacolon pattern which cause significant mass effect throughout the entire abdomen peritoneal cavity. . 5. Mild ascites in the abdomen. 6. No free intraperitoneal air observed. Xr Chest Portable    Result Date: 2019  Patient MRN:  [id number] : 1965 Age: 62 years Gender: Female Order Date:  2019 3:15 PM EXAM: XR CHEST PORTABLE INDICATION:  ETT exchanged, verify OGT placement ETT exchanged, verify OGT placement COMPARISON: Same date 0655 hours FINDINGS:  ET and NG tubes are appropriate. There has been some clearing of right basilar infiltrate since the prior study. There is some right midlung infiltrate. Infiltrate left retrocardiac region is smaller and there is less fluid/pleural thickening on the left. There appears to be old healed left-sided rib fractures     Partial clearing of bibasilar infiltrates and left effusion     Xr Chest Portable    Result Date: 2019  Patient MRN:  [id number] : 1965 Age: 62 years Gender: Female Order Date:  2019 6:00 AM EXAM: XR CHEST PORTABLE one image INDICATION:  Line / tube management Line / tube management COMPARISON: 2019 FINDINGS: There is cardiomegaly. Endotracheal tube and nasogastric tube are unchanged. There is vascular congestion with a marginal improvement in persistent perihilar and bibasilar infiltrates and pleural effusions. There is improved gastric distention. Stable abnormal chest with  improving CHF. Superimposed pneumonia is not excluded. Xr Chest Portable    Result Date: 2019  Comparison: 2019. Frontal view of the chest. Frontal view of the abdomen. FINDINGS: Endotracheal tube is present with the distal tip 5 cm above the adriano. Nasogastric tube follows the expected contours of the esophagus into stomach with distal sideholes overlying the left upper quadrant of the abdomen.  Esophageal manometry device appears present terminating at the level of the esophagogastric junction. Hazy density right lung base. Meniscus left lung base. Contiguous atelectasis or infiltrate. The heart is enlarged. There is calcification within thoracic aorta. Severe gaseous distention of bowel within the left abdomen may be stomach or colon. Remote left rib fractures. Degenerative spondylosis and facet arthropathy within the spine. Gaseous distention of a loop of bowel within the left upper abdomen may be stomach or colon. Consider further workup with computed tomography for better characterization. Bilateral pleural effusions with contiguous atelectasis. Endotracheal tube is present with the distal tip 5 cm above the adriano. Nasogastric tube follows the expected contours of the esophagus into stomach with distal sideholes overlying the left upper quadrant of the abdomen. Esophageal manometry device appears present terminating at the level of the esophagogastric junction. Xr Chest Portable    Result Date: 2019  Patient MRN:  [id number] : 1965 Age: 62 years Gender: Female Order Date:  2019 9:45 AM EXAM: XR CHEST PORTABLE NUMBER OF IMAGES:    1 INDICATION:  Possible Stroke Possible Stroke COMPARISON: 2017 TECHNIQUE: AP view of the chest. FINDINGS: Stable cardiomediastinal silhouette. Pulmonary vasculature is normal. There is left basilar opacity. No pneumothorax seen. Degenerative changes noted in the spine. Left basilar opacity. Differential includes atelectasis, infection, and/or layering pleural effusion. Xr Abdomen For Ng/og/ne Tube Placement    Result Date: 2019  Comparison: 2019. Frontal view of the chest. Frontal view of the abdomen. FINDINGS: Endotracheal tube is present with the distal tip 5 cm above the adriano. Nasogastric tube follows the expected contours of the esophagus into stomach with distal sideholes overlying the left upper quadrant of the abdomen. Esophageal manometry device appears present terminating at the level of the esophagogastric junction. Hazy density right lung base. Meniscus left lung base. Contiguous atelectasis or infiltrate. The heart is enlarged. There is calcification within thoracic aorta. Severe gaseous distention of bowel within the left abdomen may be stomach or colon. Remote left rib fractures. Degenerative spondylosis and facet arthropathy within the spine. Gaseous distention of a loop of bowel within the left upper abdomen may be stomach or colon. Consider further workup with computed tomography for better characterization. Bilateral pleural effusions with contiguous atelectasis. Endotracheal tube is present with the distal tip 5 cm above the adriano. Nasogastric tube follows the expected contours of the esophagus into stomach with distal sideholes overlying the left upper quadrant of the abdomen. Esophageal manometry device appears present terminating at the level of the esophagogastric junction. Resident's Assessment & Plan     47-year-old female with a past medical history of seizure disorder, multiple systemic sclerosis, arthritis, hypo tension presented to the ED after her  noted her blood pressure to be lower than usual and generalized weakness worse in the lower extremities.      Neurologic      Off Sedation, open eyes     Seizure disorder  - On Keppra 1000 twice daily at home  - Resume home med      Parkinson's Disease  - On Sinemet - currently holding for hypotension      Insomnia   - Hold home dose trazodone  - Melatonin 6mg qhs prn      Pulmonary     Acute hypoxic respiratory failure s/pextubation 1/9  - Likely 2/2 Aspiration pneumonia vs pulmonary edema   - Intubated 12/29, extubated 12/31, Reintubated 1/3. On T piece - monitor for 30 mins  - s/p Bronchoscopy 1/2/20, bronchial washing is growing Saccharomyces cerevisiae.  Repeat cx growing corynebacterium.    - CXR 1/6 - significant for pulmonary vascular congestion, Airspace disease   - Has excessive secretions, started on Zyrtec. - On Breathing treatments - Pulmozyme, duoneb, metabneb, hypertonic saline nebulizing treatment, chest vest q8 . Continue suctioning.  - Procalcitonin 3.15 on admission, 1.89  On 1/1,  0.45  On 1/4  - D/c Zosyn, got 15 days of treatment. Received 1 dose of vancomycin 1/8. - Follow daily labs and chest X-ray      Cardiovascular     Hypotension - resolved   - off levophed    Hypertension   - PRN labetalol 10 mg q6     Gastrointestinal     Ischemic Colitis s/p subtotal colectomy and ileostomy   - CT abdomen with distended large intestine, large amounts of feces from rectum to proximal sigmoid colon  - s/p subtotal colectomy and ileostomy wo perforation   - Lomotil if ostomy output > 1.5L qd  - Morphine 1mg q4h prn for pain  - NGT meds and TF per GS   - 15 day treatment with zosyn. Fluconazole stopped. Fungitell was neg  - Producing green non bloody stool in colostomy bag- Monitor OP      Elevated Liver enzymes - improving  - ALP - 265, ALT/AST- 6/63 on admission   - 1/13/20 - ALP- 316, AST/ALT -10/16  - CT abdomen and pelvis - Somewhat thickened contracted gallbladder with the some pericholecystic edema  - US gallbladder - No other evidence to suggest acute cholecystitis  - Acetaminophen levels <5     Elevated Alkaline phosphatase - improving  - 316 today  - CT abdomen - Somewhat thickened contracted gallbladder with the some pericholecystic edema  - US gall bladder - Gallbladder wall thickness is increased to 3.2 mm. No intraluminal echogenic density is identified within the gallbladder lumen. No other evidence to suggest acute cholecystitis.   - GGT of 405 1/9     Hyperammoniemia - resolved  - Trending down, last 1/3- 52   - Ammonia 1/10 - 37     Infectious Disease     Septic shock - resolved   - likely 2/2 ischemic colitis vs UTI  - Hypotensive requiring Levophed- currently off levophed  - Procal trending down to 0.45     Leukocytosis -

## 2020-01-13 NOTE — PROGRESS NOTES
Palliative Care Department  Palliative Care Initial Consult  Provider: Leroy Devlin Day: 16    Referring Provider:  Dr. Taylor Ochoa    Reason for Consult:  [x]  Code status Discussion  [x]  Assist with goals of care  [x]  Psychosocial support  []  Symptom Management  []  Advanced Care Planning    Chief Complaint: Omar Perez is a 62 y.o. female with chief complaint of generalized weakness. Active Hospital Problems    Diagnosis Date Noted    Pneumonia [J18.9] 01/13/2020    Peritonitis ([facility] [address] [K65.9] 01/11/2020    Severe protein-calorie malnutrition ([address] [E43] 01/02/2020    Acute respiratory failure with hypoxia and hypercapnia (HCC) [J96.01, J96.02] 01/02/2020    Septic shock ([address] [A41.9, R65.21] 12/31/2019    Ischemic colitis ([address] [K55.9]     Multiple system atrophy ([facility] [address] [G23.9] 12/29/2019           Palliative Care Encounter/Recommendations:      - Goals of care: continue current management     - Code Status: full code          Subjective:     HPI:  Omar Perez is a 62 y.o. female with significant past medical history of arthritis, depression, hypertension, Parkinson's disease and seizure who presented with generalized weakness. Patient hypotensive in ED and pressor was started. UA positive in ED. Transferred to MICU for further management. On 12/29, patient became hypoxic and unresponsive-intubated at this time. CT chest was done and showed bilateral lobe infiltrates. CT abdomen pelvis also done demonstrating distended large bowel with fecal impaction from rectum to proximal sigmoid colon with air/fluid levels proximal to the sigmoid colon. On 12/30, patient underwent exploratory laparotomy, subtotal colectomy, and end ileostomy for ischemic colitis. Patient was extubated on 12/31 however required reintubation due to decline in respiratory status. General surgery and ID following. Patient remains intubated and sedated in MICU. Subjective/Events/Discussions:    1/13: Patient extubated, on nasal cannula/BiPAP, NGT in place. Patient states she is feeling better, just weak. Voice is very hoarse and soft. Goal is to [facility]. Patient is hopeful she will get stronger. Patient to remain FULL CODE. Interested in filling out [address] notify palliative medicine LSW.        1/4 Patient seen,  and father at bedside. Palliative medicine  also at bedside for conversation. , Ha Long, explains that he was patient's primary caregiver at home. Patient was dependent with all ADLs and required help from her  for dressing, bathing, cooking, cleaning, etc.  hired ancillary help to assist with care for patient at home (5x/ week). Patient was using a walker to get around however she required someone to walk next to her because she was falling so frequently. At this time, Ha Long remains hopeful that patient can be extubated. We discussed code status and  would like to keep patient FULL CODE at this time. Ha Long states that patient has never been through anything like this before and he would like to give her every chance to get better. Patient's father, Emilee Rodrigues, in agreement with this plan. Ha Long thinks that patient may have a living will/HCPOA and is going to look for it-he is to bring in a copy if found. Ha Long also discussed d/c planning and states he has chosen [facility] and then possibly [facility] for rehab after [facility]. Support provided and questions answered. Will continue to follow.      - Family Meeting:   Participants:Spouse, Parent and patient   Family meeting was held to discuss:diagnosis, prognosis, treatment options, goals of care, prior expressed wishes, advanced care planning and discharge plan    -Surrogate/Legal NOK: Spouse    Contacts:  Abner Figueroa, , [phone]     Past Medical History:   Diagnosis Date    Arthritis     Depression     Falls frequently     multi system atrophy per family    Hypertension     Parkinson's disease [facility] [address]     Seizure [facility])        Past Surgical History:   Procedure Laterality Date    APPENDECTOMY      BRONCHOSCOPY N/A 1/2/2020    BRONCHOSCOPY DIAGNOSTIC OR CELL [address] ONLY performed by Ofelia Dias DO at [address] N/A 1/8/2020    BRONCHOSCOPY ALVEOLAR LAVAGE performed by Carolyn Obrien MD at [address] [facility] N/A 12/30/2019    EXPLORATORY LAPAROTOMY, SUBTOTAL COLECTOMY , ILEOSTOMY performed by Marlo Fleischer, MD at [address]      gyneological       Family History   Problem Relation Age of Onset    Arthritis Other     Cancer Other     Depression Other     Heart Disease Other     Hypertension Other     Kidney Disease Other     Stroke Other          Allergies   Allergen Reactions    Sulfa Antibiotics Rash       ROS: UNABLE TO ASSESS DUE TO CURRENT CRITICAL CONDITION    Objective:     Physical Exam  BP (!) 156/87   Pulse 101   Temp 99.2 °F (37.3 °C) (Temporal)   Resp (!) 31   Ht 5' 6\" (1.676 m)   Wt 110 lb (49.9 kg)   SpO2 97%   BMI 17.75 kg/m²     Gen:  Thin, ill-appearing  HEENT:  Normocephalic, conjunctiva pink, no drainage, mucosa moist  Neck:  Supple  Lungs:  CTA bilaterally, no audible rhonchi or wheezes noted  Heart[de-identified]  RRR, no murmur, rub, or gallop noted during exam  Abd:  Soft, non tender, non distended, BS hypoactive  Ext:  Moves extremities, no edema, pulses present  Skin:  Warm and dry  Neuro:  PERRL, follows commands-weakness noted, oriented x3-however confused at times        Current Medications:  Inpatient medications reviewed: yes  Home Medications reviewed: yes    Results/Verification of Data Review  Objective data reviewed: labs, images, records, medication use, vitals and chart      - Advanced Directives: Living Will and HC-POA   -  - Spiritual assessment: No spiritual distress identified     - Bereavement and grief: to be determined    - Discharge planning: discharge to EC    - Prognosis: unknown    - Referrals to: none today    Time/Communication  Greater than 51% of time spent, total 15 minutes in counseling and coordination of care at the bedside regarding goals of care, diagnosis and prognosis and see above. Assessment/Plan   1. Septic shock: UA + on ED, aspiration PNA, ischemic colitis s/p subtotal colectomy, extubated, on IV antibiotics, general surgery and ID following  2. Code: FULL CODE  3. Advanced directives: , Jonas Diver, legal NOK-will fill out HCPOA with LSW  4. Support:  and father very involved in care      [address] [place] [address] Dr SAEZ-CNP  Palliative Medicine    Discussed patient and the plan of care with the other IDT members of Palliative Care, and with patient, family and floor nurse. Thank you for allowing Palliative Medicine to participate in the care of [facility]. Note: This report was completed using computerGetBack voiced recognition software. Every effort has been made to ensure accuracy; however, inadvertent computerized transcription errors may be present.

## 2020-01-13 NOTE — CARE COORDINATION
[facility] started pre-cert today. SW asked [facility] to follow Pt in case criteria was lost for LTAC. Discharge plan is [facility], pending pre-cert.

## 2020-01-14 NOTE — PROGRESS NOTES
[address] [facility]  Department of Internal Medicine  Internal Medicine Residency Program  Resident MICU Progress  Note      Patient:  Ciro Joe 62 y.o. female MRN: [id number]     Date of Service: 1/14/2020    Allergy: Sulfa antibiotics  Follow of RF     Subjective       Patient seen and examined. Patient is awake, alert and interactive. Family encouraged to talk about code status and plan of long term care given the poor prognosis of MSA. Overnight events: Temp of 100.4. Otherwise remained stable. Objective   Physical Exam:  · Vitals: BP (!) 148/91   Pulse 91   Temp 99.1 °F (37.3 °C) (Temporal)   Resp 30   Ht 5' 6\" (1.676 m)   Wt 110 lb (49.9 kg)   SpO2 98%   BMI 17.75 kg/m²     I & O - 24hr: In: 4683 [I.V.:396; NG/GT:1181]  · Out: 1125 [Urine:575]    Physical Exam  Vitals signs and nursing note reviewed. HENT:      Head: Normocephalic and atraumatic. Mouth/Throat:      Mouth: Mucous membranes are moist.   Cardiovascular:      Rate and Rhythm: Normal rate and regular rhythm. Pulses: Normal pulses. Heart sounds: Normal heart sounds. Pulmonary:      Effort: Pulmonary effort is normal.      Breath sounds: Rhonchi (diffusely ) present. Abdominal:      General: Bowel sounds are normal. There is no distension. Palpations: Abdomen is soft. Tenderness: There is no tenderness. Comments: Pink stoma, dark green stool in colostomy bag, well healing abdominal post surgical wound   Musculoskeletal:         General: No swelling or tenderness. Skin:     General: Skin is warm. Capillary Refill: Capillary refill takes less than 2 seconds.    Neurological:      Comments: Awake, follows command, interactive   Psychiatric:         Mood and Affect: Mood normal.         Behavior: Behavior normal.       Pertinent New Labs & Imaging Studies     CBC:   Recent Labs     01/12/20  0515 01/13/20  0412 01/14/20  0514   WBC 14.2* 15.6* 12.9*   HGB 8.5* 8.0* 8.3*   HCT 29.3* 27.3* 28.1*   MCV 99.0 98.6 99.3   * 658* 642*       BMP:    Recent Labs     20  0515 20  0412 20  0514    136 138   K 3.6 3.7 3.6   CL 98 95* 98   CO2 23 22 23   BUN 19 21* 17   CREATININE 0.6 0.5 0.4*   GLUCOSE 97 112* 123*       LIVER PROFILE:   Recent Labs     20  0515 20  0412 20  0514   AST 19 16 19   ALT 12 10 12   BILITOT 0.3 0.3 0.3   ALKPHOS 315* 316* 290*       PT/INR:   No results for input(s): PROTIME, INR in the last 72 hours. APTT:   No results for input(s): APTT in the last 72 hours. Fasting Lipid Panel:    Lab Results   Component Value Date    TRIG 142 2020       Cardiac Enzymes:    Lab Results   Component Value Date    CKTOTAL 137 2019    CKTOTAL 238 (H) 2016    CKMB 5.7 (H) 2016    TROPONINI <0.01 2017    TROPONINI <0.01 2017    TROPONINI <0.01 2016         Notable Cultures:      Notable Cultures:     Culture  Date sent  Result    Bronchial washing    Saccharomyces cerevisiae    Sputum  /      Blood  19  no growth     Pneumocystis     neg    Fungitell   neg    Urine  20  no growth        Antibiotic  Day started  Days      Vancomycin x 1          Fluconazole            Zosyn     11        Lines:  Site   Date inserted Days     PICC   R basilic                       DVT Prophylaxis       Enoxaparin          PPI Prophylaxis       Protonix         Imaging studies:  Xr Abdomen (kub) (single Ap View)    Result Date: 2019  Patient MRN:  [id number] : 1965 Age: 62 years Gender: Female Order Date:  2019 12:45 PM EXAM: XR ABDOMEN (KUB) (SINGLE AP VIEW) NUMBER OF IMAGES:  1 view-2 images  INDICATION: Abdominal distention COMPARISON: None FINDINGS: Right hemicolon is distended with fecal content, and there is fluid filled pelvic small bowel which may be functional outflow obstruction. The transverse colon is distended with gas. There is no evidence of obstructive dilation, perforation, or pathologic fluid levels. Flank and psoas margins are well-defined. No pathologic calcifications are noted, and there is no evidence of organomegaly. Included portions of the lower chest show no evidence of acute pathology. Skeletal structures are unremarkable for acute findings. Tubal sterilization clips are noted in the pelvis. Overlying EKG leads are present. Right hemicolonic fecal distention with functional outflow obstruction of the distal small bowel. The transverse colon is distended with gas, raising questions regarding possible \"institutional bowel\" or chronic constipation. Limited radiographic appearance does not suggest small bowel obstruction. Ct Chest W Contrast    Result Date: 2019  Patient MRN:  [id number] : 1965 Age: 62 years Gender: Female Order Date:  2019 2:00 PM TECHNIQUE/NUMBER OF IMAGES/COMPARISON/CLINICAL HISTORY: CT chest After IV contrast administration axial images were obtained with sagittal and coronal MIP reconstructions of pulmonary arterial circulation thoracic aorta. Clinical history abdominal distention, abdominal pain, for evolution the pulmonary arterial circulation. 78-year-old female patient had previous appendectomy. The history of Parkinson's disease is seizures. FINDINGS: There are motion artifacts which cause misregistration of images makes difficult evaluation of the pulmonary arterial circulation towards the lower lung bases. There is no conspicuous acute central pulmonary embolus in the main PA, right and left main PAs in their lobar and segmental and more proximal subsegmental branches. There is no aneurysm formation dissection thoracic aorta. Heart has normal size. Inner diameter for the left ventricular cavity is 4.5 cm and for the right ventricular cavity is 4.4 cm. There is discrete to mild pericardial effusion. There is no mediastinal masses or adenopathy.  Diameter for the ascending aorta is 3.5 cm and for the main pattern of constipation/fecal rectal retention/impaction is seen in the sigmoid colon to the rectum. This can causes bowel obstruction to critical a large amount of fluid the recommendation the more proximal segments of the colon. Consider decompression of the rectum/sigmoid to relieve the contents in more proximal segments of the colon. There are normal size and enhancement for the liver, spleen and the pancreas are. Gallbladder is well distended and has a hydrops appearance. There is some dilatation of the intrahepatic biliary tree and the common bile duct measures close to 9 mm in the head of the pancreas but can be traced with unremarkable appearance through the papilla level. The pancreatic duct is not dilated. Adrenal glands are not enlarged. Kidneys have preserved size and cortical thickness are. There is an area of hypodensity in the upper pole of the left kidney which can represent a lobulated the cyst but of relative hypodensity. Further evaluation with renal ultrasound is initially recommended. Aorta and IVC appear unremarkable. There is a Samuel catheter in the bladder. The bladder is not distended. The There is a venous catheter through the right common femoral vein with the tip in the distal IVC. For findings in the chest see report of CT scan of the chest performed the same time. 1. Findings for severe constipation with a large amount of fecal contents/rotation/impaction from the sigmoid colon to the rectum, causing a pattern of megacolon with marked fluid/air distentions or more proximal segments of the colon. 2. Consider decompression of the rectum/sigmoid to permit relieve of the more proximal segments of the colon. 3. Marked: Distention can explain hypoventilation lower lung bases causing atelectasis or consolidations with overall patent central airways of both lower lobes. Please correlate clinically.  4. There is no dilatation of the biliary tree, to be reevaluated relieve of the megacolon Hazy density right lung base. Meniscus left lung base. Contiguous atelectasis or infiltrate. The heart is enlarged. There is calcification within thoracic aorta. Severe gaseous distention of bowel within the left abdomen may be stomach or colon. Remote left rib fractures. Degenerative spondylosis and facet arthropathy within the spine. Gaseous distention of a loop of bowel within the left upper abdomen may be stomach or colon. Consider further workup with computed tomography for better characterization. Bilateral pleural effusions with contiguous atelectasis. Endotracheal tube is present with the distal tip 5 cm above the adriano. Nasogastric tube follows the expected contours of the esophagus into stomach with distal sideholes overlying the left upper quadrant of the abdomen. Esophageal manometry device appears present terminating at the level of the esophagogastric junction. Resident's Assessment & Plan     61-year-old female with a past medical history of seizure disorder, multiple systemic sclerosis, arthritis, hypo tension presented to the ED after her  noted her blood pressure to be lower than usual and generalized weakness worse in the lower extremities.      Neurologic      Off Sedation, open eyes     Seizure disorder  - On Keppra 1000 twice daily at home  - Resume home med      Parkinson's Disease  - On Sinemet - currently holding for hypotension      Insomnia   - Hold home dose trazodone  - Melatonin 6mg qhs prn      Pulmonary     Acute hypoxic respiratory failure s/pextubation 1/9  - Likely 2/2 Aspiration pneumonia vs pulmonary edema   - Intubated 12/29, extubated 12/31, Reintubated 1/3. On T piece - monitor for 30 mins  - s/p Bronchoscopy 1/2/20, bronchial washing is growing Saccharomyces cerevisiae. Repeat cx growing corynebacterium.    - CXR 1/6 - significant for pulmonary vascular congestion, Airspace disease   - Has excessive secretions, started on Zyrtec.   - On Breathing treatments - Pulmozyme, duoneb, metabneb, hypertonic saline nebulizing treatment, chest vest q8 . Continue suctioning.  - Procalcitonin 3.15 on admission, 1.89  On 1/1,  0.45  On 1/4  - D/c Zosyn, got 15 days of treatment. Received 1 dose of vancomycin 1/8. - Follow daily labs and chest X-ray      Cardiovascular     Hypotension - resolved   - off levophed    Hypertension   - PRN labetalol 10 mg q6     Gastrointestinal     Ischemic Colitis s/p subtotal colectomy and ileostomy   - CT abdomen with distended large intestine, large amounts of feces from rectum to proximal sigmoid colon  - s/p subtotal colectomy and ileostomy wo perforation   - Lomotil if ostomy output > 1.5L qd  - Morphine 1mg q4h prn for pain  - NGT meds and TF per GS   - 15 day treatment with zosyn. Fluconazole stopped. Fungitell was neg  - Producing green non bloody stool in colostomy bag- Monitor OP      Elevated Liver enzymes - improving  - ALP - 265, ALT/AST- 6/63 on admission   - 1/13/20 - ALP-290 , AST/ALT -12/29  - CT abdomen and pelvis - Somewhat thickened contracted gallbladder with the some pericholecystic edema  - US gallbladder - No other evidence to suggest acute cholecystitis  - Acetaminophen levels <5     Elevated Alkaline phosphatase - improving  - 290 today  - CT abdomen - Somewhat thickened contracted gallbladder with the some pericholecystic edema  - US gall bladder - Gallbladder wall thickness is increased to 3.2 mm. No intraluminal echogenic density is identified within the gallbladder lumen. No other evidence to suggest acute cholecystitis.   - GGT of 405 1/9     Hyperammoniemia - resolved  - Trending down, last 1/3- 52   - Ammonia 1/10 - 37     Infectious Disease     Septic shock - resolved   - likely 2/2 ischemic colitis vs UTI  - Hypotensive requiring Levophed- currently off levophed  - Procal trending down to 0.45     Leukocytosis - improving  - WBC - 14.2 today   - Beta-D-glucan - neg   - Temp overnight - Tmax 99.9, pro annie - 0.27  - s/p bronchoscopy. 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 2/2 RTA  - Anion gap 20, LA - 1.0, bun 19   - beta hydroxybutyrate- 0.52, acetaminophen level - <5, oxoproline level - pending  - Urine gap - 106  - 1/13- AG today - 19, BUN- 21    UA w/ pyuria, bacteriuria and +LE  UCx with coag neg staph > 100k CFU, likely contamination     Hypernatremia - resolved      Hypermagnesemia - resolved      Hypophosphatemia - resolved     Hypokalemia - resolved  - follow daily BMP, replace as needed      Hematology/Oncology     Normocytic Anemia  - Hb of 11.1 on admission, Hb today - 8.5  - Multifactorial- likely 2/2 blood loss in surgery, dilutional, frequent blood draws   - Follow CBC daily        Dermatologic/Musculoskeletal     Multiple system atrophy  - Chronic condition, parkinson variant   - On Sinemet   - Neurology consulted      Diet: tube feed   DVT/GI prophylaxis: Lovenox/ Protonix  Disposition: MICU     Ruben Aguila MD, PGY-1  Attending physician: Rena Gifford     I personally saw, examined and provided care for the patient. Radiographs, labs and medication list were reviewed by me independently. I spoke with bedside nursing, therapists and consultants. Critical care services and times documented are independent of procedures and multidisciplinary rounds with Residents. Additionally comprehensive, multidisciplinary rounds were conducted with the MICU team. The case was discussed in detail and plans for care were established. Review of Residents documentation was conducted and revisions were made as appropriate. I agree with the above documented exam, problem list and plan of care.     Possible UTI  Multiple systemic atrophy   On sinemet   Ischemic colitis s/p surgery   Chest vest   Hypertonic saline   Neurology to see   AVAPS  Pulmonary toilet  With her weakness ,she may need PEG and she may end up with trach,will continue aggressive measures to

## 2020-01-14 NOTE — PROGRESS NOTES
20  0412   WBC 17.3* 14.2* 15.6*   HGB 8.4* 8.5* 8.0*   HCT 28.4* 29.3* 27.3*   MCV 98.3 99.0 98.6   * 663* 658*       BMP:    Recent Labs     20  0405 20  0515 20  0412    140 136   K 3.6 3.6 3.7   CL 96* 98 95*   CO2 23 23 22   BUN 21* 19 21*   CREATININE 0.5 0.6 0.5   GLUCOSE 100* 97 112*       LIVER PROFILE:   Recent Labs     20  0405 20  0515 20  0412   AST 35* 19 16   ALT 14 12 10   BILITOT 0.4 0.3 0.3   ALKPHOS 339* 315* 316*       PT/INR:   No results for input(s): PROTIME, INR in the last 72 hours. APTT:   No results for input(s): APTT in the last 72 hours. Fasting Lipid Panel:    Lab Results   Component Value Date    TRIG 142 2020       Cardiac Enzymes:    Lab Results   Component Value Date    CKTOTAL 137 2019    CKTOTAL 238 (H) 2016    CKMB 5.7 (H) 2016    TROPONINI <0.01 2017    TROPONINI <0.01 2017    TROPONINI <0.01 2016         Notable Cultures:      Notable Cultures:     Culture  Date sent  Result    Bronchial washing    Saccharomyces cerevisiae    Sputum        Blood  19  no growth     Pneumocystis     neg    Fungitell   neg    Urine  20  no growth        Antibiotic  Day started  Days      Vancomycin x 1          Fluconazole            Zosyn     11        Lines:  Site   Date inserted Days     PICC   R basilic                       DVT Prophylaxis       Enoxaparin          PPI Prophylaxis       Protonix         Imaging studies:  Xr Abdomen (kub) (single Ap View)    Result Date: 2019  Patient MRN:  [id number] : 1965 Age: 62 years Gender: Female Order Date:  2019 12:45 PM EXAM: XR ABDOMEN (KUB) (SINGLE AP VIEW) NUMBER OF IMAGES:  1 view-2 images  INDICATION: Abdominal distention COMPARISON: None FINDINGS: Right hemicolon is distended with fecal content, and there is fluid filled pelvic small bowel which may be functional outflow obstruction.  The or adenopathy. Diameter for the ascending aorta is 3.5 cm and for the main pulmonary arteries up to 2.5 cm . There is no pericardial effusion. Presence of a consolidation with atelectasis of the right and left lower lobe with patent central airways. Air bronchograms are present. Findings can be relate with hypoventilation lower lung base. Please correlate clinically. There is no conspicuous or pleural effusions. Endotracheal tube is in good position. The patient has a NG tube with the tip in the stomach. There are also compression atelectasis in the posterior aspect of the both upper lobes. The anterior aspect of the upper lobes are better aerated. A discrete the area bronchocentric infiltrate seen towards the lingular segment of the left upper lobe. Upper abdominal structures are not fully covered on this study. There is a generalized fluid distention of the bowel including the colon. The abdomen is evaluate on the CT abdomen performed same time. 1. Atelectasis consolidation of both lower lobes with patent central airways. Findings to be correlated clinically. Can be relate with the hypoventilation of the lower lung bases. Cannot exclude a pneumonia. 2. Discrete patchy bronchocentric infiltrate in the left upper lobe lingular segment. 3. No acute pulmonary emboli seen although there are some limitations for evaluation of the lower lobes and motion artifacts. 4. No aneurysm formation dissection thoracic aorta. 5. Discrete to mild pericardial effusion. Ct Abdomen Pelvis W Iv Contrast Additional Contrast? None    Result Date: 2019  Patient MRN:  [id number] : 1965 Age: 62 years Gender: Female Order Date:  2019 6:45 PM TECHNIQUE/NUMBER OF IMAGES/COMPARISON/CLINICAL HISTORY: CT abdomen pelvis IV contrast After IV contrast administration axial images were obtained with sagittal and coronal reconstructions are. Clinical history abdominal pain.  FINDINGS: There is massive dilatation of the colon with dilatation of the biliary tree, to be reevaluated relieve of the megacolon pattern which cause significant mass effect throughout the entire abdomen peritoneal cavity. . 5. Mild ascites in the abdomen. 6. No free intraperitoneal air observed. Xr Chest Portable    Result Date: 2019  Patient MRN:  [id number] : 1965 Age: 62 years Gender: Female Order Date:  2019 3:15 PM EXAM: XR CHEST PORTABLE INDICATION:  ETT exchanged, verify OGT placement ETT exchanged, verify OGT placement COMPARISON: Same date 0655 hours FINDINGS:  ET and NG tubes are appropriate. There has been some clearing of right basilar infiltrate since the prior study. There is some right midlung infiltrate. Infiltrate left retrocardiac region is smaller and there is less fluid/pleural thickening on the left. There appears to be old healed left-sided rib fractures     Partial clearing of bibasilar infiltrates and left effusion     Xr Chest Portable    Result Date: 2019  Patient MRN:  [id number] : 1965 Age: 62 years Gender: Female Order Date:  2019 6:00 AM EXAM: XR CHEST PORTABLE one image INDICATION:  Line / tube management Line / tube management COMPARISON: 2019 FINDINGS: There is cardiomegaly. Endotracheal tube and nasogastric tube are unchanged. There is vascular congestion with a marginal improvement in persistent perihilar and bibasilar infiltrates and pleural effusions. There is improved gastric distention. Stable abnormal chest with  improving CHF. Superimposed pneumonia is not excluded. Xr Chest Portable    Result Date: 2019  Comparison: 2019. Frontal view of the chest. Frontal view of the abdomen. FINDINGS: Endotracheal tube is present with the distal tip 5 cm above the adriano. Nasogastric tube follows the expected contours of the esophagus into stomach with distal sideholes overlying the left upper quadrant of the abdomen.  Esophageal manometry device appears present device appears present terminating at the level of the esophagogastric junction. Hazy density right lung base. Meniscus left lung base. Contiguous atelectasis or infiltrate. The heart is enlarged. There is calcification within thoracic aorta. Severe gaseous distention of bowel within the left abdomen may be stomach or colon. Remote left rib fractures. Degenerative spondylosis and facet arthropathy within the spine. Gaseous distention of a loop of bowel within the left upper abdomen may be stomach or colon. Consider further workup with computed tomography for better characterization. Bilateral pleural effusions with contiguous atelectasis. Endotracheal tube is present with the distal tip 5 cm above the adriano. Nasogastric tube follows the expected contours of the esophagus into stomach with distal sideholes overlying the left upper quadrant of the abdomen. Esophageal manometry device appears present terminating at the level of the esophagogastric junction. Resident's Assessment & Plan     49-year-old female with a past medical history of seizure disorder, multiple systemic sclerosis, arthritis, hypo tension presented to the ED after her  noted her blood pressure to be lower than usual and generalized weakness worse in the lower extremities.      Neurologic      Off Sedation, open eyes     Seizure disorder  - On Keppra 1000 twice daily at home  - Resume home med      Parkinson's Disease  - On Sinemet - currently holding for hypotension      Insomnia   - Hold home dose trazodone  - Melatonin 6mg qhs prn      Pulmonary     Acute hypoxic respiratory failure s/pextubation 1/9  - Likely 2/2 Aspiration pneumonia vs pulmonary edema   - Intubated 12/29, extubated 12/31, Reintubated 1/3. On T piece - monitor for 30 mins  - s/p Bronchoscopy 1/2/20, bronchial washing is growing Saccharomyces cerevisiae.  Repeat cx growing corynebacterium.    - CXR 1/6 - significant for pulmonary vascular congestion, Airspace disease   - Has excessive secretions, started on Zyrtec. - On Breathing treatments - Pulmozyme, duoneb, metabneb, hypertonic saline nebulizing treatment, chest vest q8 . Continue suctioning.  - Procalcitonin 3.15 on admission, 1.89  On 1/1,  0.45  On 1/4  - D/c Zosyn, got 15 days of treatment. Received 1 dose of vancomycin 1/8. - Follow daily labs and chest X-ray      Cardiovascular     Hypotension - resolved   - off levophed    Hypertension   - PRN labetalol 10 mg q6     Gastrointestinal     Ischemic Colitis s/p subtotal colectomy and ileostomy   - CT abdomen with distended large intestine, large amounts of feces from rectum to proximal sigmoid colon  - s/p subtotal colectomy and ileostomy wo perforation   - Lomotil if ostomy output > 1.5L qd  - Morphine 1mg q4h prn for pain  - NGT meds and TF per GS   - 15 day treatment with zosyn. Fluconazole stopped. Fungitell was neg  - Producing green non bloody stool in colostomy bag- Monitor OP      Elevated Liver enzymes - improving  - ALP - 265, ALT/AST- 6/63 on admission   - 1/13/20 - ALP- 316, AST/ALT -10/16  - CT abdomen and pelvis - Somewhat thickened contracted gallbladder with the some pericholecystic edema  - US gallbladder - No other evidence to suggest acute cholecystitis  - Acetaminophen levels <5     Elevated Alkaline phosphatase - improving  - 316 today  - CT abdomen - Somewhat thickened contracted gallbladder with the some pericholecystic edema  - US gall bladder - Gallbladder wall thickness is increased to 3.2 mm. No intraluminal echogenic density is identified within the gallbladder lumen. No other evidence to suggest acute cholecystitis.   - GGT of 405 1/9     Hyperammoniemia - resolved  - Trending down, last 1/3- 52   - Ammonia 1/10 - 37     Infectious Disease     Septic shock - resolved   - likely 2/2 ischemic colitis vs UTI  - Hypotensive requiring Levophed- currently off levophed  - Procal trending down to 0.45     Leukocytosis - improving  - WBC - 14.2 today   - Beta-D-glucan - neg   - Temp overnight - Tmax 99.9, pro annie - 0.27  - s/p bronchoscopy. 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 2/2 RTA  - Anion gap 20, LA - 1.0, bun 19   - beta hydroxybutyrate- 0.52, acetaminophen level - <5, oxoproline level - pending  - Urine gap - 106  - 1/13- AG today - 19, BUN- 21    UA w/ pyuria, bacteriuria and +LE  UCx with coag neg staph > 100k CFU, likely contamination     Hypernatremia - resolved      Hypermagnesemia - resolved      Hypophosphatemia - resolved     Hypokalemia - resolved  - follow daily BMP, replace as needed      Hematology/Oncology     Normocytic Anemia  - Hb of 11.1 on admission, Hb today - 8.5  - Multifactorial- likely 2/2 blood loss in surgery, dilutional, frequent blood draws   - Follow CBC daily        Dermatologic/Musculoskeletal     Multiple system atrophy  - Chronic condition, parkinson variant   - On Sinemet   - Neurology consulted      Diet: tube feed   DVT/GI prophylaxis: Lovenox/ Protonix  Disposition: MICU     Santy Echavarria MD, PGY-1  Attending physician: Evelyn Wilcox     I personally saw, examined and provided care for the patient. Radiographs, labs and medication list were reviewed by me independently. I spoke with bedside nursing, therapists and consultants. Critical care services and times documented are independent of procedures and multidisciplinary rounds with Residents. Additionally comprehensive, multidisciplinary rounds were conducted with the MICU team. The case was discussed in detail and plans for care were established. Review of Residents documentation was conducted and revisions were made as appropriate. I agree with the above documented exam, problem list and plan of care.     Possible UTI  Multiple systemic atrophy   On sinemet   Ischemic colitis s/p surgery   Chest vest   Hypertonic saline   Neurology to see   AVAPS  Pulmonary toilet  With her

## 2020-01-14 NOTE — PROGRESS NOTES
Nutrition Assessment (Enteral Nutrition)    Type and Reason for Visit: Reassess    Nutrition Recommendations: Continue NPO, Modify current Tube Feeding to better meet estimated needs:    Rec increase to goal rate 50 ml/hr. Will provide: 1200 ml tv, 1440 kcals, 90 gm pro, 973 ml free water   Regimen meets 100% est calorie & protein needs    Nutrition Summary: Pt slowly improving now s/p extubated to bipap however ongoing need for EN support. Noted severe malnutrition 2/2 Parkinson's/MS. Recent ex lap w/ surgical wound. Pt tolerating Semi-Elemental TF at goal.     Malnutrition Assessment:  · Malnutrition Status: Meets the criteria for severe malnutrition  · Context: Chronic illness  · Findings of the 6 clinical characteristics of malnutrition (Minimum of 2 out of 6 clinical characteristics is required to make the diagnosis of moderate or severe Protein Calorie Malnutrition based on AND/ASPEN Guidelines):  1. Energy Intake-Less than or equal to 75% of estimated energy requirement(average),  2.  Greater than or equal to 7 days(RADHA PTA intake data)    3. Weight Loss-Unable to assess(no hx on file)Fat Loss-Severe subcutaneous fat loss, Orbital  4. Muscle Loss-Severe muscle mass loss, Temples (temporalis muscle), Clavicles (pectoralis and deltoids), Thigh (quadriceps), Calf (gastrocnemius)  5. Fluid Accumulation-No significant fluid accumulation,  6.  Strength-Not measured    Nutrition Risk Level:  Moderate    Nutrition Needs:  · Estimated Daily Total Kcal: 6923-4192 (MSJ REE 1111 x 1.3 SF )  · Estimated Daily Protein (g): 75-90(1.5-1.8 g/kg )  · Estimated Daily Fluid (ml/day): per critical care     Nutrition Diagnosis:   · Problem: Severe malnutrition, In context of chronic illness  · Etiology: related to Insufficient energy/nutrient consumption(2/2 Parkinson's & MS )     Signs and symptoms:  as evidenced by Severe muscle loss, Severe loss of subcutaneous fat    Objective Information:  · Nutrition-Focused

## 2020-01-14 NOTE — PROGRESS NOTES
[facility] for Palliative Medicine spoke with pt's bedside RN. Pt very tired at this time. She has expressed wish to complete DPOA-HC to appoint her , Jaye Guaman. Palliative Medicine will follow up when she is able to discuss further/complete.

## 2020-01-14 NOTE — PLAN OF CARE
Problem: Respiratory Function - Compromised:  Goal: Absence of respiratory distress  Description  Maintaining head of bed at least 30 degrees. Outcome: Met This Shift     Problem: Pain:  Goal: Control of acute pain  Description  Control of acute pain  Outcome: Met This Shift     Problem: Bowel/Gastric:  Goal: Complications related to the disease process, condition or treatment will be avoided or minimized  Outcome: Met This Shift     Problem: Skin Integrity:  Goal: Will show no infection signs and symptoms  Description  Will show no infection signs and symptoms  Outcome: Not Met This Shift    Plan of care discussed with patient and/or family. Patient and/or family included in plan of care.

## 2020-01-14 NOTE — PROGRESS NOTES
Speech Language Pathology      NAME:  Wing Donato  :  1965  DATE: 2020  ROOM:  [facility]    Speech Pathologist (SLP) completed education with the patient/family regarding type of swallowing impairment. Reviewed current solid/liquid consistency diet recommendations (NPO) and discussed role of SLP in swallow rehab. Reviewed aspiration precautions. Encouraged patient and/or family to engage SLP in unstructured Q&A session relative to identified deficit areas; indicated understanding of all information provided via satisfactory verbal response.       Hypotension [I95.9]    Junnie Eisenmenger M.S., CCC-SLP  Speech-Language Pathologist  [id number]  2020

## 2020-01-14 NOTE — PROGRESS NOTES
Subjective:  Extubated on nasal cannula with NG more alert and responding appropriately  Family at bedside all questions answered  Discussed with nursing  Objective:    /77   Pulse 92   Temp 100 °F (37.8 °C) (Temporal)   Resp 29   Ht 5' 6\" (1.676 m)   Wt 110 lb (49.9 kg)   SpO2 98%   BMI 17.75 kg/m²     24HR INTAKE/OUTPUT:      Intake/Output Summary (Last 24 hours) at 1/14/2020 1430  Last data filed at 1/14/2020 1352  Gross per 24 hour   Intake 1577 ml   Output 1965 ml   Net -388 ml     Extubated alert NAD with NG  Heart:  RRR, no murmurs, gallops, or rubs. Lungs: Bilateral rhonchi rhonchi  Abd: bowel sounds present, nontender, nondistended, no masses  Extrem:  No clubbing, cyanosis, or edema  Weak x4 extremities  Most Recent Labs  Lab Results   Component Value Date    WBC 12.9 (H) 01/14/2020    HGB 8.3 (L) 01/14/2020    HCT 28.1 (L) 01/14/2020     (H) 01/14/2020     01/14/2020    K 3.6 01/14/2020    CL 98 01/14/2020    CREATININE 0.4 (L) 01/14/2020    BUN 17 01/14/2020    CO2 23 01/14/2020    GLUCOSE 123 (H) 01/14/2020    ALT 12 01/14/2020    AST 19 01/14/2020    INR 1.1 12/30/2019    TSH 1.950 12/29/2019     Recent Labs     01/14/20  0514   MG 2.0     Lab Results   Component Value Date    CALCIUM 9.9 01/14/2020    PHOS 2.7 01/14/2020        XR CHEST PORTABLE   Final Result   Persistent bibasilar infiltrates and pleural effusion likely   pneumonia.       Gastric distention            XR CHEST PORTABLE   Final Result   tortuous ectatic aorta   Airspace disease compatible with atelectasis or pneumonia, at the   right lung base and to lesser extent the left lung base   There is slight interval improvement                  XR CHEST PORTABLE   Final Result   tortuous ectatic aorta   Airspace disease compatible with atelectasis or pneumonia, more   prominent right lung base as compared to the left likely with   superimposed post pleural effusions, overall when compared to the   previous study of the chest is improved                     XR CHEST PORTABLE   Final Result   No interval change               XR CHEST PORTABLE   Final Result   Stable abnormal chest with persistent bibasilar infiltrates likely   pneumonia. Dilated bowel loops with improvement. XR ABDOMEN (KUB) (SINGLE AP VIEW)   Final Result   Some dilated small bowel loops in left side of the abdomen. There is a   mid small bowel obstruction suggested      Significant left lower lobe infiltrate and right basilar infiltrate   and effusion. XR CHEST PORTABLE   Final Result   Progressive bibasilar infiltrates and pleural effusions which may be   due to pneumonia. Dilated bowel loops which may be due to postoperative ileus versus   bowel obstruction. XR CHEST PORTABLE   Final Result      1. Progressive improvement in right lower lobe pneumonia and   atelectasis. 2. Apparent clearing of previous mild right upper lobe pneumonia. XR CHEST PORTABLE   Final Result      XR CHEST PORTABLE   Final Result   Patchy bilateral pulmonary infiltrates, worst in the lower lungs, with   some improvement in the left lower lung. Small right pleural effusion. US GALLBLADDER RUQ   Final Result      Color wall thickness is increased to 3.2 mm. No other evidence to suggest acute cholecystitis. XR CHEST PORTABLE   Final Result   Stable abnormal chest with the persistent bilateral infiltrates and   pleural effusions with a marginal improvement which may be due to   improving CHF with edema and/or pneumonia. CT ABDOMEN PELVIS W IV CONTRAST Additional Contrast? None   Final Result   Subtotal colectomy with right lower quadrant ileostomy with a   moderately dilated/ thickened small bowel loops which may be due to   ileus/enteritis or low-grade bowel obstruction.       The rectal remnant is significantly dilated/ thickened with retained   fecal matter and high density material with the presacral edema   concerning for proctitis. Infiltrates and pleural effusions in the lung bases. Somewhat thickened contracted gallbladder with the some   pericholecystic edema. CT CHEST W CONTRAST   Final Result   Persistent patchy bibasilar infiltrates and pleural effusions and   groundglass infiltrates in the right upper lobe likely diffuse   pneumonia. No lung abscess is identified. XR CHEST PORTABLE   Final Result   Tortuous ectatic aorta   Cardiomegaly    Airspace disease compatible with pneumonia, there may be a component   of pulmonary vascular congestion present   The chest does not appear to be significantly changed in the interval                  XR CHEST PORTABLE   Final Result   Improved yet persistent right mid and lower lung opacities with trace   effusion. Persistent retrocardiac consolidation. XR CHEST PORTABLE   Final Result      Subtle improved aeration of the right lung. No new or worsening   airspace opacities. Lines and tubes as described. XR CHEST PORTABLE   Final Result      Cardiomegaly      Airspace consolidation the right upper lobe appears to be unchanged      Small to moderate right-sided pleural effusion      Support line including endotracheal tube and other lines appears to be   in satisfactory position. XR CHEST PORTABLE   Final Result   Progressive diffuse bilateral infiltrates and pleural effusion with a   thick consolidation in the right upper lobe. CHF with edema and/or   diffuse pneumonia considered. Gastric distention with a nasogastric tube in place. XR CHEST PORTABLE   Final Result   Partial clearing of right lower lobe atelectasis/infiltrate               XR CHEST PORTABLE   Final Result   Worsening CHF with edema. Suggestion of free intraperitoneal air better seen in the previous   examination and could be related to recent surgery. Clinical   assessment is recommended.       ALERT:  THIS IS AN ABNORMAL REPORT            CT HEAD WO CONTRAST   Final Result      NO ACUTE INTRACRANIAL PROCESS         XR CHEST PORTABLE   Final Result      Right upper lung patchy infiltrate about the right minor fissure. Cardiomegaly      Nasogastric tube in satisfactory position with the tip not well seen   but appears to be in the distal stomach. XR Chest Abdomen Ng Placement   Final Result   Enteric catheter with the tip projecting over the expected region of   the stomach. Pneumoperitoneum which may be normal given the patient's history of   recent abdominal surgery. XR ABDOMEN FOR NG/OG/NE TUBE PLACEMENT   Final Result   Addendum 1 of 1   Consider CT scan if better assessment of the abdomen as clinically   indicated. Final      XR CHEST PORTABLE   Final Result   NG tube reaches the stomach               XR CHEST PORTABLE   Final Result   Partial clearing of bibasilar infiltrates and left effusion               XR CHEST PORTABLE   Final Result   Stable abnormal chest with  improving CHF. Superimposed pneumonia is   not excluded. CT CHEST W CONTRAST   Final Result   1. Atelectasis consolidation of both lower lobes with patent central   airways. Findings to be correlated clinically. Can be relate with the   hypoventilation of the lower lung bases. Cannot exclude a pneumonia. 2. Discrete patchy bronchocentric infiltrate in the left upper lobe   lingular segment. 3. No acute pulmonary emboli seen although there are some limitations   for evaluation of the lower lobes and motion artifacts. 4. No aneurysm formation dissection thoracic aorta. 5. Discrete to mild pericardial effusion. CT ABDOMEN PELVIS W IV CONTRAST Additional Contrast? None   Final Result   1.  Findings for severe constipation with a large amount of fecal   contents/rotation/impaction from the sigmoid colon to the rectum,   causing a pattern of megacolon with marked fluid/air distentions or   more

## 2020-01-14 NOTE — PROGRESS NOTES
or organomegaly. Colostomy intact  Extremities: Pedal pulses 2+ symmetric b/l. Extremities normal, no cyanosis, clubbing, or edema. Musculokeletal: No joint swelling, no muscle tenderness. ROM normal in all joints of extremities. Neurologic: Mental status: Alert and Oriented X3 . Pertinent/ New Labs and Imaging Studies     Imaging Personally Reviewed:  1/14/20          FINDINGS:   There is borderline cardiac size. Nasogastric tube and right PICC line   are unchanged. There is  bibasilar infiltrates and small pleural   effusions. Postoperative changes are identified in the abdomen with    gastric distention.           Impression   Persistent bibasilar infiltrates and pleural effusion likely   pneumonia.       Gastric distention           ECHO 1/3/20   Summary   Left ventricular internal dimensions were normal in diastole and systole. Regional wall motion abnormalities with hypokinesis of basilar inferior,   distal anteroseptal, apical and inferoapical segments. Low normal left ventricular systolic function. There is doppler evidence of stage I diastolic dysfunction. The left atrium is moderately dilated. Moderate mitral regurgitation is present. The aortic valve appears mildly sclerotic. Mild tricuspid regurgitation. Pulmonary hypertension is mild .           Labs:  Lab Results   Component Value Date    WBC 12.9 01/14/2020    HGB 8.3 01/14/2020    HCT 28.1 01/14/2020    MCV 99.3 01/14/2020    MCH 29.3 01/14/2020    MCHC 29.5 01/14/2020    RDW 18.9 01/14/2020     01/14/2020    MPV 10.8 01/14/2020     Lab Results   Component Value Date     01/14/2020    K 3.6 01/14/2020    CL 98 01/14/2020    CO2 23 01/14/2020    BUN 17 01/14/2020    CREATININE 0.4 01/14/2020    LABALBU 3.5 01/14/2020    CALCIUM 9.9 01/14/2020    GFRAA >60 01/14/2020    LABGLOM >60 01/14/2020     Lab Results   Component Value Date    PROTIME 11.9 12/30/2019    INR 1.1 12/30/2019     No results for input(s): PROBNP in

## 2020-01-14 NOTE — PLAN OF CARE
Problem: Malnutrition  (NI-5.2)  Goal: Food and/or Nutrient Delivery  Description- EN   Individualized approach for food/nutrient provision.   Outcome: Met This Shift

## 2020-01-14 NOTE — PROGRESS NOTES
Treatment goals discussed with Patient and Family, The Patient and Family understand the diagnosis, prognosis and plan of care. Evaluation time includes  review of current medical information, gathering information on past medical history/social history and prior level of function, completion of standardized testing/informal observation of tasks, assessment of data, and development of POC/Goals. [x]The admitting diagnosis and active problem list, as listed below have been reviewed prior to initiation of this evaluation.      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

## 2020-01-15 NOTE — PROGRESS NOTES
Comprehension: F              Problem solving: P              Judgement/safety: P                RASS: 0  CAM-ICU: positive for delirium                Functional Assessment:    Initial Eval Status  Date: 1/7/20 Treatment Status  Date: 1/15/20 Short Term Goals  Treatment frequency: 1-3x/week on ICU; PRN on stepdown unit  -pt will improve. .. Feeding NPO NPO      SBA  Sitting upright in chair for majority of meals; pending cleared diet restriction   Grooming DEP  Hand over hand assist with P initiation and follow-through. Sitting EOB Max A  Max A  Hand over hand assist to rub hands together/wipe forearms using clothe-P initiation and F follow-through. RUE use to comb hair and oral swab use- max support under R elbow; Grasp fair ; P fine motor skills    Sitting EOB Max A     Min A   while seated EOB;   G BUE functional use      UB Dressing DEP NT      Min A  while seated EOB;   G BUE functional use   LB Dressing DEP  DEP    Mod A   with AE/device PRN   Bathing UB-  DEP  LB-  DEP simulation  NT    UB-  Min A  LB-  Mod A with AE/DME prn   Toileting DEP DEP  Rolling bed level B sides for toileting hygiene and bed linen change      Mod A  including clothing mgmt and toileting hygiene using DME/device prn   Bed Mobility  Supine to sit: DEPx2  Sit to supine: DEPx2  Rolling: NT Supine to sit: DEPx1-2  Sit to supine: DEPx1-2  Rolling: DEP  Mod A   in prep for EOB ADL tasks   Functional/  Bathroom  Transfers Sit to stand: NT  Stand to sit: NT  Stand pivot: NT  NT  Max A  from varying surfaces using device/DME prn with G safety   Functional Mobility NT     NT   Max A   Bed>bathroom/sink distance using device prn   Balance Sitting:     Static:  Max A    Dynamic:Max A  Standing:     Static:  NT    Dynamic:NT  Sitting:     Static:  Mod/max A    Dynamic:Max A  Standing:     Static:  NT    Dynamic:NT demo Min A dynamic sitting balance EOB during ADL tasks;  Mod dynamic standing balance during ADL tasks using device prn

## 2020-01-15 NOTE — PROGRESS NOTES
retention/impaction is seen in the sigmoid colon to the rectum. This can causes bowel obstruction to critical a large amount of fluid the recommendation the more proximal segments of the colon. Consider decompression of the rectum/sigmoid to relieve the contents in more proximal segments of the colon. There are normal size and enhancement for the liver, spleen and the pancreas are. Gallbladder is well distended and has a hydrops appearance. There is some dilatation of the intrahepatic biliary tree and the common bile duct measures close to 9 mm in the head of the pancreas but can be traced with unremarkable appearance through the papilla level. The pancreatic duct is not dilated. Adrenal glands are not enlarged. Kidneys have preserved size and cortical thickness are. There is an area of hypodensity in the upper pole of the left kidney which can represent a lobulated the cyst but of relative hypodensity. Further evaluation with renal ultrasound is initially recommended. Aorta and IVC appear unremarkable. There is a Samuel catheter in the bladder. The bladder is not distended. The There is a venous catheter through the right common femoral vein with the tip in the distal IVC. For findings in the chest see report of CT scan of the chest performed the same time. 1. Findings for severe constipation with a large amount of fecal contents/rotation/impaction from the sigmoid colon to the rectum, causing a pattern of megacolon with marked fluid/air distentions or more proximal segments of the colon. 2. Consider decompression of the rectum/sigmoid to permit relieve of the more proximal segments of the colon. 3. Marked: Distention can explain hypoventilation lower lung bases causing atelectasis or consolidations with overall patent central airways of both lower lobes. Please correlate clinically.  4. There is no dilatation of the biliary tree, to be reevaluated relieve of the megacolon pattern which cause significant mass Meniscus left lung base. Contiguous atelectasis or infiltrate. The heart is enlarged. There is calcification within thoracic aorta. Severe gaseous distention of bowel within the left abdomen may be stomach or colon. Remote left rib fractures. Degenerative spondylosis and facet arthropathy within the spine. Gaseous distention of a loop of bowel within the left upper abdomen may be stomach or colon. Consider further workup with computed tomography for better characterization. Bilateral pleural effusions with contiguous atelectasis. Endotracheal tube is present with the distal tip 5 cm above the adriano. Nasogastric tube follows the expected contours of the esophagus into stomach with distal sideholes overlying the left upper quadrant of the abdomen. Esophageal manometry device appears present terminating at the level of the esophagogastric junction. Resident's Assessment & Plan     58-year-old female with a past medical history of seizure disorder, multiple systemic sclerosis, arthritis, hypo tension presented to the ED after her  noted her blood pressure to be lower than usual and generalized weakness worse in the lower extremities.      Neurologic      Off Sedation, open eyes     Seizure disorder  - On Keppra 1000 twice daily at home  - Resume home med      Parkinson's Disease  - On Sinemet - currently holding for hypotension      Insomnia   - Hold home dose trazodone  - Melatonin 6mg qhs prn      Pulmonary     Acute hypoxic respiratory failure s/pextubation 1/9  - Likely 2/2 Aspiration pneumonia vs pulmonary edema   - Intubated 12/29, extubated 12/31, Reintubated 1/3. On T piece - monitor for 30 mins  - s/p Bronchoscopy 1/2/20, bronchial washing is growing Saccharomyces cerevisiae. Repeat cx growing corynebacterium.    - CXR 1/6 - significant for pulmonary vascular congestion, Airspace disease   - Has excessive secretions, started on Zyrtec.   - On Breathing treatments - Pulmozyme, duoneb, metabneb, hypertonic saline nebulizing treatment, chest vest q8 . Continue suctioning. - secretions have decreased. - Procalcitonin 3.15 on admission, 1.89  On 1/1,  0.45  On 1/4  - D/c Zosyn, got 15 days of treatment. Received 1 dose of vancomycin 1/8. - Follow daily labs and chest X-ray      Cardiovascular     Hypotension - resolved   - off levophed    Hypertension   - PRN labetalol 10 mg q6     Gastrointestinal     Ischemic Colitis s/p subtotal colectomy and ileostomy   - CT abdomen with distended large intestine, large amounts of feces from rectum to proximal sigmoid colon  - s/p subtotal colectomy and ileostomy wo perforation   - Started on Reglan 5 mg 4 times daily to increase GI  Motility. Gastric distention and minimal bowel sounds noticed on PE. Consider continuing if needed and watch for Fluid intake and stool OP.   - NGT meds and TF per GS. Monitor intake, patient intolerant to rate of 50. Gets nauseous and vomits with rate of 50.   - 15 day treatment with zosyn. Fluconazole stopped. Fungitell was neg  - Producing green non bloody stool in colostomy bag- Monitor OP      Elevated Liver enzymes - improving  - ALP - 265, ALT/AST- 6/63 on admission   - 1/13/20 - ALP-290 , AST/ALT -12/29  - CT abdomen and pelvis - Somewhat thickened contracted gallbladder with the some pericholecystic edema  - US gallbladder - No other evidence to suggest acute cholecystitis  - Acetaminophen levels <5     Elevated Alkaline phosphatase - improving  - 290 today  - CT abdomen - Somewhat thickened contracted gallbladder with the some pericholecystic edema  - US gall bladder - Gallbladder wall thickness is increased to 3.2 mm. No intraluminal echogenic density is identified within the gallbladder lumen. No other evidence to suggest acute cholecystitis.   - GGT of 405 1/9     Hyperammoniemia - resolved  - Trending down, last 1/3- 52   - Ammonia 1/10 - 37     Infectious Disease     Septic shock - resolved   - likely 2/2 ischemic colitis vs UTI  - Hypotensive requiring Levophed- currently off levophed  - Procal trending down to 0.45     Leukocytosis - improving  - WBC - 14.2 today   - Beta-D-glucan - neg   - Temp overnight - Tmax 99.9, pro annie - 0.27  - s/p bronchoscopy. BAL had neutrophilic predominance, cx growing Corynebacterium   - On Zosyn      Genitourinary/Renal     Monitor Fluid Intake   - Prone to volume loss through stool   - Patient is currently getting Free water bolus 150 cc Q4. Monitor for hyponatremia. Adjust as needed.  Consider IV hydration if volume contraction noted     HAGMA likely 2/2 RTA vs medication induced - resolved   - Anion gap 20, LA - 1.0, bun 19   - beta hydroxybutyrate- 0.52, acetaminophen level - <5, oxoproline level - pending  - Urine gap - 106, likely Potassium wasting (>100) 2/2 medication  - 1/13- AG today - 19, BUN- 21    UA w/ pyuria, bacteriuria and +LE  UCx with coag neg staph > 100k CFU, likely contamination     Hypernatremia - resolved      Hypermagnesemia - resolved      Hypophosphatemia - resolved     Hypokalemia - resolved  - follow daily BMP, replace as needed      Hematology/Oncology     Normocytic Anemia  - Hb of 11.1 on admission, Hb today - 8.7  - Multifactorial- likely 2/2 blood loss in surgery, dilutional, frequent blood draws   - Follow CBC daily        Dermatologic/Musculoskeletal     Multiple system atrophy  - Chronic condition, parkinson variant   - On Sinemet   - Neurology consulted      Diet: tube feed   DVT/GI prophylaxis: Lovenox/ Protonix  Disposition: MICU - transfer      José Miguel Hernandez MD, PGY-1  Attending physician: Lynsey Vieira   Doing much better more energetic and talking better  Than she get apple sauce yesterday recommended to wait until we repeat another swallow study tomorrow  Possible UTI  Multiple systemic atrophy   On sinemet   Ischemic colitis s/p surgery   Chest vest   Hypertonic saline   Neurology to see   AVAPS  Pulmonary toilet  With her weakness ,she may need PEG and she

## 2020-01-15 NOTE — PROGRESS NOTES
(Last 24 hours) at 1/15/2020 1121  Last data filed at 1/15/2020 9218  Gross per 24 hour   Intake 2808 ml   Output 2830 ml   Net -22 ml       Vent Information  $Ventilation: $Subsequent Day  Ventilator Started: Yes  Ventilation Day(s): 1  Skin Assessment: Clean, dry, & intact  Equipment ID: [id number]  Equipment Changed: Mask(changed to face shield to give nose a  break from mask)  Vent Type: [id number]  Vent Mode: PS  Vt Ordered: 400 mL  Rate Set: 0 bmp  Peak Flow: 65 L/min  Pressure Support: 10 cmH20  FiO2 : (S) 50 %  Sensitivity: 1  PEEP/CPAP: 5  I Time/ I Time %: 0.9 s  Cuff Pressure (cm H2O): 29 cm H2O  Humidification Source: Heated wire  Humidification Temp: 37  Humidification Temp Measured: 36  Circuit Condensation: Drained          CPAP/EPAP: 10 cmH2O     CURRENT MEDS :  Scheduled Meds:   [Held by provider] metoclopramide  10 mg Oral 4x Daily AC & HS    cetirizine  5 mg Oral Daily    lidocaine  1 patch Transdermal Daily    potassium bicarb-citric acid  40 mEq Oral Daily    ipratropium-albuterol  1 ampule Inhalation Q4H WA    guaiFENesin  400 mg Oral TID    sodium chloride (Inhalant)  2 mL Nebulization Q4H    dornase alpha  2.5 mg Inhalation Daily    carbidopa-levodopa  2.5 tablet Oral 5x Daily    carbidopa-levodopa  2 tablet Oral Nightly    pantoprazole  40 mg Intravenous Daily    And    sodium chloride (PF)  10 mL Intravenous Daily    sodium chloride flush  10 mL Intravenous 2 times per day    enoxaparin  40 mg Subcutaneous Daily    levetiracetam  1,000 mg Intravenous Q12H    lidocaine   Topical Once       Physical Exam:  General Appearance: appears comfortable in no acute distress. HEENT: Normocephalic atraumatic without obvious abnormality NG tube to nares intact  Neck: Lips, mucosa, and tongue normal.  Supple, symmetrical, trachea midline, no adenopathy;thyroid:  no enlargement/tenderness/nodules or JVD. Lung: Breath sounds CTA. Respirations   unlabored. Symmetrical expansion.   Heart: RRR, normal S1,

## 2020-01-15 NOTE — PROGRESS NOTES
tortuous ectatic aorta   Airspace disease compatible with atelectasis or pneumonia, more   prominent right lung base as compared to the left likely with   superimposed post pleural effusions, overall when compared to the   previous study of the chest is improved                     XR CHEST PORTABLE   Final Result   No interval change               XR CHEST PORTABLE   Final Result   Stable abnormal chest with persistent bibasilar infiltrates likely   pneumonia. Dilated bowel loops with improvement. XR ABDOMEN (KUB) (SINGLE AP VIEW)   Final Result   Some dilated small bowel loops in left side of the abdomen. There is a   mid small bowel obstruction suggested      Significant left lower lobe infiltrate and right basilar infiltrate   and effusion. XR CHEST PORTABLE   Final Result   Progressive bibasilar infiltrates and pleural effusions which may be   due to pneumonia. Dilated bowel loops which may be due to postoperative ileus versus   bowel obstruction. XR CHEST PORTABLE   Final Result      1. Progressive improvement in right lower lobe pneumonia and   atelectasis. 2. Apparent clearing of previous mild right upper lobe pneumonia. XR CHEST PORTABLE   Final Result      XR CHEST PORTABLE   Final Result   Patchy bilateral pulmonary infiltrates, worst in the lower lungs, with   some improvement in the left lower lung. Small right pleural effusion. US GALLBLADDER RUQ   Final Result      Color wall thickness is increased to 3.2 mm. No other evidence to suggest acute cholecystitis. XR CHEST PORTABLE   Final Result   Stable abnormal chest with the persistent bilateral infiltrates and   pleural effusions with a marginal improvement which may be due to   improving CHF with edema and/or pneumonia.             CT ABDOMEN PELVIS W IV CONTRAST Additional Contrast? None   Final Result   Subtotal colectomy with right lower quadrant ileostomy with XR CHEST PORTABLE   Final Result   Worsening CHF with edema. Suggestion of free intraperitoneal air better seen in the previous   examination and could be related to recent surgery. Clinical   assessment is recommended. ALERT:  THIS IS AN ABNORMAL REPORT            CT HEAD WO CONTRAST   Final Result      NO ACUTE INTRACRANIAL PROCESS         XR CHEST PORTABLE   Final Result      Right upper lung patchy infiltrate about the right minor fissure. Cardiomegaly      Nasogastric tube in satisfactory position with the tip not well seen   but appears to be in the distal stomach. XR Chest Abdomen Ng Placement   Final Result   Enteric catheter with the tip projecting over the expected region of   the stomach. Pneumoperitoneum which may be normal given the patient's history of   recent abdominal surgery. XR ABDOMEN FOR NG/OG/NE TUBE PLACEMENT   Final Result   Addendum 1 of 1   Consider CT scan if better assessment of the abdomen as clinically   indicated. Final      XR CHEST PORTABLE   Final Result   NG tube reaches the stomach               XR CHEST PORTABLE   Final Result   Partial clearing of bibasilar infiltrates and left effusion               XR CHEST PORTABLE   Final Result   Stable abnormal chest with  improving CHF. Superimposed pneumonia is   not excluded. CT CHEST W CONTRAST   Final Result   1. Atelectasis consolidation of both lower lobes with patent central   airways. Findings to be correlated clinically. Can be relate with the   hypoventilation of the lower lung bases. Cannot exclude a pneumonia. 2. Discrete patchy bronchocentric infiltrate in the left upper lobe   lingular segment. 3. No acute pulmonary emboli seen although there are some limitations   for evaluation of the lower lobes and motion artifacts. 4. No aneurysm formation dissection thoracic aorta. 5. Discrete to mild pericardial effusion.       CT ABDOMEN PELVIS W IV CONTRAST Additional Contrast? None   Final Result   1. Findings for severe constipation with a large amount of fecal   contents/rotation/impaction from the sigmoid colon to the rectum,   causing a pattern of megacolon with marked fluid/air distentions or   more proximal segments of the colon. 2. Consider decompression of the rectum/sigmoid to permit relieve of   the more proximal segments of the colon. 3. Marked: Distention can explain hypoventilation lower lung bases   causing atelectasis or consolidations with overall patent central   airways of both lower lobes. Please correlate clinically. 4. There is no dilatation of the biliary tree, to be reevaluated   relieve of the megacolon pattern which cause significant mass effect   throughout the entire abdomen peritoneal cavity. .      5. Mild ascites in the abdomen. 6. No free intraperitoneal air observed. XR CHEST PORTABLE   Final Result      Gaseous distention of a loop of bowel within the left upper abdomen   may be stomach or colon. Consider further workup with computed   tomography for better characterization. Bilateral pleural effusions with contiguous atelectasis. Endotracheal tube is present with the distal tip 5 cm above the   adriano. Nasogastric tube follows the expected contours of the esophagus into   stomach with distal sideholes overlying the left upper quadrant of the   abdomen. Esophageal manometry device appears present terminating at the level   of the esophagogastric junction. XR ABDOMEN FOR NG/OG/NE TUBE PLACEMENT   Final Result      Gaseous distention of a loop of bowel within the left upper abdomen   may be stomach or colon. Consider further workup with computed   tomography for better characterization. Bilateral pleural effusions with contiguous atelectasis. Endotracheal tube is present with the distal tip 5 cm above the   adriano.        Nasogastric tube follows the expected contours of the esophagus into   stomach with distal sideholes overlying the left upper quadrant of the   abdomen. Esophageal manometry device appears present terminating at the level   of the esophagogastric junction. XR ABDOMEN (KUB) (SINGLE AP VIEW)   Final Result   Right hemicolonic fecal distention with functional outflow obstruction   of the distal small bowel. The transverse colon is distended with gas,   raising questions regarding possible \"institutional bowel\" or chronic   constipation. Limited radiographic appearance does not suggest small bowel   obstruction. XR CHEST PORTABLE   Final Result   Left basilar opacity. Differential includes atelectasis, infection,   and/or layering pleural effusion. 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  *      Plan:  62 F history of multisystem atrophy, hypotension, seizure disorder Adm to ICU w ischemic colitis septic shock status post Exploratory laparotomy with subtotal colectomy and end-ileostomy 12/30, bronchoscopy 1/3--reintubated    Extubated 1/9 on NC/PRN BiPAP   Wean per Pulm CCM  IV vasopressors-- off  IV fluid   IV antibiotics--Zosyn for suspected pneumonia completed  Diflucan IV completed  Monitor labs closely   speech therapy eval--continues to be n.p.o.  Pulmonary critical care consult appreciated  General surgery consult appreciated-  Palliative care Consult appreciated   ID consult appreciated  Plan for LTAC      Electronically signed by Tameka Mcnamara MD on 1/15/2020 at 3:04 PM

## 2020-01-15 NOTE — PROGRESS NOTES
Pt is A & O x 3  RASS: 0  CAM-ICU:  Positive   Sensation:  Unable to assess accurately   Edema:  None noted     Vitals:  Blood Pressure at rest 153/88 Blood Pressure post session 54561   Heart Rate at rest 95 bpm Heart Rate post session 96 bpm   SPO2 at rest 94% on 8 L SPO2 post session 95% on 8 L   SpO2 95-97% during EOB activity     Functional Status Score-Intensive Care Unit (FSS-ICU)   Rolling 1/7   Supine to sit transfer 1/7   Unsupported sitting  2/7   Sit to stand transfers 0/7   Ambulation 0/7   Total  4/35     Patient education  Pt educated on  safety during functional mobility, sitting balance and posture, bed mobility training, lower extremity therapeutic exercises    Pt oriented to date, time, time of day, place, and situation as well as provided with visual and auditory stimuli in order to improve cognition and combat effects of ICU delirium. Patient response to education:   Pt verbalized understanding Pt demonstrated skill Pt requires further education in this area   No  No  Yes      Comments: Discussed pt case at interdisciplinary rounds in AM.  Pt received supine with mother present and agreeable to PT treatment. Pt cleared for participation by RN prior to session. Vitals monitored during session. Pt more awake and alert today. Performed rolling L<>R and assisted with hygiene. Educated on rolling technique and hand/foot placement. Pt assisted with BLE and trunk during supine>sit. Sat EOB>15 minutes. Performed passive stretching to BLE and PROM/AAROM in all planes to BLE. Pt performed scapular protraction/retraction with cues for deep breathing x10 reps. Assisted with ADL training at EOB with focus on activity tolerance, upright sitting balance, and upright sitting posture. Pt assisted back to supine when fatigued. Scooted up in bed. Pt placed in the chair position with pillows utilized to facilitate upright posture, joint and skin integrity, and interaction with environment.

## 2020-01-15 NOTE — PROGRESS NOTES
Patient wants to stay on optiflow for the night. Patient comfortable on 45L and 70%. Bipap on standby. Will continue to monitor.

## 2020-01-16 NOTE — PROGRESS NOTES
Occupational Therapy  OT BEDSIDE TREATMENT NOTE      Date:2020  Patient Name: Ryan Hagen  MRN: [id number]  : 1965  Room: [address] [place], [place] [address]     Evaluating OT: BARBI Kathleen/L [id number]     AM-PAC Daily Activity Raw Score:   Recommended Adaptive Equipment: Continue to assess     Comments: Based on patient's functional performance as stated above and level of assistance needed prior to admission, this therapist believes that the patient would benefit from continued skilled OT during/following hospital stay in an effort to increase safety, functional independence with ADLs/IADLs, and quality of life.        Diagnosis: Hypotension  Procedures/additional info:  Exlap, subtotal colectomy, end ileostomy; - intubated;  bronch; 1/3 re-intubated;  extubated  Pertinent Medical History: arthritis, depression, frequent falls, MSA, HTN, Parkinson's disease, seizure - Multi- system atrophy     Precautions:  Falls, seizures, ileostomy,, NGT, NPO, abdominal precautions, B prevalon boots, oxygen via HFNC     Home Living: Pt lives with  in a 1 story home with ramp to enter;; bed/bath on 1 floor. Bathroom setup: Pt using walk-in tub with grab bars; elevated toilet riser with rails    Equipment owned: U-step walker, electric wheelchair     Prior Level of Function: assist with majority of ADLs- was able to feed self and groom-  reports caretakers 4-5 days/week; assist with IADLs; using walker vs electric wheelchair for ambulation. Multiple falls. Supportive father. Driving: no     Pain Level:  B feet hypersensitive to touch during ROM  Communication: F ability to express needs. G eye contact. Engaging more in conversation, answering questions. Soft spoke, flat affect  Cognition: A&O: 3/4; Follows 1 step directions with cues/encouragement. F Attention to task. Increased time to communicate.    Memory: Long term- F  Recalled family member names  Short term-P+  Initiation/termination: F              Sequencing: P+              Comprehension: F              Problem solving: P              Judgement/safety: P                Functional Assessment:    Initial Eval Status  Date: 1/7/20 Treatment Status  Date: 1/16/20 Short Term Goals  Treatment frequency: 1-3x/week on ICU; PRN on stepdown unit  -pt will improve. .. Feeding NPO PEG     SBA  Sitting upright in chair for majority of meals; pending cleared diet restriction   Grooming DEP  Hand over hand assist with P initiation and follow-through. Sitting EOB Max A  Dep A    To wash face/hair due to limited ROM BUE. Sitting EOB Max A     Min A   while seated EOB;   G BUE functional use      UB Dressing DEP Dep     Min A  while seated EOB;   G BUE functional use   LB Dressing DEP  Dep    Mod A   with AE/device PRN   Bathing UB-  DEP  LB-  DEP simulation  Dep    UB-  Min A  LB-  Mod A with AE/DME prn   Toileting DEP DEP     Mod A  including clothing mgmt and toileting hygiene using DME/device prn   Bed Mobility  Supine to sit: DEPx2  Sit to supine: DEPx2  Rolling: NT Supine to sit: DEPx 2  Sit to supine: DEPx 2  Rolling: DEP    Mod A   in prep for EOB ADL tasks   Functional/  Bathroom  Transfers Sit to stand: NT  Stand to sit: NT  Stand pivot: NT Sit <> Stand: Dep x 2  Max A  from varying surfaces using device/DME prn with G safety   Functional Mobility NT     NT   Max A   Bed>bathroom/sink distance using device prn   Balance Sitting:     Static:  Max A    Dynamic:Max A  Standing:     Static:  NT    Dynamic:NT  Sitting:     Static:  Mod A    Dynamic:Max A    Standing:     Static:  Dep x 2    Dynamic:NT demo Min A dynamic sitting balance EOB during ADL tasks;  Mod dynamic standing balance during ADL tasks using device prn   Endurance/  Activity Tolerance P+ activity tolerance/endurance during light ADL task      Sitting EOB tolerance >10 min F activity tolerance/endurance during light ADL task      Sitting EOB tolerance ~15 min demo G activity [id number]

## 2020-01-16 NOTE — CARE COORDINATION
Met with patient,  and mother at bedside to discuss transition of care. Precert pending to [facility]-was started while patient was in MICU. Discussed MATTIE for back up if LTAC is denied and the family was initially chpoiced for [facility] and [facility]. Neither facility can accomodate the NG tube. List given to family for additional choices, they are thinking about [facility] [place] [facility], will follow up. NG remains, 5 liters O2. Pulmonology, ID and Surgery following. CM will follow    The Plan for Transition of Care is related to the following treatment goals: discharge planning when medically stable    The Patient and  were provided with a choice of provider and agrees with the discharge plan. [x] Yes [] No    Freedom of choice list was provided with basic dialogue that supports the patient's individualized plan of care/goals, treatment preferences and shares the quality data associated with the providers.  [x] Yes [] No

## 2020-01-16 NOTE — PROGRESS NOTES
Date: 1/16/2020    Time: 12:11 AM    Patient Placed On BIPAP/CPAP/ Non-Invasive Ventilation? No     Comments:  Attempted to place patient on bipap but patient does not want to wear. Patient is on NC tolerating well.        Ana Paula Luo

## 2020-01-16 NOTE — PROGRESS NOTES
Subjective:  Extubated on nasal cannula with NG more alert and responding appropriately  Family at bedside all questions answered  Objective:    /75   Pulse 100   Temp 98.6 °F (37 °C) (Temporal)   Resp 26   Ht 5' 6\" (1.676 m)   Wt 110 lb 12.8 oz (50.3 kg)   SpO2 97%   BMI 17.88 kg/m²     24HR INTAKE/OUTPUT:      Intake/Output Summary (Last 24 hours) at 1/16/2020 1307  Last data filed at 1/16/2020 0856  Gross per 24 hour   Intake 2001.5 ml   Output 2080 ml   Net -78.5 ml     Extubated alert NAD with NG  Heart:  RRR, no murmurs, gallops, or rubs. Lungs: Bilateral rhonchi rhonchi  Abd: bowel sounds present, nontender, nondistended, no masses  Extrem:  No clubbing, cyanosis, or edema  Weak x4 extremities  Most Recent Labs  Lab Results   Component Value Date    WBC 10.5 01/16/2020    HGB 9.0 (L) 01/16/2020    HCT 30.2 (L) 01/16/2020     (H) 01/16/2020     01/16/2020    K 4.0 01/16/2020    CL 97 (L) 01/16/2020    CREATININE 0.4 (L) 01/16/2020    BUN 19 01/16/2020    CO2 27 01/16/2020    GLUCOSE 119 (H) 01/16/2020    ALT <5 01/16/2020    AST 22 01/16/2020    INR 1.1 12/30/2019    TSH 1.950 12/29/2019     Recent Labs     01/16/20  0518   MG 1.8     Lab Results   Component Value Date    CALCIUM 10.2 01/16/2020    PHOS 4.0 01/16/2020        XR CHEST PORTABLE   Final Result   Tortuous ectatic aorta   Cardiomegaly   Airspace disease compatible with pneumonia, in the left upper lung,   the left lung base and at the right lung   The chest appears to be worse in the interval                  XR CHEST PORTABLE   Final Result   Persistent bibasilar infiltrates and pleural effusion likely   pneumonia.       Gastric distention            XR CHEST PORTABLE   Final Result   tortuous ectatic aorta   Airspace disease compatible with atelectasis or pneumonia, at the   right lung base and to lesser extent the left lung base   There is slight interval improvement                  XR CHEST PORTABLE   Final Result a   moderately dilated/ thickened small bowel loops which may be due to   ileus/enteritis or low-grade bowel obstruction. The rectal remnant is significantly dilated/ thickened with retained   fecal matter and high density material with the presacral edema   concerning for proctitis. Infiltrates and pleural effusions in the lung bases. Somewhat thickened contracted gallbladder with the some   pericholecystic edema. CT CHEST W CONTRAST   Final Result   Persistent patchy bibasilar infiltrates and pleural effusions and   groundglass infiltrates in the right upper lobe likely diffuse   pneumonia. No lung abscess is identified. XR CHEST PORTABLE   Final Result   Tortuous ectatic aorta   Cardiomegaly    Airspace disease compatible with pneumonia, there may be a component   of pulmonary vascular congestion present   The chest does not appear to be significantly changed in the interval                  XR CHEST PORTABLE   Final Result   Improved yet persistent right mid and lower lung opacities with trace   effusion. Persistent retrocardiac consolidation. XR CHEST PORTABLE   Final Result      Subtle improved aeration of the right lung. No new or worsening   airspace opacities. Lines and tubes as described. XR CHEST PORTABLE   Final Result      Cardiomegaly      Airspace consolidation the right upper lobe appears to be unchanged      Small to moderate right-sided pleural effusion      Support line including endotracheal tube and other lines appears to be   in satisfactory position. XR CHEST PORTABLE   Final Result   Progressive diffuse bilateral infiltrates and pleural effusion with a   thick consolidation in the right upper lobe. CHF with edema and/or   diffuse pneumonia considered. Gastric distention with a nasogastric tube in place.             XR CHEST PORTABLE   Final Result   Partial clearing of right lower lobe atelectasis/infiltrate XR CHEST PORTABLE   Final Result   Worsening CHF with edema. Suggestion of free intraperitoneal air better seen in the previous   examination and could be related to recent surgery. Clinical   assessment is recommended. ALERT:  THIS IS AN ABNORMAL REPORT            CT HEAD WO CONTRAST   Final Result      NO ACUTE INTRACRANIAL PROCESS         XR CHEST PORTABLE   Final Result      Right upper lung patchy infiltrate about the right minor fissure. Cardiomegaly      Nasogastric tube in satisfactory position with the tip not well seen   but appears to be in the distal stomach. XR Chest Abdomen Ng Placement   Final Result   Enteric catheter with the tip projecting over the expected region of   the stomach. Pneumoperitoneum which may be normal given the patient's history of   recent abdominal surgery. XR ABDOMEN FOR NG/OG/NE TUBE PLACEMENT   Final Result   Addendum 1 of 1   Consider CT scan if better assessment of the abdomen as clinically   indicated. Final      XR CHEST PORTABLE   Final Result   NG tube reaches the stomach               XR CHEST PORTABLE   Final Result   Partial clearing of bibasilar infiltrates and left effusion               XR CHEST PORTABLE   Final Result   Stable abnormal chest with  improving CHF. Superimposed pneumonia is   not excluded. CT CHEST W CONTRAST   Final Result   1. Atelectasis consolidation of both lower lobes with patent central   airways. Findings to be correlated clinically. Can be relate with the   hypoventilation of the lower lung bases. Cannot exclude a pneumonia. 2. Discrete patchy bronchocentric infiltrate in the left upper lobe   lingular segment. 3. No acute pulmonary emboli seen although there are some limitations   for evaluation of the lower lobes and motion artifacts. 4. No aneurysm formation dissection thoracic aorta. 5. Discrete to mild pericardial effusion.       CT ABDOMEN PELVIS W IV CONTRAST Victoriano Templeton MD on 1/16/2020 at 1:07 PM

## 2020-01-16 NOTE — PROGRESS NOTES
Prem Dick M.D.,[facility]  Moriah Mccollum D.O., F.GOYOCClauOClauI., Abhilash Mcguire M.D. Maria Del Rosario Pruitt M.D., Michel Davis M.D. [employer], D.O. Daily Pulmonary Progress Note    Patient:  Donte Yañez 62 y.o. female MRN: [id number]     Date of Service: 1/16/2020      Synopsis     We are following patient for aspiration pneumonia, respiratory failure with hypxoia, requiring multiple intubations, pulmonary edema, s/p bronchoscopy, pneumonia with cultures growing saccharomyces cerevisiae and corynebacterium    \"CC\"  Shortness of breath    Code status: FULL       Subjective      Patient was seen and examined. Patient is sitting up in bed she appears in no acute distress she has no labored breathing. Multiple family members at bedside. She does not talk to me today she does not her head her  reports that she was talking earlier her voice is still very weak. She did not use her BiPAP overnight I will send message to RT that she needs it q. night and during naps during the day. She is currently on 5 L nasal cannula 97% saturation. She denies shortness of breath, positive for cough. It is nonproductive. Review of Systems:  Constitutional: Denies fever, weight loss, night sweats, and fatigue  Skin: Denies pigmentation, dark lesions, and rashes   HEENT: Denies hearing loss, tinnitus, ear drainage, epistaxis, sore throat, and hoarseness. Cardiovascular: Denies palpitations, chest pain, and chest pressure.   Respiratory: as above  Gastrointestinal: Denies nausea, vomiting, poor appetite, diarrhea, heartburn or reflux  Genitourinary: Denies dysuria, frequency, urgency or hematuria  Musculoskeletal: Denies myalgias, muscle weakness, and bone pain  Neurological: Denies dizziness, vertigo, headache, and focal weakness  Psychological: Denies anxiety and depression  Endocrine: Denies heat intolerance and cold intolerance  Hematopoietic/Lymphatic: Denies bleeding problems and blood Supple, symmetrical, trachea midline, no adenopathy;thyroid:  no enlargement/tenderness/nodules or JVD. Lung: Breath sounds CTA, diffusely diminished bilaterally. Respirations   unlabored. Symmetrical expansion. Heart: RRR, normal S1, S2. No MRG  Abdomen: Soft, NT, ND. BS present x 4 quadrants. No bruit or organomegaly. Colostomy intact  Extremities: Pedal pulses 2+ symmetric b/l. Extremities normal, no cyanosis, clubbing, or edema. Musculokeletal: No joint swelling, no muscle tenderness. ROM normal in all joints of extremities. Neurologic: Mental status: Alert and Oriented X3 . Pertinent/ New Labs and Imaging Studies     Imaging Personally Reviewed:  Pending       ECHO 1/3/20   Summary   Left ventricular internal dimensions were normal in diastole and systole. Regional wall motion abnormalities with hypokinesis of basilar inferior,   distal anteroseptal, apical and inferoapical segments. Low normal left ventricular systolic function. There is doppler evidence of stage I diastolic dysfunction. The left atrium is moderately dilated. Moderate mitral regurgitation is present. The aortic valve appears mildly sclerotic. Mild tricuspid regurgitation. Pulmonary hypertension is mild . Labs:  Lab Results   Component Value Date    WBC 10.5 01/16/2020    HGB 9.0 01/16/2020    HCT 30.2 01/16/2020    MCV 98.1 01/16/2020    MCH 29.2 01/16/2020    MCHC 29.8 01/16/2020    RDW 18.4 01/16/2020     01/16/2020    MPV 10.6 01/16/2020     Lab Results   Component Value Date     01/16/2020    K 4.0 01/16/2020    CL 97 01/16/2020    CO2 27 01/16/2020    BUN 19 01/16/2020    CREATININE 0.4 01/16/2020    LABALBU 3.7 01/16/2020    CALCIUM 10.2 01/16/2020    GFRAA >60 01/16/2020    LABGLOM >60 01/16/2020     Lab Results   Component Value Date    PROTIME 11.9 12/30/2019    INR 1.1 12/30/2019     No results for input(s): PROBNP in the last 72 hours.   No results for input(s): PROCAL in the last 72 AM     I personally saw, examined, and cared for the patient. Labs, medications, radiographs reviewed. I agree with history exam and plans detailed in NP note.     transferred out of ICU  Needs aggressive broncho pulm hygiene, NT suction with red catheter    Electronically signed by Virginie Eastman DO on 1/16/2020 at 4:24 PM

## 2020-01-16 NOTE — PROGRESS NOTES
Speech Language Pathology      NAME:  Randy Patel  :  1965  DATE: 2020  ROOM:  5820/6167-G    Pt seen for ongoing dysphagia management and PO trials. Cleared my charge RN prior to tx, RN present for session. Oral care provided, repositioned prior to PO administration. Poor labial seal for cup sips; SLP presented x1 tsp of thin liquid to Pt via spoon. Impaired labial strip/ seal with delayed AP transit and suspected premature spillage. Decreased hyolaryngeal excursion and multiple swallows upon laryngeal palpation. Immediate wet coughing. RN provided oral suctioning. Ongoing encouragement to continue coughing. SpO2 noted to desat to 87. RN aware, up to 91 prior to leaving room. Will continue to monitor and assess for appropriateness of MBSS.     Hypotension [I95.9]    Earl Ragland M.S., CCC-SLP  Speech-Language Pathologist  [id number]  2020

## 2020-01-16 NOTE — PROGRESS NOTES
Chart reviewed. Patient on telemetry now-out of ICU. Patient to remain FULL CODE. She is not interested in completing POA paperwork at this time. No further needs from palliative medicine standpoint. Will sign off-please re-consult if needed. Thank you!

## 2020-01-17 NOTE — PROGRESS NOTES
SBA  Dynamic Standing: Mod A     Additional Comments: Pt supine and on BiPap presently. Assisted nursing with positioning pt long sitting for suctioning and repositioning to prevent skin breakdown. Attempted PROM in PM and pt not stable for therapy. Pt having broncoscopy tmrw. Consulted with nursing about PRAFO's;pt received Prevalon boots again;sent back for credit and asked for physician note for PRAFO's again. Pt call light left by patient. Time in: 1120  Time out: 36    Pt is making limited progress toward established Physical Therapy goals. Continue with physical therapy current plan of care.     Bethene Hammans PTA  License Number: [id number]

## 2020-01-17 NOTE — ANESTHESIA PRE PROCEDURE
MD Lexi       Current medications:    Current Facility-Administered Medications   Medication Dose Route Frequency Provider Last Rate Last Dose    lidocaine PF 1 % injection 5 mL  5 mL Nebulization Q4H PRN Garret Landis MD        ondansetron [facility]) injection 4 mg  4 mg Intravenous Q6H PRN Garret Landis MD   4 mg at 01/11/20 0132    Lidocaine HCl 4 % CREA 1 each  1 each Apply externally Q6H PRN Garret Landis MD   1 each at 01/11/20 1743    cetirizine (ZYRTEC) tablet 5 mg  5 mg Oral Daily Garret Landis MD   Stopped at 01/18/20 8562    lidocaine 4 % external patch 1 patch  1 patch Transdermal Daily Garret Landis MD   Stopped at 01/18/20 1024    iohexol (OMNIPAQUE 240) injection 50 mL  50 mL Oral ONCE PRN Garret Landis MD        potassium bicarb-citric acid (EFFER-K) effervescent tablet 40 mEq  40 mEq Oral Daily Garret Landis MD   Stopped at 01/18/20 0926    ipratropium-albuterol (DUONEB) nebulizer solution 1 ampule  1 ampule Inhalation Q4H WA Garret Landis MD   1 ampule at 01/18/20 1024    sodium chloride (Inhalant) 3 % nebulizer solution 4 mL  4 mL Nebulization PRN Garret Landis MD        guaiFENesin tablet 400 mg  400 mg Oral TID Garret Landis MD   Stopped at 01/18/20 2427    sodium chloride (Inhalant) 3 % nebulizer solution 2 mL  2 mL Nebulization Q4H Garret Landis MD        perflutren lipid microspheres (DEFINITY) injection 1.65 mg  1.5 mL Intravenous ONCE PRN Garret Landis MD        sodium chloride flush 0.9 % injection 10 mL  10 mL Intravenous PRN Garret Landis MD   10 mL at 01/10/20 1439    dornase alpha (PULMOZYME) nebulizer solution 2.5 mg  2.5 mg Inhalation Daily Garret Landis MD   2.5 mg at 01/18/20 1022    labetalol (NORMODYNE;TRANDATE) injection 10 mg  10 mg Intravenous Q6H PRN Garret Landis MD   10 mg at 01/14/20 0833    carbidopa-levodopa (SINEMET)  MG per tablet 2.5 tablet  2.5 tablet Oral 5x Daily Tobin Fernandez MD   Stopped at 01/18/20 6318    carbidopa-levodopa (SINEMET)  MG per tablet 2 tablet  2 tablet Oral Nightly Tobin Fernandez MD   2 tablet at 01/17/20 2316    benzocaine (HURRICAINE) 20 % oral spray   Mouth/Throat 4x Daily PRN Tobin Fernandez MD        melatonin tablet 6 mg  6 mg Oral Nightly PRN Tobin Fernandez MD   6 mg at 01/01/20 2208    pantoprazole (PROTONIX) injection 40 mg  40 mg Intravenous Daily Tobin Fernandez MD   40 mg at 01/18/20 0945    And    sodium chloride (PF) 0.9 % injection 10 mL  10 mL Intravenous Daily Tobin Fernandez MD   10 mL at 01/18/20 0945    acetaminophen (TYLENOL) 160 MG/5ML solution 650 mg  650 mg Per NG tube Q6H PRN Tobin Fernandez MD   650 mg at 01/18/20 0507    sodium chloride flush 0.9 % injection 10 mL  10 mL Intravenous 2 times per day Tobin Fernandez MD   10 mL at 01/18/20 1024    sodium chloride flush 0.9 % injection 10 mL  10 mL Intravenous PRN Tobin Fernandez MD   10 mL at 01/06/20 1113    enoxaparin (LOVENOX) injection 40 mg  40 mg Subcutaneous Daily Tobin Fernandez MD   Stopped at 01/18/20 7584    levetiracetam (KEPPRA) 1000 mg/100 mL IVPB  1,000 mg Intravenous Q12H Tobin Fernandez MD   Stopped at 01/18/20 1025    lidocaine (XYLOCAINE) 2 % jelly   Topical Once Tobin Fernandez MD           Allergies:     Allergies   Allergen Reactions    Sulfa Antibiotics Rash       Problem List:    Patient Active Problem List   Diagnosis Code    MS (multiple sclerosis) ([address] G35    Parkinson disease ([address] G20    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    Ischemic colitis ([address] K55.9    Septic shock ([address] A41.9, R65.21    Severe protein-calorie malnutrition ([address] E43    Acute respiratory failure with hypoxia and hypercapnia ([address] J96.01, J96.02    Peritonitis ([facility] K65.9    Pneumonia J18.9       Past Medical History:        Diagnosis Date    Arthritis     Depression     Falls frequently     multi system atrophy per family    Hypertension     Parkinson's disease ([address]     Seizure [facility])        Past Surgical History:        Procedure Laterality Date    APPENDECTOMY      BRONCHOSCOPY N/A 1/2/2020    BRONCHOSCOPY DIAGNOSTIC OR CELL [address] ONLY performed by Bertram Rendon DO at [address] [facility] N/A 1/8/2020    BRONCHOSCOPY ALVEOLAR LAVAGE performed by Kait Nuñez MD at [address] [facility] N/A 12/30/2019    EXPLORATORY LAPAROTOMY, SUBTOTAL COLECTOMY , ILEOSTOMY performed by Nannette Kent MD at [address] [employer]      gyneological       Social History:    Social History     Tobacco Use    Smoking status: Never Smoker    Smokeless tobacco: Never Used   Substance Use Topics    Alcohol use: No                                Counseling given: Not Answered      Vital Signs (Current):   Vitals:    01/17/20 2345 01/18/20 0113 01/18/20 0605 01/18/20 0832   BP: 125/70   127/81   Pulse: 89   97   Resp: 26 17 22   Temp: 98.6 °F (37 °C)   98.1 °F (36.7 °C)   TempSrc: Temporal   Temporal   SpO2: 96%   99%   Weight:   94 lb 8 oz (42.9 kg)    Height:                                                  BP Readings from Last 3 Encounters:   01/18/20 127/81   01/02/20 (!) 151/83   11/09/18 (!) 158/90       NPO Status: Time of last liquid consumption: 2300Instructed RN to hold TF at 2359 for procedure                        Time of last solid consumption: 2300                        Date of last liquid consumption: 01/17/20                        Date of last solid food consumption: 01/17/20    BMI:   Wt Readings from Last 3 Encounters:   01/18/20 94 lb 8 oz (42.9 kg)   11/09/18 120 lb (54.4 kg)   10/22/18 125 lb (56.7 kg)     Body mass index is 15.25 kg/m².     CBC:   Lab Results   Component Value Date    WBC 14.6 01/18/2020    RBC 3.38 01/18/2020    HGB 9.9 01/18/2020    HCT 32.8 01/18/2020    MCV 97.0 01/18/2020    RDW 18.3 01/18/2020     01/18/2020       CMP:   Lab Results   Component Value Date     01/18/2020    K 4.4 01/18/2020    CL 93 01/18/2020    CO2 27 01/18/2020    BUN 34 01/18/2020    CREATININE 0.5 01/18/2020    GFRAA >60 01/18/2020    LABGLOM >60 01/18/2020    GLUCOSE 121 01/18/2020    PROT 7.9 01/18/2020    CALCIUM 11.0 01/18/2020    BILITOT 0.3 01/18/2020    ALKPHOS 231 01/18/2020    AST 25 01/18/2020    ALT 10 01/18/2020       POC Tests: No results for input(s): POCGLU, POCNA, POCK, POCCL, POCBUN, POCHEMO, POCHCT in the last 72 hours. Coags:   Lab Results   Component Value Date    PROTIME 11.9 12/30/2019    INR 1.1 12/30/2019    APTT 33.4 01/07/2016       HCG (If Applicable): No results found for: PREGTESTUR, PREGSERUM, HCG, HCGQUANT     ABGs:   Lab Results   Component Value Date    PO2ART 171.5 12/30/2019    ATD3DDY 46.5 12/30/2019    XXO0WFZ 21.6 12/30/2019        Type & Screen (If Applicable):  No results found for: LABABO, [address]    Anesthesia Evaluation  Patient summary reviewed and Nursing notes reviewed no history of anesthetic complications:   Airway: Mallampati: Unable to assess / NA       Comment: Currently intubated   Dental:      Comment: Pt unable to come off bipap,    Pulmonary:normal exam    (+) pneumonia:  shortness of breath:  rhonchi,            Patient did not smoke on day of surgery.                 ROS comment: Poor overall respiratory status   Cardiovascular:  Exercise tolerance: poor (<4 METS),   (+) hypertension: no interval change,       ECG reviewed  Rhythm: regular  Rate: normal           Beta Blocker:  Not on Beta Blocker      ROS comment: Hypotensive     Neuro/Psych:   (+) seizures: no interval change, neuromuscular disease: Parkinson's disease, multiple sclerosis, psychiatric history: stable with treatmentdepression/anxiety             GI/Hepatic/Renal:             Endo/Other:    (+) : arthritis:., electrolyte abnormalities, .          Pt had no PAT visit       Abdominal:       Abdomen: soft. Vascular:                                        Anesthesia Plan      MAC     ASA 4     (Pt is currently on Bipap settings 18/6 at 100% FIO2. Patients mother at bedside states patient does not want to be reintubated even for this procedure. Mother states she will talk with  and surgeon. )  Induction: intravenous. Anesthetic plan and risks discussed with patient. Plan discussed with attending. Antionette Favre, RN SRNA  1/17/2020  DOS STAFF ADDENDUM:    Pt seen and examined, chart reviewed (including anesthesia, drug and allergy history). Anesthetic plan, risks, benefits, alternatives, and personnel involved discussed with patient. Patient verbalized an understanding and agrees to proceed. Plan discussed with care team members and agreed upon.     Rashmi Chris MD  Staff Anesthesiologist  12:15 PM

## 2020-01-17 NOTE — PROGRESS NOTES
Gypsy Hernandez M.D.,[facility]  Joey Pedraza D.O., HARI., Nohemi Paiz M.D. Cristina Allison M.D., Annah Koyanagi ,M.D. Gianni Meza D.O. Daily Pulmonary Progress Note    Patient:  Dawn Slaughter 62 y.o. female MRN: [id number]     Date of Service: 1/17/2020      Synopsis     We are following patient for aspiration pneumonia, respiratory failure with hypxoia, requiring multiple intubations, pulmonary edema, s/p bronchoscopy, pneumonia with cultures growing saccharomyces cerevisiae and corynebacterium    \"CC\"  Shortness of breath    Code status: FULL       Subjective      Patient was seen and examined. Patient having more work of breathing and increased rhonchi bilaterally. She continues to have difficulty expectorating due to her overall debilitation and neuromuscular weakness. She will undergo a bronchoscopy tomorrow. Patient n.p.o. after midnight. Review of Systems:  Constitutional: Denies fever, weight loss, night sweats, and fatigue  Skin: Denies pigmentation, dark lesions, and rashes   HEENT: Denies hearing loss, tinnitus, ear drainage, epistaxis, sore throat, and hoarseness. Cardiovascular: Denies palpitations, chest pain, and chest pressure.   Respiratory:+ cough   Gastrointestinal: Denies nausea, vomiting, poor appetite, diarrhea, heartburn or reflux  Genitourinary: Denies dysuria, frequency, urgency or hematuria  Musculoskeletal: Denies myalgias,+ muscle weakness, and bone pain  24-hour events:  Increased sputum    Objective   Vitals: /63   Pulse 104   Temp 98.3 °F (36.8 °C) (Temporal)   Resp 30   Ht 5' 6\" (1.676 m)   Wt 112 lb 3.2 oz (50.9 kg)   SpO2 97%   BMI 18.11 kg/m²     I/O:    Intake/Output Summary (Last 24 hours) at 1/17/2020 1122  Last data filed at 1/17/2020 0848  Gross per 24 hour   Intake 1761 ml   Output 1920 ml   Net -159 ml       Vent Information  $Ventilation: $Subsequent Day  Ventilator Started: Yes  Ventilation Day(s): 1  Skin Assessment: Clean, dry, & intact  Equipment ID: [id number]  Equipment Changed: Mask(changed to face shield to give nose a  break from mask)  Vent Type: 840  Vent Mode: PS  Vt Ordered: 433 mL  Rate Set: 0 bmp  Peak Flow: 65 L/min  Pressure Support: 10 cmH20  FiO2 : 50 %  Sensitivity: 1  PEEP/CPAP: 5  I Time/ I Time %: 0.9 s  Cuff Pressure (cm H2O): 29 cm H2O  Humidification Source: Heated wire  Humidification Temp: 37  Humidification Temp Measured: 36  Circuit Condensation: Drained       IPAP: (S) 18 cmH20(Adjusted patient's tidal volumes after sleeping well dropped)  CPAP/EPAP: (S) 6 cmH2O     CURRENT MEDS :  Scheduled Meds:   cetirizine  5 mg Oral Daily    lidocaine  1 patch Transdermal Daily    potassium bicarb-citric acid  40 mEq Oral Daily    ipratropium-albuterol  1 ampule Inhalation Q4H WA    guaiFENesin  400 mg Oral TID    sodium chloride (Inhalant)  2 mL Nebulization Q4H    dornase alpha  2.5 mg Inhalation Daily    carbidopa-levodopa  2.5 tablet Oral 5x Daily    carbidopa-levodopa  2 tablet Oral Nightly    pantoprazole  40 mg Intravenous Daily    And    sodium chloride (PF)  10 mL Intravenous Daily    sodium chloride flush  10 mL Intravenous 2 times per day    enoxaparin  40 mg Subcutaneous Daily    levetiracetam  1,000 mg Intravenous Q12H    lidocaine   Topical Once       Physical Exam:  General Appearance: appears comfortable in no acute distress. HEENT: Normocephalic atraumatic without obvious abnormality NG tube to nares intact  Neck: Lips, mucosa, and tongue normal.  Supple, symmetrical, trachea midline, no adenopathy;thyroid:  no enlargement/tenderness/nodules or JVD. Lung: Breath sounds rhonchi bilaterally, diffusely diminished bilaterally. Respirations   unlabored. Symmetrical expansion. Heart: RRR, normal S1, S2. No MRG  Abdomen: Soft, NT, ND. BS present x 4 quadrants. No bruit or organomegaly. Colostomy intact  Extremities: Pedal pulses 2+ symmetric b/l.   Extremities normal, no cyanosis, clubbing, or edema. Musculokeletal: No joint swelling, no muscle tenderness. ROM normal in all joints of extremities. Neurologic: Mental status: Alert and Oriented X3 . Pertinent/ New Labs and Imaging Studies     Imaging Personally Reviewed:     Comparison: 1/16/2020        FINDINGS:       Mild increase sclerosis small left basilar opacity, which has   developed as of yesterday. Right lung is clear. The heart is grossly   normal in size. Aorta is mildly tortuous. Pulmonary vascular   congestion. No pneumothorax or pleural effusion.       Tip of the right arm PICC line overlies the upper right atrium. The   tip of the nasogastric tube is below the diaphragm and beyond the   field-of-view of this study.            Impression   Left basilar opacity which has developed since yesterday and may   represent atelectasis or pneumonia.       NG tube and right arm PICC line are stable in position.               ECHO 1/3/20   Summary   Left ventricular internal dimensions were normal in diastole and systole. Regional wall motion abnormalities with hypokinesis of basilar inferior,   distal anteroseptal, apical and inferoapical segments. Low normal left ventricular systolic function. There is doppler evidence of stage I diastolic dysfunction. The left atrium is moderately dilated. Moderate mitral regurgitation is present. The aortic valve appears mildly sclerotic. Mild tricuspid regurgitation. Pulmonary hypertension is mild .           Labs:  Lab Results   Component Value Date    WBC 10.9 01/17/2020    HGB 9.5 01/17/2020    HCT 31.7 01/17/2020    MCV 98.1 01/17/2020    MCH 29.4 01/17/2020    MCHC 30.0 01/17/2020    RDW 17.9 01/17/2020     01/17/2020    MPV 10.2 01/17/2020     Lab Results   Component Value Date     01/17/2020    K 4.1 01/17/2020    CL 97 01/17/2020    CO2 26 01/17/2020    BUN 27 01/17/2020    CREATININE 0.4 01/17/2020    LABALBU 4.0 01/17/2020    CALCIUM 10.9 01/17/2020    GFRAA NPO.  Speech following  9. Previously followed by neuro Dr Maryan Tinoco at [facility]   10.  incentive spirometry   11. Consent for bronchoscopy for clean our and cultures, NPO    This plan of care was reviewed in collaboration with Dr. Sonia Briggs  Electronically signed by NADJA Briscoe on 1/17/2020 at 11:22 AM     I personally saw, examined, and cared for the patient. Labs, medications, radiographs reviewed. I agree with history exam and plans detailed in NP note. Increased congestion with left lower lobe collapse and weak cough. Plan for bronch 1/18.     Electronically signed by Ras Breen DO on 1/17/2020 at 2:51 PM

## 2020-01-17 NOTE — CARE COORDINATION
Met with patient and mother at bedside. We discussed MATTIE choices and she would like [facility] or [employer]. Referral called to liason. HENS and ambulance form on soft chart.  arrived after conversation, he is agreeable to plan.  CM continues to follow

## 2020-01-17 NOTE — PROGRESS NOTES
Consent for bronchoscopy signed and placed in patient's chart.      Electronically signed by Tammy Templeton RN on 1/17/2020 at 5:39 PM

## 2020-01-17 NOTE — PROGRESS NOTES
Called Respiratory to put pt on bipap after O2 saturation found to be 78% on 15 L NC.     Electronically signed by Madhavi Bautista RN on 1/17/2020 at 1:39 PM

## 2020-01-17 NOTE — PROGRESS NOTES
concerning for proctitis. She underwent bronchoscopy on 01/08 during which mucus plugging was noted with Gram stain and culture showing no polymorphonuclear leukocytes, no epithelial cells, rare Gram-positive diphtheroid-like rods, reduced oral pharyngeal lakshmi, Corynebacterium sp, rare growth of Saccharomyces cerevisiae. Serum beta-D-glucan was negative. She was extubated on 01/09. Subjective: Patient was seen and examined. She is awake, on BiPAP, reports no abdominal pain, no diarrhea. Afebrile. Objective:  /63   Pulse 104   Temp 98.3 °F (36.8 °C) (Temporal)   Resp 28   Ht 5' 6\" (1.676 m)   Wt 112 lb 3.2 oz (50.9 kg)   SpO2 99%   BMI 18.11 kg/m²   Constitutional: Awake, not in distress  Respiratory: Clear breath sounds, no crackles, no wheezes  Cardiovascular: Regular rate and rhythm, no murmurs  Gastrointestinal: Bowel sounds present, soft, nontender, ostomy in place  Skin: Warm and dry, no active dermatoses  Musculoskeletal: No joint swelling, no joint erythema    Labs, imaging, and medical records/notes were personally reviewed. CXR showed new left basilar opacity. Assessment:  HAP   Septic shock, resolved, secondary to peritonitis from ischemic colitis s/p exploratory laparotomy, subtotal colectomy, and ileostomy on 12/13   Saccharomyces cerevisiae and Candida parapsilosis on bronchial washing, deemed colonization  Leukocytosis and fever, resolved, associated with mucus plugging, in the setting of multisystem atrophy    Recommendations:  Monitor clinically off antibiotics for now. Continue chest physiotherapy. Plan for bronchoscopy for tomorrow is noted. Thank you for involving me in the care of Lola Estrada. I will continue to follow. Please do not hesitate to call for any questions or concerns.       Electronically signed by Theodore Aguilar MD on 1/17/2020 at 12:40 PM

## 2020-01-17 NOTE — PROGRESS NOTES
Unable to get pt's O2 saturation above 87% with increasing O2 to 12 L NC. Respiratory called to give the pt the rest of her breathing treatments.      Electronically signed by Marquis Robert RN on 1/17/2020 at 11:57 AM

## 2020-01-17 NOTE — PROGRESS NOTES
01/16/20 2143   NIV Type   Skin Assessment Clean, dry, & intact   Skin Protection for O2 Device Yes   Equipment Type [id number]   Mode Bilevel   Mask Type Full face mask   Mask Size Small   Bonnet size Small   Settings/Measurements   IPAP 15 cmH20  (okay by dr Ludivina Rolon )   CPAP/EPAP 6 cmH2O   Resp 25   Vt Exhaled 500 mL   Minute Volume 13 Liters   Mask Leak (lpm) 34 lpm   Date: 1/17/2020    Time: 2143  Patient Placed On BIPAP/CPAP/ Non-Invasive Ventilation? Yes    If no must comment. Facial area red/color change? Yes           If YES are Blister/Lesion present? No   If yes must notify nursing staff  BIPAP/CPAP skin barrier?   Yes    Skin barrier type:mepilex       Comments:Dr Gillespie notified of changes and patient has slight redness to side of nose         Ace Sly

## 2020-01-18 NOTE — PLAN OF CARE
Problem: Pain:  Goal: Control of acute pain  Description  Control of acute pain  Outcome: Met This Shift  Goal: Control of chronic pain  Description  Control of chronic pain  Outcome: Met This Shift

## 2020-01-18 NOTE — PROGRESS NOTES
disease compatible with atelectasis or pneumonia, at the   right lung base and to lesser extent the left lung base   There is slight interval improvement                  XR CHEST PORTABLE   Final Result   tortuous ectatic aorta   Airspace disease compatible with atelectasis or pneumonia, more   prominent right lung base as compared to the left likely with   superimposed post pleural effusions, overall when compared to the   previous study of the chest is improved                     XR CHEST PORTABLE   Final Result   No interval change               XR CHEST PORTABLE   Final Result   Stable abnormal chest with persistent bibasilar infiltrates likely   pneumonia. Dilated bowel loops with improvement. XR ABDOMEN (KUB) (SINGLE AP VIEW)   Final Result   Some dilated small bowel loops in left side of the abdomen. There is a   mid small bowel obstruction suggested      Significant left lower lobe infiltrate and right basilar infiltrate   and effusion. XR CHEST PORTABLE   Final Result   Progressive bibasilar infiltrates and pleural effusions which may be   due to pneumonia. Dilated bowel loops which may be due to postoperative ileus versus   bowel obstruction. XR CHEST PORTABLE   Final Result      1. Progressive improvement in right lower lobe pneumonia and   atelectasis. 2. Apparent clearing of previous mild right upper lobe pneumonia. XR CHEST PORTABLE   Final Result      XR CHEST PORTABLE   Final Result   Patchy bilateral pulmonary infiltrates, worst in the lower lungs, with   some improvement in the left lower lung. Small right pleural effusion. US GALLBLADDER RUQ   Final Result      Color wall thickness is increased to 3.2 mm. No other evidence to suggest acute cholecystitis.       XR CHEST PORTABLE   Final Result   Stable abnormal chest with the persistent bilateral infiltrates and   pleural effusions with a marginal improvement which diffuse pneumonia considered. Gastric distention with a nasogastric tube in place. XR CHEST PORTABLE   Final Result   Partial clearing of right lower lobe atelectasis/infiltrate               XR CHEST PORTABLE   Final Result   Worsening CHF with edema. Suggestion of free intraperitoneal air better seen in the previous   examination and could be related to recent surgery. Clinical   assessment is recommended. ALERT:  THIS IS AN ABNORMAL REPORT            CT HEAD WO CONTRAST   Final Result      NO ACUTE INTRACRANIAL PROCESS         XR CHEST PORTABLE   Final Result      Right upper lung patchy infiltrate about the right minor fissure. Cardiomegaly      Nasogastric tube in satisfactory position with the tip not well seen   but appears to be in the distal stomach. XR Chest Abdomen Ng Placement   Final Result   Enteric catheter with the tip projecting over the expected region of   the stomach. Pneumoperitoneum which may be normal given the patient's history of   recent abdominal surgery. XR ABDOMEN FOR NG/OG/NE TUBE PLACEMENT   Final Result   Addendum 1 of 1   Consider CT scan if better assessment of the abdomen as clinically   indicated. Final      XR CHEST PORTABLE   Final Result   NG tube reaches the stomach               XR CHEST PORTABLE   Final Result   Partial clearing of bibasilar infiltrates and left effusion               XR CHEST PORTABLE   Final Result   Stable abnormal chest with  improving CHF. Superimposed pneumonia is   not excluded. CT CHEST W CONTRAST   Final Result   1. Atelectasis consolidation of both lower lobes with patent central   airways. Findings to be correlated clinically. Can be relate with the   hypoventilation of the lower lung bases. Cannot exclude a pneumonia. 2. Discrete patchy bronchocentric infiltrate in the left upper lobe   lingular segment.       3. No acute pulmonary emboli seen although there are some limitations while the patient is n.p.o. any oral feeding is high risk for aspiration. Tube feeds per NG likely needs transition to [facility]  Pulmonary critical care consult appreciated  General surgery consult appreciated-  Palliative care Consult appreciated   ID consult appreciated  Prognosis is poor. Patient is high risk for further respiratory decompensation with reintubation and transfer back to the ICU. patient is appropriate for hospice. If the family wishes to continue aggressive management she likely needs PEG and trach. She has been admitted for 19 days with minimal progress. Plan for LTAC? ?? If accepted     Nothing further to add       Electronically signed by Kolton Canchola MD on 1/18/2020 at 9:36 AM

## 2020-01-18 NOTE — PROGRESS NOTES
Called for respiratory failure. Patient tachypnea and hypoxic. Started after being turned. S/p bronch today for mucus plugging.    -Change NIV to AVAPS    Patient more comfortable with improved WOB with changes made. Will need to monitor closely. If worsens then transfer to ICU.

## 2020-01-18 NOTE — ANESTHESIA POSTPROCEDURE EVALUATION
Department of Anesthesiology  Postprocedure Note    Patient: Sundar aCrney  MRN: [id number]  YOB: 1965  Date of evaluation: 1/18/2020  Time:  1:54 PM     Procedure Summary     Date:  01/18/20 Room / Location:  [facility] / [facility]    Anesthesia Start:  5051 Anesthesia Stop:  2054    Procedure:  BRONCHOSCOPY / BAL (N/A ) Diagnosis:  (.)    Surgeon:  Charlene Ahumada DO Responsible Provider:  Marcello Cardona MD    Anesthesia Type:  MAC ASA Status:  4          Anesthesia Type: MAC    Janessa Phase I: Janessa Score: 7    Janessa Phase II:      Last vitals: Reviewed and per EMR flowsheets.        Anesthesia Post Evaluation    Patient location during evaluation: PACU  Patient participation: complete - patient participated  Level of consciousness: awake  Airway patency: patent  Nausea & Vomiting: no nausea and no vomiting  Complications: no  Cardiovascular status: hemodynamically stable  Respiratory status: acceptable  Hydration status: euvolemic

## 2020-01-18 NOTE — PROGRESS NOTES
thick consolidation in the right upper lobe. CHF with edema and/or   diffuse pneumonia considered. Gastric distention with a nasogastric tube in place. XR CHEST PORTABLE   Final Result   Partial clearing of right lower lobe atelectasis/infiltrate               XR CHEST PORTABLE   Final Result   Worsening CHF with edema. Suggestion of free intraperitoneal air better seen in the previous   examination and could be related to recent surgery. Clinical   assessment is recommended. ALERT:  THIS IS AN ABNORMAL REPORT            CT HEAD WO CONTRAST   Final Result      NO ACUTE INTRACRANIAL PROCESS         XR CHEST PORTABLE   Final Result      Right upper lung patchy infiltrate about the right minor fissure. Cardiomegaly      Nasogastric tube in satisfactory position with the tip not well seen   but appears to be in the distal stomach. XR Chest Abdomen Ng Placement   Final Result   Enteric catheter with the tip projecting over the expected region of   the stomach. Pneumoperitoneum which may be normal given the patient's history of   recent abdominal surgery. XR ABDOMEN FOR NG/OG/NE TUBE PLACEMENT   Final Result   Addendum 1 of 1   Consider CT scan if better assessment of the abdomen as clinically   indicated. Final      XR CHEST PORTABLE   Final Result   NG tube reaches the stomach               XR CHEST PORTABLE   Final Result   Partial clearing of bibasilar infiltrates and left effusion               XR CHEST PORTABLE   Final Result   Stable abnormal chest with  improving CHF. Superimposed pneumonia is   not excluded. CT CHEST W CONTRAST   Final Result   1. Atelectasis consolidation of both lower lobes with patent central   airways. Findings to be correlated clinically. Can be relate with the   hypoventilation of the lower lung bases. Cannot exclude a pneumonia. 2. Discrete patchy bronchocentric infiltrate in the left upper lobe   lingular segment. 3. No acute pulmonary emboli seen although there are some limitations   for evaluation of the lower lobes and motion artifacts. 4. No aneurysm formation dissection thoracic aorta. 5. Discrete to mild pericardial effusion. CT ABDOMEN PELVIS W IV CONTRAST Additional Contrast? None   Final Result   1. Findings for severe constipation with a large amount of fecal   contents/rotation/impaction from the sigmoid colon to the rectum,   causing a pattern of megacolon with marked fluid/air distentions or   more proximal segments of the colon. 2. Consider decompression of the rectum/sigmoid to permit relieve of   the more proximal segments of the colon. 3. Marked: Distention can explain hypoventilation lower lung bases   causing atelectasis or consolidations with overall patent central   airways of both lower lobes. Please correlate clinically. 4. There is no dilatation of the biliary tree, to be reevaluated   relieve of the megacolon pattern which cause significant mass effect   throughout the entire abdomen peritoneal cavity. .      5. Mild ascites in the abdomen. 6. No free intraperitoneal air observed. XR CHEST PORTABLE   Final Result      Gaseous distention of a loop of bowel within the left upper abdomen   may be stomach or colon. Consider further workup with computed   tomography for better characterization. Bilateral pleural effusions with contiguous atelectasis. Endotracheal tube is present with the distal tip 5 cm above the   adriano. Nasogastric tube follows the expected contours of the esophagus into   stomach with distal sideholes overlying the left upper quadrant of the   abdomen. Esophageal manometry device appears present terminating at the level   of the esophagogastric junction. XR ABDOMEN FOR NG/OG/NE TUBE PLACEMENT   Final Result      Gaseous distention of a loop of bowel within the left upper abdomen   may be stomach or colon.  Consider

## 2020-01-18 NOTE — PROGRESS NOTES
Dr. Tasha Slaughter at beside regarding patients respiratory status as well as RN. Patient placed on AVAPS mode. RR 18, , 90% (per Dr. Tasha Slaughter to keep SPO2 > 90%) EPAP 8. SPO2 93%.  Continue to monitor

## 2020-01-18 NOTE — PROGRESS NOTES
concerning for proctitis. She underwent bronchoscopy on 01/08 during which mucus plugging was noted with Gram stain and culture showing no polymorphonuclear leukocytes, no epithelial cells, rare Gram-positive diphtheroid-like rods, reduced oral pharyngeal lakshmi, Corynebacterium sp, rare growth of Saccharomyces cerevisiae, Candida tropicalis and parapsilosis. Serum beta-D-glucan was negative. She was extubated on 01/09. CXR on 01/17 showed new left basilar opacity, accompanied by leukocytosis of 14,000. She underwent bronchoscopy on 01/18 during which thick secretions around vocal cords, right middle lobe, left lower lobe, inflamed and friable trachea and main adriano were noted. Subjective: Patient was seen and examined. She is awake, on BiPAP, reports no abdominal pain, no diarrhea. Afebrile. Objective:  /81   Pulse 97   Temp 98.1 °F (36.7 °C) (Temporal)   Resp 22   Ht 5' 6\" (1.676 m)   Wt 94 lb 8 oz (42.9 kg)   SpO2 99%   BMI 15.25 kg/m²   Constitutional: Awake, not in distress  Respiratory: Clear breath sounds, no crackles, no wheezes  Cardiovascular: Regular rate and rhythm, no murmurs  Gastrointestinal: Bowel sounds present, soft, nontender, ostomy in place  Skin: Warm and dry, no active dermatoses  Musculoskeletal: No joint swelling, no joint erythema    Labs, imaging, and medical records/notes were personally reviewed. Assessment:  Leukocytosis, associated with mucus plugging, in the setting of multisystem atrophy  HAP   Septic shock, resolved, secondary to peritonitis from ischemic colitis s/p exploratory laparotomy, subtotal colectomy, and ileostomy on 12/13   Saccharomyces cerevisiae and Candida tropicalis and parapsilosis on bronchial washing, deemed colonization    Recommendations:  Monitor clinically off antibiotics for now. Continue chest physiotherapy. Follow up goals of care. Thank you for involving me in the care of Lola Estrada. I will continue to follow.  Please do not hesitate to call for any questions or concerns.       Electronically signed by Diane Crocker MD on 1/18/2020 at 8:54 AM

## 2020-01-18 NOTE — PROCEDURES
Bronchoscopy Procedure Note  Procedure Date: 1/18/20   Procedure:  Flexible bronchoscopy  Location:  Endoscopy suite  Attending: Dr. Mauro Snow  Anesthesia: conscious sedation with anesthesia, see MAR    Indications: Respiratory failure, mucous plugging    Findings:  1. Thick secretions around vocal cords, right middle lobe, left lower lobe  2. Suction trauma marks in trachea as well as main adriano that were inflamed and friable appearing  3. Weak cough    Procedures Performed:  1. Video bronchoscopy airway survey  2. BAL of right middle lobe  3. Therapeutic aspiration of left and right tracheobronchial tree    Consent:  The risks, benefits, indications, potential complications and alternatives were explained to the patient and informed consent obtained on 1/18/20     Description of Procedure:  Patient had bronchoscopy performed for respiratory failure, lobar collapse and recurrent mucous plugging. Timeout called prior to procedure all present agreed this proper patient procedure. Procedure done under conscious sedation with the help of anesthesia. Patient placed on supplemental oxygen to maintain SPO2 greater than 90% and monitored continuously in the usual fashion. Oral airway placed. Bronchoscope introduced through oral airway into posterior pharynx where the vocal cords were visualized. There were thick secretions on the vocal cords. Normal appearance of vocal cords with minimal movement. Bronchoscope introduced past vocal cords into the trachea. There were significant suction marks inflamed and irritated tissue in the trachea as well as the main adriano where previous suctioning was done. Airway survey performed of RB 1-10 and LB 1-10. There were thick secretions in the right middle lobe, right lower lobe and left lower lobe as well as in the trachea that were suctioned and lavaged. BAL performed of the right middle lobe. Final survey revealed no active bleeding.   Bronchoscope removed

## 2020-01-18 NOTE — PROGRESS NOTES
After turning pt, her oxygen saturation dropped to 72%, immediately switch pt from HFNC to bipap, sat recovered to 85-88%, Dr. Martinez Or was outside of the room at this time, explained to him what happened, adjusted bipap settings (see new orders) and pt sat is now 90%. Will continue to monitor closely.   Electronically signed by Sindy Noyola RN on 1/18/2020 at 5:08 PM

## 2020-01-19 NOTE — PROGRESS NOTES
[facility] PROGRESS NOTE      Chief complaint: Follow-up of HAP    The patient is a 62 y.o. female with history of seizure disorder, Parkinson's disease, multisystem atrophy, presented on 12/29 with increasing weakness found to be in septic shock with ischemic colitis based on CT abdomen and pelvis showing megacolon with marked fluid/air distention on more proximal segments of the colon. She underwent exploratory laparotomy, subtotal colectomy, and ileostomy on 12/13 during which large volume turbid tan malodorous fluid, profoundly distended large bowel with large volume stool burden and ischemic sigmoid colon with out evidence of perforation were noted. Peritoneal fluid Gram stain and culture showed few polymorphonuclear leukocytes, no epithelial cells, no organisms, no growth. Blood cultures showed no growth to date. She was extubated and was off vasopressor support on 12/31 but had oxygen desaturation with CXR showing development of right upper lung infiltrate. She underwent bronchoscopy on 01/02 during which diffuse tracheobronchitis and secretions with airway edema without endobronchial masses/lesions were noted. Bronchial wash Gram stain and culture showed moderate polymorphonuclear leukocytes, rare epithelial cells, rare gram-positive diphtheroid-like rods, oral pharyngeal lakshmi, light growth of Saccharomyces cerevisiae. She was reintubated on 01/03 due to acute on chronic respiratory failure despite on NIPPV. She received a dose of ceftriaxone on admission and has been on piperacillin-tazobactam since admission. She continued to have intermittent febrile episodes.  CT abdomen and pelvis on 01/06 showed subtotal colectomy with right lower quadrant ileostomy with a moderately dilated/ thickened small bowel loops which may be due to ileus/enteritis or low-grade bowel obstruction, rectal remnant significantly dilated/thickened with retained fecal matter and high density material with presacral edema concerning for proctitis. She underwent bronchoscopy on 01/08 during which mucus plugging was noted with Gram stain and culture showing no polymorphonuclear leukocytes, no epithelial cells, rare Gram-positive diphtheroid-like rods, reduced oral pharyngeal lakshmi, Corynebacterium sp, rare growth of Saccharomyces cerevisiae, Candida tropicalis and parapsilosis. Serum beta-D-glucan was negative. She was extubated on 01/09. CXR on 01/17 showed new left basilar opacity, accompanied by leukocytosis of 14,000. She underwent bronchoscopy on 01/18 during which thick secretions around vocal cords, right middle lobe, left lower lobe, inflamed and friable trachea and main adriano were noted. BAL Gram stain and culture showed few polymorphonuclear leukocytes, no epithelial cells, rare Gram-positive cocci, absent oral pharyngeal lakshmi. She became hypoxic and was transferred back to MICU on 01/18. Subjective: Patient was seen and examined. She is awake, reports no abdominal pain, no diarrhea. Afebrile. Objective:  BP (!) 135/92   Pulse 99   Temp 97.1 °F (36.2 °C) (Temporal)   Resp 29   Ht 5' 6\" (1.676 m)   Wt 94 lb 8 oz (42.9 kg)   SpO2 98%   BMI 15.25 kg/m²   Constitutional: Awake, not in distress  Respiratory: Clear breath sounds, no crackles, no wheezes  Cardiovascular: Regular rate and rhythm, no murmurs  Gastrointestinal: Bowel sounds present, soft, nontender, ostomy in place  Skin: Warm and dry, no active dermatoses  Musculoskeletal: No joint swelling, no joint erythema    Labs, imaging, and medical records/notes were personally reviewed.      Assessment:  Leukocytosis, associated with mucus plugging, in the setting of multisystem atrophy  HAP   Septic shock, resolved, secondary to peritonitis from ischemic colitis s/p exploratory laparotomy, subtotal colectomy, and ileostomy on 12/13   Saccharomyces cerevisiae and Candida tropicalis and parapsilosis on bronchial washing, deemed

## 2020-01-19 NOTE — PROGRESS NOTES
[address] [facility]  Department of Internal Medicine  Internal Medicine Residency Program  Resident MICU Progress  Note      Patient:  Ryan Hagen 62 y.o. female MRN: [id number]     Date of Service: 1/19/2020    Allergy: Sulfa antibiotics      Subjective       Patient seen and examined. Patient on on BiPAP this morning. Spoke with  who is her POA about future plan of care, possibility of trach and PEG if reintubated. Objective   Physical Exam:  · Vitals: /82   Pulse 101   Temp 97.1 °F (36.2 °C) (Temporal)   Resp 29   Ht 5' 6\" (1.676 m)   Wt 94 lb 8 oz (42.9 kg)   SpO2 96%   BMI 15.25 kg/m²     · I & O - 24hr: In: 357 [I.V.:137; NG/GT:220]  · Out: 735 [Urine:285]    Physical Exam  Vitals signs and nursing note reviewed. HENT:      Head: Normocephalic and atraumatic. Cardiovascular:      Rate and Rhythm: Normal rate and regular rhythm. Pulses: Normal pulses. Heart sounds: Normal heart sounds. Pulmonary:      Comments: On BiPAP at the  Time of PE  Abdominal:      General: There is no distension. Palpations: Abdomen is soft. Skin:     General: Skin is warm and dry. Capillary Refill: Capillary refill takes less than 2 seconds. Neurological:      Mental Status: She is alert. Mental status is at baseline. Pertinent New Labs & Imaging Studies     CBC:   Recent Labs     01/18/20  0500 01/18/20 2012 01/19/20  0506   WBC 14.6* 15.0* 15.9*   HGB 9.9* 10.0* 10.3*   HCT 32.8* 34.1 34.4   MCV 97.0 98.6 97.2   * 477* 482*       BMP:    Recent Labs     01/18/20  0500  01/18/20 2012 01/19/20  0506 01/19/20  0526     --  138 137  --    K 4.4   < > 4.7 4.6 4.55   CL 93*  --  94* 93*  --    CO2 27  --  26 23  --    BUN 34*  --  41* 51*  --    CREATININE 0.5  --  0.7 0.8  --    GLUCOSE 121*  --  109* 131*  --     < > = values in this interval not displayed.        LIVER PROFILE:   Recent Labs     01/18/20  0500 01/18/20 2012 01/19/20  0506   AST 25 21 22   ALT 10 9 16   BILITOT 0.3 0.3 0.4   ALKPHOS 231* 215* 220*       PT/INR:   No results for input(s): PROTIME, INR in the last 72 hours. APTT:   No results for input(s): APTT in the last 72 hours. Fasting Lipid Panel:    Lab Results   Component Value Date    TRIG 142 2020       Cardiac Enzymes:    Lab Results   Component Value Date    CKTOTAL 46 2020    CKTOTAL 137 2019    CKTOTAL 238 (H) 2016    CKMB 1.5 2020    CKMB 5.7 (H) 2016    TROPONINI <0.01 2020    TROPONINI <0.01 2017    TROPONINI <0.01 2017     Imaging studies:  Xr Abdomen (kub) (single Ap View)    Result Date: 2019  Patient MRN:  [id number] : 1965 Age: 62 years Gender: Female Order Date:  2019 12:45 PM EXAM: XR ABDOMEN (KUB) (SINGLE AP VIEW) NUMBER OF IMAGES:  1 view-2 images  INDICATION: Abdominal distention COMPARISON: None FINDINGS: Right hemicolon is distended with fecal content, and there is fluid filled pelvic small bowel which may be functional outflow obstruction. The transverse colon is distended with gas. There is no evidence of obstructive dilation, perforation, or pathologic fluid levels. Flank and psoas margins are well-defined. No pathologic calcifications are noted, and there is no evidence of organomegaly. Included portions of the lower chest show no evidence of acute pathology. Skeletal structures are unremarkable for acute findings. Tubal sterilization clips are noted in the pelvis. Overlying EKG leads are present. Right hemicolonic fecal distention with functional outflow obstruction of the distal small bowel. The transverse colon is distended with gas, raising questions regarding possible \"institutional bowel\" or chronic constipation. Limited radiographic appearance does not suggest small bowel obstruction.     Ct Chest W Contrast    Result Date: 2019  Patient MRN:  [id number] : 1965 Age: 62 years Gender: Female Order Date:  2019 is evaluate on the CT abdomen performed same time. 1. Atelectasis consolidation of both lower lobes with patent central airways. Findings to be correlated clinically. Can be relate with the hypoventilation of the lower lung bases. Cannot exclude a pneumonia. 2. Discrete patchy bronchocentric infiltrate in the left upper lobe lingular segment. 3. No acute pulmonary emboli seen although there are some limitations for evaluation of the lower lobes and motion artifacts. 4. No aneurysm formation dissection thoracic aorta. 5. Discrete to mild pericardial effusion. Ct Abdomen Pelvis W Iv Contrast Additional Contrast? None    Result Date: 2019  Patient MRN:  [id number] : 1965 Age: 62 years Gender: Female Order Date:  2019 6:45 PM TECHNIQUE/NUMBER OF IMAGES/COMPARISON/CLINICAL HISTORY: CT abdomen pelvis IV contrast After IV contrast administration axial images were obtained with sagittal and coronal reconstructions are. Clinical history abdominal pain. FINDINGS: There is massive dilatation of the colon with a predominant fluid contents. Large amount of solid fecal contents form a pattern of constipation/fecal rectal retention/impaction is seen in the sigmoid colon to the rectum. This can causes bowel obstruction to critical a large amount of fluid the recommendation the more proximal segments of the colon. Consider decompression of the rectum/sigmoid to relieve the contents in more proximal segments of the colon. There are normal size and enhancement for the liver, spleen and the pancreas are. Gallbladder is well distended and has a hydrops appearance. There is some dilatation of the intrahepatic biliary tree and the common bile duct measures close to 9 mm in the head of the pancreas but can be traced with unremarkable appearance through the papilla level. The pancreatic duct is not dilated. Adrenal glands are not enlarged. Kidneys have preserved size and cortical thickness are.  There is an area of effusion     Xr Chest Portable    Result Date: 2019  Patient MRN:  [id number] : 1965 Age: 62 years Gender: Female Order Date:  2019 6:00 AM EXAM: XR CHEST PORTABLE one image INDICATION:  Line / tube management Line / tube management COMPARISON: 2019 FINDINGS: There is cardiomegaly. Endotracheal tube and nasogastric tube are unchanged. There is vascular congestion with a marginal improvement in persistent perihilar and bibasilar infiltrates and pleural effusions. There is improved gastric distention. Stable abnormal chest with  improving CHF. Superimposed pneumonia is not excluded. Xr Chest Portable    Result Date: 2019  Comparison: 2019. Frontal view of the chest. Frontal view of the abdomen. FINDINGS: Endotracheal tube is present with the distal tip 5 cm above the adriano. Nasogastric tube follows the expected contours of the esophagus into stomach with distal sideholes overlying the left upper quadrant of the abdomen. Esophageal manometry device appears present terminating at the level of the esophagogastric junction. Hazy density right lung base. Meniscus left lung base. Contiguous atelectasis or infiltrate. The heart is enlarged. There is calcification within thoracic aorta. Severe gaseous distention of bowel within the left abdomen may be stomach or colon. Remote left rib fractures. Degenerative spondylosis and facet arthropathy within the spine. Gaseous distention of a loop of bowel within the left upper abdomen may be stomach or colon. Consider further workup with computed tomography for better characterization. Bilateral pleural effusions with contiguous atelectasis. Endotracheal tube is present with the distal tip 5 cm above the adriano. Nasogastric tube follows the expected contours of the esophagus into stomach with distal sideholes overlying the left upper quadrant of the abdomen.  Esophageal manometry device appears present terminating at the terminating at the level of the esophagogastric junction. Resident's Assessment & Plan     Neurologic     Seizure disorder  -On Keppra 1000 twice daily at home  -Resume home meds     Cardiovascular     Hypertension  -Labetalol 10 mg as needed every 6     Pulmonary     Acute Hypoxic Respiratory failure   -Status post intubation and extubationx2 since admission  -S/p bronchoscopy today  -Currently oxygenating on AVAPS  -Chest x-ray- 1/18 AM - Airspace disease compatible with atelectasis or pneumonia, worse at the right lung base as compared to the left.   -On breathing treatments Pulmozyme, DuoNeb, hypertonic saline nebulizing treatment, chest vest every 8, zyrtec, mucinex . Try Spirometry.   -Follow morning ABG     Gastrointestinal     s/p subtotal colectomy and ileostomy  -Ileostomy bag in place, pink stoma  - monitor stool OP     Elevated alkaline phosphatase  -ALP-220 today  -CT abdomen and pelvis 1/6 - Somewhat thickened contracted gallbladder with the some pericholecystic edema  -US gallbladder 1/7- No other evidence to suggest acute cholecystitis     Genitourinary/Renal     High anion gap   -Anion gap of- 21  -UA - Trace Leukocyte estrace, Cl <20, K- >1000, <20     Hematology/Oncology      Leukocytosis  -WBC of 15.9, T-max 98.6  -Follow Cx     Normocytic anemia  -Hemoglobin of - 9.9 on presentation, hb today - 10.3  -Likely multifactorial-status post abdominal surgery, frequent blood draws, malnutrition  -Follow CBC daily     Dermatologic/Musculoskeletal     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    - Chronic condition - followed by Dr. Magdalena Baird at [facility] [place]  - On Sinemet    Diet: NPO  DVT/GI prophylaxis: Lovenox/ Protonix  Disposition: KENIA Cordon MD, PGY-1  Attending physician: Dr. Mary Ellen Acosta     Acute resp failure second to NMS disease ,Multiple systemic atrophyMucus plugs removed by Bronch by Dr Tasha Slaughter  On sinemet   Ischemic colitis s/p surgery   Chest vest   Hypertonic

## 2020-01-19 NOTE — PROGRESS NOTES
XR CHEST PORTABLE   Final Result      1. Progressive improvement in right lower lobe pneumonia and   atelectasis. 2. Apparent clearing of previous mild right upper lobe pneumonia. XR CHEST PORTABLE   Final Result      XR CHEST PORTABLE   Final Result   Patchy bilateral pulmonary infiltrates, worst in the lower lungs, with   some improvement in the left lower lung. Small right pleural effusion. US GALLBLADDER RUQ   Final Result      Color wall thickness is increased to 3.2 mm. No other evidence to suggest acute cholecystitis. XR CHEST PORTABLE   Final Result   Stable abnormal chest with the persistent bilateral infiltrates and   pleural effusions with a marginal improvement which may be due to   improving CHF with edema and/or pneumonia. CT ABDOMEN PELVIS W IV CONTRAST Additional Contrast? None   Final Result   Subtotal colectomy with right lower quadrant ileostomy with a   moderately dilated/ thickened small bowel loops which may be due to   ileus/enteritis or low-grade bowel obstruction. The rectal remnant is significantly dilated/ thickened with retained   fecal matter and high density material with the presacral edema   concerning for proctitis. Infiltrates and pleural effusions in the lung bases. Somewhat thickened contracted gallbladder with the some   pericholecystic edema. CT CHEST W CONTRAST   Final Result   Persistent patchy bibasilar infiltrates and pleural effusions and   groundglass infiltrates in the right upper lobe likely diffuse   pneumonia. No lung abscess is identified.             XR CHEST PORTABLE   Final Result   Tortuous ectatic aorta   Cardiomegaly    Airspace disease compatible with pneumonia, there may be a component   of pulmonary vascular congestion present   The chest does not appear to be significantly changed in the interval                  XR CHEST PORTABLE   Final Result   Improved yet persistent XR CHEST PORTABLE   Final Result   NG tube reaches the stomach               XR CHEST PORTABLE   Final Result   Partial clearing of bibasilar infiltrates and left effusion               XR CHEST PORTABLE   Final Result   Stable abnormal chest with  improving CHF. Superimposed pneumonia is   not excluded. CT CHEST W CONTRAST   Final Result   1. Atelectasis consolidation of both lower lobes with patent central   airways. Findings to be correlated clinically. Can be relate with the   hypoventilation of the lower lung bases. Cannot exclude a pneumonia. 2. Discrete patchy bronchocentric infiltrate in the left upper lobe   lingular segment. 3. No acute pulmonary emboli seen although there are some limitations   for evaluation of the lower lobes and motion artifacts. 4. No aneurysm formation dissection thoracic aorta. 5. Discrete to mild pericardial effusion. CT ABDOMEN PELVIS W IV CONTRAST Additional Contrast? None   Final Result   1. Findings for severe constipation with a large amount of fecal   contents/rotation/impaction from the sigmoid colon to the rectum,   causing a pattern of megacolon with marked fluid/air distentions or   more proximal segments of the colon. 2. Consider decompression of the rectum/sigmoid to permit relieve of   the more proximal segments of the colon. 3. Marked: Distention can explain hypoventilation lower lung bases   causing atelectasis or consolidations with overall patent central   airways of both lower lobes. Please correlate clinically. 4. There is no dilatation of the biliary tree, to be reevaluated   relieve of the megacolon pattern which cause significant mass effect   throughout the entire abdomen peritoneal cavity. .      5. Mild ascites in the abdomen. 6. No free intraperitoneal air observed.        XR CHEST PORTABLE   Final Result      Gaseous distention of a loop of bowel within the left upper abdomen   may be stomach or

## 2020-01-19 NOTE — PATIENT CARE CONFERENCE
[facility] Quality Flow/Interdisciplinary Rounds Progress Note        Quality Flow Rounds held on January 19, 2020    Disciplines Attending:  ICU rounding team, resp therapy, bedside nurse, charge nurse    Fred Eduar was admitted on 12/29/2019  9:30 AM    Anticipated Discharge Date:  Expected Discharge Date: 01/22/20    Disposition:    Zay Score:  Zay Scale Score: 13    Readmission Risk              Risk of Unplanned Readmission:        21           Discussed patient goal for the day, patient clinical progression, and barriers to discharge. The following Goal(s) of the Day/Commitment(s) have been identified:  Continue to monitor and treat as indicated.       Mehdi Spence  January 19, 2020

## 2020-01-19 NOTE — PROGRESS NOTES
Jackie Riley M.D.,[facility]  Larisa Patel D.O., F.A.C.O.I., Faby Fulton M.D. Radha Cortez M.D., Shaista Ureña M.D. Ej Ely D.O. Daily Pulmonary Progress Note    Patient:  Ovidio Ortega 62 y.o. female MRN: [id number]     Date of Service: 1/19/2020        Subjective      Patient was seen and examined. Patient had bronc yesterday 1/18 for mucous plugging. Thick mucus in multi lobes and around vocal cords. That was suctioned. Later in the evening had some dyspnea increased work of breathing with positioning. See previous notes. Patient transferred to critical care unit. Patient more comfortable this morning and been wearing NIV.     Objective   Vitals: /82   Pulse 101   Temp 97.1 °F (36.2 °C) (Temporal)   Resp 29   Ht 5' 6\" (1.676 m)   Wt 94 lb 8 oz (42.9 kg)   SpO2 96%   BMI 15.25 kg/m²     I/O:    Intake/Output Summary (Last 24 hours) at 1/19/2020 1249  Last data filed at 1/19/2020 0800  Gross per 24 hour   Intake 357 ml   Output 935 ml   Net -578 ml       CURRENT MEDS :  Scheduled Meds:   cetirizine  5 mg Oral Daily    lidocaine  1 patch Transdermal Daily    potassium bicarb-citric acid  40 mEq Oral Daily    ipratropium-albuterol  1 ampule Inhalation Q4H WA    guaiFENesin  400 mg Oral TID    sodium chloride (Inhalant)  2 mL Nebulization Q4H    dornase alpha  2.5 mg Inhalation Daily    carbidopa-levodopa  2.5 tablet Oral 5x Daily    carbidopa-levodopa  2 tablet Oral Nightly    pantoprazole  40 mg Intravenous Daily    And    sodium chloride (PF)  10 mL Intravenous Daily    sodium chloride flush  10 mL Intravenous 2 times per day    enoxaparin  40 mg Subcutaneous Daily    levetiracetam  1,000 mg Intravenous Q12H    lidocaine   Topical Once       Continuous Infusions:   dextrose      dextrose 5 % and 0.9 % NaCl 100 mL/hr at 01/19/20 1204       PRN Meds:  acetaminophen, analgesic ointment, medicated lip balm, glucose, dextrose,

## 2020-01-19 NOTE — PROGRESS NOTES
Date: 1/18/2020    Time: 8:35 PM    Patient Placed On BIPAP/CPAP/ Non-Invasive Ventilation? No    If no must comment. Facial area red/color change? No           If YES are Blister/Lesion present? No   If yes must notify nursing staff  BIPAP/CPAP skin barrier?   Yes    Skin barrier type:mepilex       Comments:18/400/70/8        Preet Strong

## 2020-01-19 NOTE — CONSULTS
Epinephrine    CENTRAL LINES:     [x] No   [] Yes   (Date of Insertion:   )           If yes -     [] Right IJ     [] Left IJ [] Right Femoral [] Left Femoral                   [] Right Subclavian [] Left Subclavian     BHARDWAJ'S CATHETER:   [] No   [x] Yes  (Date of Insertion:   )     URINE OUTPUT:            [x] Good   [] Low              [] Anuric    REVIEW OF SYSTEMS:    · On AVAPS unable to talk    OBJECTIVE:     VITAL SIGNS:  /69   Pulse 110   Temp 98.2 °F (36.8 °C) (Temporal)   Resp (!) 34   Ht 5' 6\" (1.676 m)   Wt 94 lb 8 oz (42.9 kg)   SpO2 93%   BMI 15.25 kg/m²   Tmax over 24 hours:  Temp (24hrs), Av °F (36.7 °C), Min:97.5 °F (36.4 °C), Max:98.6 °F (37 °C)      Patient Vitals for the past 6 hrs:   BP Temp Temp src Pulse Resp SpO2   20 1744 -- -- -- -- (!) 34 --   20 1739 -- -- -- -- -- 93 %   20 1730 -- -- -- -- (!) 36 --   20 1713 117/69 98.2 °F (36.8 °C) Temporal 110 (!) 33 (!) 87 %   20 1330 -- -- -- -- -- 93 %         Intake/Output Summary (Last 24 hours) at 2020 1918  Last data filed at 2020 1508  Gross per 24 hour   Intake 782 ml   Output 1100 ml   Net -318 ml     Wt Readings from Last 2 Encounters:   20 94 lb 8 oz (42.9 kg)   18 120 lb (54.4 kg)     Body mass index is 15.25 kg/m².       PHYSICAL EXAMINATION:  General appearance - in acute distress  Mental status - anxious  Eyes - pupils equal and reactive, extraocular eye movements intact  Chest -coarse breath sounds noted bilaterally, patient on AVAPS  Heart - normal rate, regular rhythm, normal S1, S2, no murmurs, rubs, clicks or gallops  Abdomen - soft, nontender, pink stoma , ostomy bag in place   neurological -nonverbal on AVAPS  Extremities - peripheral pulses normal, no pedal edema, no clubbing or cyanosis  Skin - normal coloration and turgor, no rashes, no suspicious skin lesions noted     MEDICATIONS:  Scheduled Meds:   cetirizine  5 mg Oral Daily    lidocaine  1 patch

## 2020-01-20 NOTE — ADT AUTH CERT
Sepsis and Other Febrile Illness, without Focal Infection - Care Day 23 (1/20/2020) by Melissa Barnhart RN         Review Status Review Entered   Completed 1/20/2020 14:38       Criteria Review      Care Day: 23 Care Date: 1/20/2020 Level of Care:    Guideline Day 4    Level Of Care    (X) Floor to discharge [J]    1/20/2020 2:38 PM EST by Deedee Kennedy      tele    Clinical Status    ( ) * Hemodynamic stability    ( ) * Fever absent or acceptable for next level of care    ( ) * Hypoxemia absent    ( ) * Tachypnea absent    ( ) * Cultures negative or infection identified and under adequate treatment    ( ) * Mental status at baseline    ( ) * Metabolic derangement (eg, dehydration, acidosis) absent    ( ) * End-organ dysfunction (eg, myocardial ischemia, renal failure) absent    ( ) * Discharge plans and education understood    Activity    ( ) * Ambulatory    Routes    ( ) * Oral hydration, medications [K]    Interventions    (X) WBC    1/20/2020 2:38 PM EST by Deedee Kennedy      wbc 10.7    Medications    ( ) * Antimicrobial treatment not necessary or treatment at next level of care arranged [E]    * Milestone   Additional Notes   01/20/20 0800  98.5 (36.9)  24  87  143/80Abnormal    100% on 12L hi flow o2            Abd w cxr= 1. Improving atelectasis. 2. No obvious free air. 3. Moderate postoperative abdomen ileus.          WBC 10.7 E9/L      RBC 2.87 E12/L      Hemoglobin 8.4 g/dL      Hematocrit 28.6 %      MCV 99.7 fL      MCH 29.3 pg      MCHC 29.4 %      RDW 18.1 fL      Platelets 876 L0/R       Sodium 141 mmol/L      Potassium reflex Magnesium 3.2 mmol/L      Chloride 104 mmol/L      CO2 21 mmol/L      Anion Gap 16 mmol/L      Glucose 142 mg/dL      BUN 47 mg/dL      CREATININE 0.5 mg/dL      GFR Non-African American >60 mL/min/1.73      GFR African American >60     Calcium 9.6 mg/dL      Total Protein 6.9 g/dL      Alb 3.5 g/dL      Total Bilirubin 0.2 mg/dL      Alkaline Phosphatase 168 U/L protonix qd, d/c ivf, icu tele, above meds            DCP:  Patient has returned to ICU and currently on 12L hfnc vs bipap 70%. I have Matt Reynolds from [facility] looking at this case again since patient's condition has worsened as patient had choiced for [facility] Pena earlier this admission. If [facility] time of [facility] [address] [facility] was choiced by patient- Abram Cagle is following; HENS, envelope, and ambulance form on chart. CM/SW will continue to follow for discharge planning. Update: P2P has ; going to submit expedited appeal to insurance for LTAC.

## 2020-01-20 NOTE — PROGRESS NOTES
[facility] Quality Flow/Interdisciplinary Rounds Progress Note        Quality Flow Rounds held on January 20, 2020    Disciplines Attending:  Dr Oliva Cedeño was admitted on 12/29/2019  9:30 AM    Anticipated Discharge Date:  Expected Discharge Date: 01/22/20    Disposition:    Zay Score:  Zay Scale Score: 10    Readmission Risk              Risk of Unplanned Readmission:        22           Discussed patient goal for the day, patient clinical progression, and barriers to discharge.   The following Goal(s) of the Day/Commitment(s) have been identified:  Wean O2 continue treatment      Juan Pablo Tan  January 20, 2020

## 2020-01-20 NOTE — PROGRESS NOTES
[address] [facility]  Department of Internal Medicine  Internal Medicine Residency Program  Resident MICU Progress  Note      Patient:  Donte Yañez 62 y.o. female MRN: [id number]     Date of Service: 1/19/2020    Allergy: Sulfa antibiotics    Follow respiratory failure    Subjective       Patient seen and examined. Patient on on BiPAP this morning. Spoke with  who is her POA about future plan of care, possibility of trach and PEG if reintubated. Objective   Physical Exam:  · Vitals: /89   Pulse 90   Temp 98.2 °F (36.8 °C) (Temporal)   Resp 26   Ht 5' 6\" (1.676 m)   Wt 94 lb 8 oz (42.9 kg)   SpO2 99%   BMI 15.25 kg/m²     I & O - 24hr: In: 923 [I.V.:683; NG/GT:240]  · Out: 820 [Urine:295]    Physical Exam  Vitals signs and nursing note reviewed. HENT:      Head: Normocephalic and atraumatic. Cardiovascular:      Rate and Rhythm: Normal rate and regular rhythm. Pulses: Normal pulses. Heart sounds: Normal heart sounds. Pulmonary:      Comments: On BiPAP at the  Time of PE  Abdominal:      General: There is no distension. Palpations: Abdomen is soft. Skin:     General: Skin is warm and dry. Capillary Refill: Capillary refill takes less than 2 seconds. Neurological:      Mental Status: She is alert. Mental status is at baseline. Pertinent New Labs & Imaging Studies     CBC:   Recent Labs     01/18/20  0500 01/18/20 2012 01/19/20  0506   WBC 14.6* 15.0* 15.9*   HGB 9.9* 10.0* 10.3*   HCT 32.8* 34.1 34.4   MCV 97.0 98.6 97.2   * 477* 482*       BMP:    Recent Labs     01/18/20  0500  01/18/20 2012 01/19/20  0506 01/19/20  0526     --  138 137  --    K 4.4   < > 4.7 4.6 4.55   CL 93*  --  94* 93*  --    CO2 27  --  26 23  --    BUN 34*  --  41* 51*  --    CREATININE 0.5  --  0.7 0.8  --    GLUCOSE 121*  --  109* 131*  --     < > = values in this interval not displayed.        LIVER PROFILE:   Recent Labs     01/18/20  0500 01/18/20 2012 20  0506   AST 25 21 22   ALT 10 9 16   BILITOT 0.3 0.3 0.4   ALKPHOS 231* 215* 220*       PT/INR:   No results for input(s): PROTIME, INR in the last 72 hours. APTT:   No results for input(s): APTT in the last 72 hours. Fasting Lipid Panel:    Lab Results   Component Value Date    TRIG 142 2020       Cardiac Enzymes:    Lab Results   Component Value Date    CKTOTAL 46 2020    CKTOTAL 137 2019    CKTOTAL 238 (H) 2016    CKMB 1.5 2020    CKMB 5.7 (H) 2016    TROPONINI <0.01 2020    TROPONINI <0.01 2017    TROPONINI <0.01 2017     Imaging studies:  Xr Abdomen (kub) (single Ap View)    Result Date: 2019  Patient MRN:  [id number] : 1965 Age: 62 years Gender: Female Order Date:  2019 12:45 PM EXAM: XR ABDOMEN (KUB) (SINGLE AP VIEW) NUMBER OF IMAGES:  1 view-2 images  INDICATION: Abdominal distention COMPARISON: None FINDINGS: Right hemicolon is distended with fecal content, and there is fluid filled pelvic small bowel which may be functional outflow obstruction. The transverse colon is distended with gas. There is no evidence of obstructive dilation, perforation, or pathologic fluid levels. Flank and psoas margins are well-defined. No pathologic calcifications are noted, and there is no evidence of organomegaly. Included portions of the lower chest show no evidence of acute pathology. Skeletal structures are unremarkable for acute findings. Tubal sterilization clips are noted in the pelvis. Overlying EKG leads are present. Right hemicolonic fecal distention with functional outflow obstruction of the distal small bowel. The transverse colon is distended with gas, raising questions regarding possible \"institutional bowel\" or chronic constipation. Limited radiographic appearance does not suggest small bowel obstruction.     Ct Chest W Contrast    Result Date: 2019  Patient MRN:  [id number] : 1965 Age: 62 years Gender: Female Order Date:  12/29/2019 2:00 PM TECHNIQUE/NUMBER OF IMAGES/COMPARISON/CLINICAL HISTORY: CT chest After IV contrast administration axial images were obtained with sagittal and coronal MIP reconstructions of pulmonary arterial circulation thoracic aorta. Clinical history abdominal distention, abdominal pain, for evolution the pulmonary arterial circulation. 64-year-old female patient had previous appendectomy. The history of Parkinson's disease is seizures. FINDINGS: There are motion artifacts which cause misregistration of images makes difficult evaluation of the pulmonary arterial circulation towards the lower lung bases. There is no conspicuous acute central pulmonary embolus in the main PA, right and left main PAs in their lobar and segmental and more proximal subsegmental branches. There is no aneurysm formation dissection thoracic aorta. Heart has normal size. Inner diameter for the left ventricular cavity is 4.5 cm and for the right ventricular cavity is 4.4 cm. There is discrete to mild pericardial effusion. There is no mediastinal masses or adenopathy. Diameter for the ascending aorta is 3.5 cm and for the main pulmonary arteries up to 2.5 cm . There is no pericardial effusion. Presence of a consolidation with atelectasis of the right and left lower lobe with patent central airways. Air bronchograms are present. Findings can be relate with hypoventilation lower lung base. Please correlate clinically. There is no conspicuous or pleural effusions. Endotracheal tube is in good position. The patient has a NG tube with the tip in the stomach. There are also compression atelectasis in the posterior aspect of the both upper lobes. The anterior aspect of the upper lobes are better aerated. A discrete the area bronchocentric infiltrate seen towards the lingular segment of the left upper lobe. Upper abdominal structures are not fully covered on this study.  There is a generalized fluid distention of the bowel including the colon. The abdomen is evaluate on the CT abdomen performed same time. 1. Atelectasis consolidation of both lower lobes with patent central airways. Findings to be correlated clinically. Can be relate with the hypoventilation of the lower lung bases. Cannot exclude a pneumonia. 2. Discrete patchy bronchocentric infiltrate in the left upper lobe lingular segment. 3. No acute pulmonary emboli seen although there are some limitations for evaluation of the lower lobes and motion artifacts. 4. No aneurysm formation dissection thoracic aorta. 5. Discrete to mild pericardial effusion. Ct Abdomen Pelvis W Iv Contrast Additional Contrast? None    Result Date: 2019  Patient MRN:  [id number] : 1965 Age: 62 years Gender: Female Order Date:  2019 6:45 PM TECHNIQUE/NUMBER OF IMAGES/COMPARISON/CLINICAL HISTORY: CT abdomen pelvis IV contrast After IV contrast administration axial images were obtained with sagittal and coronal reconstructions are. Clinical history abdominal pain. FINDINGS: There is massive dilatation of the colon with a predominant fluid contents. Large amount of solid fecal contents form a pattern of constipation/fecal rectal retention/impaction is seen in the sigmoid colon to the rectum. This can causes bowel obstruction to critical a large amount of fluid the recommendation the more proximal segments of the colon. Consider decompression of the rectum/sigmoid to relieve the contents in more proximal segments of the colon. There are normal size and enhancement for the liver, spleen and the pancreas are. Gallbladder is well distended and has a hydrops appearance. There is some dilatation of the intrahepatic biliary tree and the common bile duct measures close to 9 mm in the head of the pancreas but can be traced with unremarkable appearance through the papilla level. The pancreatic duct is not dilated. Adrenal glands are not enlarged.  Kidneys have preserved size and cortical thickness bibasilar infiltrates and left effusion     Xr Chest Portable    Result Date: 2019  Patient MRN:  [id number] : 1965 Age: 62 years Gender: Female Order Date:  2019 6:00 AM EXAM: XR CHEST PORTABLE one image INDICATION:  Line / tube management Line / tube management COMPARISON: 2019 FINDINGS: There is cardiomegaly. Endotracheal tube and nasogastric tube are unchanged. There is vascular congestion with a marginal improvement in persistent perihilar and bibasilar infiltrates and pleural effusions. There is improved gastric distention. Stable abnormal chest with  improving CHF. Superimposed pneumonia is not excluded. Xr Chest Portable    Result Date: 2019  Comparison: 2019. Frontal view of the chest. Frontal view of the abdomen. FINDINGS: Endotracheal tube is present with the distal tip 5 cm above the adriano. Nasogastric tube follows the expected contours of the esophagus into stomach with distal sideholes overlying the left upper quadrant of the abdomen. Esophageal manometry device appears present terminating at the level of the esophagogastric junction. Hazy density right lung base. Meniscus left lung base. Contiguous atelectasis or infiltrate. The heart is enlarged. There is calcification within thoracic aorta. Severe gaseous distention of bowel within the left abdomen may be stomach or colon. Remote left rib fractures. Degenerative spondylosis and facet arthropathy within the spine. Gaseous distention of a loop of bowel within the left upper abdomen may be stomach or colon. Consider further workup with computed tomography for better characterization. Bilateral pleural effusions with contiguous atelectasis. Endotracheal tube is present with the distal tip 5 cm above the adriano. Nasogastric tube follows the expected contours of the esophagus into stomach with distal sideholes overlying the left upper quadrant of the abdomen.  Esophageal manometry device

## 2020-01-20 NOTE — PROGRESS NOTES
Palliative Care Department  Palliative Care Progress Note  Provider: Leroy Devlin Day: 23    Referring Provider:  Dr. Kenisha Dockery    Reason for Consult:  [x]  Code status Discussion  [x]  Assist with goals of care  [x]  Psychosocial support  []  Symptom Management  []  Advanced Care Planning    Chief Complaint: Kimberly Streeter is a 62 y.o. female with chief complaint of generalized weakness. 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 female with significant past medical history of arthritis, depression, hypertension, Parkinson's disease and seizure who presented with generalized weakness. Patient hypotensive in ED and pressor was started. UA positive in ED. Transferred to MICU for further management. On 12/29, patient became hypoxic and unresponsive-intubated at this time. CT chest was done and showed bilateral lobe infiltrates. CT abdomen pelvis also done demonstrating distended large bowel with fecal impaction from rectum to proximal sigmoid colon with air/fluid levels proximal to the sigmoid colon. On 12/30, patient underwent exploratory laparotomy, subtotal colectomy, and end ileostomy for ischemic colitis. Patient was extubated on 12/31 however required reintubation due to decline in respiratory status. General surgery and ID following. Patient remains intubated and sedated in MICU. Subjective/Events/Discussions:    1/20: Patient had bronchoscopy done on 1/18 which showed large amounts of mucus. Patient was placed on BiPAP post-procedure however patient desaturated while being turned and RRT was called. Patient transferred back to MICU. Patient alert and oriented to self, place and time-however very disoriented to situation. Brother, jian, at bedside. , Ulysses Carbine, called. Ulysses Carbine wishes for patient to remain full code. He states his goals remain the same-he wants patient to be able to transfer to LTAC to gain strength and hopefully return home eventually. Support offered. 1/13: Patient extubated, on nasal cannula/BiPAP, NGT in place. Patient states she is feeling better, just weak. Voice is very hoarse and soft. Goal is to [facility]. Patient is hopeful she will get stronger. Patient to remain FULL CODE. Interested in filling out [address] notify palliative medicine LSW.        1/4 Patient seen,  and father at bedside. Palliative medicine  also at bedside for conversation. , Ulysseslianet Gillespie, explains that he was patient's primary caregiver at home. Patient was dependent with all ADLs and required help from her  for dressing, bathing, cooking, cleaning, etc.  hired ancillary help to assist with care for patient at home (5x/ week). Patient was using a walker to get around however she required someone to walk next to her because she was falling so frequently. At this time, Ulysses Carbine remains hopeful that patient can be extubated. We discussed code status and  would like to keep patient FULL CODE at this time. Ulysses Carbine states that patient has never been through anything like this before and he would like to give her every chance to get better. Patient's father, Unique Lemus, in agreement with this plan. Ulysses Carbine thinks that patient may have a living will/HCPOA and is going to look for it-he is to bring in a copy if found.  Ulysses Carbine also discussed d/c planning and commands-weakness noted, oriented x3-however confused at times        Current Medications:  Inpatient medications reviewed: yes  Home Medications reviewed: yes    Results/Verification of Data Review  Objective data reviewed: labs, images, records, medication use, vitals and chart      - Advanced Directives: Living Will and HC-POA   -  - Spiritual assessment: No spiritual distress identified     - Bereavement and grief: to be determined    - Discharge planning: discharge to ECF    - Prognosis: unknown    - Referrals to: none today    Time/Communication  Greater than 51% of time spent, total 25 minutes in counseling and coordination of care at the bedside regarding goals of care, diagnosis and prognosis and see above. Assessment/Plan   Patient transferred back to MICU after RRT for respiratory distress. Patient in bed resting on high flow nasal cannula with brother and sister in law at bedside. 1. Code status: FULL CODE  2. Support: -Ray, brother-Carlos; family very involved in care  3. Goals:  states his goal continues to be for patient to gain strength and eventually be able to return home     Ed Leon APRN-CNP  Palliative Medicine    Discussed patient and the plan of care with the other IDT members of Palliative Care, and with patient, family and floor nurse. Thank you for allowing Palliative Medicine to participate in the care of [place]. Note: This report was completed using computerInveshare voiced recognition software. Every effort has been made to ensure accuracy; however, inadvertent computerized transcription errors may be present.

## 2020-01-20 NOTE — PROGRESS NOTES
Date: 1/19/2020    Time: 11:26 PM    Patient Placed On BIPAP/CPAP/ Non-Invasive Ventilation? Yes    If no must comment. Facial area red/color change? No           If YES are Blister/Lesion present? No   If yes must notify nursing staff  BIPAP/CPAP skin barrier?   Yes    Skin barrier type:mepilex       Comments:        Viveca Shoulder

## 2020-01-20 NOTE — CARE COORDINATION
Patient has returned to ICU and currently on 12L hfnc vs bipap 70%. I have Ileana Mendez from [facility] looking at this case again since patient's condition has worsened as patient had choiced for [facility] earlier this admission. If [facility] is appropriate at time of [facility] [address] [facility] was choiced by patient- [employer] is following; HENS, envelope, and ambulance form on chart. CM/SW will continue to follow for discharge planning. Update: P2P has ; going to submit expedited appeal to insurance for LTAC.

## 2020-01-20 NOTE — PLAN OF CARE
Problem: Pain:  Goal: Pain level will decrease  Description  Pain level will decrease  Outcome: Met This Shift  Goal: Control of acute pain  Description  Control of acute pain  Outcome: Met This Shift  Goal: Control of chronic pain  Description  Control of chronic pain  Outcome: Met This Shift     Problem:  Bowel/Gastric:  Goal: Complications related to the disease process, condition or treatment will be avoided or minimized  Outcome: Met This Shift

## 2020-01-20 NOTE — PROGRESS NOTES
Physical Therapy    Re-evaluation     Name: Jorge Bernard  : 1965  MRN: [id number]    Date of Service: 2020    Evaluating PT:  Rosy Polk, PT, DPT [id number]    Room #:  2655/3939-T  Diagnosis:  Hypotension   PMHx:  Arthritis, depression, MSA, HTN, Parkinson's disease, seizure   Procedures:  Exlap, subtotal colectomy, end ileostomy 19;  Bronch 20   Pertinent info: Intubated -, Bronch , Re-intubated 1/3, Extubated   Precautions:  Falls, O2, NGT, Ostomy, B prevalon boots   Equipment Needs:  B THOMAS    Pt lives with  in a 1 story home with ramp entry. Bedroom and bathroom are on the first level. Pt ambulated with U-Step Walker vs. Motor wheelchair PTA per . She reports she requires heavy assist to pivot to chair. Requires assistance for ADLs at baseline. Mostly helped by her  and her parents assist as well. Re-evaluation on 20 d/t transfer out of intensive care and return to intensive care for respiratory distress. Re Evaluation  Date: 20  Short Term/ Long Term   Goals   AM-PAC 6 Clicks      Was pt agreeable to Eval/treatment? Yes     Does pt have pain? Reports pain in BLE with passive movement      Bed Mobility  Rolling: Dep  Supine to sit: Dep  Sit to supine: Dep x2  Scooting: Dep   Rolling: Mod A  Supine to sit: Mod A  Sit to supine: Mod A  Scooting: Mod A   Transfers Sit to stand: NT  Stand to sit: NT  Stand pivot: NT  Sit to stand: Max A  Stand to sit: Max A  Stand pivot: Max A with AAD   Ambulation    NT  >10 feet with AAD Max A   Stair negotiation: ascended and descended  NT  NA   ROM BUE:  Per OT eval   RLE:  Passively WFL  LLE:  Plantarflexion/supination at foot/ankle and slight adduction at hip      Strength BUE:  Per OT eval   RLE:  Grossly 2+/5  LLE:  Grossly 1/5 at ankle, 2/5 at knee and hip   RLE>3+/5  LLE>3/5    Balance Sitting EOB:  Max A  Dynamic Standing:  NT  Sitting EOB:  SBA  Dynamic Standing:   Mod A     Pt

## 2020-01-20 NOTE — PROGRESS NOTES
[x] Strength [x]  Cognition [x]  Functional transfers  [x] IADLs [x] Safety Awareness [x]  Endurance [x]  Fine Motor Coordination [x] Balance [x] Vision/perception [x] Sensation []   Gross Motor Coordination [x] ROM [x] Delirium [x]                  Motor Control []    Plan of Care:  ADL retraining [x]   Equipment needs [x]   Neuromuscular re-education [x] Energy Conservation Techniques [x]  Functional Transfer training [x] Patient and/or Family Education [x]  Functional Mobility training [x]  Environmental Modifications [x]  Cognitive re-training [x]   Compensatory techniques for ADLs [x]  Splinting Needs [x]   Positioning to improve overall function [x]   Therapeutic Activity [x]                       Therapeutic Exercise  [x]  Visual/Perceptual: [x]    Delirium prevention/treatment  [x]   Other:  []    Rehab Potential: Good for established goals    Patient / Family Goal: None stated     Patient and/or family were instructed/educated on diagnosis, prognosis/goals and plan of care. Demonstrated G understanding, further information not needed. [] Malnutrition indicators have been identified and nursing has been notified to ensure a dietitian consult is ordered. Evaluation time includes thorough review of current medical information, gathering information on past medical & social history & PLOF, completion of standardized testing, informal observation of tasks, consultation with other medical professions/disciplines, assessment of data & development of POC/goals.      Re-evaluation + 35 treatment minutes  Time in: [address]  Time out: [address] [facility], [address] [facility]

## 2020-01-20 NOTE — PROGRESS NOTES
[facility] PROGRESS NOTE      Chief complaint: Follow-up of HAP    The patient is a 62 y.o. female with history of seizure disorder, Parkinson's disease, multisystem atrophy, presented on 12/29 with increasing weakness found to be in septic shock with ischemic colitis based on CT abdomen and pelvis showing megacolon with marked fluid/air distention on more proximal segments of the colon. She underwent exploratory laparotomy, subtotal colectomy, and ileostomy on 12/13 during which large volume turbid tan malodorous fluid, profoundly distended large bowel with large volume stool burden and ischemic sigmoid colon with out evidence of perforation were noted. Peritoneal fluid Gram stain and culture showed few polymorphonuclear leukocytes, no epithelial cells, no organisms, no growth. Blood cultures showed no growth to date. She was extubated and was off vasopressor support on 12/31 but had oxygen desaturation with CXR showing development of right upper lung infiltrate. She underwent bronchoscopy on 01/02 during which diffuse tracheobronchitis and secretions with airway edema without endobronchial masses/lesions were noted. Bronchial wash Gram stain and culture showed moderate polymorphonuclear leukocytes, rare epithelial cells, rare gram-positive diphtheroid-like rods, oral pharyngeal lakshmi, light growth of Saccharomyces cerevisiae. She was reintubated on 01/03 due to acute on chronic respiratory failure despite on NIPPV. She received a dose of ceftriaxone on admission and has been on piperacillin-tazobactam since admission. She continued to have intermittent febrile episodes.  CT abdomen and pelvis on 01/06 showed subtotal colectomy with right lower quadrant ileostomy with a moderately dilated/ thickened small bowel loops which may be due to ileus/enteritis or low-grade bowel obstruction, rectal remnant significantly dilated/thickened with retained fecal matter and high density material with presacral edema concerning for proctitis. She underwent bronchoscopy on 01/08 during which mucus plugging was noted with Gram stain and culture showing no polymorphonuclear leukocytes, no epithelial cells, rare Gram-positive diphtheroid-like rods, reduced oral pharyngeal lakshmi, Corynebacterium sp, rare growth of Saccharomyces cerevisiae, Candida tropicalis and parapsilosis. Serum beta-D-glucan was negative. She was extubated on 01/09. CXR on 01/17 showed new left basilar opacity, accompanied by leukocytosis of 14,000. She underwent bronchoscopy on 01/18 during which thick secretions around vocal cords, right middle lobe, left lower lobe, inflamed and friable trachea and main adriano were noted. BAL Gram stain and culture showed few polymorphonuclear leukocytes, no epithelial cells, rare Gram-positive cocci, absent oral pharyngeal lakshmi. She became hypoxic and was transferred back to MICU on 01/18. Subjective: Patient was seen and examined. She is awake, reports no abdominal pain, no diarrhea. Afebrile. She wants to have something to eat and drink. Objective:  /73   Pulse 83   Temp 98.5 °F (36.9 °C) (Oral)   Resp 23   Ht 5' 6\" (1.676 m)   Wt 94 lb (42.6 kg)   SpO2 100%   BMI 15.17 kg/m²   Constitutional: Awake, not in distress  Respiratory: Clear breath sounds, no crackles, no wheezes  Cardiovascular: Regular rate and rhythm, no murmurs  Gastrointestinal: Bowel sounds present, soft, nontender, ostomy in place  Skin: Warm and dry, no active dermatoses  Musculoskeletal: No joint swelling, no joint erythema    Labs, imaging, and medical records/notes were personally reviewed.      Assessment:  Leukocytosis, associated with mucus plugging, in the setting of multisystem atrophy  HAP, received 14-day course of empiric piperacillin-tazobactam  Septic shock, resolved, secondary to peritonitis from ischemic colitis s/p exploratory laparotomy, subtotal colectomy, and ileostomy on 12/13   Saccharomyces cerevisiae and Candida tropicalis and parapsilosis on bronchial washing, deemed colonization    Recommendations:  Monitor clinically off antibiotics for now. Continue chest physiotherapy. Follow up goals of care. Thank you for involving me in the care of Lola Estrada. I will continue to follow. Please do not hesitate to call for any questions or concerns.       Electronically signed by Kulwant Ramos MD on 1/20/2020 at 10:12 AM

## 2020-01-20 NOTE — PROGRESS NOTES
137  --  141   K 4.7 4.6 4.55 3.2*   CL 94* 93*  --  104   CO2 26 23  --  21*   BUN 41* 51*  --  47*   CREATININE 0.7 0.8  --  0.5   GLUCOSE 109* 131*  --  142*       LIVER PROFILE:   Recent Labs     20  0506 20  0430   AST 21 22 16   ALT 9 16 <5   BILITOT 0.3 0.4 0.2   ALKPHOS 215* 220* 168*       PT/INR:   No results for input(s): PROTIME, INR in the last 72 hours. APTT:   No results for input(s): APTT in the last 72 hours. Fasting Lipid Panel:    Lab Results   Component Value Date    TRIG 142 2020       Cardiac Enzymes:    Lab Results   Component Value Date    CKTOTAL 46 2020    CKTOTAL 137 2019    CKTOTAL 238 (H) 2016    CKMB 1.5 2020    CKMB 5.7 (H) 2016    TROPONINI <0.01 2020    TROPONINI <0.01 2017    TROPONINI <0.01 2017     Imaging studies:  Xr Abdomen (kub) (single Ap View)    Result Date: 2019  Patient MRN:  [id number] : 1965 Age: 62 years Gender: Female Order Date:  2019 12:45 PM EXAM: XR ABDOMEN (KUB) (SINGLE AP VIEW) NUMBER OF IMAGES:  1 view-2 images  INDICATION: Abdominal distention COMPARISON: None FINDINGS: Right hemicolon is distended with fecal content, and there is fluid filled pelvic small bowel which may be functional outflow obstruction. The transverse colon is distended with gas. There is no evidence of obstructive dilation, perforation, or pathologic fluid levels. Flank and psoas margins are well-defined. No pathologic calcifications are noted, and there is no evidence of organomegaly. Included portions of the lower chest show no evidence of acute pathology. Skeletal structures are unremarkable for acute findings. Tubal sterilization clips are noted in the pelvis. Overlying EKG leads are present. Right hemicolonic fecal distention with functional outflow obstruction of the distal small bowel.  The transverse colon is distended with gas, raising questions regarding possible \"institutional bowel\" or chronic constipation. Limited radiographic appearance does not suggest small bowel obstruction. Ct Chest W Contrast    Result Date: 2019  Patient MRN:  [id number] : 1965 Age: 62 years Gender: Female Order Date:  2019 2:00 PM TECHNIQUE/NUMBER OF IMAGES/COMPARISON/CLINICAL HISTORY: CT chest After IV contrast administration axial images were obtained with sagittal and coronal MIP reconstructions of pulmonary arterial circulation thoracic aorta. Clinical history abdominal distention, abdominal pain, for evolution the pulmonary arterial circulation. 18-year-old female patient had previous appendectomy. The history of Parkinson's disease is seizures. FINDINGS: There are motion artifacts which cause misregistration of images makes difficult evaluation of the pulmonary arterial circulation towards the lower lung bases. There is no conspicuous acute central pulmonary embolus in the main PA, right and left main PAs in their lobar and segmental and more proximal subsegmental branches. There is no aneurysm formation dissection thoracic aorta. Heart has normal size. Inner diameter for the left ventricular cavity is 4.5 cm and for the right ventricular cavity is 4.4 cm. There is discrete to mild pericardial effusion. There is no mediastinal masses or adenopathy. Diameter for the ascending aorta is 3.5 cm and for the main pulmonary arteries up to 2.5 cm . There is no pericardial effusion. Presence of a consolidation with atelectasis of the right and left lower lobe with patent central airways. Air bronchograms are present. Findings can be relate with hypoventilation lower lung base. Please correlate clinically. There is no conspicuous or pleural effusions. Endotracheal tube is in good position. The patient has a NG tube with the tip in the stomach. There are also compression atelectasis in the posterior aspect of the both upper lobes.  The anterior aspect of the upper lobes are better aerated. A discrete the area bronchocentric infiltrate seen towards the lingular segment of the left upper lobe. Upper abdominal structures are not fully covered on this study. There is a generalized fluid distention of the bowel including the colon. The abdomen is evaluate on the CT abdomen performed same time. 1. Atelectasis consolidation of both lower lobes with patent central airways. Findings to be correlated clinically. Can be relate with the hypoventilation of the lower lung bases. Cannot exclude a pneumonia. 2. Discrete patchy bronchocentric infiltrate in the left upper lobe lingular segment. 3. No acute pulmonary emboli seen although there are some limitations for evaluation of the lower lobes and motion artifacts. 4. No aneurysm formation dissection thoracic aorta. 5. Discrete to mild pericardial effusion. Ct Abdomen Pelvis W Iv Contrast Additional Contrast? None    Result Date: 2019  Patient MRN:  [id number] : 1965 Age: 62 years Gender: Female Order Date:  2019 6:45 PM TECHNIQUE/NUMBER OF IMAGES/COMPARISON/CLINICAL HISTORY: CT abdomen pelvis IV contrast After IV contrast administration axial images were obtained with sagittal and coronal reconstructions are. Clinical history abdominal pain. FINDINGS: There is massive dilatation of the colon with a predominant fluid contents. Large amount of solid fecal contents form a pattern of constipation/fecal rectal retention/impaction is seen in the sigmoid colon to the rectum. This can causes bowel obstruction to critical a large amount of fluid the recommendation the more proximal segments of the colon. Consider decompression of the rectum/sigmoid to relieve the contents in more proximal segments of the colon. There are normal size and enhancement for the liver, spleen and the pancreas are. Gallbladder is well distended and has a hydrops appearance.  There is some dilatation of the intrahepatic biliary tree and the common bile duct measures close to 9 mm in the head of the pancreas but can be traced with unremarkable appearance through the papilla level. The pancreatic duct is not dilated. Adrenal glands are not enlarged. Kidneys have preserved size and cortical thickness are. There is an area of hypodensity in the upper pole of the left kidney which can represent a lobulated the cyst but of relative hypodensity. Further evaluation with renal ultrasound is initially recommended. Aorta and IVC appear unremarkable. There is a Samuel catheter in the bladder. The bladder is not distended. The There is a venous catheter through the right common femoral vein with the tip in the distal IVC. For findings in the chest see report of CT scan of the chest performed the same time. 1. Findings for severe constipation with a large amount of fecal contents/rotation/impaction from the sigmoid colon to the rectum, causing a pattern of megacolon with marked fluid/air distentions or more proximal segments of the colon. 2. Consider decompression of the rectum/sigmoid to permit relieve of the more proximal segments of the colon. 3. Marked: Distention can explain hypoventilation lower lung bases causing atelectasis or consolidations with overall patent central airways of both lower lobes. Please correlate clinically. 4. There is no dilatation of the biliary tree, to be reevaluated relieve of the megacolon pattern which cause significant mass effect throughout the entire abdomen peritoneal cavity. . 5. Mild ascites in the abdomen. 6. No free intraperitoneal air observed. Xr Chest Portable    Result Date: 2019  Patient MRN:  [id number] : 1965 Age: 62 years Gender: Female Order Date:  2019 3:15 PM EXAM: XR CHEST PORTABLE INDICATION:  ETT exchanged, verify OGT placement ETT exchanged, verify OGT placement COMPARISON: Same date 0655 hours FINDINGS:  ET and NG tubes are appropriate.  There has been some clearing of right basilar infiltrate since the prior present with the distal tip 5 cm above the adriano. Nasogastric tube follows the expected contours of the esophagus into stomach with distal sideholes overlying the left upper quadrant of the abdomen. Esophageal manometry device appears present terminating at the level of the esophagogastric junction. Xr Chest Portable    Result Date: 2019  Patient MRN:  [id number] : 1965 Age: 62 years Gender: Female Order Date:  2019 9:45 AM EXAM: XR CHEST PORTABLE NUMBER OF IMAGES:    1 INDICATION:  Possible Stroke Possible Stroke COMPARISON: 2017 TECHNIQUE: AP view of the chest. FINDINGS: Stable cardiomediastinal silhouette. Pulmonary vasculature is normal. There is left basilar opacity. No pneumothorax seen. Degenerative changes noted in the spine. Left basilar opacity. Differential includes atelectasis, infection, and/or layering pleural effusion. Xr Abdomen For Ng/og/ne Tube Placement    Result Date: 2019  Comparison: 2019. Frontal view of the chest. Frontal view of the abdomen. FINDINGS: Endotracheal tube is present with the distal tip 5 cm above the adriano. Nasogastric tube follows the expected contours of the esophagus into stomach with distal sideholes overlying the left upper quadrant of the abdomen. Esophageal manometry device appears present terminating at the level of the esophagogastric junction. Hazy density right lung base. Meniscus left lung base. Contiguous atelectasis or infiltrate. The heart is enlarged. There is calcification within thoracic aorta. Severe gaseous distention of bowel within the left abdomen may be stomach or colon. Remote left rib fractures. Degenerative spondylosis and facet arthropathy within the spine. Gaseous distention of a loop of bowel within the left upper abdomen may be stomach or colon. Consider further workup with computed tomography for better characterization. Bilateral pleural effusions with contiguous atelectasis.

## 2020-01-20 NOTE — PROGRESS NOTES
Subjective: On nasal cannula answering questions appropriately asking to eat. Family at bedside all questions answered--they are upset. They want her fed. Objective:    /81   Pulse 85   Temp 98.5 °F (36.9 °C) (Oral)   Resp 17   Ht 5' 6\" (1.676 m)   Wt 94 lb (42.6 kg)   SpO2 100%   BMI 15.17 kg/m²     24HR INTAKE/OUTPUT:      Intake/Output Summary (Last 24 hours) at 1/20/2020 1251  Last data filed at 1/20/2020 1100  Gross per 24 hour   Intake 2458 ml   Output 1730 ml   Net 728 ml     Off BiPAP answering questions appropriately  heart:  RRR, no murmurs, gallops, or rubs. Lungs: Bilateral rhonchi rhonchi  Abd: bowel sounds present, nontender, nondistended, no masses  Extrem:  No clubbing, cyanosis, or edema  Weak x4 extremities  Most Recent Labs  Lab Results   Component Value Date    WBC 10.7 01/20/2020    HGB 7.2 (L) 01/20/2020    HCT 24.8 (L) 01/20/2020     01/20/2020     01/20/2020    K 3.2 (L) 01/20/2020     01/20/2020    CREATININE 0.5 01/20/2020    BUN 47 (H) 01/20/2020    CO2 21 (L) 01/20/2020    GLUCOSE 142 (H) 01/20/2020    ALT <5 01/20/2020    AST 16 01/20/2020    INR 1.1 12/30/2019    TSH 1.950 12/29/2019     Recent Labs     01/20/20  0430   MG 2.0     Lab Results   Component Value Date    CALCIUM 9.6 01/20/2020    PHOS 2.7 01/20/2020        XR CHEST PORTABLE   Final Result   Stable abnormal chest with  improving but persistent   infiltrates/atelectasis in the right lung base concerning for   pneumonia. Significant bowel dilatation. XR CHEST PORTABLE   Final Result   No significant changes since study performed the same   date. The areas of atelectasis in the right base.       XR CHEST PORTABLE   Final Result   tortuous ectatic aorta   Airspace disease compatible with atelectasis or pneumonia, worse at   the right lung base as compared to the left   The chest appears to be worse in the interval                  XR CHEST PORTABLE   Final Result   Left NG tube reaches the stomach               XR CHEST PORTABLE   Final Result   Partial clearing of bibasilar infiltrates and left effusion               XR CHEST PORTABLE   Final Result   Stable abnormal chest with  improving CHF. Superimposed pneumonia is   not excluded. CT CHEST W CONTRAST   Final Result   1. Atelectasis consolidation of both lower lobes with patent central   airways. Findings to be correlated clinically. Can be relate with the   hypoventilation of the lower lung bases. Cannot exclude a pneumonia. 2. Discrete patchy bronchocentric infiltrate in the left upper lobe   lingular segment. 3. No acute pulmonary emboli seen although there are some limitations   for evaluation of the lower lobes and motion artifacts. 4. No aneurysm formation dissection thoracic aorta. 5. Discrete to mild pericardial effusion. CT ABDOMEN PELVIS W IV CONTRAST Additional Contrast? None   Final Result   1. Findings for severe constipation with a large amount of fecal   contents/rotation/impaction from the sigmoid colon to the rectum,   causing a pattern of megacolon with marked fluid/air distentions or   more proximal segments of the colon. 2. Consider decompression of the rectum/sigmoid to permit relieve of   the more proximal segments of the colon. 3. Marked: Distention can explain hypoventilation lower lung bases   causing atelectasis or consolidations with overall patent central   airways of both lower lobes. Please correlate clinically. 4. There is no dilatation of the biliary tree, to be reevaluated   relieve of the megacolon pattern which cause significant mass effect   throughout the entire abdomen peritoneal cavity. .      5. Mild ascites in the abdomen. 6. No free intraperitoneal air observed. XR CHEST PORTABLE   Final Result      Gaseous distention of a loop of bowel within the left upper abdomen   may be stomach or colon.  Consider further workup with computed   tomography for better characterization. Bilateral pleural effusions with contiguous atelectasis. Endotracheal tube is present with the distal tip 5 cm above the   adriano. Nasogastric tube follows the expected contours of the esophagus into   stomach with distal sideholes overlying the left upper quadrant of the   abdomen. Esophageal manometry device appears present terminating at the level   of the esophagogastric junction. XR ABDOMEN FOR NG/OG/NE TUBE PLACEMENT   Final Result      Gaseous distention of a loop of bowel within the left upper abdomen   may be stomach or colon. Consider further workup with computed   tomography for better characterization. Bilateral pleural effusions with contiguous atelectasis. Endotracheal tube is present with the distal tip 5 cm above the   adriano. Nasogastric tube follows the expected contours of the esophagus into   stomach with distal sideholes overlying the left upper quadrant of the   abdomen. Esophageal manometry device appears present terminating at the level   of the esophagogastric junction. XR ABDOMEN (KUB) (SINGLE AP VIEW)   Final Result   Right hemicolonic fecal distention with functional outflow obstruction   of the distal small bowel. The transverse colon is distended with gas,   raising questions regarding possible \"institutional bowel\" or chronic   constipation. Limited radiographic appearance does not suggest small bowel   obstruction. XR CHEST PORTABLE   Final Result   Left basilar opacity. Differential includes atelectasis, infection,   and/or layering pleural effusion.                XR CHEST PORTABLE    (Results Pending)   XR CHEST PORTABLE    (Results Pending)   XR Acute Abd Series Chest 1 VW    (Results Pending)       Assessment    Principal Problem:    Septic shock (HCC)  Active Problems:    Multiple system atrophy (HCC)    Ischemic colitis (HCC)    Severe protein-calorie malnutrition

## 2020-01-21 NOTE — PROGRESS NOTES
[address] [facility]  Department of Internal Medicine  Internal Medicine Residency Program  Resident MICU Progress  Note      Patient:  Benito Mcdonald 62 y.o. female MRN: [id number]     Date of Service: 1/21/2020    Allergy: Sulfa antibiotics    Follow respiratory failure    Subjective       Patient seen and examined. Patient denied any abdominal pain, chest pain. Patient got tired overnight needed to be placed on BiPAP. Otherwise patient did not appear to be in any distress this morning. Spoke with Dr. Lulu Ny at [facility] per family's request.  Dr. Kathy Marrero stated that patient has parkinsonism, MSA variant. He stated that the patient has been following with him for the past 6 to 8 years. Objective   Physical Exam:  · Vitals: /72   Pulse 100   Temp 98.4 °F (36.9 °C) (Temporal)   Resp (!) 51   Ht 5' 6\" (1.676 m)   Wt 94 lb (42.6 kg)   SpO2 98%   BMI 15.17 kg/m²     I & O - 24hr: In: 1419 [I.V.:782; NG/GT:437]  · Out: 1585 [Urine:685]    Physical Exam  Vitals signs and nursing note reviewed. HENT:      Head: Normocephalic and atraumatic. Cardiovascular:      Rate and Rhythm: Normal rate and regular rhythm. Pulses: Normal pulses. Heart sounds: Normal heart sounds. Pulmonary:      Comments: On BiPAP at the  Time of PE  Abdominal:      General: There is no distension. Palpations: Abdomen is soft. Tenderness: There is no tenderness. There is guarding. Comments: Hypoactive bowel sounds    Skin:     General: Skin is warm and dry. Capillary Refill: Capillary refill takes less than 2 seconds. Neurological:      Mental Status: She is alert. Mental status is at baseline.        Pertinent New Labs & Imaging Studies     CBC:   Recent Labs     01/19/20  0506 01/20/20  0430 01/20/20  0835 01/21/20  0537   WBC 15.9* 10.7  --  10.2   HGB 10.3* 8.4* 7.2* 9.0*   HCT 34.4 28.6* 24.8* 31.5*   MCV 97.2 99.7  --  102.9*   * 362  --  315       BMP:    Recent Labs 20  0506 20  0526 20  0430 20  0537     --  141 148*   K 4.6 4.55 3.2* 4.1   CL 93*  --  104 110*   CO2 23  --  21* 23   BUN 51*  --  47* 35*   CREATININE 0.8  --  0.5 0.5   GLUCOSE 131*  --  142* 104*       LIVER PROFILE:   Recent Labs     20  0506 20  0430 20  0537   AST 22 16 15   ALT 16 <5 6   BILITOT 0.4 0.2 0.3   ALKPHOS 220* 168* 169*       PT/INR:   No results for input(s): PROTIME, INR in the last 72 hours. APTT:   No results for input(s): APTT in the last 72 hours. Fasting Lipid Panel:    Lab Results   Component Value Date    TRIG 142 2020       Cardiac Enzymes:    Lab Results   Component Value Date    CKTOTAL 46 2020    CKTOTAL 137 2019    CKTOTAL 238 (H) 2016    CKMB 1.5 2020    CKMB 5.7 (H) 2016    TROPONINI <0.01 2020    TROPONINI <0.01 2017    TROPONINI <0.01 2017     Imaging studies:  Xr Abdomen (kub) (single Ap View)    Result Date: 2019  Patient MRN:  [id number] : 1965 Age: 62 years Gender: Female Order Date:  2019 12:45 PM EXAM: XR ABDOMEN (KUB) (SINGLE AP VIEW) NUMBER OF IMAGES:  1 view-2 images  INDICATION: Abdominal distention COMPARISON: None FINDINGS: Right hemicolon is distended with fecal content, and there is fluid filled pelvic small bowel which may be functional outflow obstruction. The transverse colon is distended with gas. There is no evidence of obstructive dilation, perforation, or pathologic fluid levels. Flank and psoas margins are well-defined. No pathologic calcifications are noted, and there is no evidence of organomegaly. Included portions of the lower chest show no evidence of acute pathology. Skeletal structures are unremarkable for acute findings. Tubal sterilization clips are noted in the pelvis. Overlying EKG leads are present. Right hemicolonic fecal distention with functional outflow obstruction of the distal small bowel.  The transverse colon upper lobes. The anterior aspect of the upper lobes are better aerated. A discrete the area bronchocentric infiltrate seen towards the lingular segment of the left upper lobe. Upper abdominal structures are not fully covered on this study. There is a generalized fluid distention of the bowel including the colon. The abdomen is evaluate on the CT abdomen performed same time. 1. Atelectasis consolidation of both lower lobes with patent central airways. Findings to be correlated clinically. Can be relate with the hypoventilation of the lower lung bases. Cannot exclude a pneumonia. 2. Discrete patchy bronchocentric infiltrate in the left upper lobe lingular segment. 3. No acute pulmonary emboli seen although there are some limitations for evaluation of the lower lobes and motion artifacts. 4. No aneurysm formation dissection thoracic aorta. 5. Discrete to mild pericardial effusion. Ct Abdomen Pelvis W Iv Contrast Additional Contrast? None    Result Date: 2019  Patient MRN:  [id number] : 1965 Age: 62 years Gender: Female Order Date:  2019 6:45 PM TECHNIQUE/NUMBER OF IMAGES/COMPARISON/CLINICAL HISTORY: CT abdomen pelvis IV contrast After IV contrast administration axial images were obtained with sagittal and coronal reconstructions are. Clinical history abdominal pain. FINDINGS: There is massive dilatation of the colon with a predominant fluid contents. Large amount of solid fecal contents form a pattern of constipation/fecal rectal retention/impaction is seen in the sigmoid colon to the rectum. This can causes bowel obstruction to critical a large amount of fluid the recommendation the more proximal segments of the colon. Consider decompression of the rectum/sigmoid to relieve the contents in more proximal segments of the colon. There are normal size and enhancement for the liver, spleen and the pancreas are. Gallbladder is well distended and has a hydrops appearance.  There is some dilatation of been some clearing of right basilar infiltrate since the prior study. There is some right midlung infiltrate. Infiltrate left retrocardiac region is smaller and there is less fluid/pleural thickening on the left. There appears to be old healed left-sided rib fractures     Partial clearing of bibasilar infiltrates and left effusion     Xr Chest Portable    Result Date: 2019  Patient MRN:  [id number] : 1965 Age: 62 years Gender: Female Order Date:  2019 6:00 AM EXAM: XR CHEST PORTABLE one image INDICATION:  Line / tube management Line / tube management COMPARISON: 2019 FINDINGS: There is cardiomegaly. Endotracheal tube and nasogastric tube are unchanged. There is vascular congestion with a marginal improvement in persistent perihilar and bibasilar infiltrates and pleural effusions. There is improved gastric distention. Stable abnormal chest with  improving CHF. Superimposed pneumonia is not excluded. Xr Chest Portable    Result Date: 2019  Comparison: 2019. Frontal view of the chest. Frontal view of the abdomen. FINDINGS: Endotracheal tube is present with the distal tip 5 cm above the adriano. Nasogastric tube follows the expected contours of the esophagus into stomach with distal sideholes overlying the left upper quadrant of the abdomen. Esophageal manometry device appears present terminating at the level of the esophagogastric junction. Hazy density right lung base. Meniscus left lung base. Contiguous atelectasis or infiltrate. The heart is enlarged. There is calcification within thoracic aorta. Severe gaseous distention of bowel within the left abdomen may be stomach or colon. Remote left rib fractures. Degenerative spondylosis and facet arthropathy within the spine. Gaseous distention of a loop of bowel within the left upper abdomen may be stomach or colon. Consider further workup with computed tomography for better characterization.  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 - Chronic condition - followed by Dr. Gt Shaver at [facility] [place]. Patient was walking with a walker before presenting to the hospital   - On Sinemet  - PT/OT consulted     Diet: Trickle feed (TB)   DVT/GI prophylaxis: Lovenox/ Protonix  Disposition: MICU    Pierre Booth MD, PGY-1  Attending physician: Dr. Flex Mclaughlin       Addendum; Patient requiring BiPAP overnight. Tolerated trickle feed. Will advance diet as tolerated. Patient very weak. PT OT evaluation reviewed. Will switch current NG tube to a CorPak. Continue aggressive BPH. Patient most definitely will benefit from LTAC. Updated  at the bedside. I personally saw, examined and provided care for the patient. Radiographs, labs and medication list were reviewed by me independently. I spoke with bedside nursing, therapists and consultants. Critical care services and times documented are independent of procedures and multidisciplinary rounds with Residents. Additionally comprehensive, multidisciplinary rounds were conducted with the MICU team. The case was discussed in detail and plans for care were established. Review of Residents documentation was conducted and revisions were made as appropriate. I agree with the above documented exam, problem list and plan of care.   Gabriela Galvez MD   CCT excluding procedures:38'

## 2020-01-21 NOTE — PROGRESS NOTES
[facility] Quality Flow/Interdisciplinary Rounds Progress Note        Quality Flow Rounds held on January 21, 2020    Disciplines Attending:  Dr Margarita Miranda [facility] respiratory    [address] was admitted on 12/29/2019  9:30 AM    Anticipated Discharge Date:  Expected Discharge Date: 01/22/20    Disposition:    Zay Score:  Zay Scale Score: 10    Readmission Risk              Risk of Unplanned Readmission:        23           Discussed patient goal for the day, patient clinical progression, and barriers to discharge.   The following Goal(s) of the Day/Commitment(s) have been identified:  Continue Bipap possible transfer       Jn Brambila  January 21, 2020

## 2020-01-21 NOTE — PROGRESS NOTES
[facility] PROGRESS NOTE      Chief complaint: Follow-up of HAP    The patient is a 62 y.o. female with history of seizure disorder, Parkinson's disease, multisystem atrophy, presented on 12/29 with increasing weakness found to be in septic shock with ischemic colitis based on CT abdomen and pelvis showing megacolon with marked fluid/air distention on more proximal segments of the colon. She underwent exploratory laparotomy, subtotal colectomy, and ileostomy on 12/13 during which large volume turbid tan malodorous fluid, profoundly distended large bowel with large volume stool burden and ischemic sigmoid colon with out evidence of perforation were noted. Peritoneal fluid Gram stain and culture showed few polymorphonuclear leukocytes, no epithelial cells, no organisms, no growth. Blood cultures showed no growth to date. She was extubated and was off vasopressor support on 12/31 but had oxygen desaturation with CXR showing development of right upper lung infiltrate. She underwent bronchoscopy on 01/02 during which diffuse tracheobronchitis and secretions with airway edema without endobronchial masses/lesions were noted. Bronchial wash Gram stain and culture showed moderate polymorphonuclear leukocytes, rare epithelial cells, rare gram-positive diphtheroid-like rods, oral pharyngeal lakshmi, light growth of Saccharomyces cerevisiae. She was reintubated on 01/03 due to acute on chronic respiratory failure despite on NIPPV. She received a dose of ceftriaxone on admission and has been on piperacillin-tazobactam since admission. She continued to have intermittent febrile episodes.  CT abdomen and pelvis on 01/06 showed subtotal colectomy with right lower quadrant ileostomy with a moderately dilated/ thickened small bowel loops which may be due to ileus/enteritis or low-grade bowel obstruction, rectal remnant significantly dilated/thickened with retained fecal matter and high density material with presacral edema deemed colonization    Recommendations:  Monitor clinically off antibiotics for now. Continue chest physiotherapy. Follow up goals of care. Thank you for involving me in the care of Lola Estrada. I will continue to follow. Please do not hesitate to call for any questions or concerns.       Electronically signed by Tricia De Luna MD on 1/21/2020 at 10:17 AM

## 2020-01-21 NOTE — PLAN OF CARE
Problem: Pain:  Goal: Pain level will decrease  Description  Pain level will decrease  Outcome: Met This Shift     Problem: Skin Integrity:  Goal: Will show no infection signs and symptoms  Description  Will show no infection signs and symptoms  Outcome: Met This Shift

## 2020-01-21 NOTE — PROGRESS NOTES
Subjective: On nasal cannula answering questions appropriately asking to go home. family at bedside-all questions answered  Objective:    /76   Pulse 97   Temp 98.4 °F (36.9 °C) (Temporal)   Resp 27   Ht 5' 6\" (1.676 m)   Wt 94 lb (42.6 kg)   SpO2 99%   BMI 15.17 kg/m²     24HR INTAKE/OUTPUT:      Intake/Output Summary (Last 24 hours) at 1/21/2020 1347  Last data filed at 1/21/2020 1300  Gross per 24 hour   Intake 1539 ml   Output 1795 ml   Net -256 ml     Off BiPAP answering questions appropriately  heart:  RRR, no murmurs, gallops, or rubs. Lungs: CTA today no wheezes rhonchi or crackles  Abd: bowel sounds present, nontender, nondistended, no masses  Extrem:  No clubbing, cyanosis, or edema  Weak x4 extremities  Most Recent Labs  Lab Results   Component Value Date    WBC 10.2 01/21/2020    HGB 9.0 (L) 01/21/2020    HCT 31.5 (L) 01/21/2020     01/21/2020     (H) 01/21/2020    K 4.1 01/21/2020     (H) 01/21/2020    CREATININE 0.5 01/21/2020    BUN 35 (H) 01/21/2020    CO2 23 01/21/2020    GLUCOSE 104 (H) 01/21/2020    ALT 6 01/21/2020    AST 15 01/21/2020    INR 1.1 12/30/2019    TSH 1.950 12/29/2019     Recent Labs     01/21/20  0537   MG 2.1     Lab Results   Component Value Date    CALCIUM 10.7 (H) 01/21/2020    PHOS 3.3 01/21/2020        XR CHEST PORTABLE   Final Result   No acute process in the chest      Some gastric and small bowel loop distention               XR Acute Abd Series Chest 1 VW   Final Result      1. Improving atelectasis. 2. No obvious free air. 3. Moderate postoperative abdomen ileus. XR CHEST PORTABLE   Final Result   Stable abnormal chest with  improving but persistent   infiltrates/atelectasis in the right lung base concerning for   pneumonia. Significant bowel dilatation. XR CHEST PORTABLE   Final Result   No significant changes since study performed the same   date. The areas of atelectasis in the right base. XR CHEST PORTABLE   Final Result   tortuous ectatic aorta   Airspace disease compatible with atelectasis or pneumonia, worse at   the right lung base as compared to the left   The chest appears to be worse in the interval                  XR CHEST PORTABLE   Final Result   Left basilar opacity which has developed since yesterday and may   represent atelectasis or pneumonia. NG tube and right arm PICC line are stable in position. XR CHEST PORTABLE   Final Result   No acute cardiopulmonary process. XR CHEST PORTABLE   Final Result   Tortuous ectatic aorta   Cardiomegaly   Airspace disease compatible with pneumonia, in the left upper lung,   the left lung base and at the right lung   The chest appears to be worse in the interval                  XR CHEST PORTABLE   Final Result   Persistent bibasilar infiltrates and pleural effusion likely   pneumonia. Gastric distention            XR CHEST PORTABLE   Final Result   tortuous ectatic aorta   Airspace disease compatible with atelectasis or pneumonia, at the   right lung base and to lesser extent the left lung base   There is slight interval improvement                  XR CHEST PORTABLE   Final Result   tortuous ectatic aorta   Airspace disease compatible with atelectasis or pneumonia, more   prominent right lung base as compared to the left likely with   superimposed post pleural effusions, overall when compared to the   previous study of the chest is improved                     XR CHEST PORTABLE   Final Result   No interval change               XR CHEST PORTABLE   Final Result   Stable abnormal chest with persistent bibasilar infiltrates likely   pneumonia. Dilated bowel loops with improvement. XR ABDOMEN (KUB) (SINGLE AP VIEW)   Final Result   Some dilated small bowel loops in left side of the abdomen.  There is a   mid small bowel obstruction suggested      Significant left lower lobe infiltrate and right basilar infiltrate disease compatible with pneumonia, there may be a component   of pulmonary vascular congestion present   The chest does not appear to be significantly changed in the interval                  XR CHEST PORTABLE   Final Result   Improved yet persistent right mid and lower lung opacities with trace   effusion. Persistent retrocardiac consolidation. XR CHEST PORTABLE   Final Result      Subtle improved aeration of the right lung. No new or worsening   airspace opacities. Lines and tubes as described. XR CHEST PORTABLE   Final Result      Cardiomegaly      Airspace consolidation the right upper lobe appears to be unchanged      Small to moderate right-sided pleural effusion      Support line including endotracheal tube and other lines appears to be   in satisfactory position. XR CHEST PORTABLE   Final Result   Progressive diffuse bilateral infiltrates and pleural effusion with a   thick consolidation in the right upper lobe. CHF with edema and/or   diffuse pneumonia considered. Gastric distention with a nasogastric tube in place. XR CHEST PORTABLE   Final Result   Partial clearing of right lower lobe atelectasis/infiltrate               XR CHEST PORTABLE   Final Result   Worsening CHF with edema. Suggestion of free intraperitoneal air better seen in the previous   examination and could be related to recent surgery. Clinical   assessment is recommended. ALERT:  THIS IS AN ABNORMAL REPORT            CT HEAD WO CONTRAST   Final Result      NO ACUTE INTRACRANIAL PROCESS         XR CHEST PORTABLE   Final Result      Right upper lung patchy infiltrate about the right minor fissure. Cardiomegaly      Nasogastric tube in satisfactory position with the tip not well seen   but appears to be in the distal stomach. XR Chest Abdomen Ng Placement   Final Result   Enteric catheter with the tip projecting over the expected region of   the stomach.    Pneumoperitoneum which may be normal given the patient's history of   recent abdominal surgery. XR ABDOMEN FOR NG/OG/NE TUBE PLACEMENT   Final Result   Addendum 1 of 1   Consider CT scan if better assessment of the abdomen as clinically   indicated. Final      XR CHEST PORTABLE   Final Result   NG tube reaches the stomach               XR CHEST PORTABLE   Final Result   Partial clearing of bibasilar infiltrates and left effusion               XR CHEST PORTABLE   Final Result   Stable abnormal chest with  improving CHF. Superimposed pneumonia is   not excluded. CT CHEST W CONTRAST   Final Result   1. Atelectasis consolidation of both lower lobes with patent central   airways. Findings to be correlated clinically. Can be relate with the   hypoventilation of the lower lung bases. Cannot exclude a pneumonia. 2. Discrete patchy bronchocentric infiltrate in the left upper lobe   lingular segment. 3. No acute pulmonary emboli seen although there are some limitations   for evaluation of the lower lobes and motion artifacts. 4. No aneurysm formation dissection thoracic aorta. 5. Discrete to mild pericardial effusion. CT ABDOMEN PELVIS W IV CONTRAST Additional Contrast? None   Final Result   1. Findings for severe constipation with a large amount of fecal   contents/rotation/impaction from the sigmoid colon to the rectum,   causing a pattern of megacolon with marked fluid/air distentions or   more proximal segments of the colon. 2. Consider decompression of the rectum/sigmoid to permit relieve of   the more proximal segments of the colon. 3. Marked: Distention can explain hypoventilation lower lung bases   causing atelectasis or consolidations with overall patent central   airways of both lower lobes. Please correlate clinically.       4. There is no dilatation of the biliary tree, to be reevaluated   relieve of the megacolon pattern which cause significant mass effect   throughout the entire abdomen peritoneal cavity. .      5. Mild ascites in the abdomen. 6. No free intraperitoneal air observed. XR CHEST PORTABLE   Final Result      Gaseous distention of a loop of bowel within the left upper abdomen   may be stomach or colon. Consider further workup with computed   tomography for better characterization. Bilateral pleural effusions with contiguous atelectasis. Endotracheal tube is present with the distal tip 5 cm above the   adriano. Nasogastric tube follows the expected contours of the esophagus into   stomach with distal sideholes overlying the left upper quadrant of the   abdomen. Esophageal manometry device appears present terminating at the level   of the esophagogastric junction. XR ABDOMEN FOR NG/OG/NE TUBE PLACEMENT   Final Result      Gaseous distention of a loop of bowel within the left upper abdomen   may be stomach or colon. Consider further workup with computed   tomography for better characterization. Bilateral pleural effusions with contiguous atelectasis. Endotracheal tube is present with the distal tip 5 cm above the   adriano. Nasogastric tube follows the expected contours of the esophagus into   stomach with distal sideholes overlying the left upper quadrant of the   abdomen. Esophageal manometry device appears present terminating at the level   of the esophagogastric junction. XR ABDOMEN (KUB) (SINGLE AP VIEW)   Final Result   Right hemicolonic fecal distention with functional outflow obstruction   of the distal small bowel. The transverse colon is distended with gas,   raising questions regarding possible \"institutional bowel\" or chronic   constipation. Limited radiographic appearance does not suggest small bowel   obstruction. XR CHEST PORTABLE   Final Result   Left basilar opacity. Differential includes atelectasis, infection,   and/or layering pleural effusion.                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 *      Plan:  62 F history of multiple system atrophy, hypotension, seizure disorder Adm to ICU w ischemic colitis septic shock status post Exploratory laparotomy with subtotal colectomy and end-ileostomy 12/30, bronchoscopy 1/3 and subsequently reintubated, Extubated 1/9 on high flow NC/PRN BiPAP status post repeat bronchoscopy 1/18 mucous plugging    Transferred back to ICU 1/17 for worsening respiratory failure    Wean O2 per Pulm CCM  Aggressive pulmonary hygiene  IV vasopressors-- off  IV antibiotics--Zosyn for suspected pneumonia completed  Diflucan IV completed  Monitor labs closely   speech therapy eval--continues to be n.p.o.--continue speech therapy  Multiple reports of family members feeding patient. I have emphasized repeatedly that while the patient is n.p.o. any oral feeding is high risk for aspiration. Tube feeds per NG likely needs transition to [facility]  Pulmonary critical care consult appreciated--discussed with Dr. Eder Jensen surgery consult appreciated-  Palliative care Consult appreciated   ID consult appreciated  Patient is high risk for further respiratory decompensation with reintubation and further decline. If patient declines any further or fails to progress and family wishes to continue aggressive management then I recommend PEG and trach. Hospice would be a reasonable alternative, though family does not seem accepting of prognosis. Plan for LTAC? ??       Electronically signed by Angelina Rosa MD on 1/21/2020 at 1:47 PM

## 2020-01-21 NOTE — PROGRESS NOTES
Date: 1/21/2020    Time: 12:14 AM    Patient Placed On BIPAP/CPAP/ Non-Invasive Ventilation? No    If no must comment. Facial area red/color change? No           If YES are Blister/Lesion present? No   If yes must notify nursing staff  BIPAP/CPAP skin barrier?   Yes    Skin barrier type:mepilex       Comments:yoni 18/400/70/8        Collins Taylor

## 2020-01-22 NOTE — PROGRESS NOTES
Date: 1/22/2020    Time: 12:27 AM    Patient Placed On BIPAP/CPAP/ Non-Invasive Ventilation? Pt alredy on bipap    If no must comment. Facial area red/color change? No           If YES are Blister/Lesion present? No   If yes must notify nursing staff  BIPAP/CPAP skin barrier?   Yes    Skin barrier type:mepilex       Comments:        Araceli Stovall

## 2020-01-22 NOTE — PROGRESS NOTES
16 15 14   ALT <5 6 9   BILITOT 0.2 0.3 0.2   ALKPHOS 168* 169* 171*       PT/INR:   No results for input(s): PROTIME, INR in the last 72 hours. APTT:   No results for input(s): APTT in the last 72 hours. Fasting Lipid Panel:    Lab Results   Component Value Date    TRIG 142 2020       Cardiac Enzymes:    Lab Results   Component Value Date    CKTOTAL 46 2020    CKTOTAL 137 2019    CKTOTAL 238 (H) 2016    CKMB 1.5 2020    CKMB 5.7 (H) 2016    TROPONINI <0.01 2020    TROPONINI <0.01 2017    TROPONINI <0.01 2017     Imaging studies:  Xr Abdomen (kub) (single Ap View)    Result Date: 2019  Patient MRN:  [id number] : 1965 Age: 62 years Gender: Female Order Date:  2019 12:45 PM EXAM: XR ABDOMEN (KUB) (SINGLE AP VIEW) NUMBER OF IMAGES:  1 view-2 images  INDICATION: Abdominal distention COMPARISON: None FINDINGS: Right hemicolon is distended with fecal content, and there is fluid filled pelvic small bowel which may be functional outflow obstruction. The transverse colon is distended with gas. There is no evidence of obstructive dilation, perforation, or pathologic fluid levels. Flank and psoas margins are well-defined. No pathologic calcifications are noted, and there is no evidence of organomegaly. Included portions of the lower chest show no evidence of acute pathology. Skeletal structures are unremarkable for acute findings. Tubal sterilization clips are noted in the pelvis. Overlying EKG leads are present. Right hemicolonic fecal distention with functional outflow obstruction of the distal small bowel. The transverse colon is distended with gas, raising questions regarding possible \"institutional bowel\" or chronic constipation. Limited radiographic appearance does not suggest small bowel obstruction.     Ct Chest W Contrast    Result Date: 2019  Patient MRN:  [id number] : 1965 Age: 62 years Gender: Female Order Date:  2019 hypodensity in the upper pole of the left kidney which can represent a lobulated the cyst but of relative hypodensity. Further evaluation with renal ultrasound is initially recommended. Aorta and IVC appear unremarkable. There is a Samuel catheter in the bladder. The bladder is not distended. The There is a venous catheter through the right common femoral vein with the tip in the distal IVC. For findings in the chest see report of CT scan of the chest performed the same time. 1. Findings for severe constipation with a large amount of fecal contents/rotation/impaction from the sigmoid colon to the rectum, causing a pattern of megacolon with marked fluid/air distentions or more proximal segments of the colon. 2. Consider decompression of the rectum/sigmoid to permit relieve of the more proximal segments of the colon. 3. Marked: Distention can explain hypoventilation lower lung bases causing atelectasis or consolidations with overall patent central airways of both lower lobes. Please correlate clinically. 4. There is no dilatation of the biliary tree, to be reevaluated relieve of the megacolon pattern which cause significant mass effect throughout the entire abdomen peritoneal cavity. . 5. Mild ascites in the abdomen. 6. No free intraperitoneal air observed. Xr Chest Portable    Result Date: 2019  Patient MRN:  [id number] : 1965 Age: 62 years Gender: Female Order Date:  2019 3:15 PM EXAM: XR CHEST PORTABLE INDICATION:  ETT exchanged, verify OGT placement ETT exchanged, verify OGT placement COMPARISON: Same date 0655 hours FINDINGS:  ET and NG tubes are appropriate. There has been some clearing of right basilar infiltrate since the prior study. There is some right midlung infiltrate. Infiltrate left retrocardiac region is smaller and there is less fluid/pleural thickening on the left.  There appears to be old healed left-sided rib fractures     Partial clearing of bibasilar infiltrates and left

## 2020-01-22 NOTE — PROGRESS NOTES
Patient seen,  at bedside. Goal is for patient to transfer out of ICU today-plan is for telemetry transfer. Patient and  deny any needs from palliative medicine team at this time. Support offered. Will continue to follow.

## 2020-01-22 NOTE — PROGRESS NOTES
Date: 1/21/2020    Time: 10:51 PM    Patient Placed On BIPAP/CPAP/ Non-Invasive Ventilation? Yes    If no must comment. Facial area red/color change? No           If YES are Blister/Lesion present? No   If yes must notify nursing staff  BIPAP/CPAP skin barrier?   Yes    Skin barrier type:mepilex       Comments:        Keira Lopez

## 2020-01-22 NOTE — CARE COORDINATION
Patient remains on bipap vs nc5L; corpak in place. Expedited appeal pending for LTAC at [facility]. Oralia Bradshaw or [employer] is following for MATTIE placement; HENS, envelope, & ambulance form on soft chart. CM/SW will continue to follow for discharge planning.

## 2020-01-22 NOTE — PATIENT CARE CONFERENCE
[facility] Quality Flow/Interdisciplinary Rounds Progress Note        Quality Flow Rounds held on January 22, 2020    Disciplines Attending:  Bedside Nurse, Charge nurse,  PT, , and . Kimberly Streeter was admitted on 12/29/2019  9:30 AM    Anticipated Discharge Date:  Expected Discharge Date: 01/22/20    Disposition: LTAC    Zay Score:  Zay Scale Score: 12    Readmission Risk              Risk of Unplanned Readmission:        23           Discussed patient goal for the day, patient clinical progression, and barriers to discharge. The following Goal(s) of the Day/Commitment(s) have been identified:  Continue current treatment. Possible transfer.       Manuela Amor  January 22, 2020

## 2020-01-22 NOTE — PROGRESS NOTES
concerning for proctitis. She underwent bronchoscopy on 01/08 during which mucus plugging was noted with Gram stain and culture showing no polymorphonuclear leukocytes, no epithelial cells, rare Gram-positive diphtheroid-like rods, reduced oral pharyngeal lakshmi, Corynebacterium sp, rare growth of Saccharomyces cerevisiae, Candida tropicalis and parapsilosis. Serum beta-D-glucan was negative. She was extubated on 01/09. CXR on 01/17 showed new left basilar opacity, accompanied by leukocytosis of 14,000. She underwent bronchoscopy on 01/18 during which thick secretions around vocal cords, right middle lobe, left lower lobe, inflamed and friable trachea and main adriano were noted. BAL Gram stain and culture showed few polymorphonuclear leukocytes, no epithelial cells, rare Gram-positive cocci, absent oral pharyngeal lakshmi. She became hypoxic and was transferred back to MICU on 01/18. Subjective: Patient was seen and examined. She is awake but responds minimally to questions today. Objective:  /66   Pulse 100   Temp 97.3 °F (36.3 °C) (Axillary)   Resp 29   Ht 5' 6\" (1.676 m)   Wt 94 lb (42.6 kg)   SpO2 95%   BMI 15.17 kg/m²   Constitutional: Awake, not in distress  Respiratory: Clear breath sounds, no crackles, no wheezes  Cardiovascular: Regular rate and rhythm, no murmurs  Gastrointestinal: Bowel sounds present, soft, nontender, ostomy in place  Skin: Warm and dry, no active dermatoses  Musculoskeletal: No joint swelling, no joint erythema    Labs, imaging, and medical records/notes were personally reviewed.      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 Continue chest physiotherapy. Follow up goals of care. Thank you for involving me in the care of Lola MARIAH Sean. I will continue to follow. Please do not hesitate to call for any questions or concerns.       Electronically signed by Papa Paulson MD on 1/22/2020 at 10:40 AM

## 2020-01-22 NOTE — PROGRESS NOTES
See re-eval    Strength BUE:  Per OT eval   RLE:  Grossly 2+/5  LLE:  Grossly 1/5 at ankle, 2/5 at knee and hip  See re-eval RLE>3+/5  LLE>3/5    Balance Sitting EOB:  Max A  Dynamic Standing:  NT Sitting EOB:  Max A  Static Standing:  Dep with L knee block  Sitting EOB:  SBA  Dynamic Standing: Mod A     RASS: 0  CAM-ICU:  NT this date, has been positive   Sensation:  Unable to assess accurately   Edema:  None noted     Vitals:  Blood Pressure at rest 130/66 Blood Pressure post session 147/96   Heart Rate at rest 99 bpm Heart Rate post session 105 bpm   SPO2 at rest 96% on 6 L SPO2 post session 99% on 6 L   SpO2 93-99% during mobility     Functional Status Score-Intensive Care Unit (FSS-ICU)   Rolling 1/7   Supine to sit transfer 1/7   Unsupported sitting  2/7   Sit to stand transfers 1/7   Ambulation 0/7   Total  5/35     Patient education  Pt educated on  safety during functional mobility, sitting balance and posture, bed mobility training, lower extremity therapeutic exercises and stretching, static standing posture   Family educated on PT POC, positioning in bed    Patient response to education:   Pt verbalized understanding Pt demonstrated skill Pt requires further education in this area   No  No  Yes      Comments: Discussed pt case at interdisciplinary rounds in AM.  Pt received supine and agreeable to PT treatment. Pt cleared for participation by RN prior to session. Vitals monitored during session. Pt assisted to EOB. At EOB performed BLE AAROM and PROM. Passive stretching performed to B hamstrings and B plantarflexors 2x30 seconds. Performed AAROM in B hip marching, B LAQs, and B ankle pumps. Performed B scapular retraction with focus on upright static sitting posture and deep breathing. Performed cervical AAROM and passive stretching with soft tissue mobilization to cervical and upper back musculature.   Performed dependent STS with knee block and increased assistance to get pt in upright standing posture. Cued on scap retraction and upward head posture while in standing. Tolerated stand ~2 minutes. Returned to sitting. Assisted back to supine and scooted up in bed. Pt placed in the chair position with pillows utilized to facilitate upright posture, joint and skin integrity, and interaction with environment. Pt's  and her friend educated on positioning. Rn present on exit. Pt left with call button in reach, lines attached, and needs met. PLAN  Pt is making expected progress towards established goals. Continue PT POC.        Time in  1015  Time out  [address] [facility], [address], [place]  [id number]

## 2020-01-23 NOTE — FLOWSHEET NOTE
ET Nurse(follow up)[id number]  Admit Date: 12/29/2019  9:30 AM    Reason for consult:  New ileostomy    Stoma assessment:       01/02/20 1502   Vital Signs   Pulse 113   Resp 28   SpO2 98 %   Ileostomy RLQ   Placement Date: 12/30/19   Pre-existing: No  Location: RLQ   Stomal Appliance 1 piece; Changed   Stoma  Assessment Protrudes; Moist;Red   Mucocutaneous Junction Intact   Peristomal Assessment Intact   Treatment Bag change; Other (Comment)  (skin care)   Stool Color Black;Green   Stool Appearance Watery   Stool Amount Small   Output (mL) 50 ml       Plan: Will follow,patient not ready for teaching, no family present at this time.  Evette Frias 1/2/2020 3:04 PM
ET Nurse(initial evaluation) [id number]  Admit Date: 12/29/2019  9:30 AM    Reason for consult:  New ileostomy    Significant history: 12/30. PROCEDURE:  Exploratory laparotomy with subtotal colectomy and  end-ileostomy.     POSTOPERATIVE DIAGNOSIS:  Septic shock and ischemic colitis. Stoma assessment:    12/31/19 1350   Ileostomy RLQ   Placement Date: 12/30/19   Pre-existing: No  Location: RLQ   Stomal Appliance 1 piece; Intact   Stoma  Assessment Moist;Red;Protrudes   Peristomal Assessment   (pouch not removed)   Stool Color Black   Stool Appearance Watery   Incision 12/30/19 Abdomen   Date First Assessed/Time First Assessed: 12/30/19 1008   Present on Hospital Admission: No  Primary Wound Type: Incision  Location: Abdomen   Dressing Status Intact; Old drainage     Plan:   Will follow  Family states patient does not know about the ileostomy  Patient just extubated this morning  Will follow up Thursday  Bradley Jacobson 12/31/2019 2:00 PM
Inpatient Wound Care(follow up) [id number]    Admit Date: 12/29/2019  9:30 AM    Reason for consult:  Re-evaluation    Findings:       01/17/20 1100   Ileostomy RLQ   Placement Date: 12/30/19   Pre-existing: No  Location: RLQ   Stomal Appliance 2 piece; Changed;Leaking   Stoma  Assessment Moist;Protrudes; Red   Mucocutaneous Junction Intact   Peristomal Assessment Clean; Intact   Stool Color Brown   Stool Appearance Watery   Output (mL) 150 ml   Skin Integrity   Skin Integrity   (erosion)   Location coccyx   Skin Integrity Site 2   Skin Integrity Location 2   (blanchble redness, areas of erosion)   Location 2 bridge of nose   Skin Integrity Site 3   Skin Integrity Location 3   (dried scabs)    Location 3 hands   Wound 01/17/20 Abdomen Distal   Date First Assessed/Time First Assessed: 01/17/20 1100   Present on Hospital Admission: No  Location: Abdomen  Wound Location Orientation: Distal   Dressing/Treatment Dry dressing   Wound Length (cm) 0.8 cm   Wound Width (cm) 0.4 cm   Wound Depth (cm) 0.1 cm   Wound Surface Area (cm^2) 0.32 cm^2   Wound Volume (cm^3) 0.03 cm^3   Wound Assessment Red   Drainage Amount None   Valerie-wound Assessment Intact   Non-staged Wound Description Partial thickness   Red%Wound Bed 100   previous DTI to buttocks resolved  Arms/hands contracted  Stiff/difficult to turn    Plan:  Orders reviewed and updated    Norm Moreira 1/17/2020 1:05 PM
place  Continued preventive care.  Ciro Ray 1/23/2020 1:39 PM
" I am having a D & C hysteroscopy".

## 2020-01-23 NOTE — PROGRESS NOTES
[address] from [facility] called and informed that the patient was denied transfer to [facility].  Will send paper denial.

## 2020-01-23 NOTE — PROGRESS NOTES
Daily    And    sodium chloride (PF)  10 mL Intravenous Daily    sodium chloride flush  10 mL Intravenous 2 times per day    enoxaparin  40 mg Subcutaneous Daily    levetiracetam  1,000 mg Intravenous Q12H    lidocaine   Topical Once       Physical Exam:  General Appearance: appears comfortable in no acute distress. ,  She is not answering questions or opening eyes for me, thin, contracted, ill-appearing  HEENT: Normocephalic atraumatic without obvious abnormality , corpak   Neck: Lips, mucosa, and tongue normal.    Lung: Breath sounds diffuse rhonchi bilaterally very coarse throughout, no wheezing, no crackles. Respirations   unlabored. Symmetrical expansion. Heart: RRR, normal S1, S2. No MRG  Abdomen: Soft, NT, ND. BS present x 4 quadrants. No bruit or organomegaly. Extremities: Pedal pulses 2+ symmetric b/l. Extremities normal, no cyanosis, clubbing, or edema. Musculokeletal: No joint swelling, no muscle tenderness. ROM normal in all joints of extremities. Neurologic: Mental status: Eyes closed, moaning, cannot follow commands for me    Pertinent/ New Labs and Imaging Studies     Imaging Personally Reviewed:  Date:  1/22/2020 7:00 AM       EXAM: XR CHEST PORTABLE 1 image       INDICATION:  resp failure    resp failure        COMPARISON: 1/21/2020       FINDINGS:   Heart and the mediastinal structures are normal. Aorta is tortuous. A   nasogastric tube and right PICC line are unchanged. There is   developing right perihilar and basilar atelectasis/infiltrates. A   nodular density is identified in the left upper lobe. There is strandy   left perihilar densities concerning for bronchitis.  Postsurgical   changes are identified in the abdomen with the dilated bowel loops.           Impression   Stable abnormal chest with bronchitis and developing right basilar   atelectasis/infiltrates concerning for pneumonia.       Dilated bowel loops.           Labs:  Lab Results   Component Value Date    WBC 12.1

## 2020-01-23 NOTE — PROGRESS NOTES
Subjective: On nasal cannula answering questions appropriately asking to go home. family at bedside-all questions answered  Objective:    BP (!) 143/68   Pulse 91   Temp 99.3 °F (37.4 °C) (Temporal)   Resp 30   Ht 5' 6\" (1.676 m)   Wt 101 lb 4 oz (45.9 kg)   SpO2 95%   BMI 16.34 kg/m²     24HR INTAKE/OUTPUT:      Intake/Output Summary (Last 24 hours) at 1/23/2020 0932  Last data filed at 1/23/2020 0616  Gross per 24 hour   Intake 1833 ml   Output 944 ml   Net 889 ml     Off BiPAP answering questions appropriately  heart:  RRR, no murmurs, gallops, or rubs. Lungs: Scattered rhonchi's today no wheezes or crackles  Abd: bowel sounds present, nontender, nondistended, no masses  Extrem:  No clubbing, cyanosis, or edema  Weak x4 extremities  Most Recent Labs  Lab Results   Component Value Date    WBC 12.1 (H) 01/23/2020    HGB 8.6 (L) 01/23/2020    HCT 29.2 (L) 01/23/2020     01/23/2020     01/22/2020    K 4.1 01/22/2020     01/22/2020    CREATININE 0.4 (L) 01/22/2020    BUN 39 (H) 01/22/2020    CO2 24 01/22/2020    GLUCOSE 129 (H) 01/22/2020    ALT 9 01/22/2020    AST 14 01/22/2020    INR 1.1 12/30/2019    TSH 1.950 12/29/2019     Recent Labs     01/22/20  0605   MG 1.9     Lab Results   Component Value Date    CALCIUM 10.7 (H) 01/22/2020    PHOS 3.2 01/22/2020        XR ABDOMEN (KUB) (SINGLE AP VIEW)   Final Result      1. New Dobbhoff feeding tube coiled in the stomach. 2. Persistent moderately dilated small bowel loops in the central and   left abdomen. XR CHEST PORTABLE   Final Result   Stable abnormal chest with bronchitis and developing right basilar   atelectasis/infiltrates concerning for pneumonia. Dilated bowel loops. XR CHEST PORTABLE   Final Result   No acute process in the chest      Some gastric and small bowel loop distention               XR Acute Abd Series Chest 1 VW   Final Result      1. Improving atelectasis. 2. No obvious free air. 3. Moderate postoperative abdomen ileus. XR CHEST PORTABLE   Final Result   Stable abnormal chest with  improving but persistent   infiltrates/atelectasis in the right lung base concerning for   pneumonia. Significant bowel dilatation. XR CHEST PORTABLE   Final Result   No significant changes since study performed the same   date. The areas of atelectasis in the right base. XR CHEST PORTABLE   Final Result   tortuous ectatic aorta   Airspace disease compatible with atelectasis or pneumonia, worse at   the right lung base as compared to the left   The chest appears to be worse in the interval                  XR CHEST PORTABLE   Final Result   Left basilar opacity which has developed since yesterday and may   represent atelectasis or pneumonia. NG tube and right arm PICC line are stable in position. XR CHEST PORTABLE   Final Result   No acute cardiopulmonary process. XR CHEST PORTABLE   Final Result   Tortuous ectatic aorta   Cardiomegaly   Airspace disease compatible with pneumonia, in the left upper lung,   the left lung base and at the right lung   The chest appears to be worse in the interval                  XR CHEST PORTABLE   Final Result   Persistent bibasilar infiltrates and pleural effusion likely   pneumonia.       Gastric distention            XR CHEST PORTABLE   Final Result   tortuous ectatic aorta   Airspace disease compatible with atelectasis or pneumonia, at the   right lung base and to lesser extent the left lung base   There is slight interval improvement                  XR CHEST PORTABLE   Final Result   tortuous ectatic aorta   Airspace disease compatible with atelectasis or pneumonia, more   prominent right lung base as compared to the left likely with   superimposed post pleural effusions, overall when compared to the   previous study of the chest is improved                     XR CHEST PORTABLE   Final Result   No interval change Somewhat thickened contracted gallbladder with the some   pericholecystic edema. CT CHEST W CONTRAST   Final Result   Persistent patchy bibasilar infiltrates and pleural effusions and   groundglass infiltrates in the right upper lobe likely diffuse   pneumonia. No lung abscess is identified. XR CHEST PORTABLE   Final Result   Tortuous ectatic aorta   Cardiomegaly    Airspace disease compatible with pneumonia, there may be a component   of pulmonary vascular congestion present   The chest does not appear to be significantly changed in the interval                  XR CHEST PORTABLE   Final Result   Improved yet persistent right mid and lower lung opacities with trace   effusion. Persistent retrocardiac consolidation. XR CHEST PORTABLE   Final Result      Subtle improved aeration of the right lung. No new or worsening   airspace opacities. Lines and tubes as described. XR CHEST PORTABLE   Final Result      Cardiomegaly      Airspace consolidation the right upper lobe appears to be unchanged      Small to moderate right-sided pleural effusion      Support line including endotracheal tube and other lines appears to be   in satisfactory position. XR CHEST PORTABLE   Final Result   Progressive diffuse bilateral infiltrates and pleural effusion with a   thick consolidation in the right upper lobe. CHF with edema and/or   diffuse pneumonia considered. Gastric distention with a nasogastric tube in place. XR CHEST PORTABLE   Final Result   Partial clearing of right lower lobe atelectasis/infiltrate               XR CHEST PORTABLE   Final Result   Worsening CHF with edema. Suggestion of free intraperitoneal air better seen in the previous   examination and could be related to recent surgery. Clinical   assessment is recommended.       ALERT:  THIS IS AN ABNORMAL REPORT            CT HEAD WO CONTRAST   Final Result      NO ACUTE INTRACRANIAL PROCESS XR CHEST PORTABLE   Final Result      Right upper lung patchy infiltrate about the right minor fissure. Cardiomegaly      Nasogastric tube in satisfactory position with the tip not well seen   but appears to be in the distal stomach. XR Chest Abdomen Ng Placement   Final Result   Enteric catheter with the tip projecting over the expected region of   the stomach. Pneumoperitoneum which may be normal given the patient's history of   recent abdominal surgery. XR ABDOMEN FOR NG/OG/NE TUBE PLACEMENT   Final Result   Addendum 1 of 1   Consider CT scan if better assessment of the abdomen as clinically   indicated. Final      XR CHEST PORTABLE   Final Result   NG tube reaches the stomach               XR CHEST PORTABLE   Final Result   Partial clearing of bibasilar infiltrates and left effusion               XR CHEST PORTABLE   Final Result   Stable abnormal chest with  improving CHF. Superimposed pneumonia is   not excluded. CT CHEST W CONTRAST   Final Result   1. Atelectasis consolidation of both lower lobes with patent central   airways. Findings to be correlated clinically. Can be relate with the   hypoventilation of the lower lung bases. Cannot exclude a pneumonia. 2. Discrete patchy bronchocentric infiltrate in the left upper lobe   lingular segment. 3. No acute pulmonary emboli seen although there are some limitations   for evaluation of the lower lobes and motion artifacts. 4. No aneurysm formation dissection thoracic aorta. 5. Discrete to mild pericardial effusion. CT ABDOMEN PELVIS W IV CONTRAST Additional Contrast? None   Final Result   1. Findings for severe constipation with a large amount of fecal   contents/rotation/impaction from the sigmoid colon to the rectum,   causing a pattern of megacolon with marked fluid/air distentions or   more proximal segments of the colon.       2. Consider decompression of the rectum/sigmoid to permit relieve of the more proximal segments of the colon. 3. Marked: Distention can explain hypoventilation lower lung bases   causing atelectasis or consolidations with overall patent central   airways of both lower lobes. Please correlate clinically. 4. There is no dilatation of the biliary tree, to be reevaluated   relieve of the megacolon pattern which cause significant mass effect   throughout the entire abdomen peritoneal cavity. .      5. Mild ascites in the abdomen. 6. No free intraperitoneal air observed. XR CHEST PORTABLE   Final Result      Gaseous distention of a loop of bowel within the left upper abdomen   may be stomach or colon. Consider further workup with computed   tomography for better characterization. Bilateral pleural effusions with contiguous atelectasis. Endotracheal tube is present with the distal tip 5 cm above the   adriano. Nasogastric tube follows the expected contours of the esophagus into   stomach with distal sideholes overlying the left upper quadrant of the   abdomen. Esophageal manometry device appears present terminating at the level   of the esophagogastric junction. XR ABDOMEN FOR NG/OG/NE TUBE PLACEMENT   Final Result      Gaseous distention of a loop of bowel within the left upper abdomen   may be stomach or colon. Consider further workup with computed   tomography for better characterization. Bilateral pleural effusions with contiguous atelectasis. Endotracheal tube is present with the distal tip 5 cm above the   adriano. Nasogastric tube follows the expected contours of the esophagus into   stomach with distal sideholes overlying the left upper quadrant of the   abdomen. Esophageal manometry device appears present terminating at the level   of the esophagogastric junction.        XR ABDOMEN (KUB) (SINGLE AP VIEW)   Final Result   Right hemicolonic fecal distention with functional outflow obstruction   of the distal small bowel. The transverse colon is distended with gas,   raising questions regarding possible \"institutional bowel\" or chronic   constipation. Limited radiographic appearance does not suggest small bowel   obstruction. XR CHEST PORTABLE   Final Result   Left basilar opacity. Differential includes atelectasis, infection,   and/or layering pleural effusion. 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 *      Plan:  62 F history of multiple system atrophy, hypotension, seizure disorder Adm to ICU w ischemic colitis septic shock status post Exploratory laparotomy with subtotal colectomy and end-ileostomy , bronchoscopy 1/3 and subsequently reintubated, Extubated  on high flow NC/PRN BiPAP status post repeat bronchoscopy  mucous plugging    Transferred back to ICU  for worsening respiratory failure    Wean O2 per Pulm CCM  Aggressive pulmonary hygiene  NT suctioning discussed with nursing  IV vasopressors-- off  IV antibiotics--Zosyn for suspected pneumonia completed  Diflucan IV completed  Monitor labs closely   speech therapy eval--continues to be n.p.o.--continue speech therapy  Multiple reports of family members feeding patient. I have emphasized repeatedly that while the patient is n.p.o. any oral feeding is high risk for aspiration.   Tube feeds per  transition to [employer]  Pulmonary critical care consult appreciated--discussed with Dr. Rosa Maya surgery consult appreciated-  Palliative care Consult appreciated   ID consult appreciated      Plan for LTAC--appeal pending      Electronically signed by Hellen Apgar, MD on 2020 at 9:32 AM

## 2020-01-24 NOTE — PROGRESS NOTES
Physical Therapy    Daily treatment note      Name: Mi Carrion  : 1965  MRN: [id number]    Date of Service: 2020    Evaluating PT:  Paul Loyd, PT, DPT [id number]    Room #:  5333/5556-P  Diagnosis:  Hypotension   PMHx:  Arthritis, depression, MSA, HTN, Parkinson's disease, seizure   Procedures:  Exlap, subtotal colectomy, end ileostomy 19;  Bronch 20   Pertinent info: Intubated -, Bronch , Re-intubated 1/3, Extubated   Precautions:  Falls, O2, Corpak, Ostomy, B prevalon boots   Equipment Needs:  LINDA THOMAS    Pt lives with  in a 1 story home with ramp entry. Bedroom and bathroom are on the first level. Pt ambulated with U-Step Walker vs. Motor wheelchair PTA per . She reports she requires heavy assist to pivot to chair. Requires assistance for ADLs at baseline. Mostly helped by her  and her parents assist as well. Re-evaluation on 20 d/t transfer out of intensive care and return to intensive care for respiratory distress. Re Evaluation  Date: 20  Treatment   20 Short Term/ Long Term   Goals   AM-PAC 6 Clicks 035 3/26    Was pt agreeable to Eval/treatment? Yes Yes    Does pt have pain? Reports pain in BLE with passive movement  Hypersensitive L UE    Bed Mobility  Rolling: Dep  Supine to sit: Dep  Sit to supine: Dep x2  Scooting: Dep  Rolling:NT  Supine to long sit: dep  Long Sit to supine: dep   Scooting: Dep  Rolling: Mod A  Supine to sit: Mod A  Sit to supine: Mod A  Scooting: Mod A   Transfers Sit to stand: NT  Stand to sit: NT  Stand pivot: NT Sit to stand: NT   Stand to sit: NT   Stand pivot: NT Sit to stand: Max A  Stand to sit: Max A  Stand pivot:  Max A with AAD   Ambulation    NT NT >10 feet with AAD Max A   Stair negotiation: ascended and descended  NT NT NA   ROM BUE:  Per OT eval   RLE:  Passively WFL  LLE:  Plantarflexion/supination at foot/ankle and slight adduction at hip  See re-eval    Strength BUE:  Per OT eval   RLE:  Grossly 2+/5  LLE:  Grossly 1/5 at ankle, 2/5 at knee and hip  See re-eval RLE>3+/5  LLE>3/5    Balance Sitting EOB:  Max A  Dynamic Standing:  NT Sitting EOB:  Max A  Static Standing:  Dep with L knee block  Sitting EOB:  SBA  Dynamic Standing: Mod A     Therapeutic Exercise:  Minimal AAROM of the following:  Gentle hip/knee flex/ext, abd/add, IR/ER  Ankle pumps with prolong stretch    Patient education  Pt was educated on importance of mobility    Patient response to education:   Pt verbalized understanding Pt demonstrated skill Pt requires further education in this area   x x x     Additional Comments: Pt supine with HOB slightly elevated. Pt is still very angry and repeatedly said \"No\" to therapists initially however finally agreed if her aunt Luis Angel Stacy would stay in the room with her. Educated family member on various PROM to perform with pt throughout the day;family very receptive. Pt long sat for 1 minute with assistance trying to cough to expel secretions; minimal amount expelled and suctioned. Repositioned pt to comfort and left with all needs met. Pt call light left by patient. Time in: 1402  Time out: 1428    Pt is making no progress toward established Physical Therapy goals. Continue with physical therapy current plan of care.     Jayda Catherine PTA  License Number: [id number]

## 2020-01-24 NOTE — PROGRESS NOTES
Subjective: On nasal cannula answering questions appropriately asking to go home. family at bedside-all questions answered  Objective:    BP (!) 167/87   Pulse 94   Temp 98.5 °F (36.9 °C) (Temporal)   Resp 20   Ht 5' 6\" (1.676 m)   Wt 75 lb 14.4 oz (34.4 kg)   SpO2 91%   BMI 12.25 kg/m²     24HR INTAKE/OUTPUT:      Intake/Output Summary (Last 24 hours) at 1/24/2020 1221  Last data filed at 1/24/2020 6058  Gross per 24 hour   Intake 1067 ml   Output 800 ml   Net 267 ml     Off BiPAP answering questions appropriately  heart:  RRR, no murmurs, gallops, or rubs. Lungs: CTA today no wheezes rhonchi or crackles  Abd: bowel sounds present, nontender, nondistended, no masses  Extrem:  No clubbing, cyanosis, or edema  Weak x4 extremities  Most Recent Labs  Lab Results   Component Value Date    WBC 10.5 01/24/2020    HGB 8.7 (L) 01/24/2020    HCT 29.3 (L) 01/24/2020     01/24/2020     01/24/2020    K 3.8 01/24/2020    CL 98 01/24/2020    CREATININE 0.3 (L) 01/24/2020    BUN 26 (H) 01/24/2020    CO2 29 01/24/2020    GLUCOSE 134 (H) 01/24/2020    ALT 9 01/22/2020    AST 14 01/22/2020    INR 1.1 12/30/2019    TSH 1.950 12/29/2019     Recent Labs     01/24/20  0545   MG 1.9     Lab Results   Component Value Date    CALCIUM 9.6 01/24/2020    PHOS 2.8 01/24/2020        XR CHEST PORTABLE   Final Result   New feeding tube placed which terminates well below the diaphragms. See above. No new abnormal findings otherwise. XR ABDOMEN (KUB) (SINGLE AP VIEW)   Final Result      1. New Dobbhoff feeding tube coiled in the stomach. 2. Persistent moderately dilated small bowel loops in the central and   left abdomen. XR CHEST PORTABLE   Final Result   Stable abnormal chest with bronchitis and developing right basilar   atelectasis/infiltrates concerning for pneumonia. Dilated bowel loops.       XR CHEST PORTABLE   Final Result   No acute process in the chest      Some gastric and small bowel loop distention               XR Acute Abd Series Chest 1 VW   Final Result      1. Improving atelectasis. 2. No obvious free air. 3. Moderate postoperative abdomen ileus. XR CHEST PORTABLE   Final Result   Stable abnormal chest with  improving but persistent   infiltrates/atelectasis in the right lung base concerning for   pneumonia. Significant bowel dilatation. XR CHEST PORTABLE   Final Result   No significant changes since study performed the same   date. The areas of atelectasis in the right base. XR CHEST PORTABLE   Final Result   tortuous ectatic aorta   Airspace disease compatible with atelectasis or pneumonia, worse at   the right lung base as compared to the left   The chest appears to be worse in the interval                  XR CHEST PORTABLE   Final Result   Left basilar opacity which has developed since yesterday and may   represent atelectasis or pneumonia. NG tube and right arm PICC line are stable in position. XR CHEST PORTABLE   Final Result   No acute cardiopulmonary process. XR CHEST PORTABLE   Final Result   Tortuous ectatic aorta   Cardiomegaly   Airspace disease compatible with pneumonia, in the left upper lung,   the left lung base and at the right lung   The chest appears to be worse in the interval                  XR CHEST PORTABLE   Final Result   Persistent bibasilar infiltrates and pleural effusion likely   pneumonia.       Gastric distention            XR CHEST PORTABLE   Final Result   tortuous ectatic aorta   Airspace disease compatible with atelectasis or pneumonia, at the   right lung base and to lesser extent the left lung base   There is slight interval improvement                  XR CHEST PORTABLE   Final Result   tortuous ectatic aorta   Airspace disease compatible with atelectasis or pneumonia, more   prominent right lung base as compared to the left likely with   superimposed post pleural effusions, Clinical   assessment is recommended. ALERT:  THIS IS AN ABNORMAL REPORT            CT HEAD WO CONTRAST   Final Result      NO ACUTE INTRACRANIAL PROCESS         XR CHEST PORTABLE   Final Result      Right upper lung patchy infiltrate about the right minor fissure. Cardiomegaly      Nasogastric tube in satisfactory position with the tip not well seen   but appears to be in the distal stomach. XR Chest Abdomen Ng Placement   Final Result   Enteric catheter with the tip projecting over the expected region of   the stomach. Pneumoperitoneum which may be normal given the patient's history of   recent abdominal surgery. XR ABDOMEN FOR NG/OG/NE TUBE PLACEMENT   Final Result   Addendum 1 of 1   Consider CT scan if better assessment of the abdomen as clinically   indicated. Final      XR CHEST PORTABLE   Final Result   NG tube reaches the stomach               XR CHEST PORTABLE   Final Result   Partial clearing of bibasilar infiltrates and left effusion               XR CHEST PORTABLE   Final Result   Stable abnormal chest with  improving CHF. Superimposed pneumonia is   not excluded. CT CHEST W CONTRAST   Final Result   1. Atelectasis consolidation of both lower lobes with patent central   airways. Findings to be correlated clinically. Can be relate with the   hypoventilation of the lower lung bases. Cannot exclude a pneumonia. 2. Discrete patchy bronchocentric infiltrate in the left upper lobe   lingular segment. 3. No acute pulmonary emboli seen although there are some limitations   for evaluation of the lower lobes and motion artifacts. 4. No aneurysm formation dissection thoracic aorta. 5. Discrete to mild pericardial effusion. CT ABDOMEN PELVIS W IV CONTRAST Additional Contrast? None   Final Result   1.  Findings for severe constipation with a large amount of fecal   contents/rotation/impaction from the sigmoid colon to the rectum,   causing a pattern of

## 2020-01-24 NOTE — PROGRESS NOTES
bowel loop distention               XR Acute Abd Series Chest 1 VW   Final Result      1. Improving atelectasis. 2. No obvious free air. 3. Moderate postoperative abdomen ileus. XR CHEST PORTABLE   Final Result   Stable abnormal chest with  improving but persistent   infiltrates/atelectasis in the right lung base concerning for   pneumonia. Significant bowel dilatation. XR CHEST PORTABLE   Final Result   No significant changes since study performed the same   date. The areas of atelectasis in the right base. XR CHEST PORTABLE   Final Result   tortuous ectatic aorta   Airspace disease compatible with atelectasis or pneumonia, worse at   the right lung base as compared to the left   The chest appears to be worse in the interval                  XR CHEST PORTABLE   Final Result   Left basilar opacity which has developed since yesterday and may   represent atelectasis or pneumonia. NG tube and right arm PICC line are stable in position. XR CHEST PORTABLE   Final Result   No acute cardiopulmonary process. XR CHEST PORTABLE   Final Result   Tortuous ectatic aorta   Cardiomegaly   Airspace disease compatible with pneumonia, in the left upper lung,   the left lung base and at the right lung   The chest appears to be worse in the interval                  XR CHEST PORTABLE   Final Result   Persistent bibasilar infiltrates and pleural effusion likely   pneumonia.       Gastric distention            XR CHEST PORTABLE   Final Result   tortuous ectatic aorta   Airspace disease compatible with atelectasis or pneumonia, at the   right lung base and to lesser extent the left lung base   There is slight interval improvement                  XR CHEST PORTABLE   Final Result   tortuous ectatic aorta   Airspace disease compatible with atelectasis or pneumonia, more   prominent right lung base as compared to the left likely with   superimposed post pleural effusions, overall when compared to the   previous study of the chest is improved                     XR CHEST PORTABLE   Final Result   No interval change               XR CHEST PORTABLE   Final Result   Stable abnormal chest with persistent bibasilar infiltrates likely   pneumonia. Dilated bowel loops with improvement. XR ABDOMEN (KUB) (SINGLE AP VIEW)   Final Result   Some dilated small bowel loops in left side of the abdomen. There is a   mid small bowel obstruction suggested      Significant left lower lobe infiltrate and right basilar infiltrate   and effusion. XR CHEST PORTABLE   Final Result   Progressive bibasilar infiltrates and pleural effusions which may be   due to pneumonia. Dilated bowel loops which may be due to postoperative ileus versus   bowel obstruction. XR CHEST PORTABLE   Final Result      1. Progressive improvement in right lower lobe pneumonia and   atelectasis. 2. Apparent clearing of previous mild right upper lobe pneumonia. XR CHEST PORTABLE   Final Result      XR CHEST PORTABLE   Final Result   Patchy bilateral pulmonary infiltrates, worst in the lower lungs, with   some improvement in the left lower lung. Small right pleural effusion. US GALLBLADDER RUQ   Final Result      Color wall thickness is increased to 3.2 mm. No other evidence to suggest acute cholecystitis. XR CHEST PORTABLE   Final Result   Stable abnormal chest with the persistent bilateral infiltrates and   pleural effusions with a marginal improvement which may be due to   improving CHF with edema and/or pneumonia. CT ABDOMEN PELVIS W IV CONTRAST Additional Contrast? None   Final Result   Subtotal colectomy with right lower quadrant ileostomy with a   moderately dilated/ thickened small bowel loops which may be due to   ileus/enteritis or low-grade bowel obstruction.       The rectal remnant is significantly dilated/ thickened with retained   fecal matter and high density material with the presacral edema   concerning for proctitis. Infiltrates and pleural effusions in the lung bases. Somewhat thickened contracted gallbladder with the some   pericholecystic edema. CT CHEST W CONTRAST   Final Result   Persistent patchy bibasilar infiltrates and pleural effusions and   groundglass infiltrates in the right upper lobe likely diffuse   pneumonia. No lung abscess is identified. XR CHEST PORTABLE   Final Result   Tortuous ectatic aorta   Cardiomegaly    Airspace disease compatible with pneumonia, there may be a component   of pulmonary vascular congestion present   The chest does not appear to be significantly changed in the interval                  XR CHEST PORTABLE   Final Result   Improved yet persistent right mid and lower lung opacities with trace   effusion. Persistent retrocardiac consolidation. XR CHEST PORTABLE   Final Result      Subtle improved aeration of the right lung. No new or worsening   airspace opacities. Lines and tubes as described. XR CHEST PORTABLE   Final Result      Cardiomegaly      Airspace consolidation the right upper lobe appears to be unchanged      Small to moderate right-sided pleural effusion      Support line including endotracheal tube and other lines appears to be   in satisfactory position. XR CHEST PORTABLE   Final Result   Progressive diffuse bilateral infiltrates and pleural effusion with a   thick consolidation in the right upper lobe. CHF with edema and/or   diffuse pneumonia considered. Gastric distention with a nasogastric tube in place. XR CHEST PORTABLE   Final Result   Partial clearing of right lower lobe atelectasis/infiltrate               XR CHEST PORTABLE   Final Result   Worsening CHF with edema. Suggestion of free intraperitoneal air better seen in the previous   examination and could be related to recent surgery. Clinical   assessment is recommended. ALERT:  THIS IS AN ABNORMAL REPORT            CT HEAD WO CONTRAST   Final Result      NO ACUTE INTRACRANIAL PROCESS         XR CHEST PORTABLE   Final Result      Right upper lung patchy infiltrate about the right minor fissure. Cardiomegaly      Nasogastric tube in satisfactory position with the tip not well seen   but appears to be in the distal stomach. XR Chest Abdomen Ng Placement   Final Result   Enteric catheter with the tip projecting over the expected region of   the stomach. Pneumoperitoneum which may be normal given the patient's history of   recent abdominal surgery. XR ABDOMEN FOR NG/OG/NE TUBE PLACEMENT   Final Result   Addendum 1 of 1   Consider CT scan if better assessment of the abdomen as clinically   indicated. Final      XR CHEST PORTABLE   Final Result   NG tube reaches the stomach               XR CHEST PORTABLE   Final Result   Partial clearing of bibasilar infiltrates and left effusion               XR CHEST PORTABLE   Final Result   Stable abnormal chest with  improving CHF. Superimposed pneumonia is   not excluded. CT CHEST W CONTRAST   Final Result   1. Atelectasis consolidation of both lower lobes with patent central   airways. Findings to be correlated clinically. Can be relate with the   hypoventilation of the lower lung bases. Cannot exclude a pneumonia. 2. Discrete patchy bronchocentric infiltrate in the left upper lobe   lingular segment. 3. No acute pulmonary emboli seen although there are some limitations   for evaluation of the lower lobes and motion artifacts. 4. No aneurysm formation dissection thoracic aorta. 5. Discrete to mild pericardial effusion. CT ABDOMEN PELVIS W IV CONTRAST Additional Contrast? None   Final Result   1.  Findings for severe constipation with a large amount of fecal   contents/rotation/impaction from the sigmoid colon to the rectum,   causing a pattern of megacolon with marked fluid/air distentions or   more proximal segments of the colon. 2. Consider decompression of the rectum/sigmoid to permit relieve of   the more proximal segments of the colon. 3. Marked: Distention can explain hypoventilation lower lung bases   causing atelectasis or consolidations with overall patent central   airways of both lower lobes. Please correlate clinically. 4. There is no dilatation of the biliary tree, to be reevaluated   relieve of the megacolon pattern which cause significant mass effect   throughout the entire abdomen peritoneal cavity. .      5. Mild ascites in the abdomen. 6. No free intraperitoneal air observed. XR CHEST PORTABLE   Final Result      Gaseous distention of a loop of bowel within the left upper abdomen   may be stomach or colon. Consider further workup with computed   tomography for better characterization. Bilateral pleural effusions with contiguous atelectasis. Endotracheal tube is present with the distal tip 5 cm above the   adriano. Nasogastric tube follows the expected contours of the esophagus into   stomach with distal sideholes overlying the left upper quadrant of the   abdomen. Esophageal manometry device appears present terminating at the level   of the esophagogastric junction. XR ABDOMEN FOR NG/OG/NE TUBE PLACEMENT   Final Result      Gaseous distention of a loop of bowel within the left upper abdomen   may be stomach or colon. Consider further workup with computed   tomography for better characterization. Bilateral pleural effusions with contiguous atelectasis. Endotracheal tube is present with the distal tip 5 cm above the   adriano. Nasogastric tube follows the expected contours of the esophagus into   stomach with distal sideholes overlying the left upper quadrant of the   abdomen.        Esophageal manometry device appears present terminating at the level   of the esophagogastric junction. XR ABDOMEN (KUB) (SINGLE AP VIEW)   Final Result   Right hemicolonic fecal distention with functional outflow obstruction   of the distal small bowel. The transverse colon is distended with gas,   raising questions regarding possible \"institutional bowel\" or chronic   constipation. Limited radiographic appearance does not suggest small bowel   obstruction. XR CHEST PORTABLE   Final Result   Left basilar opacity. Differential includes atelectasis, infection,   and/or layering pleural effusion. 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 *      Plan:  62 F history of multiple system atrophy, hypotension, seizure disorder Adm to ICU w ischemic colitis septic shock status post Exploratory laparotomy with subtotal colectomy and end-ileostomy 12/30, bronchoscopy 1/3 and subsequently reintubated, Extubated 1/9 on high flow NC/PRN BiPAP status post repeat bronchoscopy 1/18 mucous plugging    Transferred back to ICU 1/17 for worsening respiratory failure    Wean O2 per Pulm CCM  Aggressive pulmonary hygiene  IV vasopressors-- off  IV antibiotics--Zosyn for suspected pneumonia completed  Diflucan IV completed  Monitor labs closely   speech therapy eval--continues to be n.p.o.--continue speech therapy  Multiple reports of family members feeding patient. I have emphasized repeatedly that while the patient is n.p.o. any oral feeding is high risk for aspiration.   Tube feeds per NG likely needs transition to [facility]  Pulmonary critical care consult appreciated--discussed with Dr. Green Clinjaylin surgery consult appreciated-  Palliative care Consult appreciated   ID

## 2020-01-24 NOTE — PROGRESS NOTES
Occupational Therapy  Treatment Session    Date:2020  Patient Name: Gisel Gil  MRN: [id number]  : 1965  Room: [address] [place] [address]-A    Re-evaluating OT: Rachel Mcgovern OTR/L [id number]     AM-PAC Daily Activity Raw Score:   Recommended Adaptive Equipment:  AE/built up foam for grooming tasks; may benefit from hand/Wrist splints to prevent wrist/digit flexion contracture (cont to monitor)     Comments: Based on patient's functional performance as stated above and level of assistance needed prior to admission, this therapist believes that the patient would benefit from continued skilled OT during/following hospital stay in an effort to increase safety, functional independence with ADLs/IADLs, and quality of life.        Diagnosis: Hypotension    Procedures/additional info:  Exlap, subtotal colectomy, end ileostomy; - intubated;  bronch; 1/3 re-intubated;  extubated    Pertinent Medical History: arthritis, depression, frequent falls, MSA, HTN, Parkinson's disease, seizure     Precautions:  Falls, seizures, ileostomy,, NGT, NPO/tube feed, abdominal precautions, B prevalon boots, oxygen via HFNC     Home Living: Pt lives with  in a 1 story home with ramp to enter;; bed/bath on 1 floor.   Bathroom setup: Pt using walk-in tub with grab bars; elevated toilet riser with rails    Equipment owned: U-step walker, electric wheelchair     Prior Level of Function: assist with majority of ADLs- was able to feed self and groom-  reports caretakers 4-5 days/week; assist with IADLs; using walker vs electric wheelchair for ambulation. Multiple falls. Supportive father. Driving: no     Pain Level:  Unable to quantify, Winces and Yells out w/ all movement/attempts at Ney UE/LE ROM, appears to be hypersensitive L UE/LE to light touch, adamantly declines ROM of L UE     Communication: F- ability to express needs. F+ eye contact. Answering basic questions with delay.   Yelling out \"No!\" and \"Get properly positioned in semi-supine at end of session. All extremities elevated on pillows in neutral position. Objects remained in pt's palms. Family educated on BUE positioning bed level to decrease contracture. G understanding. After session, patient left with all devices within reach, all lines and tubes intact, nursing notified of pt status. Pt educated on benefits of participating in OT services, benefits of proper positioning for skin integrity and joint integrity. Pt additionally educated on OT role,  importance of EOB/OOB activity and completing ADL tasks daily as IND as possible, fall risk precautions/call light use, UB ROM and proper positioning in bed. Environmental and sensory modifications assessed and implemented. Pt re-oriented to date, time, time of day, place, and situation as well as provided with visual and auditory stimuli. Family remained at bedside      Patient and/or family were instructed/educated on diagnosis, prognosis/goals and plan of care. Demonstrated G understanding, further information not needed.       24 treatment minutes  Time in: 1402  Time out: [place], [place], OTR/L  # [id number]

## 2020-01-24 NOTE — PROGRESS NOTES
[facility] PROGRESS NOTE      Chief complaint: Follow-up of HAP    The patient is a 62 y.o. female with history of seizure disorder, Parkinson's disease, multisystem atrophy, presented on 12/29 with increasing weakness found to be in septic shock with ischemic colitis based on CT abdomen and pelvis showing megacolon with marked fluid/air distention on more proximal segments of the colon. She underwent exploratory laparotomy, subtotal colectomy, and ileostomy on 12/13 during which large volume turbid tan malodorous fluid, profoundly distended large bowel with large volume stool burden and ischemic sigmoid colon with out evidence of perforation were noted. Peritoneal fluid Gram stain and culture showed few polymorphonuclear leukocytes, no epithelial cells, no organisms, no growth. Blood cultures showed no growth to date. She was extubated and was off vasopressor support on 12/31 but had oxygen desaturation with CXR showing development of right upper lung infiltrate. She underwent bronchoscopy on 01/02 during which diffuse tracheobronchitis and secretions with airway edema without endobronchial masses/lesions were noted. Bronchial wash Gram stain and culture showed moderate polymorphonuclear leukocytes, rare epithelial cells, rare gram-positive diphtheroid-like rods, oral pharyngeal lakshmi, light growth of Saccharomyces cerevisiae. She was reintubated on 01/03 due to acute on chronic respiratory failure despite on NIPPV. She received a dose of ceftriaxone on admission and has been on piperacillin-tazobactam since admission. She continued to have intermittent febrile episodes.  CT abdomen and pelvis on 01/06 showed subtotal colectomy with right lower quadrant ileostomy with a moderately dilated/ thickened small bowel loops which may be due to ileus/enteritis or low-grade bowel obstruction, rectal remnant significantly dilated/thickened with retained fecal matter and high density material with presacral edema concerning for proctitis. She underwent bronchoscopy on 01/08 during which mucus plugging was noted with Gram stain and culture showing no polymorphonuclear leukocytes, no epithelial cells, rare Gram-positive diphtheroid-like rods, reduced oral pharyngeal lakshmi, Corynebacterium sp, rare growth of Saccharomyces cerevisiae, Candida tropicalis and parapsilosis. Serum beta-D-glucan was negative. She was extubated on 01/09. CXR on 01/17 showed new left basilar opacity, accompanied by leukocytosis of 14,000. She underwent bronchoscopy on 01/18 during which thick secretions around vocal cords, right middle lobe, left lower lobe, inflamed and friable trachea and main adriano were noted. BAL Gram stain and culture showed few polymorphonuclear leukocytes, no epithelial cells, rare Gram-positive cocci, absent oral pharyngeal lakshmi. She became hypoxic and was transferred back to MICU on 01/18. Subjective: Patient was seen and examined. She is awake, has weak cough. Objective:  BP (!) 167/87   Pulse 94   Temp 98.5 °F (36.9 °C) (Temporal)   Resp 20   Ht 5' 6\" (1.676 m)   Wt 75 lb 14.4 oz (34.4 kg)   SpO2 91%   BMI 12.25 kg/m²   Constitutional: Awake, not in distress  Respiratory: Coarse breath sounds, no wheezes  Cardiovascular: Regular rate and rhythm, no murmurs  Gastrointestinal: Bowel sounds present, soft, nontender, ostomy in place  Skin: Warm and dry, no active dermatoses  Musculoskeletal: No joint swelling, no joint erythema    Labs, imaging, and medical records/notes were personally reviewed.      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 Continue chest physiotherapy. Thank you for involving me in the care of Lola Estrada. Dr. Tyra Chin will continue to follow over the weekend. Please do not hesitate to call for any questions or concerns.       Electronically signed by Cristian Harper MD on 1/24/2020 at 9:55 AM

## 2020-01-24 NOTE — PROGRESS NOTES
Chart reviewed. We have had multiple conversations with  and patient along with patient's family regarding goals of care and code status discussion. They are all in agreement that they want all aggressive medical treatment and for patient to remain full code. Awaiting on placement/dc planning. No further pall med needs identified at this time. Will sign off.

## 2020-01-24 NOTE — PROGRESS NOTES
1,000 mg Intravenous Q12H    lidocaine   Topical Once       Physical Exam:  General Appearance: appears comfortable in no acute distress. ,  She is not answering questions or opening eyes for me, thin, contracted, ill-appearing  HEENT: Normocephalic atraumatic without obvious abnormality , corpak   Neck: Lips, mucosa, and tongue normal.    Lung: Breath sounds coarse sounding  bilaterally  no wheezing, no crackles. Respirations   unlabored. Symmetrical expansion. Heart: RRR, normal S1, S2. No MRG  Abdomen: Soft, NT, ND. BS present x 4 quadrants. No bruit or organomegaly. Extremities: Pedal pulses 2+ symmetric b/l. Extremities normal, no cyanosis, clubbing, or edema. Musculokeletal: No joint swelling, no muscle tenderness. ROM normal in all joints of extremities. Neurologic: Mental status: yelling out , dickey snot follow commands     Pertinent/ New Labs and Imaging Studies     Imaging Personally Reviewed:         Labs:  Lab Results   Component Value Date    WBC 10.5 01/24/2020    HGB 8.7 01/24/2020    HCT 29.3 01/24/2020    MCV 98.3 01/24/2020    MCH 29.2 01/24/2020    MCHC 29.7 01/24/2020    RDW 16.7 01/24/2020     01/24/2020    MPV 11.1 01/24/2020     Lab Results   Component Value Date     01/24/2020    K 3.8 01/24/2020    K 4.1 01/22/2020    CL 98 01/24/2020    CO2 29 01/24/2020    BUN 26 01/24/2020    CREATININE 0.3 01/24/2020    LABALBU 4.0 01/22/2020    CALCIUM 9.6 01/24/2020    GFRAA >60 01/24/2020    LABGLOM >60 01/24/2020     Lab Results   Component Value Date    PROTIME 11.9 12/30/2019    INR 1.1 12/30/2019     No results for input(s): PROBNP in the last 72 hours. No results for input(s): PROCAL in the last 72 hours. This SmartLink has not been configured with any valid records. Assessment:    1. Acute respiratory failure with hypoxia , Intubated 12/29, extubated 12/31, Reintubated 1/3 extubation 1/9/20  2.  Bronchoscopy 1/2/20, bronchial washing is growing Saccharomyces

## 2020-01-24 NOTE — CARE COORDINATION
Expedited appeal for LTAC denied. Martine Buncollin following. Will add to weekend therapy evals and start precert when appropriate. HENS and ambulance form on soft chart. Pulmonology following, NP had an extensive discussion about resp status with family. Palliative following.  CM/SW continues to follow

## 2020-01-24 NOTE — PLAN OF CARE
Problem: Respiratory Function - Compromised:  Goal: Absence of respiratory distress  Description  Maintaining head of bed at least 30 degrees.    Outcome: Met This Shift     Problem: Pain:  Goal: Control of acute pain  Description  Control of acute pain  Outcome: Met This Shift     Problem: Skin Integrity:  Goal: Will show no infection signs and symptoms  Description  Will show no infection signs and symptoms  Outcome: Met This Shift

## 2020-01-25 NOTE — PROGRESS NOTES
developing right basilar   atelectasis/infiltrates concerning for pneumonia. Dilated bowel loops. XR CHEST PORTABLE   Final Result   No acute process in the chest      Some gastric and small bowel loop distention               XR Acute Abd Series Chest 1 VW   Final Result      1. Improving atelectasis. 2. No obvious free air. 3. Moderate postoperative abdomen ileus. XR CHEST PORTABLE   Final Result   Stable abnormal chest with  improving but persistent   infiltrates/atelectasis in the right lung base concerning for   pneumonia. Significant bowel dilatation. XR CHEST PORTABLE   Final Result   No significant changes since study performed the same   date. The areas of atelectasis in the right base. XR CHEST PORTABLE   Final Result   tortuous ectatic aorta   Airspace disease compatible with atelectasis or pneumonia, worse at   the right lung base as compared to the left   The chest appears to be worse in the interval                  XR CHEST PORTABLE   Final Result   Left basilar opacity which has developed since yesterday and may   represent atelectasis or pneumonia. NG tube and right arm PICC line are stable in position. XR CHEST PORTABLE   Final Result   No acute cardiopulmonary process. XR CHEST PORTABLE   Final Result   Tortuous ectatic aorta   Cardiomegaly   Airspace disease compatible with pneumonia, in the left upper lung,   the left lung base and at the right lung   The chest appears to be worse in the interval                  XR CHEST PORTABLE   Final Result   Persistent bibasilar infiltrates and pleural effusion likely   pneumonia.       Gastric distention            XR CHEST PORTABLE   Final Result   tortuous ectatic aorta   Airspace disease compatible with atelectasis or pneumonia, at the   right lung base and to lesser extent the left lung base   There is slight interval improvement                  XR CHEST PORTABLE   Final of the esophagus into   stomach with distal sideholes overlying the left upper quadrant of the   abdomen. Esophageal manometry device appears present terminating at the level   of the esophagogastric junction. XR ABDOMEN (KUB) (SINGLE AP VIEW)   Final Result   Right hemicolonic fecal distention with functional outflow obstruction   of the distal small bowel. The transverse colon is distended with gas,   raising questions regarding possible \"institutional bowel\" or chronic   constipation. Limited radiographic appearance does not suggest small bowel   obstruction. XR CHEST PORTABLE   Final Result   Left basilar opacity. Differential includes atelectasis, infection,   and/or layering pleural effusion. 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 *      Plan:  62 F history of multiple system atrophy, hypotension, seizure disorder Adm to ICU w ischemic colitis septic shock status post Exploratory laparotomy with subtotal colectomy and end-ileostomy 12/30, bronchoscopy 1/3 and subsequently reintubated, Extubated 1/9 on high flow NC/PRN BiPAP status post repeat bronchoscopy 1/18 mucous plugging    Transferred back to ICU 1/17 for worsening respiratory failure  Seen on intermediate care  Wean O2 per Pulm CCM  Aggressive pulmonary hygiene  IV vasopressors-- off  IV antibiotics--Zosyn for suspected pneumonia completed  Diflucan IV completed  Monitor labs closely   speech therapy eval--continues to be n.p.o.--continue speech therapy  Multiple reports of family members feeding patient. I have emphasized repeatedly that while the patient is n.p.o. any oral feeding is high risk for aspiration.   Tube feeds per NG likely needs transition to [facility]  Pulmonary critical care consult appreciated--  General surgery consult appreciated-  Palliative care Consult appreciated   ID consult appreciated  Patient is high risk for further respiratory decompensation with reintubation and further decline. If patient declines any further or fails to progress and family wishes to continue aggressive management then I recommend PEG and trach. Hospice would be a reasonable alternative, though family does not seem accepting of prognosis.       LTAC appeal denied  Plan for SNF     Electronically signed by Tameka Mcnamara MD on 1/25/2020 at 3:25 PM

## 2020-01-25 NOTE — PROGRESS NOTES
Pt refusimg use of bipap. Telling RRT to get out of her room. bipap on standby.   Pt comfortable on 3L NC.

## 2020-01-25 NOTE — PROGRESS NOTES
Dr. Carlos Luevano notified of patient still having staples from surgery with no order to remove. Awaiting response.  Electronically signed by Ruth Ruvalcaba RN on 1/25/2020 at 9:47 AM

## 2020-01-25 NOTE — PROGRESS NOTES
Midline abdominal incision healing nicely. Staples removed by surgical resident.     Electronically signed by Mil Lim on 1/25/2020 at 5:48 PM

## 2020-01-25 NOTE — PROGRESS NOTES
lidocaine   Topical Once       Physical Exam:  General Appearance: appears comfortable in no acute distress. ,  She is not answering questions or opening eyes for me, thin, contracted, ill-appearing  HEENT: Normocephalic atraumatic without obvious abnormality , corpak   Neck: Lips, mucosa, and tongue normal.    Lung: Breath sounds coarse sounding  bilaterally  no wheezing, no crackles. Respirations   unlabored. Symmetrical expansion. Heart: RRR, normal S1, S2. No MRG  Abdomen: Soft, NT, ND. BS present x 4 quadrants. No bruit or organomegaly. Extremities: Pedal pulses 2+ symmetric b/l. Extremities normal, no cyanosis, clubbing, or edema. Musculokeletal: No joint swelling, no muscle tenderness. ROM normal in all joints of extremities. Neurologic: Mental status: yelling out , dickey snot follow commands     Pertinent/ New Labs and Imaging Studies     Imaging Personally Reviewed:         Labs:  Lab Results   Component Value Date    WBC 9.1 01/25/2020    HGB 9.4 01/25/2020    HCT 31.4 01/25/2020    MCV 98.1 01/25/2020    MCH 29.4 01/25/2020    MCHC 29.9 01/25/2020    RDW 16.4 01/25/2020     01/25/2020    MPV 10.9 01/25/2020     Lab Results   Component Value Date     01/25/2020    K 3.9 01/25/2020    K 4.1 01/22/2020    CL 96 01/25/2020    CO2 30 01/25/2020    BUN 18 01/25/2020    CREATININE 0.3 01/25/2020    LABALBU 4.0 01/22/2020    CALCIUM 9.8 01/25/2020    GFRAA >60 01/25/2020    LABGLOM >60 01/25/2020     Lab Results   Component Value Date    PROTIME 11.9 12/30/2019    INR 1.1 12/30/2019     No results for input(s): PROBNP in the last 72 hours. No results for input(s): PROCAL in the last 72 hours. This SmartLink has not been configured with any valid records. Assessment:    1. Acute respiratory failure with hypoxia , Intubated 12/29, extubated 12/31, Reintubated 1/3 extubation 1/9/20  2. Bronchoscopy 1/2/20, bronchial washing is growing Saccharomyces cerevisiae.  Repeat cx growing corynebacterium. Repeat bronch 1/18 for mucus plugging  3. Septic shock-resolved, secondary to peritonitis from ischemic colitis status post exploratory laparotomy, subtotal colectomy and ileostomy on 1213  4. Parkinsons disease  5. Seizure disorder   6. Cervical dystonia   7. Multiple system atrophy -neuromuscular disorder diagnosed at [facility] Dr Miguel Jacobo treated with botox for dystonia, baclofen and flexeril   8. Hypotension improved off pressors , hx of HTN  9. Ischemic colitis s/p subtotal colectomy and ileostomy       Plan:   1. ABG done today shows metabolic alkalosis. Possibly due to volume contraction patient's urine output last 24 hours is only been 625 cc. Will give patient 500 cc normal saline. Follow BMP in a.m. 2. Chest physiotherapy , chest vest Q 8 hrs , [place]  neb  3. currently  3 L nasal cannula saturation 95%   4. NIV for respiratory support, AVAPSE  5. ID following-treated for HAP , currently monitoring off antibiotics  6. Scheduled bronchodilators duoneb Q 4 WA, pulmozyme, 3% nebs ,guaifenesin, pep/flutter   7. PT, OT  8. Nutritional support-CorPak  9. Previously followed by neuro Dr Miguel Jacobo at [facility]   10. Patient is being followed by palliative care    Talk to mom and brother answered her questions.      Electronically signed by Immanuel Camilo MD on 1/25/2020 at 1:36 PM

## 2020-01-25 NOTE — PROGRESS NOTES
[facility] PROGRESS NOTE      C/C : HAP    Pt is awake and alert  Denies fever  Reports SOB  Pt is confused   Afebrile      Current Facility-Administered Medications   Medication Dose Route Frequency Provider Last Rate Last Dose    mineral oil-hydrophilic petrolatum (HYDROPHOR) ointment   Topical 4x Daily Polina Driscoll MD        And    mineral oil-hydrophilic petrolatum (HYDROPHOR) ointment   Topical 4x Daily PRN Polina Driscoll MD        acetaminophen (TYLENOL) tablet 650 mg  650 mg Oral Q6H PRN Lily Dawson MD        analgesic ointment ointment   Topical PRN Lily Dawson MD        medicated lip balm (BLISTEX/CARMEX) stick   Topical PRN Sharla Ivan MD        glucose (GLUTOSE) 40 % oral gel 15 g  15 g Oral PRN Carrol Myers MD        dextrose 50 % IV solution  12.5 g Intravenous PRN Carrol Myers MD        glucagon (rDNA) injection 1 mg  1 mg Intramuscular PRN Carrol Myers MD        dextrose 5 % solution  100 mL/hr Intravenous PRN Carrol Myers MD        nystatin (MYCOSTATIN) 783997 UNIT/ML suspension 500,000 Units  5 mL Oral 4x Daily Margaretluis Cast, DO   500,000 Units at 01/25/20 0841    lidocaine PF 1 % injection 5 mL  5 mL Nebulization Q4H PRN Carrol Myers MD        ondansetron [facility]) injection 4 mg  4 mg Intravenous Q6H PRN Carrol Myers MD   4 mg at 01/22/20 1850    Lidocaine HCl 4 % CREA 1 each  1 each Apply externally Q6H PRN Carrol Myers MD   1 each at 01/19/20 0030    cetirizine (ZYRTEC) tablet 5 mg  5 mg Oral Daily Carrol Myers MD   5 mg at 01/25/20 0840    lidocaine 4 % external patch 1 patch  1 patch Transdermal Daily Carrol Myers MD   1 patch at 01/25/20 0840    iohexol (OMNIPAQUE 240) injection 50 mL  50 mL Oral ONCE PRN Carrol Myers MD        potassium bicarb-citric acid (EFFER-K) effervescent tablet 40 mEq  40 mEq Oral Daily Carrol Myers MD   40 mEq Intravenous 2 times per day sEequiel Warner MD   10 mL at 01/25/20 0842    sodium chloride flush 0.9 % injection 10 mL  10 mL Intravenous PRN Esequiel Warner MD   10 mL at 01/22/20 0546    enoxaparin (LOVENOX) injection 40 mg  40 mg Subcutaneous Daily Esequiel Warner MD   40 mg at 01/25/20 0841    levetiracetam (KEPPRA) 1000 mg/100 mL IVPB  1,000 mg Intravenous Q12H Esequiel Warner MD   Stopped at 01/25/20 0656    lidocaine (XYLOCAINE) 2 % jelly   Topical Once Esequiel Warner MD           REVIEW OF SYSTEMS:    CONSTITUTIONAL:  Denies fever, chill or rigors  HEENT: denies blurring of vision or double vision, denies hearing problem  RESPIRATORY: SOB    CARDIOVASCULAR:  Denies palpitation  GASTROINTESTINAL:  Denies abdomen pain, diarrhea or constipation. GENITOURINARY:  Denies burning urination or frequency of urination  INTEGUMENT: denies wound , rash  HEMATOLOGIC/LYMPHATIC:  Denies lymph node swelling, gum bleeding or easy bruising.   MUSCULOSKELETAL:  Denies leg pain , joint pain , joint swelling  NEUROLOGICAL:  Weakness, confused           Objective:    BP (!) 140/94   Pulse 95   Temp 98.7 °F (37.1 °C) (Temporal)   Resp 20   Ht 5' 6\" (1.676 m)   Wt 80 lb 4.8 oz (36.4 kg)   SpO2 95%   BMI 12.96 kg/m²   Constitutional: Awake, not in distress, cachectic   HEENT : pallor +, no LN , no thrush   Respiratory: decreased breath sound   Cardiovascular: Regular rate and rhythm, no murmurs  Gastrointestinal: Bowel sounds present, soft, nontender, ileostomy functioning   Skin: Warm and dry, no active dermatoses  Musculoskeletal: No joint swelling, no joint erythema        CBC with Differential:      Lab Results   Component Value Date    WBC 9.1 01/25/2020    RBC 3.20 01/25/2020    HGB 9.4 01/25/2020    HCT 31.4 01/25/2020     01/25/2020    MCV 98.1 01/25/2020    MCH 29.4 01/25/2020    MCHC 29.9 01/25/2020    RDW 16.4 01/25/2020    NRBC 0.9 01/05/2020    METASPCT 0.9 01/05/2020    LYMPHOPCT 15.8 01/25/2020    PROMYELOPCT 0.9 01/04/2020    MONOPCT 9.0 01/25/2020    MYELOPCT 0.9 01/05/2020    BASOPCT 0.3 01/25/2020    MONOSABS 0.82 01/25/2020    LYMPHSABS 1.44 01/25/2020    EOSABS 0.09 01/25/2020    BASOSABS 0.03 01/25/2020       CMP:    Lab Results   Component Value Date     01/25/2020    K 3.9 01/25/2020    K 4.1 01/22/2020    CL 96 01/25/2020    CO2 30 01/25/2020    BUN 18 01/25/2020    CREATININE 0.3 01/25/2020    GFRAA >60 01/25/2020    LABGLOM >60 01/25/2020    GLUCOSE 116 01/25/2020    PROT 7.5 01/22/2020    LABALBU 4.0 01/22/2020    CALCIUM 9.8 01/25/2020    BILITOT 0.2 01/22/2020    ALKPHOS 171 01/22/2020    AST 14 01/22/2020    ALT 9 01/22/2020       Hepatic Function Panel:    Lab Results   Component Value Date    ALKPHOS 171 01/22/2020    ALT 9 01/22/2020    AST 14 01/22/2020    PROT 7.5 01/22/2020    BILITOT 0.2 01/22/2020    BILIDIR <0.1 09/10/2013    LABALBU 4.0 01/22/2020         Microbiology :    Blood culture - negative     Radiology :    Chest X ray  Bibasilar opacities. Assessment:  Leukocytosis- improved   HAP - treated   Septic shock  Resolved ( secondary to peritonitis from ischemic colitis s/p exploratory laparotomy, subtotal colectomy, and ileostomy on 12/13 )         Recommendations:  Monitor clinically off antibiotics for now.         Electronically signed by Jason Diez MD on 1/25/2020 at 12:59 PM

## 2020-01-26 NOTE — PROGRESS NOTES
Subjective: On nasal cannula answering questions appropriately asking to go home. Again states wants to go home no complaints  family at bedside-all questions answered  Objective:    /73   Pulse 103   Temp 98.5 °F (36.9 °C) (Temporal)   Resp 22   Ht 5' 6\" (1.676 m)   Wt 81 lb 3.2 oz (36.8 kg)   SpO2 100%   BMI 13.11 kg/m²     24HR INTAKE/OUTPUT:      Intake/Output Summary (Last 24 hours) at 1/26/2020 1640  Last data filed at 1/26/2020 1415  Gross per 24 hour   Intake 1744 ml   Output 900 ml   Net 844 ml     Off BiPAP answering questions appropriately alert and appropriate  heart:  RRR, no murmurs, gallops, or rubs. Lungs: CTA today no wheezes rhonchi or crackles  Abd: bowel sounds present, nontender, nondistended, no masses  Extrem:  No clubbing, cyanosis, or edema  Weak x4 extremities  Most Recent Labs  Lab Results   Component Value Date    WBC 8.9 01/26/2020    HGB 9.2 (L) 01/26/2020    HCT 31.1 (L) 01/26/2020     01/26/2020     01/26/2020    K 3.8 01/26/2020    CL 98 01/26/2020    CREATININE 0.4 (L) 01/26/2020    BUN 23 (H) 01/26/2020    CO2 27 01/26/2020    GLUCOSE 130 (H) 01/26/2020    ALT 9 01/22/2020    AST 14 01/22/2020    INR 1.1 12/30/2019    TSH 1.950 12/29/2019     Recent Labs     01/26/20  0530   MG 2.0     Lab Results   Component Value Date    CALCIUM 9.9 01/26/2020    PHOS 3.8 01/26/2020        XR CHEST PORTABLE   Final Result   Bibasilar opacities. Differential includes atelectasis and infection. XR CHEST PORTABLE   Final Result   New feeding tube placed which terminates well below the diaphragms. See above. No new abnormal findings otherwise. XR ABDOMEN (KUB) (SINGLE AP VIEW)   Final Result      1. New Dobbhoff feeding tube coiled in the stomach. 2. Persistent moderately dilated small bowel loops in the central and   left abdomen.                XR CHEST PORTABLE   Final Result   Stable abnormal chest with atelectasis/infiltrate               XR CHEST PORTABLE   Final Result   Worsening CHF with edema. Suggestion of free intraperitoneal air better seen in the previous   examination and could be related to recent surgery. Clinical   assessment is recommended. ALERT:  THIS IS AN ABNORMAL REPORT            CT HEAD WO CONTRAST   Final Result      NO ACUTE INTRACRANIAL PROCESS         XR CHEST PORTABLE   Final Result      Right upper lung patchy infiltrate about the right minor fissure. Cardiomegaly      Nasogastric tube in satisfactory position with the tip not well seen   but appears to be in the distal stomach. XR Chest Abdomen Ng Placement   Final Result   Enteric catheter with the tip projecting over the expected region of   the stomach. Pneumoperitoneum which may be normal given the patient's history of   recent abdominal surgery. XR ABDOMEN FOR NG/OG/NE TUBE PLACEMENT   Final Result   Addendum 1 of 1   Consider CT scan if better assessment of the abdomen as clinically   indicated. Final      XR CHEST PORTABLE   Final Result   NG tube reaches the stomach               XR CHEST PORTABLE   Final Result   Partial clearing of bibasilar infiltrates and left effusion               XR CHEST PORTABLE   Final Result   Stable abnormal chest with  improving CHF. Superimposed pneumonia is   not excluded. CT CHEST W CONTRAST   Final Result   1. Atelectasis consolidation of both lower lobes with patent central   airways. Findings to be correlated clinically. Can be relate with the   hypoventilation of the lower lung bases. Cannot exclude a pneumonia. 2. Discrete patchy bronchocentric infiltrate in the left upper lobe   lingular segment. 3. No acute pulmonary emboli seen although there are some limitations   for evaluation of the lower lobes and motion artifacts. 4. No aneurysm formation dissection thoracic aorta. 5. Discrete to mild pericardial effusion.       CT the expected contours of the esophagus into   stomach with distal sideholes overlying the left upper quadrant of the   abdomen. Esophageal manometry device appears present terminating at the level   of the esophagogastric junction. XR ABDOMEN (KUB) (SINGLE AP VIEW)   Final Result   Right hemicolonic fecal distention with functional outflow obstruction   of the distal small bowel. The transverse colon is distended with gas,   raising questions regarding possible \"institutional bowel\" or chronic   constipation. Limited radiographic appearance does not suggest small bowel   obstruction. XR CHEST PORTABLE   Final Result   Left basilar opacity. Differential includes atelectasis, infection,   and/or layering pleural effusion. 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 *      Plan:  62 F history of multiple system atrophy, hypotension, seizure disorder Adm to ICU w ischemic colitis septic shock status post Exploratory laparotomy with subtotal colectomy and end-ileostomy 12/30, bronchoscopy 1/3 and subsequently reintubated, Extubated 1/9 on high flow NC/PRN BiPAP status post repeat bronchoscopy 1/18 mucous plugging    Transferred back to ICU 1/17 for worsening respiratory failure  Seen on intermediate care  Wean O2 per Pulm [facility]  Aggressive pulmonary hygiene  IV vasopressors-- off  IV antibiotics--Zosyn for suspected pneumonia completed  Diflucan IV completed  Monitor labs closely   speech therapy eval--continues to be n.p.o.--continue speech therapy  Multiple reports of family members feeding patient.   I have emphasized repeatedly that while the patient is n.p.o. any oral feeding is high risk for

## 2020-01-26 NOTE — PROGRESS NOTES
Occupational Therapy  Treatment Session    Date:2020  Patient Name: Fred Witt  MRN: [id number]  : 1965  Room: [address] [place] [address]-A    Re-evaluating OT: Gabriela Stapleton OTR/L [id number]     AM-PAC Daily Activity Raw Score:   Recommended Adaptive Equipment:  AE/built up foam for grooming tasks; hand/Wrist splints to prevent wrist/digit flexion contracture (cont to monitor)     Diagnosis: Hypotension    Procedures/additional info:  Exlap, subtotal colectomy, end ileostomy; - intubated;  bronch; 1/3 re-intubated;  extubated    Pertinent Medical History: arthritis, depression, frequent falls, MSA, HTN, Parkinson's disease, seizure     Precautions:  Falls, seizures, ileostomy,, NGT, NPO/tube feed, abdominal precautions, B prevalon boots, oxygen via HFNC     Home Living: Pt lives with  in a 1 story home with ramp to enter; bed/bath on 1 floor.   Bathroom setup: Pt using walk-in tub with grab bars; elevated toilet riser with rails    Equipment owned: U-step walker, electric wheelchair     Prior Level of Function: assist with majority of ADLs- was able to feed self and groom-  reports caretakers 4-5 days/week; assist with IADLs; using walker vs electric wheelchair for ambulation. Multiple falls. Supportive father. Driving: no     Pain Level:  Pt winces with pain with movement and touch of bilateral wrists, no numeric value given. Communication: P+ ability to express needs. F- eye contact. Answering basic questions with delay. Yelling out \"No!\" but cooperative w/  gentle encouragement     Cognition: A&O:  to self only- provided choices with place, month/year but did not answer; Follows 1 step directions with cues/encouragement. F Attention to task.    Memory: Long term- P Short term-P+  Initiation/termination: P              Sequencing: P+              Comprehension: F-              Problem solving: P              Judgement/safety: P          Functional Assessment:    Visual/  Perceptual Glasses: yes- request family bring in     Impairments:  F eye contact-   Cervical stiffness; head rotated to R, R visual gaze     F scanning L of midline          Safety P        Poor F+   BUE strength/endurance See below Pt demonstrates poor tolerance to bilateral PROM G tolerance to BUE AAROM/AROM exercise to improve overall UE use during ADLs and functional tasks                              Comments/Treatment Narrative:  OK from RN to see patient. Upon arrival patient pt seated upright in bed. Completed BUE PROM and core strengthening within tolerance. Placed towel roll in left hand to decrease chance of skin breakdown due to contracted hand. At end of session pt left seated upright in bed,  present.     24 treatment minutes  Time in: 9:13  Time out: 9:37    Jyoti Abbasi

## 2020-01-27 NOTE — PROGRESS NOTES
[facility] PROGRESS NOTE      Chief complaint: Follow-up of HAP    The patient is a 62 y.o. female with history of seizure disorder, Parkinson's disease, multisystem atrophy, presented on 12/29 with increasing weakness found to be in septic shock with ischemic colitis based on CT abdomen and pelvis showing megacolon with marked fluid/air distention on more proximal segments of the colon. She underwent exploratory laparotomy, subtotal colectomy, and ileostomy on 12/13 during which large volume turbid tan malodorous fluid, profoundly distended large bowel with large volume stool burden and ischemic sigmoid colon with out evidence of perforation were noted. Peritoneal fluid Gram stain and culture showed few polymorphonuclear leukocytes, no epithelial cells, no organisms, no growth. Blood cultures showed no growth to date. She was extubated and was off vasopressor support on 12/31 but had oxygen desaturation with CXR showing development of right upper lung infiltrate. She underwent bronchoscopy on 01/02 during which diffuse tracheobronchitis and secretions with airway edema without endobronchial masses/lesions were noted. Bronchial wash Gram stain and culture showed moderate polymorphonuclear leukocytes, rare epithelial cells, rare gram-positive diphtheroid-like rods, oral pharyngeal lakshmi, light growth of Saccharomyces cerevisiae. She was reintubated on 01/03 due to acute on chronic respiratory failure despite on NIPPV. She received a dose of ceftriaxone on admission and has been on piperacillin-tazobactam since admission. She continued to have intermittent febrile episodes.  CT abdomen and pelvis on 01/06 showed subtotal colectomy with right lower quadrant ileostomy with a moderately dilated/ thickened small bowel loops which may be due to ileus/enteritis or low-grade bowel obstruction, rectal remnant significantly dilated/thickened with retained fecal matter and high density material with presacral edema deemed colonization    Recommendations:  Monitor clinically off antibiotics for now. Continue chest physiotherapy. Thank you for involving me in the care of Lola Estrada. I will continue to follow. Please do not hesitate to call for any questions or concerns.       Electronically signed by Diane Crocker MD on 1/27/2020 at 10:23 AM

## 2020-01-27 NOTE — DISCHARGE SUMMARY
Physician Discharge Summary     Patient ID:  Janis Elliott  [id number]  86 y.o.  1965    Admit date: 12/29/2019    Discharge date and time:  1/27/2020    Discharge Diagnoses: Principal Problem:    Septic shock [facility])  Active Problems:    Multiple system atrophy ([facility]    Ischemic colitis ([facility]    Severe protein-calorie malnutrition ([facility]    Acute respiratory failure with hypoxia and hypercapnia (HCC)    Peritonitis ([facility]    Pneumonia  Resolved Problems:    * No resolved hospital problems.  *      Consults: IP CONSULT TO HOSPITALIST  IP CONSULT TO CRITICAL CARE  IP CONSULT TO GENERAL SURGERY  IP CONSULT TO INFECTIOUS DISEASES  IP CONSULT TO PALLIATIVE CARE  IP CONSULT TO NEUROLOGY  IP CONSULT TO IV TEAM    Procedures: ***    Hospital Course: ***    Discharge Exam:  See progress note from today    Condition:  Stable    Disposition: {disposition:13112}    Patient Instructions:   Current Discharge Medication List      START taking these medications    Details   ipratropium-albuterol (DUONEB) 0.5-2.5 (3) MG/3ML SOLN nebulizer solution Inhale 3 mLs into the lungs every 4 hours (while awake)  Qty: 360 mL      cetirizine (ZYRTEC) 5 MG tablet 1 tablet by Per NG tube route daily      ondansetron (ZOFRAN-ODT) 4 MG disintegrating tablet 1 tablet by Per NG tube route 3 times daily as needed for Nausea or Vomiting  Qty: 21 tablet, Refills: 0      guaiFENesin 400 MG tablet 1 tablet by Per NG tube route 3 times daily  Qty: 56 tablet, Refills: 0      melatonin 3 MG TABS tablet 2 tablets by Per NG tube route nightly as needed (sleep)  Refills: 3      dornase alpha (PULMOZYME) 1 MG/ML nebulizer solution Inhale 2.5 mg into the lungs daily  Qty: 75 mL, Refills: 3      !! sodium chloride, Inhalant, 3 % nebulizer solution Take 2 mLs by nebulization every 4 hours      !! sodium chloride, Inhalant, 3 % nebulizer solution Take 4 mLs by nebulization as needed for Cough      potassium bicarb-citric acid (EFFER-K) 20 MEQ TBEF effervescent tablet 1 tablet by Per NG tube route daily  Qty: 120 tablet      Lidocaine HCl 4 % CREA Apply 1 each topically every 6 hours as needed (Pain in right foot and/or back. Please hold/do not apply if open wound is present.)      lidocaine 4 % external patch Place 1 patch onto the skin daily      medicated lip balm (BLISTEX/CARMEX) 2-2.5-6.6 % STCK Apply topically as needed for Dry Lips  Refills: 0      nystatin (MYCOSTATIN) 854682 UNIT/ML suspension Take 5 mLs by mouth 4 times daily      !! mineral oil-hydrophilic petrolatum (HYDROPHOR) ointment Apply topically as needed. Refills: 0      !! mineral oil-hydrophilic petrolatum (HYDROPHOR) ointment Apply topically as needed. Refills: 0      famotidine (PEPCID) 20 MG tablet 1 tablet by Per NG tube route 2 times daily  Qty: 60 tablet, Refills: 3       !! - Potential duplicate medications found. Please discuss with provider. CONTINUE these medications which have CHANGED    Details   !! carbidopa-levodopa (SINEMET)  MG per tablet 2 tablets by Per NG tube route nightly  Qty: 90 tablet, Refills: 3      !! carbidopa-levodopa (SINEMET)  MG per tablet 2.5 tablets by Per NG tube route 5 times daily  Qty: 90 tablet, Refills: 3       !! - Potential duplicate medications found. Please discuss with provider.       STOP taking these medications       potassium acetate 2 MEQ/ML injection Comments:   Reason for Stopping:         pantoprazole (PROTONIX) 40 MG tablet Comments:   Reason for Stopping:         magnesium oxide (MAG-OX) 400 MG tablet Comments:   Reason for Stopping:         NONFORMULARY Comments:   Reason for Stopping:         fludrocortisone (FLORINEF) 0.1 MG tablet Comments:   Reason for Stopping:         midodrine (PROAMATINE) 5 MG tablet Comments:   Reason for Stopping:         gabapentin (NEURONTIN) 300 MG capsule Comments:   Reason for Stopping:         Cyanocobalamin (VITAMIN B-12 PO) Comments:   Reason for Stopping:         TURMERIC PO Comments:   Reason

## 2020-01-27 NOTE — PROGRESS NOTES
Date: 1/26/2020    Time: 10:54 PM    Patient Placed On BIPAP/CPAP/ Non-Invasive Ventilation? Yes    If no must comment. Facial area red/color change? No           If YES are Blister/Lesion present? Yes   If yes must notify nursing staff  BIPAP/CPAP skin barrier? Yes    Skin barrier type:mepilex       Comments:pt has a scab on her nose. Rn is aware.         Chacho Blanco

## 2020-01-27 NOTE — PROGRESS NOTES
Brad Palacio M.D.,[facility]  Chato Jalloh D.O., F.A.C.O.I., [place] M.D. Yoanna Carney M.D., Stella Clinton M.D. Alba Schirmer, D.O. Daily Pulmonary Progress Note    Patient:  Chen Huffman 62 y.o. female MRN: [id number]     Date of Service: 1/27/2020      Synopsis     We are following patient for acute hypoxic respiratory failure, status post intubation extubation x2 since admission, bronchoscopy, noninvasive ventilator    \"CC\" cough     Code status:full      Subjective      Patient was seen and examined. She is laying in bed. Awake trying to talk but very difficult to understand. Still status waxes and wanes. She is saturating 95% on 3 L.  at bedside asking about swallow evaluation I recommended that we wait longer until patient is a bit stronger, patient still with altered mental status at times also.     Review of Systems: Unable to complete    24-hour events:  None     Objective   Vitals: /79   Pulse 97   Temp 97.9 °F (36.6 °C) (Temporal)   Resp 24   Ht 5' 6\" (1.676 m)   Wt 82 lb 3.2 oz (37.3 kg)   SpO2 97%   BMI 13.27 kg/m²     I/O:    Intake/Output Summary (Last 24 hours) at 1/27/2020 1119  Last data filed at 1/27/2020 1000  Gross per 24 hour   Intake 1814 ml   Output 675 ml   Net 1139 ml          IPAP: 18 cmH20  CPAP/EPAP: 8 cmH2O     CURRENT MEDS :  Scheduled Meds:   mineral oil-hydrophilic petrolatum   Topical 4x Daily    nystatin  5 mL Oral 4x Daily    cetirizine  5 mg Oral Daily    lidocaine  1 patch Transdermal Daily    potassium bicarb-citric acid  40 mEq Oral Daily    ipratropium-albuterol  1 ampule Inhalation Q4H WA    guaiFENesin  400 mg Oral TID    sodium chloride (Inhalant)  2 mL Nebulization Q4H    dornase alpha  2.5 mg Inhalation Daily    carbidopa-levodopa  2.5 tablet Oral 5x Daily    carbidopa-levodopa  2 tablet Oral Nightly    pantoprazole  40 mg Intravenous Daily    And    sodium chloride (PF)  10 mL Intravenous Daily    sodium chloride flush  10 mL Intravenous 2 times per day    enoxaparin  40 mg Subcutaneous Daily    levetiracetam  1,000 mg Intravenous Q12H    lidocaine   Topical Once       Physical Exam:  General Appearance: appears comfortable in no acute distress. ,  She is not answering questions or opening eyes for me, thin, contracted, ill-appearing  HEENT: Normocephalic atraumatic without obvious abnormality , corpak   Neck: Lips, mucosa, and tongue normal.    Lung: Breath sounds coarse sounding  bilaterally  no wheezing, no crackles, no rhonchi . Respirations   unlabored. Symmetrical expansion. Heart: RRR, normal S1, S2. No MRG  Abdomen: Soft, NT, ND. BS present x 4 quadrants. No bruit or organomegaly. Extremities: Pedal pulses 2+ symmetric b/l. Extremities normal, no cyanosis, clubbing, or edema. Musculokeletal: No joint swelling, no muscle tenderness. ROM normal in all joints of extremities. Neurologic: Mental status: Alert to self and place today     Pertinent/ New Labs and Imaging Studies     Imaging Personally Reviewed:         Labs:  Lab Results   Component Value Date    WBC 10.0 01/27/2020    HGB 9.1 01/27/2020    HCT 30.9 01/27/2020    MCV 99.4 01/27/2020    MCH 29.3 01/27/2020    MCHC 29.4 01/27/2020    RDW 16.5 01/27/2020     01/27/2020    MPV 11.7 01/27/2020     Lab Results   Component Value Date     01/27/2020    K 4.0 01/27/2020    K 4.1 01/22/2020    CL 99 01/27/2020    CO2 28 01/27/2020    BUN 30 01/27/2020    CREATININE 0.4 01/27/2020    LABALBU 3.7 01/27/2020    CALCIUM 9.8 01/27/2020    GFRAA >60 01/27/2020    LABGLOM >60 01/27/2020     Lab Results   Component Value Date    PROTIME 11.9 12/30/2019    INR 1.1 12/30/2019     No results for input(s): PROBNP in the last 72 hours. No results for input(s): PROCAL in the last 72 hours. This SmartLink has not been configured with any valid records. Assessment:    1.  Acute respiratory failure with hypoxia ,

## 2020-01-27 NOTE — PROGRESS NOTES
Subjective: On nasal cannula answering questions appropriately asking to go home. Denies complaints. No chest pain trouble breathing vomiting diarrhea fevers   No family today, discussed with nursing  objective:    /79   Pulse 97   Temp 97.9 °F (36.6 °C) (Temporal)   Resp 24   Ht 5' 6\" (1.676 m)   Wt 82 lb 3.2 oz (37.3 kg)   SpO2 97%   BMI 13.27 kg/m²     24HR INTAKE/OUTPUT:      Intake/Output Summary (Last 24 hours) at 1/27/2020 1225  Last data filed at 1/27/2020 1000  Gross per 24 hour   Intake 1814 ml   Output 675 ml   Net 1139 ml     Off BiPAP answering questions appropriately alert and appropriate  heart:  RRR, no murmurs, gallops, or rubs. Lungs: CTA today no wheezes rhonchi or crackles  Abd: bowel sounds present, nontender, nondistended, no masses  Extrem:  No clubbing, cyanosis, or edema  Weak x4 extremities  Most Recent Labs  Lab Results   Component Value Date    WBC 10.0 01/27/2020    HGB 9.1 (L) 01/27/2020    HCT 30.9 (L) 01/27/2020     01/27/2020     01/27/2020    K 4.0 01/27/2020    CL 99 01/27/2020    CREATININE 0.4 (L) 01/27/2020    BUN 30 (H) 01/27/2020    CO2 28 01/27/2020    GLUCOSE 114 (H) 01/27/2020    ALT 7 01/27/2020    AST 16 01/27/2020    INR 1.1 12/30/2019    TSH 1.950 12/29/2019     Recent Labs     01/27/20  0530   MG 2.1     Lab Results   Component Value Date    CALCIUM 9.8 01/27/2020    PHOS 3.3 01/27/2020        XR CHEST PORTABLE   Final Result   Bibasilar opacities. Differential includes atelectasis and infection. XR CHEST PORTABLE   Final Result   New feeding tube placed which terminates well below the diaphragms. See above. No new abnormal findings otherwise. XR ABDOMEN (KUB) (SINGLE AP VIEW)   Final Result      1. New Dobbhoff feeding tube coiled in the stomach. 2. Persistent moderately dilated small bowel loops in the central and   left abdomen.                XR CHEST PORTABLE   Final Result XR CHEST PORTABLE   Final Result   tortuous ectatic aorta   Airspace disease compatible with atelectasis or pneumonia, more   prominent right lung base as compared to the left likely with   superimposed post pleural effusions, overall when compared to the   previous study of the chest is improved                     XR CHEST PORTABLE   Final Result   No interval change               XR CHEST PORTABLE   Final Result   Stable abnormal chest with persistent bibasilar infiltrates likely   pneumonia. Dilated bowel loops with improvement. XR ABDOMEN (KUB) (SINGLE AP VIEW)   Final Result   Some dilated small bowel loops in left side of the abdomen. There is a   mid small bowel obstruction suggested      Significant left lower lobe infiltrate and right basilar infiltrate   and effusion. XR CHEST PORTABLE   Final Result   Progressive bibasilar infiltrates and pleural effusions which may be   due to pneumonia. Dilated bowel loops which may be due to postoperative ileus versus   bowel obstruction. XR CHEST PORTABLE   Final Result      1. Progressive improvement in right lower lobe pneumonia and   atelectasis. 2. Apparent clearing of previous mild right upper lobe pneumonia. XR CHEST PORTABLE   Final Result      XR CHEST PORTABLE   Final Result   Patchy bilateral pulmonary infiltrates, worst in the lower lungs, with   some improvement in the left lower lung. Small right pleural effusion. US GALLBLADDER RUQ   Final Result      Color wall thickness is increased to 3.2 mm. No other evidence to suggest acute cholecystitis. XR CHEST PORTABLE   Final Result   Stable abnormal chest with the persistent bilateral infiltrates and   pleural effusions with a marginal improvement which may be due to   improving CHF with edema and/or pneumonia.             CT ABDOMEN PELVIS W IV CONTRAST Additional Contrast? None   Final Result   Subtotal colectomy with right lower quadrant ileostomy with a   moderately dilated/ thickened small bowel loops which may be due to   ileus/enteritis or low-grade bowel obstruction. The rectal remnant is significantly dilated/ thickened with retained   fecal matter and high density material with the presacral edema   concerning for proctitis. Infiltrates and pleural effusions in the lung bases. Somewhat thickened contracted gallbladder with the some   pericholecystic edema. CT CHEST W CONTRAST   Final Result   Persistent patchy bibasilar infiltrates and pleural effusions and   groundglass infiltrates in the right upper lobe likely diffuse   pneumonia. No lung abscess is identified. XR CHEST PORTABLE   Final Result   Tortuous ectatic aorta   Cardiomegaly    Airspace disease compatible with pneumonia, there may be a component   of pulmonary vascular congestion present   The chest does not appear to be significantly changed in the interval                  XR CHEST PORTABLE   Final Result   Improved yet persistent right mid and lower lung opacities with trace   effusion. Persistent retrocardiac consolidation. XR CHEST PORTABLE   Final Result      Subtle improved aeration of the right lung. No new or worsening   airspace opacities. Lines and tubes as described. XR CHEST PORTABLE   Final Result      Cardiomegaly      Airspace consolidation the right upper lobe appears to be unchanged      Small to moderate right-sided pleural effusion      Support line including endotracheal tube and other lines appears to be   in satisfactory position. XR CHEST PORTABLE   Final Result   Progressive diffuse bilateral infiltrates and pleural effusion with a   thick consolidation in the right upper lobe. CHF with edema and/or   diffuse pneumonia considered. Gastric distention with a nasogastric tube in place.             XR CHEST PORTABLE   Final Result   Partial clearing of right lower lobe atelectasis/infiltrate               XR CHEST PORTABLE   Final Result   Worsening CHF with edema. Suggestion of free intraperitoneal air better seen in the previous   examination and could be related to recent surgery. Clinical   assessment is recommended. ALERT:  THIS IS AN ABNORMAL REPORT            CT HEAD WO CONTRAST   Final Result      NO ACUTE INTRACRANIAL PROCESS         XR CHEST PORTABLE   Final Result      Right upper lung patchy infiltrate about the right minor fissure. Cardiomegaly      Nasogastric tube in satisfactory position with the tip not well seen   but appears to be in the distal stomach. XR Chest Abdomen Ng Placement   Final Result   Enteric catheter with the tip projecting over the expected region of   the stomach. Pneumoperitoneum which may be normal given the patient's history of   recent abdominal surgery. XR ABDOMEN FOR NG/OG/NE TUBE PLACEMENT   Final Result   Addendum 1 of 1   Consider CT scan if better assessment of the abdomen as clinically   indicated. Final      XR CHEST PORTABLE   Final Result   NG tube reaches the stomach               XR CHEST PORTABLE   Final Result   Partial clearing of bibasilar infiltrates and left effusion               XR CHEST PORTABLE   Final Result   Stable abnormal chest with  improving CHF. Superimposed pneumonia is   not excluded. CT CHEST W CONTRAST   Final Result   1. Atelectasis consolidation of both lower lobes with patent central   airways. Findings to be correlated clinically. Can be relate with the   hypoventilation of the lower lung bases. Cannot exclude a pneumonia. 2. Discrete patchy bronchocentric infiltrate in the left upper lobe   lingular segment. 3. No acute pulmonary emboli seen although there are some limitations   for evaluation of the lower lobes and motion artifacts. 4. No aneurysm formation dissection thoracic aorta.       5. Discrete to mild pericardial effusion. CT ABDOMEN PELVIS W IV CONTRAST Additional Contrast? None   Final Result   1. Findings for severe constipation with a large amount of fecal   contents/rotation/impaction from the sigmoid colon to the rectum,   causing a pattern of megacolon with marked fluid/air distentions or   more proximal segments of the colon. 2. Consider decompression of the rectum/sigmoid to permit relieve of   the more proximal segments of the colon. 3. Marked: Distention can explain hypoventilation lower lung bases   causing atelectasis or consolidations with overall patent central   airways of both lower lobes. Please correlate clinically. 4. There is no dilatation of the biliary tree, to be reevaluated   relieve of the megacolon pattern which cause significant mass effect   throughout the entire abdomen peritoneal cavity. .      5. Mild ascites in the abdomen. 6. No free intraperitoneal air observed. XR CHEST PORTABLE   Final Result      Gaseous distention of a loop of bowel within the left upper abdomen   may be stomach or colon. Consider further workup with computed   tomography for better characterization. Bilateral pleural effusions with contiguous atelectasis. Endotracheal tube is present with the distal tip 5 cm above the   adriano. Nasogastric tube follows the expected contours of the esophagus into   stomach with distal sideholes overlying the left upper quadrant of the   abdomen. Esophageal manometry device appears present terminating at the level   of the esophagogastric junction. XR ABDOMEN FOR NG/OG/NE TUBE PLACEMENT   Final Result      Gaseous distention of a loop of bowel within the left upper abdomen   may be stomach or colon. Consider further workup with computed   tomography for better characterization. Bilateral pleural effusions with contiguous atelectasis. Endotracheal tube is present with the distal tip 5 cm above the   adriano. Nasogastric tube follows the expected contours of the esophagus into   stomach with distal sideholes overlying the left upper quadrant of the   abdomen. Esophageal manometry device appears present terminating at the level   of the esophagogastric junction. XR ABDOMEN (KUB) (SINGLE AP VIEW)   Final Result   Right hemicolonic fecal distention with functional outflow obstruction   of the distal small bowel. The transverse colon is distended with gas,   raising questions regarding possible \"institutional bowel\" or chronic   constipation. Limited radiographic appearance does not suggest small bowel   obstruction. XR CHEST PORTABLE   Final Result   Left basilar opacity. Differential includes atelectasis, infection,   and/or layering pleural effusion. 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 *      Plan:  62 F history of multiple system atrophy, hypotension, seizure disorder Adm to ICU w ischemic colitis septic shock status post Exploratory laparotomy with subtotal colectomy and end-ileostomy 12/30, bronchoscopy 1/3 and subsequently reintubated, Extubated 1/9 on high flow NC/PRN BiPAP status post repeat bronchoscopy 1/18 mucous plugging    Transferred back to ICU 1/17 for worsening respiratory failure  Seen on intermediate care  Wean O2 per Pulm [facility]  Aggressive pulmonary hygiene  IV vasopressors-- off  IV antibiotics--Zosyn for suspected pneumonia completed  Diflucan IV completed  Monitor labs closely   speech therapy eval--continues to be n.p.o.--continue speech therapy  Multiple reports of family members feeding patient.   I have emphasized repeatedly that while the patient is n.p.o. any oral feeding is high risk for aspiration.   Tube feeds per NG likely needs transition to [facility]  Pulmonary critical care consult appreciated  General surgery consult appreciated  Palliative care Consult appreciated   ID consult appreciated    Patient significantly improved hopefully SNF within the next 24 to 48 hours  LTAC appeal denied  Plan for SNF pending bed likely 24 to 48 hours    Electronically signed by Pradeep Raza MD on 1/27/2020 at 12:25 PM

## 2020-01-28 NOTE — PROGRESS NOTES
Goals  Treatment frequency: 1-3x/week on ICU; PRN on stepdown unit  -pt will improve. .. Feeding NPO/tube feed NPO        Grooming DEP  Seated EOB with max A;  Hand over hand assist using RUE to wash face- F grasp, Max A use of oral swab for hygiene. Limited by RUE rigidity/resistance. L hand use poor d/t pain/digit contractures.  DEP    To wash face while supine     Mod A   while seated EOB with Min A for balance;   F BUE functional use with use of AE/techniques prn      UB Dressing DEP DEP    To don/doff gown while seated EOB     Mod A  while seated EOB;   F BUE functional use   LB Dressing DEP Dep   To don/doff socks while seated upright in bed.    Mod A   rolling bed level with Mod A   Bathing UB-  DEP  LB-  DEP simulation Dep   simulated UB-  Min A  LB-  Mod A with AE/DME prn   Toileting DEP  Rolling bed level B sides to change bed linens  Dep     Mod A  including clothing mgmt and toileting hygiene using DME/device prn   Bed Mobility  Supine to sit: DEP  Sit to supine: DEPx2  Rolling: DEP Dep- supine<->sit  Educated pt on technique to increase independence.      Mod A   in prep for EOB ADL tasks   Functional/  Bathroom  Transfers Sit to stand: NT  Stand to sit: NT  Stand pivot: NT NT  Max A  from varying surfaces using device/DME prn with G safety   Functional Mobility NT    NT  Max A   Bed>bathroom/sink distance using device prn   Balance Sitting:     Static:  Mod <>max A    Dynamic:Max A  Standing:     Static:  NT    Dynamic:NT Max A     for static sitting EOB demo Min A dynamic sitting balance EOB during ADL tasks; Mod dynamic standing balance during ADL tasks using device prn   Endurance/  Activity Tolerance F activity tolerance/endurance during light ADL task      Sitting EOB tolerance >15 min Poor      demo G activity tolerance/endurance during [id number] min ADL task      G demo of EC, pacing and proper breathing techniques   Visual/  Perceptual Glasses: yes- request family bring in     Impairments:  F eye contact-   Cervical stiffness; head rotated to R, R visual gaze     F scanning L of midline          Safety P        Poor F+   BUE strength/endurance See below Pt demonstrates poor tolerance to bilateral PROM G tolerance to BUE AAROM/AROM exercise to improve overall UE use during ADLs and functional tasks                              Comments/Treatment Narrative:  OK from RN to see patient. Upon arrival patient pt in supine. Completed slow gentle PROM to BUEs for x 10 in each plane with increased time. Patient stating ROM \"felt good. \"  Placed L hand/wrist in resting hand splint to reduce further contractures and improve wrist extension. Nursing in room with education provided on proper donning of splint and 2 hour rotation from R hand to L hand. At end of session pt left in supine with HOB slightly elevated and pillows in place to reduce skin breakdown and elevation, mother present.     13 treatment minutes  Time in: 1500  Time out: Fabrice Varma [address], [address]

## 2020-01-28 NOTE — PROGRESS NOTES
12/30/2019    INR 1.1 12/30/2019     No results for input(s): PROBNP in the last 72 hours. No results for input(s): PROCAL in the last 72 hours. This SmartLink has not been configured with any valid records. Assessment:    1. Acute respiratory failure with hypoxia , Intubated 12/29, extubated 12/31, Reintubated 1/3 extubation 1/9/20  2. Bronchoscopy 1/2/20, bronchial washing is growing Saccharomyces cerevisiae. Repeat cx growing corynebacterium. Repeat bronch 1/18 for mucus plugging  3. Septic shock-resolved, secondary to peritonitis from ischemic colitis status post exploratory laparotomy, subtotal colectomy and ileostomy on 1213  4. Parkinsons disease  5. Seizure disorder   6. Cervical dystonia   7. Multiple system atrophy -neuromuscular disorder diagnosed at [facility] Dr Leyda Jensen treated with botox for dystonia, baclofen and flexeril   8. Hypotension improved off pressors , hx of HTN  9. Ischemic colitis s/p subtotal colectomy and ileostomy       Plan:     1. Chest physiotherapy , chest vest Q 8 hrs , [facility]  neb  2. currently  1 L nasal cannula saturation 95%   3. NIV for respiratory support, AVAPSE  4. ID following-treated for HAP , currently monitoring off antibiotics  5. Scheduled bronchodilators duoneb Q 4 WA, pulmozyme, 3% nebs ,guaifenesin, pep/flutter   6. PT, OT  7. Nutritional support-CorPak  8. Follow cxr -no change clear        Electronically signed by NADJA Almeida on 1/28/2020 at 2:33 PM      I personally saw, examined, and cared for the patient. Labs, medications, radiographs reviewed. I agree with history exam and plans detailed in NP note. Clinically improved.   Continue supportive therapies  NIV - AVAPSE  bronchopulm hygiene    Electronically signed by Lilliana Vazquez DO on 1/28/2020 at 4:16 PM

## 2020-01-28 NOTE — PROGRESS NOTES
Date: 1/28/2020    Time: 9:06 AM    Patient Placed On BIPAP/CPAP/ Non-Invasive Ventilation?   No      Comments: bipap in room on standby, patient is not wearing at this time       Maria De Jesus Weinberg

## 2020-01-28 NOTE — PROGRESS NOTES
and   left abdomen. XR CHEST PORTABLE   Final Result   Stable abnormal chest with bronchitis and developing right basilar   atelectasis/infiltrates concerning for pneumonia. Dilated bowel loops. XR CHEST PORTABLE   Final Result   No acute process in the chest      Some gastric and small bowel loop distention               XR Acute Abd Series Chest 1 VW   Final Result      1. Improving atelectasis. 2. No obvious free air. 3. Moderate postoperative abdomen ileus. XR CHEST PORTABLE   Final Result   Stable abnormal chest with  improving but persistent   infiltrates/atelectasis in the right lung base concerning for   pneumonia. Significant bowel dilatation. XR CHEST PORTABLE   Final Result   No significant changes since study performed the same   date. The areas of atelectasis in the right base. XR CHEST PORTABLE   Final Result   tortuous ectatic aorta   Airspace disease compatible with atelectasis or pneumonia, worse at   the right lung base as compared to the left   The chest appears to be worse in the interval                  XR CHEST PORTABLE   Final Result   Left basilar opacity which has developed since yesterday and may   represent atelectasis or pneumonia. NG tube and right arm PICC line are stable in position. XR CHEST PORTABLE   Final Result   No acute cardiopulmonary process. XR CHEST PORTABLE   Final Result   Tortuous ectatic aorta   Cardiomegaly   Airspace disease compatible with pneumonia, in the left upper lung,   the left lung base and at the right lung   The chest appears to be worse in the interval                  XR CHEST PORTABLE   Final Result   Persistent bibasilar infiltrates and pleural effusion likely   pneumonia.       Gastric distention            XR CHEST PORTABLE   Final Result   tortuous ectatic aorta   Airspace disease compatible with atelectasis or pneumonia, at the   right lung base and to lesser extent the left lung base   There is slight interval improvement                  XR CHEST PORTABLE   Final Result   tortuous ectatic aorta   Airspace disease compatible with atelectasis or pneumonia, more   prominent right lung base as compared to the left likely with   superimposed post pleural effusions, overall when compared to the   previous study of the chest is improved                     XR CHEST PORTABLE   Final Result   No interval change               XR CHEST PORTABLE   Final Result   Stable abnormal chest with persistent bibasilar infiltrates likely   pneumonia. Dilated bowel loops with improvement. XR ABDOMEN (KUB) (SINGLE AP VIEW)   Final Result   Some dilated small bowel loops in left side of the abdomen. There is a   mid small bowel obstruction suggested      Significant left lower lobe infiltrate and right basilar infiltrate   and effusion. XR CHEST PORTABLE   Final Result   Progressive bibasilar infiltrates and pleural effusions which may be   due to pneumonia. Dilated bowel loops which may be due to postoperative ileus versus   bowel obstruction. XR CHEST PORTABLE   Final Result      1. Progressive improvement in right lower lobe pneumonia and   atelectasis. 2. Apparent clearing of previous mild right upper lobe pneumonia. XR CHEST PORTABLE   Final Result      XR CHEST PORTABLE   Final Result   Patchy bilateral pulmonary infiltrates, worst in the lower lungs, with   some improvement in the left lower lung. Small right pleural effusion. US GALLBLADDER RUQ   Final Result      Color wall thickness is increased to 3.2 mm. No other evidence to suggest acute cholecystitis. XR CHEST PORTABLE   Final Result   Stable abnormal chest with the persistent bilateral infiltrates and   pleural effusions with a marginal improvement which may be due to   improving CHF with edema and/or pneumonia.             CT ABDOMEN PELVIS W IV CONTRAST Additional Contrast? None   Final Result   Subtotal colectomy with right lower quadrant ileostomy with a   moderately dilated/ thickened small bowel loops which may be due to   ileus/enteritis or low-grade bowel obstruction. The rectal remnant is significantly dilated/ thickened with retained   fecal matter and high density material with the presacral edema   concerning for proctitis. Infiltrates and pleural effusions in the lung bases. Somewhat thickened contracted gallbladder with the some   pericholecystic edema. CT CHEST W CONTRAST   Final Result   Persistent patchy bibasilar infiltrates and pleural effusions and   groundglass infiltrates in the right upper lobe likely diffuse   pneumonia. No lung abscess is identified. XR CHEST PORTABLE   Final Result   Tortuous ectatic aorta   Cardiomegaly    Airspace disease compatible with pneumonia, there may be a component   of pulmonary vascular congestion present   The chest does not appear to be significantly changed in the interval                  XR CHEST PORTABLE   Final Result   Improved yet persistent right mid and lower lung opacities with trace   effusion. Persistent retrocardiac consolidation. XR CHEST PORTABLE   Final Result      Subtle improved aeration of the right lung. No new or worsening   airspace opacities. Lines and tubes as described. XR CHEST PORTABLE   Final Result      Cardiomegaly      Airspace consolidation the right upper lobe appears to be unchanged      Small to moderate right-sided pleural effusion      Support line including endotracheal tube and other lines appears to be   in satisfactory position. XR CHEST PORTABLE   Final Result   Progressive diffuse bilateral infiltrates and pleural effusion with a   thick consolidation in the right upper lobe. CHF with edema and/or   diffuse pneumonia considered. Gastric distention with a nasogastric tube in place. XR CHEST PORTABLE   Final Result   Partial clearing of right lower lobe atelectasis/infiltrate               XR CHEST PORTABLE   Final Result   Worsening CHF with edema. Suggestion of free intraperitoneal air better seen in the previous   examination and could be related to recent surgery. Clinical   assessment is recommended. ALERT:  THIS IS AN ABNORMAL REPORT            CT HEAD WO CONTRAST   Final Result      NO ACUTE INTRACRANIAL PROCESS         XR CHEST PORTABLE   Final Result      Right upper lung patchy infiltrate about the right minor fissure. Cardiomegaly      Nasogastric tube in satisfactory position with the tip not well seen   but appears to be in the distal stomach. XR Chest Abdomen Ng Placement   Final Result   Enteric catheter with the tip projecting over the expected region of   the stomach. Pneumoperitoneum which may be normal given the patient's history of   recent abdominal surgery. XR ABDOMEN FOR NG/OG/NE TUBE PLACEMENT   Final Result   Addendum 1 of 1   Consider CT scan if better assessment of the abdomen as clinically   indicated. Final      XR CHEST PORTABLE   Final Result   NG tube reaches the stomach               XR CHEST PORTABLE   Final Result   Partial clearing of bibasilar infiltrates and left effusion               XR CHEST PORTABLE   Final Result   Stable abnormal chest with  improving CHF. Superimposed pneumonia is   not excluded. CT CHEST W CONTRAST   Final Result   1. Atelectasis consolidation of both lower lobes with patent central   airways. Findings to be correlated clinically. Can be relate with the   hypoventilation of the lower lung bases. Cannot exclude a pneumonia. 2. Discrete patchy bronchocentric infiltrate in the left upper lobe   lingular segment. 3. No acute pulmonary emboli seen although there are some limitations   for evaluation of the lower lobes and motion artifacts.       4. No aneurysm formation present with the distal tip 5 cm above the   adriano. Nasogastric tube follows the expected contours of the esophagus into   stomach with distal sideholes overlying the left upper quadrant of the   abdomen. Esophageal manometry device appears present terminating at the level   of the esophagogastric junction. XR ABDOMEN (KUB) (SINGLE AP VIEW)   Final Result   Right hemicolonic fecal distention with functional outflow obstruction   of the distal small bowel. The transverse colon is distended with gas,   raising questions regarding possible \"institutional bowel\" or chronic   constipation. Limited radiographic appearance does not suggest small bowel   obstruction. XR CHEST PORTABLE   Final Result   Left basilar opacity. Differential includes atelectasis, infection,   and/or layering pleural effusion. 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 *      Plan:  62 F history of multiple system atrophy, hypotension, seizure disorder Adm to ICU w ischemic colitis septic shock status post Exploratory laparotomy with subtotal colectomy and end-ileostomy 12/30, bronchoscopy 1/3 and subsequently reintubated, Extubated 1/9 on high flow NC/PRN BiPAP status post repeat bronchoscopy 1/18 mucous plugging    Transferred back to ICU 1/17 for worsening respiratory failure  Seen on intermediate care   O2 NC/ HS and PRN Bipap   Aggressive pulmonary hygiene  IV vasopressors-- off  IV antibiotics--Zosyn for suspected pneumonia completed  Diflucan IV completed  Monitor labs closely   speech therapy eval--continues to be n.p.o.--continue speech therapy  Multiple reports of family members feeding patient.   I have

## 2020-01-29 NOTE — PROGRESS NOTES
and   left abdomen. XR CHEST PORTABLE   Final Result   Stable abnormal chest with bronchitis and developing right basilar   atelectasis/infiltrates concerning for pneumonia. Dilated bowel loops. XR CHEST PORTABLE   Final Result   No acute process in the chest      Some gastric and small bowel loop distention               XR Acute Abd Series Chest 1 VW   Final Result      1. Improving atelectasis. 2. No obvious free air. 3. Moderate postoperative abdomen ileus. XR CHEST PORTABLE   Final Result   Stable abnormal chest with  improving but persistent   infiltrates/atelectasis in the right lung base concerning for   pneumonia. Significant bowel dilatation. XR CHEST PORTABLE   Final Result   No significant changes since study performed the same   date. The areas of atelectasis in the right base. XR CHEST PORTABLE   Final Result   tortuous ectatic aorta   Airspace disease compatible with atelectasis or pneumonia, worse at   the right lung base as compared to the left   The chest appears to be worse in the interval                  XR CHEST PORTABLE   Final Result   Left basilar opacity which has developed since yesterday and may   represent atelectasis or pneumonia. NG tube and right arm PICC line are stable in position. XR CHEST PORTABLE   Final Result   No acute cardiopulmonary process. XR CHEST PORTABLE   Final Result   Tortuous ectatic aorta   Cardiomegaly   Airspace disease compatible with pneumonia, in the left upper lung,   the left lung base and at the right lung   The chest appears to be worse in the interval                  XR CHEST PORTABLE   Final Result   Persistent bibasilar infiltrates and pleural effusion likely   pneumonia.       Gastric distention            XR CHEST PORTABLE   Final Result   tortuous ectatic aorta   Airspace disease compatible with atelectasis or pneumonia, at the   right lung base and to lesser extent the left lung base   There is slight interval improvement                  XR CHEST PORTABLE   Final Result   tortuous ectatic aorta   Airspace disease compatible with atelectasis or pneumonia, more   prominent right lung base as compared to the left likely with   superimposed post pleural effusions, overall when compared to the   previous study of the chest is improved                     XR CHEST PORTABLE   Final Result   No interval change               XR CHEST PORTABLE   Final Result   Stable abnormal chest with persistent bibasilar infiltrates likely   pneumonia. Dilated bowel loops with improvement. XR ABDOMEN (KUB) (SINGLE AP VIEW)   Final Result   Some dilated small bowel loops in left side of the abdomen. There is a   mid small bowel obstruction suggested      Significant left lower lobe infiltrate and right basilar infiltrate   and effusion. XR CHEST PORTABLE   Final Result   Progressive bibasilar infiltrates and pleural effusions which may be   due to pneumonia. Dilated bowel loops which may be due to postoperative ileus versus   bowel obstruction. XR CHEST PORTABLE   Final Result      1. Progressive improvement in right lower lobe pneumonia and   atelectasis. 2. Apparent clearing of previous mild right upper lobe pneumonia. XR CHEST PORTABLE   Final Result      XR CHEST PORTABLE   Final Result   Patchy bilateral pulmonary infiltrates, worst in the lower lungs, with   some improvement in the left lower lung. Small right pleural effusion. US GALLBLADDER RUQ   Final Result      Color wall thickness is increased to 3.2 mm. No other evidence to suggest acute cholecystitis. XR CHEST PORTABLE   Final Result   Stable abnormal chest with the persistent bilateral infiltrates and   pleural effusions with a marginal improvement which may be due to   improving CHF with edema and/or pneumonia.             CT ABDOMEN PELVIS W IV CONTRAST Additional Contrast? None   Final Result   Subtotal colectomy with right lower quadrant ileostomy with a   moderately dilated/ thickened small bowel loops which may be due to   ileus/enteritis or low-grade bowel obstruction. The rectal remnant is significantly dilated/ thickened with retained   fecal matter and high density material with the presacral edema   concerning for proctitis. Infiltrates and pleural effusions in the lung bases. Somewhat thickened contracted gallbladder with the some   pericholecystic edema. CT CHEST W CONTRAST   Final Result   Persistent patchy bibasilar infiltrates and pleural effusions and   groundglass infiltrates in the right upper lobe likely diffuse   pneumonia. No lung abscess is identified. XR CHEST PORTABLE   Final Result   Tortuous ectatic aorta   Cardiomegaly    Airspace disease compatible with pneumonia, there may be a component   of pulmonary vascular congestion present   The chest does not appear to be significantly changed in the interval                  XR CHEST PORTABLE   Final Result   Improved yet persistent right mid and lower lung opacities with trace   effusion. Persistent retrocardiac consolidation. XR CHEST PORTABLE   Final Result      Subtle improved aeration of the right lung. No new or worsening   airspace opacities. Lines and tubes as described. XR CHEST PORTABLE   Final Result      Cardiomegaly      Airspace consolidation the right upper lobe appears to be unchanged      Small to moderate right-sided pleural effusion      Support line including endotracheal tube and other lines appears to be   in satisfactory position. XR CHEST PORTABLE   Final Result   Progressive diffuse bilateral infiltrates and pleural effusion with a   thick consolidation in the right upper lobe. CHF with edema and/or   diffuse pneumonia considered. Gastric distention with a nasogastric tube in place. dissection thoracic aorta. 5. Discrete to mild pericardial effusion. CT ABDOMEN PELVIS W IV CONTRAST Additional Contrast? None   Final Result   1. Findings for severe constipation with a large amount of fecal   contents/rotation/impaction from the sigmoid colon to the rectum,   causing a pattern of megacolon with marked fluid/air distentions or   more proximal segments of the colon. 2. Consider decompression of the rectum/sigmoid to permit relieve of   the more proximal segments of the colon. 3. Marked: Distention can explain hypoventilation lower lung bases   causing atelectasis or consolidations with overall patent central   airways of both lower lobes. Please correlate clinically. 4. There is no dilatation of the biliary tree, to be reevaluated   relieve of the megacolon pattern which cause significant mass effect   throughout the entire abdomen peritoneal cavity. .      5. Mild ascites in the abdomen. 6. No free intraperitoneal air observed. XR CHEST PORTABLE   Final Result      Gaseous distention of a loop of bowel within the left upper abdomen   may be stomach or colon. Consider further workup with computed   tomography for better characterization. Bilateral pleural effusions with contiguous atelectasis. Endotracheal tube is present with the distal tip 5 cm above the   adriano. Nasogastric tube follows the expected contours of the esophagus into   stomach with distal sideholes overlying the left upper quadrant of the   abdomen. Esophageal manometry device appears present terminating at the level   of the esophagogastric junction. XR ABDOMEN FOR NG/OG/NE TUBE PLACEMENT   Final Result      Gaseous distention of a loop of bowel within the left upper abdomen   may be stomach or colon. Consider further workup with computed   tomography for better characterization. Bilateral pleural effusions with contiguous atelectasis.       Endotracheal tube is present with the distal tip 5 cm above the   adriano. Nasogastric tube follows the expected contours of the esophagus into   stomach with distal sideholes overlying the left upper quadrant of the   abdomen. Esophageal manometry device appears present terminating at the level   of the esophagogastric junction. XR ABDOMEN (KUB) (SINGLE AP VIEW)   Final Result   Right hemicolonic fecal distention with functional outflow obstruction   of the distal small bowel. The transverse colon is distended with gas,   raising questions regarding possible \"institutional bowel\" or chronic   constipation. Limited radiographic appearance does not suggest small bowel   obstruction. XR CHEST PORTABLE   Final Result   Left basilar opacity. Differential includes atelectasis, infection,   and/or layering pleural effusion. 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 *      Plan:  62 F history of multiple system atrophy, hypotension, seizure disorder Adm to ICU w ischemic colitis septic shock status post Exploratory laparotomy with subtotal colectomy and end-ileostomy 12/30, bronchoscopy 1/3 and subsequently reintubated, Extubated 1/9 on high flow NC/PRN BiPAP status post repeat bronchoscopy 1/18 mucous plugging    Transferred back to ICU 1/17 for worsening respiratory failure  Seen on intermediate care   O2 NC/ HS and PRN Bipap   Aggressive pulmonary hygiene  IV vasopressors-- off  IV antibiotics--Zosyn for suspected pneumonia completed  Diflucan IV completed  Monitor labs closely   speech therapy eval--continues to be n.p.o.--continue speech therapy  Multiple reports of family members feeding patient.   I have emphasized repeatedly that while the patient is n.p.o. any oral feeding is high risk for aspiration.   Tube feeds per  [employer]  Pulmonary critical care consult appreciated  General surgery consult appreciated  Palliative care Consult appreciated   ID consult appreciated      [facility] appeal denied  Plan for SNF pending bed     Electronically signed by David Sal MD on 1/29/2020 at 9:17 AM

## 2020-01-29 NOTE — PROGRESS NOTES
Occupational Therapy  Treatment Session    Date:2020  Patient Name: Marisol Nick  MRN: [id number]  : 1965  Room: [address] [place] [address]-A    Re-evaluating OT: Melinda Sanches OTR/L [id number]     AM-PAC Daily Activity Raw Score:   Recommended Adaptive Equipment:  AE/built up foam for grooming tasks; hand/Wrist splints to prevent wrist/digit flexion contracture (cont to monitor)     Diagnosis: Hypotension    Procedures/additional info:  Exlap, subtotal colectomy, end ileostomy; - intubated;  bronch; 1/3 re-intubated;  extubated    Pertinent Medical History: arthritis, depression, frequent falls, MSA, HTN, Parkinson's disease, seizure     Precautions:  Falls, seizures, ileostomy,, NGT, NPO/tube feed, abdominal precautions, B prevalon boots, oxygen via HFNC  Resting Hand Splint - Alternate UEs Q 2 hours     Home Living: Pt lives with  in a 1 story home with ramp to enter; bed/bath on 1 floor.   Bathroom setup: Pt using walk-in tub with grab bars; elevated toilet riser with rails    Equipment owned: U-step walker, electric wheelchair     Prior Level of Function: assist with majority of ADLs- was able to feed self and groom-  reports caretakers 4-5 days/week; assist with IADLs; using walker vs electric wheelchair for ambulation. Multiple falls. Supportive father. Driving: no     Pain Level:  Pt allowing increased movement to her UEs. Communication: P+ ability to express needs. F eye contact. Answering basic questions with delay. Cooperative w/  gentle encouragement     Cognition: A&O:  to self only- provided choices with place, month/year but did not answer; Follows 1 step directions with cues/encouragement. F Attention to task.    Memory: Long term- P Short term-P+  Initiation/termination: P              Sequencing: P+              Comprehension: F-              Problem solving: P              Judgement/safety: P          Functional Assessment:    Re-Eval

## 2020-01-29 NOTE — PROGRESS NOTES
Madiha Godoy M.D.,[facility]  Maryan Walker D.O., F.A.C.O.I., Agustina Nichole M.D. Tammy Ingram M.D., Jeovanny Ravi M.D. Luiz Hand D.O. Daily Pulmonary Progress Note    Patient:  Dexter Hernandezman 62 y.o. female MRN: [id number]     Date of Service: 1/29/2020      Synopsis     We are following patient for acute hypoxic respiratory failure, status post intubation extubation x2 since admission, bronchoscopy, noninvasive ventilator    \"CC\" cough     Code status:full      Subjective      Patient was seen and examined. She is laying in bed. Awake trying to talk but very difficult to understand. She is saturating 95% on 2 L. Family at bedside. Patient has pending discharge to [facility] on insurance pre-CERT.       eview of Systems: Unable to complete    24-hour events:  None     Objective   Vitals: /74   Pulse 87   Temp 99.1 °F (37.3 °C) (Oral)   Resp 26   Ht 5' 6\" (1.676 m)   Wt 85 lb 6.4 oz (38.7 kg)   SpO2 95%   BMI 13.78 kg/m²     I/O:    Intake/Output Summary (Last 24 hours) at 1/29/2020 1007  Last data filed at 1/29/2020 0803  Gross per 24 hour   Intake 658 ml   Output 685 ml   Net -27 ml          IPAP: 18 cmH20  CPAP/EPAP: 8 cmH2O     CURRENT MEDS :  Scheduled Meds:   gabapentin  100 mg PEG Tube TID    mineral oil-hydrophilic petrolatum   Topical 4x Daily    nystatin  5 mL Oral 4x Daily    cetirizine  5 mg Oral Daily    lidocaine  1 patch Transdermal Daily    potassium bicarb-citric acid  40 mEq Oral Daily    ipratropium-albuterol  1 ampule Inhalation Q4H WA    guaiFENesin  400 mg Oral TID    sodium chloride (Inhalant)  2 mL Nebulization Q4H    dornase alpha  2.5 mg Inhalation Daily    carbidopa-levodopa  2.5 tablet Oral 5x Daily    carbidopa-levodopa  2 tablet Oral Nightly    pantoprazole  40 mg Intravenous Daily    And    sodium chloride (PF)  10 mL Intravenous Daily    sodium chloride flush  10 mL Intravenous 2 times per day 01/29/2020    LABGLOM >60 01/29/2020     Lab Results   Component Value Date    PROTIME 11.9 12/30/2019    INR 1.1 12/30/2019     No results for input(s): PROBNP in the last 72 hours. No results for input(s): PROCAL in the last 72 hours. This SmartLink has not been configured with any valid records. Assessment:    1. Acute respiratory failure with hypoxia , Intubated 12/29, extubated 12/31, Reintubated 1/3 extubation 1/9/20  2. Bronchoscopy 1/2/20, bronchial washing is growing Saccharomyces cerevisiae. Repeat cx growing corynebacterium. Repeat bronch 1/18 for mucus plugging  3. Septic shock-resolved, secondary to peritonitis from ischemic colitis status post exploratory laparotomy, subtotal colectomy and ileostomy on 1213  4. Parkinsons disease  5. Seizure disorder   6. Cervical dystonia   7. Multiple system atrophy -neuromuscular disorder diagnosed at [facility] Dr Linwood Thompson treated with botox for dystonia, baclofen and flexeril   8. Hypotension improved off pressors , hx of HTN  9. Ischemic colitis s/p subtotal colectomy and ileostomy       Plan:     1. Chest physiotherapy , chest vest Q 8 hrs , [place]  neb  2. currently  1 L nasal cannula saturation 95%   3. NIV for respiratory support, AVAPSE, ordered for facility  4. ID following-treated for HAP , currently monitoring off antibiotics  5. Scheduled bronchodilators duoneb Q 4 WA, pulmozyme, 3% nebs ,guaifenesin, pep/flutter   6. PT, OT  7. Nutritional support-CorPak  8. Follow cxr        Electronically signed by NADJA Contreras on 1/29/2020 at 10:07 AM    I personally saw, examined, and cared for the patient. Labs, medications, radiographs reviewed. I agree with history exam and plans detailed in NP note. Continue supportive therapies  NIV - AVAPSE  bronchopulm hygiene    Will need non-invasive ventilator support to be continued at facility.     Electronically signed by Jay Goldman DO on 1/29/2020 at 6:26 PM

## 2020-01-29 NOTE — CARE COORDINATION
Bernardert pending to [facility]. Spoke to [employer] with insurance and she stated precert was pending MD decision. HENS and ambulance form on soft chart.

## 2020-01-29 NOTE — PROGRESS NOTES
Nutrition Assessment (Enteral Nutrition)    Type and Reason for Visit: Reassess    Nutrition Recommendations: Continue current TF as ordered. Current regimen meets 100% of calculated protein and calorie needs. Will continue to follow. Nutrition Assessment: Patient w/ conitnued need for EN support- current TF continues to meet patient's needs. Will continue to monitor and folow. Malnutrition Assessment:  · Malnutrition Status: Meets the criteria for severe malnutrition  · Context: Chronic illness  · Findings of the 6 clinical characteristics of malnutrition (Minimum of 2 out of 6 clinical characteristics is required to make the diagnosis of moderate or severe Protein Calorie Malnutrition based on AND/ASPEN Guidelines):  1. Energy Intake-Less than or equal to 75% of estimated energy requirement(average), Greater than or equal to 7 days(RADHA PTA intake data)    2. Weight Loss-Unable to assess(no hx on file), unable to assess  3. Fat Loss-Severe subcutaneous fat loss, Orbital  4. Muscle Loss-Severe muscle mass loss, Temples (temporalis muscle), Clavicles (pectoralis and deltoids), Thigh (quadriceps), Calf (gastrocnemius)  5. Fluid Accumulation-No significant fluid accumulation,    6.   Strength-Not measured    Nutrition Risk Level: High    Nutrition Needs:  · Estimated Daily Total Kcal: 9609-5262(MSJ: 1030 x 1.3 )  · Estimated Daily Protein (g): 65-80(1.5-1.8)  · Estimated Daily Fluid (ml/day): 1300ml     Nutrition Diagnosis:   · Problem: Severe malnutrition, In context of chronic illness  · Etiology: related to Insufficient energy/nutrient consumption(2/2 parkinsons, MS )     Signs and symptoms:  as evidenced by Severe loss of subcutaneous fat, Severe muscle loss    Objective Information:  · Nutrition-Focused Physical Findings: +I/os, orieneted x1, Bipap, soft abd ,active bs, ileostomy RLQ, diarrhea resolved, nastoenteric w/ TF   · Wound Type: Multiple, Surgical Wound, Partial Thickness(wounds

## 2020-01-30 PROBLEM — R53.2 FUNCTIONAL QUADRIPLEGIA (HCC): Status: ACTIVE | Noted: 2020-01-01

## 2020-01-30 NOTE — PROGRESS NOTES
the central and   left abdomen. XR CHEST PORTABLE   Final Result   Stable abnormal chest with bronchitis and developing right basilar   atelectasis/infiltrates concerning for pneumonia. Dilated bowel loops. XR CHEST PORTABLE   Final Result   No acute process in the chest      Some gastric and small bowel loop distention               XR Acute Abd Series Chest 1 VW   Final Result      1. Improving atelectasis. 2. No obvious free air. 3. Moderate postoperative abdomen ileus. XR CHEST PORTABLE   Final Result   Stable abnormal chest with  improving but persistent   infiltrates/atelectasis in the right lung base concerning for   pneumonia. Significant bowel dilatation. XR CHEST PORTABLE   Final Result   No significant changes since study performed the same   date. The areas of atelectasis in the right base. XR CHEST PORTABLE   Final Result   tortuous ectatic aorta   Airspace disease compatible with atelectasis or pneumonia, worse at   the right lung base as compared to the left   The chest appears to be worse in the interval                  XR CHEST PORTABLE   Final Result   Left basilar opacity which has developed since yesterday and may   represent atelectasis or pneumonia. NG tube and right arm PICC line are stable in position. XR CHEST PORTABLE   Final Result   No acute cardiopulmonary process. XR CHEST PORTABLE   Final Result   Tortuous ectatic aorta   Cardiomegaly   Airspace disease compatible with pneumonia, in the left upper lung,   the left lung base and at the right lung   The chest appears to be worse in the interval                  XR CHEST PORTABLE   Final Result   Persistent bibasilar infiltrates and pleural effusion likely   pneumonia.       Gastric distention            XR CHEST PORTABLE   Final Result   tortuous ectatic aorta   Airspace disease compatible with atelectasis or pneumonia, at the   right lung base and to lesser extent the left lung base   There is slight interval improvement                  XR CHEST PORTABLE   Final Result   tortuous ectatic aorta   Airspace disease compatible with atelectasis or pneumonia, more   prominent right lung base as compared to the left likely with   superimposed post pleural effusions, overall when compared to the   previous study of the chest is improved                     XR CHEST PORTABLE   Final Result   No interval change               XR CHEST PORTABLE   Final Result   Stable abnormal chest with persistent bibasilar infiltrates likely   pneumonia. Dilated bowel loops with improvement. XR ABDOMEN (KUB) (SINGLE AP VIEW)   Final Result   Some dilated small bowel loops in left side of the abdomen. There is a   mid small bowel obstruction suggested      Significant left lower lobe infiltrate and right basilar infiltrate   and effusion. XR CHEST PORTABLE   Final Result   Progressive bibasilar infiltrates and pleural effusions which may be   due to pneumonia. Dilated bowel loops which may be due to postoperative ileus versus   bowel obstruction. XR CHEST PORTABLE   Final Result      1. Progressive improvement in right lower lobe pneumonia and   atelectasis. 2. Apparent clearing of previous mild right upper lobe pneumonia. XR CHEST PORTABLE   Final Result      XR CHEST PORTABLE   Final Result   Patchy bilateral pulmonary infiltrates, worst in the lower lungs, with   some improvement in the left lower lung. Small right pleural effusion. US GALLBLADDER RUQ   Final Result      Color wall thickness is increased to 3.2 mm. No other evidence to suggest acute cholecystitis. XR CHEST PORTABLE   Final Result   Stable abnormal chest with the persistent bilateral infiltrates and   pleural effusions with a marginal improvement which may be due to   improving CHF with edema and/or pneumonia.             CT tube is present with the distal tip 5 cm above the   adriano. Nasogastric tube follows the expected contours of the esophagus into   stomach with distal sideholes overlying the left upper quadrant of the   abdomen. Esophageal manometry device appears present terminating at the level   of the esophagogastric junction. XR ABDOMEN (KUB) (SINGLE AP VIEW)   Final Result   Right hemicolonic fecal distention with functional outflow obstruction   of the distal small bowel. The transverse colon is distended with gas,   raising questions regarding possible \"institutional bowel\" or chronic   constipation. Limited radiographic appearance does not suggest small bowel   obstruction. XR CHEST PORTABLE   Final Result   Left basilar opacity. Differential includes atelectasis, infection,   and/or layering pleural effusion. XR CHEST PORTABLE    (Results Pending)       Assessment    Principal Problem:    Septic shock ([address]  Active Problems:    Multiple system atrophy ([address]    Ischemic colitis ([address]    Severe protein-calorie malnutrition ([address]    Acute respiratory failure with hypoxia and hypercapnia (HCC)    Peritonitis ([address]    Pneumonia    Functional quadriplegia ([address]  Resolved Problems:    * No resolved hospital problems.  *      Plan:  62 F history of multiple system atrophy, hypotension, seizure disorder Adm to ICU w ischemic colitis septic shock status post Exploratory laparotomy with subtotal colectomy and end-ileostomy 12/30, bronchoscopy 1/3 and subsequently reintubated, Extubated 1/9 on high flow NC/PRN BiPAP status post repeat bronchoscopy 1/18 mucous plugging    Transferred back to ICU 1/17 for worsening respiratory failure  Seen on intermediate care   O2 NC/ HS and PRN Bipap   Aggressive pulmonary hygiene  IV vasopressors-- off  IV antibiotics--Zosyn for suspected pneumonia completed  Diflucan IV completed  Monitor labs closely   speech therapy eval--continues to be n.p.o.--continue

## 2020-01-30 NOTE — PROGRESS NOTES
corepak removed, pt tolerated well. Attempt for NG tube, unable to advance. Second attempt,NG placed R nare.  Xrays ordered to verify

## 2020-01-30 NOTE — CARE COORDINATION
The pt has been denied from insurance for skilled level of care. Spoke to the pt's  . He is asking that the pt receive another swallow study. Dr Pennie Ruiz notified.  Will have speech re-eval for necessity of repeat swallow eval

## 2020-01-30 NOTE — PLAN OF CARE
Have been unable to give pt her meds through core jennifer. Part of the end of feeding tube tubing is stuck in the core jennifer and numerous attempts by to staff to dislodge it have been unsuccessful. Charge nurse is aware. Message sent to iv team.

## 2020-01-30 NOTE — PROGRESS NOTES
Occupational Therapy  Treatment Session    Date:2020  Patient Name: Willie Griffith  MRN: [id number]  : 1965  Room: [address] [place] [address]    Re-evaluating OT: Viki Harley OTR/L [id number]     AM-PAC Daily Activity Raw Score:   Recommended Adaptive Equipment:  AE/built up foam for grooming tasks; hand/Wrist splints to prevent wrist/digit flexion contracture (cont to monitor)     Diagnosis: Hypotension    Procedures/additional info:  Exlap, subtotal colectomy, end ileostomy; - intubated;  bronch; 1/3 re-intubated;  extubated    Pertinent Medical History: arthritis, depression, frequent falls, MSA, HTN, Parkinson's disease, seizure     Precautions:  Falls, seizures, ileostomy,, NGT, NPO/tube feed, abdominal precautions, B prevalon boots, oxygen via HFNC  Resting Hand Splint - Alternate UEs Q 2 hours     Home Living: Pt lives with  in a 1 story home with ramp to enter; bed/bath on 1 floor.   Bathroom setup: Pt using walk-in tub with grab bars; elevated toilet riser with rails    Equipment owned: U-step walker, electric wheelchair     Prior Level of Function: assist with majority of ADLs- was able to feed self and groom-  reports caretakers 4-5 days/week; assist with IADLs; using walker vs electric wheelchair for ambulation. Multiple falls. Supportive father. Driving: no     Pain Level:  Pt reports Min pain with wrist movement. Communication: P+ ability to express needs. F eye contact. Answering basic questions with delay. Cooperative w/  gentle encouragement     Cognition: A&O: 2/4 to self only- provided choices with place, month/year but did not answer; Follows 1 step directions with cues/encouragement. F Attention to task. Pt hearing and seeing \"birds\" throughout session. Memory: Long term- F Short term- F  Initiation/termination: P              Sequencing: P+              Comprehension:  F              Problem solving: P              Judgement/safety: tolerance >15 min Poor +     demo G activity tolerance/endurance during [id number] min ADL task      G demo of EC, pacing and proper breathing techniques   Visual/  Perceptual Glasses: yes- request family bring in     Impairments:  F eye contact-   Cervical stiffness; head rotated to R, R visual gaze     F scanning L of midline          Safety P        Poor F+   BUE strength/endurance See below Completed BUE PROM all planes to decrease tone with fair- tolerance. Completed cervical and scapular stretching to decrease tightness and discomfort. Core strengthening activities completed seated EOB to increase sitting balance for increased independence with ADL participation. G tolerance to BUE AAROM/AROM exercise to improve overall UE use during ADLs and functional tasks                         Comments/Treatment Narrative:  Upon arrival patient pt in semi-supine. Family members outside room. Increased tolerance to BUE PROM/therapuetic stretching this date. Increased activity tolerance this date tolerating ~25 mins EOB and agreed to sitting in high back chair this date. At end of session pt left seated in bedside chair, pillows under arms. All lines and tubes in place. ** returned to get pt back into bed with Max A x 2. Continued education given to  and pt on splint wear. Discussed level of assist with  due to hopes of discharging home.     39 treatment minutes  Time in: 9:35  Time out: 10:19  Time in: 11:45  Time out: 12:00     Jyoti Arredondo

## 2020-01-30 NOTE — PLAN OF CARE
IV TEAM NURSE HERE TO CHECK CORE AMBER. TUBE FEEDING TUBE PIECE IS LODGED IN CORE AMBER AND IV TEAM NURSE STATED COR AMBER WOULD HAVE TO BE REMOVED ANOTHER ONE PLACED. HOWEVER PT IS FOR A SWALLOW EVAL,SO WE WILL WAIT FOR THE RESULTS OF SWALLOW EVAL BEFORE REPLACING FEEDING TUBE.

## 2020-01-30 NOTE — PROGRESS NOTES
concerning for proctitis. She underwent bronchoscopy on 01/08 during which mucus plugging was noted with Gram stain and culture showing no polymorphonuclear leukocytes, no epithelial cells, rare Gram-positive diphtheroid-like rods, reduced oral pharyngeal lakshmi, Corynebacterium sp, rare growth of Saccharomyces cerevisiae, Candida tropicalis and parapsilosis. Serum beta-D-glucan was negative. She was extubated on 01/09. CXR on 01/17 showed new left basilar opacity, accompanied by leukocytosis of 14,000. She underwent bronchoscopy on 01/18 during which thick secretions around vocal cords, right middle lobe, left lower lobe, inflamed and friable trachea and main adriano were noted. BAL Gram stain and culture showed few polymorphonuclear leukocytes, no epithelial cells, rare Gram-positive cocci, absent oral pharyngeal lakshmi. She became hypoxic and was transferred back to MICU on 01/18. Subjective: Patient was seen and examined. She is awake, has no abdominal pain, able to cough. Objective:  BP (!) 154/76   Pulse 88   Temp 98 °F (36.7 °C) (Temporal)   Resp 18   Ht 5' 6\" (1.676 m)   Wt 85 lb 6.4 oz (38.7 kg)   SpO2 98%   BMI 13.78 kg/m²   Constitutional: Awake, not in distress  Respiratory: Clearbreath sounds, no wheezes  Cardiovascular: Regular rate and rhythm, no murmurs  Gastrointestinal: Bowel sounds present, soft, nontender, ostomy in place  Skin: Warm and dry, no active dermatoses  Musculoskeletal: No joint swelling, no joint erythema    Labs, imaging, and medical records/notes were personally reviewed.      Assessment:  Leukocytosis, resolved, associated with recurrent mucus plugging, in the setting of multisystem atrophy  HAP, received 14-day course of empiric piperacillin-tazobactam  Septic shock, resolved, secondary to peritonitis from ischemic colitis s/p exploratory laparotomy, subtotal colectomy, and ileostomy on 12/13   Saccharomyces cerevisiae and Candida tropicalis and parapsilosis on bronchial washing, deemed colonization    Recommendations:  Monitor clinically off antibiotics for now. Continue chest physiotherapy. Thank you for involving me in the care of Lola Estrada. I will continue to follow. Please do not hesitate to call for any questions or concerns.       Electronically signed by Bryn Camp MD on 1/30/2020 at 10:18 AM

## 2020-01-30 NOTE — PROGRESS NOTES
Strength BUE:  Per OT eval   RLE:  Grossly 2+/5  LLE:  Grossly 1/5 at ankle, 2/5 at knee and hip   RLE>3+/5  LLE>3/5    Balance Sitting EOB:  Max A  Dynamic Standing:  NT Sitting EOB:  Min assist   Static Standing:  Max  Sitting EOB:  SBA  Dynamic Standing: Mod A     Therapeutic Exercise:  PROM of the following supine with HOB slightly elevated: Ankle rom  -  Limited rom B ankles  Hip/knee flex/ext x 10  Hip abd x 5 repeated 2 times with endrange static stretch  Cervical arom x 10  Elbow pushup right side with assist x 5  laq with assist seated x 10  Seated trunk flexion     Ex to promote increase flexability. Pt very rigid      Patient education  Pt was educated on function    Patient response to education:   Pt verbalized understanding Pt demonstrated skill Pt requires further education in this area   x x x     Additional Comments:   Pt supine upon arrival.  Performed prom to increase flexibility. Pt very rigid with rom. Pt then transferred to eob and worked on sitting banance and movement to promote flexibility as well as trunk stability. Pt shoulder and scapular very stiff and rigid making movement difficult for pt. Sitting on eob performed laq rom with endrange stretch and pt positioned sitting eob with black wedges from tap system under feet to promote stretching but pt very limited. Pt performed sit to stand and transferred to sitting in high back chair with ue support . Pt very happy to be up in the chair. Nursing going to medicate and aware of transfer assist.   Pt also given hand sensitive call light. Family out in [address] and checked on pt later after rx and pt visiting with family. Family would benefit from education for prom ex. To promote increase flexability. Time in: 935  Time out: 80    Pt is making minimal progress toward established Physical Therapy goals. Continue with physical therapy current plan of care.     Keith Michelle, PTA

## 2020-01-31 NOTE — DISCHARGE SUMMARY
Physician Discharge Summary     Patient ID:  Ovidio Ortega  [id number]  26 y.o.  1965    Admit date: 2019    Discharge date and time:  2020    Discharge Diagnoses: Principal Problem:    Septic shock [facility])  Active Problems:    Multiple system atrophy ([facility]    Ischemic colitis ([facility]    Severe protein-calorie malnutrition ([facility]    Acute respiratory failure with hypoxia and hypercapnia (HCC)    Peritonitis ([facility]    Pneumonia    Functional quadriplegia ([facility]  Resolved Problems:    * No resolved hospital problems. *      Consults: IP CONSULT TO HOSPITALIST  IP CONSULT TO CRITICAL CARE  IP CONSULT TO GENERAL SURGERY  IP CONSULT TO INFECTIOUS DISEASES  IP CONSULT TO PALLIATIVE CARE  IP CONSULT TO NEUROLOGY  IP CONSULT TO IV TEAM    Procedures: See below    Hospital Course: 62 F history of multiple system atrophy, hypotension, seizure disorder Adm to ICU w ischemic colitis septic shock status post Exploratory laparotomy with subtotal colectomy and end-ileostomy , bronchoscopy 1/3 and subsequently reintubated, Extubated  on high flow NC/PRN BiPAP status post repeat bronchoscopy  mucous plugging     Patient had a prolonged and complicated hospital course. She developed cardiac arrest this morning. She was coded per ACLS protocol. This was unsuccessful --cause of death cardiac arrest.        LTAC appeal denied  SNF denied        Condition:    .     Signed:  Ayan Cordova MD  2020  8:46 AM

## 2020-01-31 NOTE — PROGRESS NOTES
Intubation Record    Date: 1/31/2020  Time: 0242  Patient identity confirmed: Yes  Indications: code blue   Preoxygenation: BVM with 100% O2   Laryngoscope size and type: glidescope  Airway introducer used: No  Evac: Yes  Tube size: 8  Number of attempts :1  Cords visualized:  [x]Clearly [] Poorly   Breath sounds present bilaterally: Yes  ETCO2 16  ETT to lip: 23  Tube secured with: cling  Chest x-ray ordered: No  Difficulties encountered: Patient aspirated bilateral breath sounds heard positive endtidal,suctioned for thick white no damage to teeth, mouth, lips.  Patient's family requested That I take the endo tube out     Difficult Airway: none           Guanakito Vyas, RRT

## 2020-02-03 LAB
FUNGUS (MYCOLOGY) CULTURE: ABNORMAL
FUNGUS STAIN: ABNORMAL
ORGANISM: ABNORMAL

## 2020-02-10 LAB
FUNGUS (MYCOLOGY) CULTURE: ABNORMAL
FUNGUS (MYCOLOGY) CULTURE: NORMAL
FUNGUS STAIN: ABNORMAL
FUNGUS STAIN: NORMAL
ORGANISM: ABNORMAL

## 2020-02-18 LAB
AFB CULTURE (MYCOBACTERIA): NORMAL
AFB SMEAR: NORMAL

## 2020-02-24 LAB
FUNGUS (MYCOLOGY) CULTURE: NORMAL
FUNGUS STAIN: NORMAL

## 2020-02-25 LAB
AFB CULTURE (MYCOBACTERIA): NORMAL
AFB SMEAR: NORMAL

## 2020-08-21 NOTE — PROGRESS NOTES
PLEASE READ YOUR DISCHARGE INSTRUCTIONS ENTIRELY AS IT CONTAINS IMPORTANT INFORMATION.  Please go to the emergency room if you experience chest pain, shortness of breath, funny heart beats, headache, blurred vision, weakness in one arm or leg, slurred speech, numbness, inability to walk or talk, confusion.     Try to avoid common triggers of headaches (stress, menstruation, visual stimuli, weather changes, nitrates, fasting, wine, lack of sleep, smoking, odors, chocolate)       Please return or see your primary care doctor if you develop new or worsening symptoms.       Please arrange follow up with your primary medical clinic as soon as possible. You must understand that you've received an Urgent Care treatment only and that you may be released before all of your medical problems are known or treated. You, the patient, will arrange for follow up as instructed. If your symptoms worsen or fail to improve you should go to the Emergency Room.  WE CANNOT RULE OUT ALL POSSIBLE CAUSES OF YOUR SYMPTOMS IN THE URGENT CARE SETTING PLEASE GO TO THE ER IF YOU FEELS YOUR CONDITION IS WORSENING OR YOU WOULD LIKE EMERGENT EVALUATION.        Instructions for Patients with Confirmed or Suspected COVID-19    If you are awaiting your test result, you will either be called or it will be released to the patient portal.  If you have any questions about your test, please visit [email or web address] or call our COVID-19 information line at [phone].      Instructions for non-hospitalized or discharged patients with confirmed or suspected COVID-19:       Stay home except to get medical care.    Separate yourself from other people and animals in your home.    Call ahead before visiting your doctor.    Wear a face mask.    Cover your coughs and sneezes.    Clean your hands often.    Avoid sharing personal household items.    Clean all high-touch surfaces every day.    Monitor your symptoms. Seek prompt medical  [facility] Quality Flow/Interdisciplinary Rounds Progress Note        Quality Flow Rounds held on January 15, 2020    Disciplines Attending:  Bedside Nurse, Charge nurse, GARRET, OT, PT, , and . Ciro Joe was admitted on 12/29/2019  9:30 AM    Anticipated Discharge Date:  Expected Discharge Date: 12/04/21    Disposition:    Zay Score:  Zay Scale Score: 14    Readmission Risk              Risk of Unplanned Readmission:        18           Discussed patient goal for the day, patient clinical progression, and barriers to discharge.   The following Goal(s) of the Day/Commitment(s) have been identified:  Okay to transfer to [facility]   January 15, 2020 attention if your illness is worsening (e.g., difficulty breathing). Before seeking care, call your healthcare provider.    If you have a medical emergency and must call 911, notify the dispatcher that you have or are being evaluated for COVID-19. If possible, put on a face mask before emergency medical services arrive.    Use the following symptom-based strategy to return to normal activity following a suspected or confirmed case of COVID-19. Continue isolation until:   o At least 3 days (72 hours) have passed since recovery defined as resolution of fever without the use of fever-reducing medications and improvement in respiratory symptoms (e.g. cough, shortness of breath), and   o At least 10 days have passed since the first positive test.       As one of the next steps, you will receive a call or text from the [place] Department of Health ([facility]) COVID-19 Tracing Team. See the contact information below so you know not to ignore the health departments call. It is important that you contact them back immediately so they can help.     Contact Tracer Number:  [phone]  Caller ID for most carriers: [facility]    What is contact tracing?   Contact tracing is a process that helps identify everyone who has been in close contact with an infected person. Contact tracers let those people know they may have been exposed and guide them on next steps. Confidentiality is important for everyone; no one will be told who may have exposed them to the virus.   Your involvement is important. The more we know about where and how this virus is spreading, the better chance we have at stopping it from spreading further.  What does exposure mean?   Exposure means you have been within 6 feet for more than 15 minutes with a person who has or had COVID-19.  What kind of questions do the contact tracers ask?   A contact tracer will confirm your basic contact information including name, address, phone number, and next of kin,  as well as asking about any symptoms you may have had. Theyll also ask you how you think you may have gotten sick, such as places where you may have been exposed to the virus, and people you were with. Those names will never be shared with anyone outside of that call, and will only be used to help trace and stop the spread of the virus.   I have privacy concerns. How will the state use my information?   Your privacy about your health is important. All calls are completed using call centers that use the appropriate health privacy protection measures (HIPAA compliance), meaning that your patient information is safe. No one will ever ask you any questions related to immigration status. Your health comes first.   Do I have to participate?   You do not have to participate, but we strongly encourage you to. Contact tracing can help us catch and control new outbreaks as theyre developing to keep your friends and family safe.   What if I dont hear from anyone?   If you dont receive a call within 24 hours, you can call the number above right away to inquire about your status. That line is open from 8:00 am - 8:00 p.m., 7 days a week.  Contact tracing saves lives! Together, we have the power to beat this virus and keep our loved ones and neighbors safe.       Instructions for household members, intimate partners and caregivers in a non-healthcare setting of a patient with confirmed or suspected COVID-19:         Close contacts should monitor their health and call their healthcare provider right away if they develop symptoms suggestive of COVID-19 (e.g., fever, cough, shortness of breath).    Stay home except to get medical care. Separate yourself from other people and animals in the home.   Monitor the patients symptoms. If the patient is getting sicker, call his or her healthcare provider. If the patient has a medical emergency and you need to call 911, notify the dispatch personnel that the patient has or is  being evaluated for COVID-19.    Wear a facemask when around other people such as sharing a room or vehicle and before entering a healthcare provider's office.   Cover coughs and sneezes with a tissue. Throw used tissues in a lined trash can immediately and wash hands.   Clean hands often with soap and water for at least 20 seconds or with an alcohol-based hand , rubbing hands together until they feel dry. Avoid touching your eyes, nose, and mouth with unwashed hands.   Clean all high-touch; surfaces every day, including counters, tabletops, doorknobs, bathroom fixtures, toilets, phones, keyboards, tablets, bedside tables, etc. Use a household cleaning spray or wipe according to label instructions.   Avoid sharing personal household items such as dishes, drinking glasses, cups, towels, bedding, etc. After these items are used, they should be washed thoroughly with soap and water.   Continue isolation until:   At least 3 days (72 hours) have passed since recovery defined as resolution of fever without the use of fever-reducing medications and improvement in respiratory symptoms (e.g. cough, shortness of breath), and    At least 10 days have passed since the patients first positive test.    https://www.cdc.gov/coronavirus/2019-ncov/your-health/index.htm

## 2023-12-21 NOTE — PROGRESS NOTES
CERTIFICATE OF SCHOOL       December 21, 2023      Re: Deion Melendez  [address] [place] [address]      This is to certify that Deion Melendez has been under my care from 12/21/2023 and can return to school on 12/21/23          SIGNATURE:___________________________________________          Melva Armas MD  [facility], [facility]  [address] [address]  Phone: [phone]   Date: 1/28/2020    Time: 1:23 AM    Patient  On BIPAP/CPAP/ Non-Invasive Ventilation? Yes    If no must comment. Facial area red/color change? Yes           If YES are Blister/Lesion present? Yes   If yes must notify nursing staff  BIPAP/CPAP skin barrier?   Yes    Skin barrier type:mepilex       Comments:  RN aware of scab on nose      Alyssa Lopez

## (undated) DEVICE — BLADE ES L6IN ELASTOMERIC COAT EXT DURABLE BEND UPTO 90DEG

## (undated) DEVICE — SPONGE LAP W18XL18IN WHT COT 4 PLY FLD STRUNG RADPQ DISP ST

## (undated) DEVICE — 90MM,YELLOW,SOFT GUEDEL AIRWAY: Brand: MEDLINE

## (undated) DEVICE — PAD,NON-ADHERENT,3X8,STERILE,LF,1/PK: Brand: MEDLINE

## (undated) DEVICE — PATIENT RETURN ELECTRODE, SINGLE-USE, CONTACT QUALITY MONITORING, ADULT, WITH 9FT CORD, FOR PATIENTS WEIGING OVER 33LBS. (15KG): Brand: MEGADYNE

## (undated) DEVICE — SYRINGE MED 50ML LUERLOCK TIP

## (undated) DEVICE — CONTAINER SPEC 60ML PH 7NEUTRAL BUFF FRMLN RDY TO USE

## (undated) DEVICE — DRAPE THER FLUID WARMING 66X44 IN FLAT SLUSH DBL DISC ORS

## (undated) DEVICE — SET INSTRUMENT LAP II

## (undated) DEVICE — WIPE,1ML,SUREPREP RAPID DRY,NO-STG,FILM: Brand: MEDLINE

## (undated) DEVICE — SURGICAL PROCEDURE PACK BRONCH

## (undated) DEVICE — PAD GEN USE BORDERED ADH 14IN 2IN AND 12IN 4IN GZ UNIV ST

## (undated) DEVICE — Device

## (undated) DEVICE — RELOAD STPL L75MM OPN H3.8MM CLS 1.5MM WIRE DIA0.2MM REG

## (undated) DEVICE — SOLUTION IV IRRIG POUR BRL 0.9% SODIUM CHL 2F7124

## (undated) DEVICE — SEALER LAP L20CM SHFT DIA10MM TISS FUS OPN INSTR STR BILAT

## (undated) DEVICE — SET INSTRUMENT LAP I

## (undated) DEVICE — ADAPTER TBNG DIA15MM SWVL FBROPT BRONCHSCP TERM 2 AXIS PEEP

## (undated) DEVICE — SOLUTION IV IRRIG 500ML 0.9% SODIUM CHL 2F7123

## (undated) DEVICE — DOUBLE BASIN SET: Brand: MEDLINE INDUSTRIES, INC.

## (undated) DEVICE — SURGICAL PROCEDURE PACK TRAUM

## (undated) DEVICE — Device: Brand: MEDEX

## (undated) DEVICE — Z DISCONTINUED USE 2716238 PER MEDLINE STAPLER INT L40MM DIA4.7MM GRN CRV HD B FRM TECHNOLOGY DISP

## (undated) DEVICE — TOWEL,OR,DSP,ST,GREEN,DLX,XR,4/PK,20PK/C: Brand: MEDLINE

## (undated) DEVICE — SHEET, T, LAPAROTOMY, STERILE: Brand: MEDLINE

## (undated) DEVICE — SOLUTION IV IRRIG WATER 1000ML POUR BRL 2F7114

## (undated) DEVICE — BAG,SPONGE COUNTER,BLUE,50/BX,5BX/CS: Brand: MEDLINE

## (undated) DEVICE — Z DISCONTINUED USE 2716239 STAPLER INT STPL 51MM CUT LN L40MM STD TISS CRV CUT CR40B

## (undated) DEVICE — STAPLER INT L75MM CUT LN L73MM STPL LN L77MM BLU B FRM 8

## (undated) DEVICE — SET EXTN IV L30IN TBNG DIA0.1IN PRIMING 4ML MACBOR FEM ADPT